# Patient Record
Sex: FEMALE | Race: WHITE | Employment: OTHER | URBAN - METROPOLITAN AREA
[De-identification: names, ages, dates, MRNs, and addresses within clinical notes are randomized per-mention and may not be internally consistent; named-entity substitution may affect disease eponyms.]

---

## 2017-07-01 PROBLEM — G93.40 ACUTE ENCEPHALOPATHY: Status: ACTIVE | Noted: 2017-07-01

## 2017-11-21 ENCOUNTER — TELEPHONE (OUTPATIENT)
Dept: FAMILY MEDICINE CLINIC | Age: 82
End: 2017-11-21

## 2018-05-01 ENCOUNTER — HOSPITAL ENCOUNTER (OUTPATIENT)
Dept: OTHER | Age: 83
Discharge: HOME | End: 2018-05-31
Attending: INTERNAL MEDICINE | Admitting: INTERNAL MEDICINE

## 2018-05-02 LAB
ALBUMIN SERPL-MCNC: 3.6 GM/DL (ref 3.4–5)
ALP BLD-CCNC: 81 IU/L (ref 40–128)
ALT SERPL-CCNC: 35 U/L (ref 10–40)
ANION GAP SERPL CALCULATED.3IONS-SCNC: 13 MMOL/L (ref 4–16)
AST SERPL-CCNC: 27 IU/L (ref 15–37)
BILIRUB SERPL-MCNC: 0.3 MG/DL (ref 0–1)
BUN BLDV-MCNC: 18 MG/DL (ref 6–23)
CALCIUM SERPL-MCNC: 9.6 MG/DL (ref 8.3–10.6)
CHLORIDE BLD-SCNC: 98 MMOL/L (ref 99–110)
CO2: 28 MMOL/L (ref 21–32)
CREAT SERPL-MCNC: 0.8 MG/DL (ref 0.6–1.1)
GFR AFRICAN AMERICAN: >60 ML/MIN/1.73M2
GFR NON-AFRICAN AMERICAN: >60 ML/MIN/1.73M2
GLUCOSE BLD-MCNC: 70 MG/DL (ref 70–99)
POTASSIUM SERPL-SCNC: 4.7 MMOL/L (ref 3.5–5.1)
SODIUM BLD-SCNC: 139 MMOL/L (ref 135–145)
TOTAL PROTEIN: 6.3 GM/DL (ref 6.4–8.2)

## 2018-05-16 LAB
ALBUMIN SERPL-MCNC: 3.5 GM/DL (ref 3.4–5)
ALP BLD-CCNC: 64 IU/L (ref 40–128)
ALT SERPL-CCNC: 8 U/L (ref 10–40)
ANION GAP SERPL CALCULATED.3IONS-SCNC: 10 MMOL/L (ref 4–16)
AST SERPL-CCNC: 13 IU/L (ref 15–37)
BILIRUB SERPL-MCNC: 0.2 MG/DL (ref 0–1)
BUN BLDV-MCNC: 18 MG/DL (ref 6–23)
CALCIUM SERPL-MCNC: 9.3 MG/DL (ref 8.3–10.6)
CHLORIDE BLD-SCNC: 102 MMOL/L (ref 99–110)
CO2: 28 MMOL/L (ref 21–32)
CREAT SERPL-MCNC: 0.9 MG/DL (ref 0.6–1.1)
GFR AFRICAN AMERICAN: >60 ML/MIN/1.73M2
GFR NON-AFRICAN AMERICAN: 59 ML/MIN/1.73M2
GLUCOSE BLD-MCNC: 78 MG/DL (ref 70–99)
POTASSIUM SERPL-SCNC: 4.2 MMOL/L (ref 3.5–5.1)
SODIUM BLD-SCNC: 140 MMOL/L (ref 135–145)
TOTAL PROTEIN: 5.9 GM/DL (ref 6.4–8.2)

## 2018-06-01 ENCOUNTER — HOSPITAL ENCOUNTER (OUTPATIENT)
Dept: OTHER | Age: 83
Discharge: HOME | End: 2018-06-30
Attending: INTERNAL MEDICINE | Admitting: INTERNAL MEDICINE

## 2018-08-12 PROBLEM — A41.9 SEPSIS (HCC): Status: ACTIVE | Noted: 2018-08-12

## 2018-08-24 PROBLEM — K92.2 GI BLEED: Status: ACTIVE | Noted: 2018-08-24

## 2018-10-17 ENCOUNTER — HOSPITAL ENCOUNTER (EMERGENCY)
Age: 83
Discharge: ANOTHER HOSPITAL | End: 2018-10-17
Attending: EMERGENCY MEDICINE
Payer: MEDICARE

## 2018-10-17 VITALS
WEIGHT: 153 LBS | HEIGHT: 60 IN | OXYGEN SATURATION: 97 % | HEART RATE: 92 BPM | SYSTOLIC BLOOD PRESSURE: 108 MMHG | TEMPERATURE: 98.2 F | DIASTOLIC BLOOD PRESSURE: 60 MMHG | BODY MASS INDEX: 30.04 KG/M2 | RESPIRATION RATE: 19 BRPM

## 2018-10-17 DIAGNOSIS — K92.2 LOWER GI BLEED: Primary | ICD-10-CM

## 2018-10-17 LAB
ALBUMIN SERPL-MCNC: 3.5 GM/DL (ref 3.4–5)
ALP BLD-CCNC: 82 IU/L (ref 40–129)
ALT SERPL-CCNC: 6 U/L (ref 10–40)
ANION GAP SERPL CALCULATED.3IONS-SCNC: 13 MMOL/L (ref 4–16)
APTT: 28.8 SECONDS (ref 21.2–33)
AST SERPL-CCNC: 9 IU/L (ref 15–37)
BASOPHILS ABSOLUTE: 0 K/CU MM
BASOPHILS RELATIVE PERCENT: 0.5 % (ref 0–1)
BILIRUB SERPL-MCNC: 0.3 MG/DL (ref 0–1)
BUN BLDV-MCNC: 34 MG/DL (ref 6–23)
CALCIUM SERPL-MCNC: 9.4 MG/DL (ref 8.3–10.6)
CHLORIDE BLD-SCNC: 106 MMOL/L (ref 99–110)
CO2: 19 MMOL/L (ref 21–32)
CREAT SERPL-MCNC: 1.1 MG/DL (ref 0.6–1.1)
DIFFERENTIAL TYPE: ABNORMAL
EOSINOPHILS ABSOLUTE: 0.1 K/CU MM
EOSINOPHILS RELATIVE PERCENT: 1.2 % (ref 0–3)
GFR AFRICAN AMERICAN: 57 ML/MIN/1.73M2
GFR NON-AFRICAN AMERICAN: 47 ML/MIN/1.73M2
GLUCOSE BLD-MCNC: 130 MG/DL (ref 70–99)
HCT VFR BLD CALC: 42.9 % (ref 37–47)
HEMOGLOBIN: 12.9 GM/DL (ref 12.5–16)
IMMATURE NEUTROPHIL %: 0.3 % (ref 0–0.43)
INR BLD: 0.98 INDEX
LACTATE: 2.4 MMOL/L (ref 0.4–2)
LYMPHOCYTES ABSOLUTE: 1.9 K/CU MM
LYMPHOCYTES RELATIVE PERCENT: 22.6 % (ref 24–44)
MCH RBC QN AUTO: 27.7 PG (ref 27–31)
MCHC RBC AUTO-ENTMCNC: 30.1 % (ref 32–36)
MCV RBC AUTO: 92.1 FL (ref 78–100)
MONOCYTES ABSOLUTE: 0.7 K/CU MM
MONOCYTES RELATIVE PERCENT: 8.5 % (ref 0–4)
NUCLEATED RBC %: 0 %
PDW BLD-RTO: 13.5 % (ref 11.7–14.9)
PLATELET # BLD: 367 K/CU MM (ref 140–440)
PMV BLD AUTO: 10 FL (ref 7.5–11.1)
POTASSIUM SERPL-SCNC: 3.7 MMOL/L (ref 3.5–5.1)
PROTHROMBIN TIME: 11.2 SECONDS (ref 9.12–12.5)
RBC # BLD: 4.66 M/CU MM (ref 4.2–5.4)
REASON FOR REJECTION: NORMAL
REJECTED TEST: NORMAL
SEGMENTED NEUTROPHILS ABSOLUTE COUNT: 5.8 K/CU MM
SEGMENTED NEUTROPHILS RELATIVE PERCENT: 66.9 % (ref 36–66)
SODIUM BLD-SCNC: 138 MMOL/L (ref 135–145)
SOURCE: NORMAL
TOTAL IMMATURE NEUTOROPHIL: 0.03 K/CU MM
TOTAL NUCLEATED RBC: 0 K/CU MM
TOTAL PROTEIN: 7.2 GM/DL (ref 6.4–8.2)
WBC # BLD: 8.6 K/CU MM (ref 4–10.5)

## 2018-10-17 PROCEDURE — 86850 RBC ANTIBODY SCREEN: CPT

## 2018-10-17 PROCEDURE — 99284 EMERGENCY DEPT VISIT MOD MDM: CPT

## 2018-10-17 PROCEDURE — 36415 COLL VENOUS BLD VENIPUNCTURE: CPT

## 2018-10-17 PROCEDURE — 2580000003 HC RX 258: Performed by: EMERGENCY MEDICINE

## 2018-10-17 PROCEDURE — 96361 HYDRATE IV INFUSION ADD-ON: CPT

## 2018-10-17 PROCEDURE — 96360 HYDRATION IV INFUSION INIT: CPT

## 2018-10-17 PROCEDURE — 86900 BLOOD TYPING SEROLOGIC ABO: CPT

## 2018-10-17 PROCEDURE — 86901 BLOOD TYPING SEROLOGIC RH(D): CPT

## 2018-10-17 PROCEDURE — 85730 THROMBOPLASTIN TIME PARTIAL: CPT

## 2018-10-17 PROCEDURE — 85610 PROTHROMBIN TIME: CPT

## 2018-10-17 PROCEDURE — 80053 COMPREHEN METABOLIC PANEL: CPT

## 2018-10-17 PROCEDURE — 83605 ASSAY OF LACTIC ACID: CPT

## 2018-10-17 PROCEDURE — 85025 COMPLETE CBC W/AUTO DIFF WBC: CPT

## 2018-10-17 RX ORDER — 0.9 % SODIUM CHLORIDE 0.9 %
500 INTRAVENOUS SOLUTION INTRAVENOUS ONCE
Status: COMPLETED | OUTPATIENT
Start: 2018-10-17 | End: 2018-10-17

## 2018-10-17 RX ADMIN — SODIUM CHLORIDE 500 ML: 9 INJECTION, SOLUTION INTRAVENOUS at 19:37

## 2018-10-17 ASSESSMENT — PAIN SCALES - GENERAL: PAINLEVEL_OUTOF10: 8

## 2018-10-17 ASSESSMENT — PAIN DESCRIPTION - LOCATION: LOCATION: ABDOMEN

## 2018-10-17 ASSESSMENT — PAIN DESCRIPTION - PAIN TYPE: TYPE: ACUTE PAIN

## 2018-10-17 NOTE — ED PROVIDER NOTES
Triage Chief Complaint:   Rectal Bleeding (x 1hour)    [place]:  Cheyanne Sanchez is a 80 y.o. female that presents with bright red blood per rectum. Per EMS report patient has had 1 hour of bright red blood per rectum. Patient with a history recently of the same. Patient is complaining of some abdominal pain that is described as an achiness, mid abdomen and nonradiating. Patient reports \"I am tired of this happening\". Patient recently with admissions for the same. Patient was found to have a transverse colon mass on last admission and scope at [facility] but at that time was declining any intervention. Patient is with some confusion limiting history. Per chart review, patient is DNR comfort care arrest.  Additionally, patient is on aspirin and Plavix per chart review.     ROS:  General:  No fevers, no chills, no weakness  Eyes:  No recent vison changes, no discharge  ENT:  No sore throat, no nasal congestion, no hearing changes  Cardiovascular:  No chest pain, no palpitations  Respiratory:  No shortness of breath, no cough, no wheezing  Gastrointestinal:  + pain, + GIB, no nausea, no vomiting, no diarrhea  Musculoskeletal:  No muscle pain, no joint pain  Skin:  No rash, no pruritis, no easy bruising  Neurologic:  No speech problems, no headache, no extremity numbness, no extremity tingling, no extremity weakness  Psychiatric:  No anxiety  Genitourinary:  No dysuria, no hematuria  Endocrine:  No unexpected weight gain, no unexpected weight loss  Extremities:  no edema, no pain    Past Medical History:   Diagnosis Date    Arthritis     Blind left eye     Cancer ([facility] [address] [facility] 1971    colon    COPD (chronic obstructive pulmonary disease) (HCC)     Depression     GERD (gastroesophageal reflux disease)     Hypertension     Pneumonia     Restless legs syndrome     Unspecified cerebral artery occlusion with cerebral infarction      Past Surgical History:   Procedure Laterality Date    ABDOMEN SURGERY do not believe this is unreasonable. [facility] and I did speak with Dr. Lacho Olivia who is accepting of patient for direct admission. Bed and definitive transfer are pending on signout to overnight physician. Vitals signs on signout 108/57, HR 88, 96% on RA. Patient updated. Questions sought and answered with the patient. They voice understanding and agree with plan. Clinical Impression:  1. Lower GI bleed      Disposition referral (if applicable):  No follow-up provider specified. Disposition medications (if applicable):  New Prescriptions    No medications on file       Comment: Please note this report has been produced using speech recognition software and may contain errors related to that system including errors in grammar, punctuation, and spelling, as well as words and phrases that may be inappropriate. If there are any questions or concerns please feel free to contact the dictating provider for clarification.        Viridiana Ortega MD  10/17/18 7754

## 2018-12-18 ENCOUNTER — HOSPITAL ENCOUNTER (EMERGENCY)
Age: 83
Discharge: HOME | End: 2018-12-18
Attending: EMERGENCY MEDICINE
Payer: MEDICARE

## 2018-12-18 VITALS
HEIGHT: 60 IN | RESPIRATION RATE: 18 BRPM | SYSTOLIC BLOOD PRESSURE: 118 MMHG | TEMPERATURE: 98.9 F | HEART RATE: 112 BPM | BODY MASS INDEX: 29.45 KG/M2 | WEIGHT: 150 LBS | DIASTOLIC BLOOD PRESSURE: 70 MMHG | OXYGEN SATURATION: 94 %

## 2018-12-18 DIAGNOSIS — R10.33 PERIUMBILICAL ABDOMINAL PAIN: Primary | ICD-10-CM

## 2018-12-18 LAB
ALBUMIN SERPL-MCNC: 3.7 GM/DL (ref 3.4–5)
ALP BLD-CCNC: 79 IU/L (ref 40–129)
ALT SERPL-CCNC: 10 U/L (ref 10–40)
ANION GAP SERPL CALCULATED.3IONS-SCNC: 11 MMOL/L (ref 4–16)
AST SERPL-CCNC: 18 IU/L (ref 15–37)
BASOPHILS ABSOLUTE: 0 K/CU MM
BASOPHILS RELATIVE PERCENT: 0.4 % (ref 0–1)
BILIRUB SERPL-MCNC: 0.1 MG/DL (ref 0–1)
BUN BLDV-MCNC: 15 MG/DL (ref 6–23)
CALCIUM SERPL-MCNC: 8.9 MG/DL (ref 8.3–10.6)
CHLORIDE BLD-SCNC: 107 MMOL/L (ref 99–110)
CO2: 23 MMOL/L (ref 21–32)
CREAT SERPL-MCNC: 0.8 MG/DL (ref 0.6–1.1)
DIFFERENTIAL TYPE: ABNORMAL
EOSINOPHILS ABSOLUTE: 0.4 K/CU MM
EOSINOPHILS RELATIVE PERCENT: 4.5 % (ref 0–3)
GFR AFRICAN AMERICAN: >60 ML/MIN/1.73M2
GFR NON-AFRICAN AMERICAN: >60 ML/MIN/1.73M2
GLUCOSE BLD-MCNC: 97 MG/DL (ref 70–99)
HCT VFR BLD CALC: 39.9 % (ref 37–47)
HEMOGLOBIN: 11.9 GM/DL (ref 12.5–16)
IMMATURE NEUTROPHIL %: 0.3 % (ref 0–0.43)
LACTATE: 1.8 MMOL/L (ref 0.4–2)
LIPASE: 53 IU/L (ref 13–60)
LYMPHOCYTES ABSOLUTE: 2 K/CU MM
LYMPHOCYTES RELATIVE PERCENT: 22.3 % (ref 24–44)
MCH RBC QN AUTO: 25.8 PG (ref 27–31)
MCHC RBC AUTO-ENTMCNC: 29.8 % (ref 32–36)
MCV RBC AUTO: 86.6 FL (ref 78–100)
MONOCYTES ABSOLUTE: 0.6 K/CU MM
MONOCYTES RELATIVE PERCENT: 6.7 % (ref 0–4)
NUCLEATED RBC %: 0 %
PDW BLD-RTO: 15.6 % (ref 11.7–14.9)
PLATELET # BLD: 307 K/CU MM (ref 140–440)
PMV BLD AUTO: 10 FL (ref 7.5–11.1)
POTASSIUM SERPL-SCNC: 4 MMOL/L (ref 3.5–5.1)
RBC # BLD: 4.61 M/CU MM (ref 4.2–5.4)
SEGMENTED NEUTROPHILS ABSOLUTE COUNT: 6 K/CU MM
SEGMENTED NEUTROPHILS RELATIVE PERCENT: 65.8 % (ref 36–66)
SODIUM BLD-SCNC: 141 MMOL/L (ref 135–145)
TOTAL IMMATURE NEUTOROPHIL: 0.03 K/CU MM
TOTAL NUCLEATED RBC: 0 K/CU MM
TOTAL PROTEIN: 6.9 GM/DL (ref 6.4–8.2)
TROPONIN T: <0.01 NG/ML
WBC # BLD: 9.1 K/CU MM (ref 4–10.5)

## 2018-12-18 PROCEDURE — 83690 ASSAY OF LIPASE: CPT

## 2018-12-18 PROCEDURE — 85025 COMPLETE CBC W/AUTO DIFF WBC: CPT

## 2018-12-18 PROCEDURE — 84484 ASSAY OF TROPONIN QUANT: CPT

## 2018-12-18 PROCEDURE — 83605 ASSAY OF LACTIC ACID: CPT

## 2018-12-18 PROCEDURE — 93005 ELECTROCARDIOGRAM TRACING: CPT | Performed by: EMERGENCY MEDICINE

## 2018-12-18 PROCEDURE — 36415 COLL VENOUS BLD VENIPUNCTURE: CPT

## 2018-12-18 PROCEDURE — 99284 EMERGENCY DEPT VISIT MOD MDM: CPT

## 2018-12-18 PROCEDURE — 80053 COMPREHEN METABOLIC PANEL: CPT

## 2018-12-18 ASSESSMENT — ENCOUNTER SYMPTOMS
SORE THROAT: 0
CONSTIPATION: 0
ABDOMINAL PAIN: 1
SHORTNESS OF BREATH: 0
COUGH: 0
RHINORRHEA: 0
DIARRHEA: 0
NAUSEA: 1
BACK PAIN: 0
EYE REDNESS: 0
VOMITING: 0

## 2018-12-18 NOTE — ED PROVIDER NOTES
COLONOSCOPY      ENDOSCOPY, COLON, DIAGNOSTIC      EYE SURGERY      FRACTURE SURGERY      HYSTERECTOMY  1972    SPINE SURGERY  1995    VASCULAR SURGERY       Family History   Problem Relation Age of Onset    Arthritis Mother     Cancer Mother     Diabetes Mother     Heart Disease Mother     High Blood Pressure Mother    Alissa Hanna / [place] Mother     Cancer Sister     Cancer Brother     Diabetes Daughter     High Blood Pressure Daughter      Social History     Social History    Marital status:      Spouse name: N/A    Number of children: 9    Years of education: N/A     Occupational History    Not on file. Social History Main Topics    Smoking status: Current Every Day Smoker     Packs/day: 0.25     Years: 50.00     Types: Cigarettes    Smokeless tobacco: Never Used      Comment: states smokes 1-2 cigs a day    Alcohol use No    Drug use: No    Sexual activity: No     Other Topics Concern    Not on file     Social History Narrative    No narrative on file     No current facility-administered medications for this encounter.       Current Outpatient Prescriptions   Medication Sig Dispense Refill    vitamin B-12 (CYANOCOBALAMIN) 1000 MCG tablet Take 1,000 mcg by mouth daily      magnesium hydroxide (MILK OF MAGNESIA) 400 MG/5ML suspension Take 30 mLs by mouth daily as needed for Constipation      albuterol sulfate  (90 Base) MCG/ACT inhaler Inhale 2 puffs into the lungs every 6 hours as needed for Shortness of Breath       rOPINIRole (REQUIP) 4 MG tablet Take 4 mg by mouth 2 times daily      bisacodyl (DULCOLAX) 10 MG suppository Place 10 mg rectally daily as needed for Constipation       vitamin D (CHOLECALCIFEROL) 1000 UNIT TABS tablet Take 1,000 Units by mouth daily      memantine (NAMENDA) 5 MG tablet Take 1 tablet by mouth 2 times daily 60 tablet 3    acetaminophen 650 MG TABS Take 650 mg by mouth every 4 hours as needed 120 tablet 3    dorzolamide available lab results from this visit (if applicable):  Results for orders placed or performed during the hospital encounter of 12/18/18   Comprehensive Metabolic Panel w/ Reflex to MG   Result Value Ref Range    Sodium 141 135 - 145 MMOL/L    Potassium 4.0 3.5 - 5.1 MMOL/L    Chloride 107 99 - 110 mMol/L    CO2 23 21 - 32 MMOL/L    BUN 15 6 - 23 MG/DL    CREATININE 0.8 0.6 - 1.1 MG/DL    Glucose 97 70 - 99 MG/DL    Calcium 8.9 8.3 - 10.6 MG/DL    Alb 3.7 3.4 - 5.0 GM/DL    Total Protein 6.9 6.4 - 8.2 GM/DL    Total Bilirubin 0.1 0.0 - 1.0 MG/DL    ALT 10 10 - 40 U/L    AST 18 15 - 37 IU/L    Alkaline Phosphatase 79 40 - 129 IU/L    GFR Non-African American >60 >60 mL/min/1.73m2    GFR African American >60 >60 mL/min/1.73m2    Anion Gap 11 4 - 16   CBC Auto Differential   Result Value Ref Range    WBC 9.1 4.0 - 10.5 K/CU MM    RBC 4.61 4.2 - 5.4 M/CU MM    Hemoglobin 11.9 (L) 12.5 - 16.0 GM/DL    Hematocrit 39.9 37 - 47 %    MCV 86.6 78 - 100 FL    MCH 25.8 (L) 27 - 31 PG    MCHC 29.8 (L) 32.0 - 36.0 %    RDW 15.6 (H) 11.7 - 14.9 %    Platelets 236 645 - 760 K/CU MM    MPV 10.0 7.5 - 11.1 FL    Differential Type AUTOMATED DIFFERENTIAL     Segs Relative 65.8 36 - 66 %    Lymphocytes % 22.3 (L) 24 - 44 %    Monocytes % 6.7 (H) 0 - 4 %    Eosinophils % 4.5 (H) 0 - 3 %    Basophils % 0.4 0 - 1 %    Segs Absolute 6.0 K/CU MM    Lymphocytes # 2.0 K/CU MM    Monocytes # 0.6 K/CU MM    Eosinophils # 0.4 K/CU MM    Basophils # 0.0 K/CU MM    Nucleated RBC % 0.0 %    Total Nucleated RBC 0.0 K/CU MM    Total Immature Neutrophil 0.03 K/CU MM    Immature Neutrophil % 0.3 0 - 0.43 %   Troponin   Result Value Ref Range    Troponin T <0.010 <0.01 NG/ML   Lipase   Result Value Ref Range    Lipase 53 13 - 60 IU/L   Lactic Acid, Plasma   Result Value Ref Range    Lactate 1.8 0.4 - 2.0 mMOL/L   EKG 12 Lead   Result Value Ref Range    Ventricular Rate 108 BPM    Atrial Rate 108 BPM    P-R Interval 152 ms    QRS Duration 72 ms    Q-T were addressed to the patient's satisfaction. Patient understood and agreed with plan. The likelihood of other entities in the differential is insufficient to justify any further testing for them. This was explained to the patient. The patient was advised that persistent or worsening symptoms would requirefurther evaluation. Clinical Impression:  1. Periumbilical abdominal pain          Cheryl Sena MD       Please note that portions of this note may have been complete with a voice recognition program.  Effortswere made to edit the dictations, but occasional words are mis-transcribed.           Cheyrl Sena MD  12/18/18 4018

## 2018-12-21 LAB
EKG ATRIAL RATE: 105 BPM
EKG DIAGNOSIS: NORMAL
EKG P AXIS: 33 DEGREES
EKG P-R INTERVAL: 154 MS
EKG Q-T INTERVAL: 368 MS
EKG QRS DURATION: 74 MS
EKG QTC CALCULATION (BAZETT): 486 MS
EKG R AXIS: -17 DEGREES
EKG T AXIS: 28 DEGREES
EKG VENTRICULAR RATE: 105 BPM

## 2019-02-09 ENCOUNTER — APPOINTMENT (OUTPATIENT)
Dept: CT IMAGING | Age: 84
End: 2019-02-09
Payer: MEDICARE

## 2019-02-09 ENCOUNTER — HOSPITAL ENCOUNTER (EMERGENCY)
Age: 84
Discharge: HOME | End: 2019-02-09
Attending: EMERGENCY MEDICINE
Payer: MEDICARE

## 2019-02-09 ENCOUNTER — APPOINTMENT (OUTPATIENT)
Dept: GENERAL RADIOLOGY | Age: 84
End: 2019-02-09
Payer: MEDICARE

## 2019-02-09 VITALS
RESPIRATION RATE: 15 BRPM | DIASTOLIC BLOOD PRESSURE: 114 MMHG | SYSTOLIC BLOOD PRESSURE: 165 MMHG | BODY MASS INDEX: 30.27 KG/M2 | OXYGEN SATURATION: 95 % | WEIGHT: 155 LBS | HEART RATE: 86 BPM | TEMPERATURE: 97.8 F

## 2019-02-09 DIAGNOSIS — R53.83 FATIGUE, UNSPECIFIED TYPE: Primary | ICD-10-CM

## 2019-02-09 LAB
ALBUMIN SERPL-MCNC: 3.5 GM/DL (ref 3.4–5)
ALP BLD-CCNC: 72 IU/L (ref 40–129)
ALT SERPL-CCNC: 9 U/L (ref 10–40)
ANION GAP SERPL CALCULATED.3IONS-SCNC: 10 MMOL/L (ref 4–16)
AST SERPL-CCNC: 15 IU/L (ref 15–37)
BACTERIA: ABNORMAL /HPF
BASOPHILS ABSOLUTE: 0 K/CU MM
BASOPHILS RELATIVE PERCENT: 0.8 % (ref 0–1)
BILIRUB SERPL-MCNC: 0.3 MG/DL (ref 0–1)
BILIRUBIN URINE: NEGATIVE MG/DL
BLOOD, URINE: ABNORMAL
BUN BLDV-MCNC: 15 MG/DL (ref 6–23)
CALCIUM SERPL-MCNC: 9.1 MG/DL (ref 8.3–10.6)
CHLORIDE BLD-SCNC: 108 MMOL/L (ref 99–110)
CLARITY: ABNORMAL
CO2: 24 MMOL/L (ref 21–32)
COLOR: YELLOW
CREAT SERPL-MCNC: 0.8 MG/DL (ref 0.6–1.1)
DIFFERENTIAL TYPE: ABNORMAL
EOSINOPHILS ABSOLUTE: 0.4 K/CU MM
EOSINOPHILS RELATIVE PERCENT: 8.3 % (ref 0–3)
GFR AFRICAN AMERICAN: >60 ML/MIN/1.73M2
GFR NON-AFRICAN AMERICAN: >60 ML/MIN/1.73M2
GLUCOSE BLD-MCNC: 89 MG/DL (ref 70–99)
GLUCOSE, URINE: NEGATIVE MG/DL
HCT VFR BLD CALC: 40.5 % (ref 37–47)
HEMOGLOBIN: 12.1 GM/DL (ref 12.5–16)
IMMATURE NEUTROPHIL %: 0.4 % (ref 0–0.43)
KETONES, URINE: NEGATIVE MG/DL
LEUKOCYTE ESTERASE, URINE: NEGATIVE
LYMPHOCYTES ABSOLUTE: 2 K/CU MM
LYMPHOCYTES RELATIVE PERCENT: 37.9 % (ref 24–44)
MCH RBC QN AUTO: 26.1 PG (ref 27–31)
MCHC RBC AUTO-ENTMCNC: 29.9 % (ref 32–36)
MCV RBC AUTO: 87.3 FL (ref 78–100)
MONOCYTES ABSOLUTE: 0.5 K/CU MM
MONOCYTES RELATIVE PERCENT: 9.7 % (ref 0–4)
MUCUS: ABNORMAL HPF
NITRITE URINE, QUANTITATIVE: NEGATIVE
NUCLEATED RBC %: 0 %
PDW BLD-RTO: 16.5 % (ref 11.7–14.9)
PH, URINE: 6 (ref 5–8)
PLATELET # BLD: 316 K/CU MM (ref 140–440)
PMV BLD AUTO: 9.8 FL (ref 7.5–11.1)
POTASSIUM SERPL-SCNC: 4.2 MMOL/L (ref 3.5–5.1)
PROTEIN UA: NEGATIVE MG/DL
RBC # BLD: 4.64 M/CU MM (ref 4.2–5.4)
RBC URINE: 3 /HPF (ref 0–6)
SEGMENTED NEUTROPHILS ABSOLUTE COUNT: 2.2 K/CU MM
SEGMENTED NEUTROPHILS RELATIVE PERCENT: 42.9 % (ref 36–66)
SODIUM BLD-SCNC: 142 MMOL/L (ref 135–145)
SPECIFIC GRAVITY UA: 1.01 (ref 1–1.03)
SQUAMOUS EPITHELIAL: 41 /HPF
TOTAL IMMATURE NEUTOROPHIL: 0.02 K/CU MM
TOTAL NUCLEATED RBC: 0 K/CU MM
TOTAL PROTEIN: 6.9 GM/DL (ref 6.4–8.2)
TRICHOMONAS: ABNORMAL /HPF
TROPONIN T: <0.01 NG/ML
UROBILINOGEN, URINE: NORMAL MG/DL (ref 0.2–1)
WBC # BLD: 5.2 K/CU MM (ref 4–10.5)
WBC UA: <1 /HPF (ref 0–5)

## 2019-02-09 PROCEDURE — 70450 CT HEAD/BRAIN W/O DYE: CPT

## 2019-02-09 PROCEDURE — 84484 ASSAY OF TROPONIN QUANT: CPT

## 2019-02-09 PROCEDURE — 4500000027

## 2019-02-09 PROCEDURE — 93005 ELECTROCARDIOGRAM TRACING: CPT | Performed by: PHYSICIAN ASSISTANT

## 2019-02-09 PROCEDURE — 85025 COMPLETE CBC W/AUTO DIFF WBC: CPT

## 2019-02-09 PROCEDURE — 99285 EMERGENCY DEPT VISIT HI MDM: CPT

## 2019-02-09 PROCEDURE — 71045 X-RAY EXAM CHEST 1 VIEW: CPT

## 2019-02-09 PROCEDURE — 81001 URINALYSIS AUTO W/SCOPE: CPT

## 2019-02-09 PROCEDURE — 80053 COMPREHEN METABOLIC PANEL: CPT

## 2019-02-11 PROCEDURE — 93010 ELECTROCARDIOGRAM REPORT: CPT | Performed by: INTERNAL MEDICINE

## 2019-02-12 LAB
EKG ATRIAL RATE: 78 BPM
EKG DIAGNOSIS: NORMAL
EKG P AXIS: 25 DEGREES
EKG P-R INTERVAL: 172 MS
EKG Q-T INTERVAL: 426 MS
EKG QRS DURATION: 82 MS
EKG QTC CALCULATION (BAZETT): 485 MS
EKG R AXIS: -12 DEGREES
EKG T AXIS: 43 DEGREES
EKG VENTRICULAR RATE: 78 BPM

## 2019-08-27 ENCOUNTER — HOSPITAL ENCOUNTER (INPATIENT)
Age: 84
LOS: 3 days | Discharge: HOME | DRG: 191 | End: 2019-08-30
Attending: EMERGENCY MEDICINE | Admitting: FAMILY MEDICINE
Payer: MEDICARE

## 2019-08-27 ENCOUNTER — APPOINTMENT (OUTPATIENT)
Dept: GENERAL RADIOLOGY | Age: 84
DRG: 191 | End: 2019-08-27
Payer: MEDICARE

## 2019-08-27 DIAGNOSIS — J44.1 COPD EXACERBATION (HCC): Primary | ICD-10-CM

## 2019-08-27 LAB
ADENOVIRUS DETECTION BY PCR: NOT DETECTED
ALBUMIN SERPL-MCNC: 3.7 GM/DL (ref 3.4–5)
ALP BLD-CCNC: 98 IU/L (ref 40–129)
ALT SERPL-CCNC: 10 U/L (ref 10–40)
ANION GAP SERPL CALCULATED.3IONS-SCNC: 9 MMOL/L (ref 4–16)
AST SERPL-CCNC: 16 IU/L (ref 15–37)
BASE EXCESS MIXED: ABNORMAL (ref 0–2.3)
BASOPHILS ABSOLUTE: 0 K/CU MM
BASOPHILS RELATIVE PERCENT: 0.5 % (ref 0–1)
BILIRUB SERPL-MCNC: 0.2 MG/DL (ref 0–1)
BORDETELLA PERTUSSIS PCR: NOT DETECTED
BUN BLDV-MCNC: 18 MG/DL (ref 6–23)
CALCIUM SERPL-MCNC: 9.5 MG/DL (ref 8.3–10.6)
CHLAMYDOPHILA PNEUMONIA PCR: NOT DETECTED
CHLORIDE BLD-SCNC: 105 MMOL/L (ref 99–110)
CO2: 25 MMOL/L (ref 21–32)
COMMENT: ABNORMAL
CORONAVIRUS 229E PCR: NOT DETECTED
CORONAVIRUS HKU1 PCR: NOT DETECTED
CORONAVIRUS NL63 PCR: NOT DETECTED
CORONAVIRUS OC43 PCR: NOT DETECTED
CREAT SERPL-MCNC: 1 MG/DL (ref 0.6–1.1)
DIFFERENTIAL TYPE: ABNORMAL
EOSINOPHILS ABSOLUTE: 0.3 K/CU MM
EOSINOPHILS RELATIVE PERCENT: 3.8 % (ref 0–3)
GFR AFRICAN AMERICAN: >60 ML/MIN/1.73M2
GFR NON-AFRICAN AMERICAN: 52 ML/MIN/1.73M2
GLUCOSE BLD-MCNC: 119 MG/DL (ref 70–99)
HCO3 VENOUS: 27.3 MMOL/L (ref 19–25)
HCT VFR BLD CALC: 40.8 % (ref 37–47)
HEMOGLOBIN: 12.4 GM/DL (ref 12.5–16)
HUMAN METAPNEUMOVIRUS PCR: NOT DETECTED
IMMATURE NEUTROPHIL %: 0.4 % (ref 0–0.43)
INFLUENZA A BY PCR: NOT DETECTED
INFLUENZA A H1 (2009) PCR: NOT DETECTED
INFLUENZA A H1 PANDEMIC PCR: NOT DETECTED
INFLUENZA A H3 PCR: NOT DETECTED
INFLUENZA B BY PCR: NOT DETECTED
LACTATE: 1.9 MMOL/L (ref 0.4–2)
LYMPHOCYTES ABSOLUTE: 2.5 K/CU MM
LYMPHOCYTES RELATIVE PERCENT: 31 % (ref 24–44)
MAGNESIUM: 2.2 MG/DL (ref 1.8–2.4)
MCH RBC QN AUTO: 26.6 PG (ref 27–31)
MCHC RBC AUTO-ENTMCNC: 30.4 % (ref 32–36)
MCV RBC AUTO: 87.4 FL (ref 78–100)
MONOCYTES ABSOLUTE: 0.6 K/CU MM
MONOCYTES RELATIVE PERCENT: 7.8 % (ref 0–4)
MYCOPLASMA PNEUMONIAE PCR: NOT DETECTED
NUCLEATED RBC %: 0 %
O2 SAT, VEN: 71.1 % (ref 50–70)
PARAINFLUENZA 1 PCR: NOT DETECTED
PARAINFLUENZA 2 PCR: NOT DETECTED
PARAINFLUENZA 3 PCR: NOT DETECTED
PARAINFLUENZA 4 PCR: NOT DETECTED
PCO2, VEN: 44 MMHG (ref 38–52)
PDW BLD-RTO: 15.5 % (ref 11.7–14.9)
PH VENOUS: 7.4 (ref 7.32–7.42)
PLATELET # BLD: 307 K/CU MM (ref 140–440)
PMV BLD AUTO: 10.2 FL (ref 7.5–11.1)
PO2, VEN: 35 MMHG (ref 28–48)
POTASSIUM SERPL-SCNC: 3.6 MMOL/L (ref 3.5–5.1)
PRO-BNP: 194.8 PG/ML
RBC # BLD: 4.67 M/CU MM (ref 4.2–5.4)
RHINOVIRUS ENTEROVIRUS PCR: ABNORMAL
RSV PCR: NOT DETECTED
SEGMENTED NEUTROPHILS ABSOLUTE COUNT: 4.5 K/CU MM
SEGMENTED NEUTROPHILS RELATIVE PERCENT: 56.5 % (ref 36–66)
SODIUM BLD-SCNC: 139 MMOL/L (ref 135–145)
TOTAL IMMATURE NEUTOROPHIL: 0.03 K/CU MM
TOTAL NUCLEATED RBC: 0 K/CU MM
TOTAL PROTEIN: 7 GM/DL (ref 6.4–8.2)
TROPONIN T: 0.02 NG/ML
WBC # BLD: 8 K/CU MM (ref 4–10.5)

## 2019-08-27 PROCEDURE — 6360000002 HC RX W HCPCS: Performed by: FAMILY MEDICINE

## 2019-08-27 PROCEDURE — 71045 X-RAY EXAM CHEST 1 VIEW: CPT

## 2019-08-27 PROCEDURE — 82805 BLOOD GASES W/O2 SATURATION: CPT

## 2019-08-27 PROCEDURE — 2580000003 HC RX 258: Performed by: INTERNAL MEDICINE

## 2019-08-27 PROCEDURE — 96375 TX/PRO/DX INJ NEW DRUG ADDON: CPT

## 2019-08-27 PROCEDURE — 2580000003 HC RX 258: Performed by: EMERGENCY MEDICINE

## 2019-08-27 PROCEDURE — 6370000000 HC RX 637 (ALT 250 FOR IP): Performed by: INTERNAL MEDICINE

## 2019-08-27 PROCEDURE — 96367 TX/PROPH/DG ADDL SEQ IV INF: CPT

## 2019-08-27 PROCEDURE — 83735 ASSAY OF MAGNESIUM: CPT

## 2019-08-27 PROCEDURE — 84484 ASSAY OF TROPONIN QUANT: CPT

## 2019-08-27 PROCEDURE — 87486 CHLMYD PNEUM DNA AMP PROBE: CPT

## 2019-08-27 PROCEDURE — 2580000003 HC RX 258: Performed by: FAMILY MEDICINE

## 2019-08-27 PROCEDURE — 94640 AIRWAY INHALATION TREATMENT: CPT

## 2019-08-27 PROCEDURE — 80053 COMPREHEN METABOLIC PANEL: CPT

## 2019-08-27 PROCEDURE — 85025 COMPLETE CBC W/AUTO DIFF WBC: CPT

## 2019-08-27 PROCEDURE — 6370000000 HC RX 637 (ALT 250 FOR IP): Performed by: FAMILY MEDICINE

## 2019-08-27 PROCEDURE — 96361 HYDRATE IV INFUSION ADD-ON: CPT

## 2019-08-27 PROCEDURE — 6360000002 HC RX W HCPCS: Performed by: INTERNAL MEDICINE

## 2019-08-27 PROCEDURE — 93010 ELECTROCARDIOGRAM REPORT: CPT | Performed by: INTERNAL MEDICINE

## 2019-08-27 PROCEDURE — 83605 ASSAY OF LACTIC ACID: CPT

## 2019-08-27 PROCEDURE — 87040 BLOOD CULTURE FOR BACTERIA: CPT

## 2019-08-27 PROCEDURE — 97163 PT EVAL HIGH COMPLEX 45 MIN: CPT

## 2019-08-27 PROCEDURE — 6370000000 HC RX 637 (ALT 250 FOR IP): Performed by: EMERGENCY MEDICINE

## 2019-08-27 PROCEDURE — 94761 N-INVAS EAR/PLS OXIMETRY MLT: CPT

## 2019-08-27 PROCEDURE — 36415 COLL VENOUS BLD VENIPUNCTURE: CPT

## 2019-08-27 PROCEDURE — 87581 M.PNEUMON DNA AMP PROBE: CPT

## 2019-08-27 PROCEDURE — 2060000000 HC ICU INTERMEDIATE R&B

## 2019-08-27 PROCEDURE — 96365 THER/PROPH/DIAG IV INF INIT: CPT

## 2019-08-27 PROCEDURE — 97530 THERAPEUTIC ACTIVITIES: CPT

## 2019-08-27 PROCEDURE — 87798 DETECT AGENT NOS DNA AMP: CPT

## 2019-08-27 PROCEDURE — 6360000002 HC RX W HCPCS: Performed by: EMERGENCY MEDICINE

## 2019-08-27 PROCEDURE — 87633 RESP VIRUS 12-25 TARGETS: CPT

## 2019-08-27 PROCEDURE — 83880 ASSAY OF NATRIURETIC PEPTIDE: CPT

## 2019-08-27 PROCEDURE — 97112 NEUROMUSCULAR REEDUCATION: CPT

## 2019-08-27 PROCEDURE — 93005 ELECTROCARDIOGRAM TRACING: CPT | Performed by: EMERGENCY MEDICINE

## 2019-08-27 PROCEDURE — 99285 EMERGENCY DEPT VISIT HI MDM: CPT

## 2019-08-27 RX ORDER — IPRATROPIUM BROMIDE AND ALBUTEROL SULFATE 2.5; .5 MG/3ML; MG/3ML
1 SOLUTION RESPIRATORY (INHALATION) ONCE
Status: COMPLETED | OUTPATIENT
Start: 2019-08-27 | End: 2019-08-27

## 2019-08-27 RX ORDER — SODIUM CHLORIDE 0.9 % (FLUSH) 0.9 %
10 SYRINGE (ML) INJECTION PRN
Status: DISCONTINUED | OUTPATIENT
Start: 2019-08-27 | End: 2019-08-30 | Stop reason: HOSPADM

## 2019-08-27 RX ORDER — ALBUTEROL SULFATE 90 UG/1
2 AEROSOL, METERED RESPIRATORY (INHALATION)
Status: DISCONTINUED | OUTPATIENT
Start: 2019-08-27 | End: 2019-08-30 | Stop reason: HOSPADM

## 2019-08-27 RX ORDER — 0.9 % SODIUM CHLORIDE 0.9 %
500 INTRAVENOUS SOLUTION INTRAVENOUS ONCE
Status: COMPLETED | OUTPATIENT
Start: 2019-08-27 | End: 2019-08-27

## 2019-08-27 RX ORDER — IPRATROPIUM BROMIDE AND ALBUTEROL SULFATE 2.5; .5 MG/3ML; MG/3ML
1 SOLUTION RESPIRATORY (INHALATION)
Status: DISCONTINUED | OUTPATIENT
Start: 2019-08-27 | End: 2019-08-30 | Stop reason: HOSPADM

## 2019-08-27 RX ORDER — IPRATROPIUM BROMIDE AND ALBUTEROL SULFATE 2.5; .5 MG/3ML; MG/3ML
1 SOLUTION RESPIRATORY (INHALATION) EVERY 4 HOURS PRN
Status: DISCONTINUED | OUTPATIENT
Start: 2019-08-27 | End: 2019-08-30 | Stop reason: HOSPADM

## 2019-08-27 RX ORDER — METHYLPREDNISOLONE SODIUM SUCCINATE 40 MG/ML
40 INJECTION, POWDER, LYOPHILIZED, FOR SOLUTION INTRAMUSCULAR; INTRAVENOUS EVERY 6 HOURS
Status: DISCONTINUED | OUTPATIENT
Start: 2019-08-27 | End: 2019-08-28

## 2019-08-27 RX ORDER — ACETAMINOPHEN 325 MG/1
650 TABLET ORAL EVERY 4 HOURS PRN
Status: DISCONTINUED | OUTPATIENT
Start: 2019-08-27 | End: 2019-08-30 | Stop reason: HOSPADM

## 2019-08-27 RX ORDER — METHYLPREDNISOLONE SODIUM SUCCINATE 125 MG/2ML
125 INJECTION, POWDER, LYOPHILIZED, FOR SOLUTION INTRAMUSCULAR; INTRAVENOUS ONCE
Status: COMPLETED | OUTPATIENT
Start: 2019-08-27 | End: 2019-08-27

## 2019-08-27 RX ORDER — SODIUM CHLORIDE 0.9 % (FLUSH) 0.9 %
10 SYRINGE (ML) INJECTION EVERY 12 HOURS SCHEDULED
Status: DISCONTINUED | OUTPATIENT
Start: 2019-08-27 | End: 2019-08-30 | Stop reason: HOSPADM

## 2019-08-27 RX ORDER — ROPINIROLE 1 MG/1
2 TABLET, FILM COATED ORAL 2 TIMES DAILY
Status: DISCONTINUED | OUTPATIENT
Start: 2019-08-27 | End: 2019-08-30 | Stop reason: HOSPADM

## 2019-08-27 RX ORDER — MAGNESIUM SULFATE IN WATER 40 MG/ML
2 INJECTION, SOLUTION INTRAVENOUS ONCE
Status: COMPLETED | OUTPATIENT
Start: 2019-08-27 | End: 2019-08-27

## 2019-08-27 RX ADMIN — IPRATROPIUM BROMIDE AND ALBUTEROL SULFATE 1 AMPULE: .5; 3 SOLUTION RESPIRATORY (INHALATION) at 07:21

## 2019-08-27 RX ADMIN — ROPINIROLE HYDROCHLORIDE 2 MG: 1 TABLET, FILM COATED ORAL at 11:33

## 2019-08-27 RX ADMIN — Medication 10 ML: at 20:16

## 2019-08-27 RX ADMIN — IPRATROPIUM BROMIDE AND ALBUTEROL SULFATE 1 AMPULE: .5; 3 SOLUTION RESPIRATORY (INHALATION) at 20:19

## 2019-08-27 RX ADMIN — ROPINIROLE HYDROCHLORIDE 2 MG: 1 TABLET, FILM COATED ORAL at 20:16

## 2019-08-27 RX ADMIN — AZITHROMYCIN MONOHYDRATE 500 MG: 500 INJECTION, POWDER, LYOPHILIZED, FOR SOLUTION INTRAVENOUS at 11:34

## 2019-08-27 RX ADMIN — SODIUM CHLORIDE 500 ML: 9 INJECTION, SOLUTION INTRAVENOUS at 01:14

## 2019-08-27 RX ADMIN — IPRATROPIUM BROMIDE AND ALBUTEROL SULFATE 1 AMPULE: .5; 3 SOLUTION RESPIRATORY (INHALATION) at 01:20

## 2019-08-27 RX ADMIN — METHYLPREDNISOLONE SODIUM SUCCINATE 40 MG: 40 INJECTION, POWDER, FOR SOLUTION INTRAMUSCULAR; INTRAVENOUS at 16:29

## 2019-08-27 RX ADMIN — IPRATROPIUM BROMIDE AND ALBUTEROL SULFATE 1 AMPULE: .5; 3 SOLUTION RESPIRATORY (INHALATION) at 02:10

## 2019-08-27 RX ADMIN — METHYLPREDNISOLONE SODIUM SUCCINATE 40 MG: 40 INJECTION, POWDER, FOR SOLUTION INTRAMUSCULAR; INTRAVENOUS at 11:34

## 2019-08-27 RX ADMIN — IPRATROPIUM BROMIDE AND ALBUTEROL SULFATE 1 AMPULE: .5; 3 SOLUTION RESPIRATORY (INHALATION) at 12:09

## 2019-08-27 RX ADMIN — Medication 10 ML: at 11:36

## 2019-08-27 RX ADMIN — ENOXAPARIN SODIUM 40 MG: 40 INJECTION SUBCUTANEOUS at 11:34

## 2019-08-27 RX ADMIN — AZITHROMYCIN MONOHYDRATE 500 MG: 500 INJECTION, POWDER, LYOPHILIZED, FOR SOLUTION INTRAVENOUS at 03:07

## 2019-08-27 RX ADMIN — METHYLPREDNISOLONE SODIUM SUCCINATE 125 MG: 125 INJECTION, POWDER, LYOPHILIZED, FOR SOLUTION INTRAMUSCULAR; INTRAVENOUS at 01:14

## 2019-08-27 RX ADMIN — METHYLPREDNISOLONE SODIUM SUCCINATE 40 MG: 40 INJECTION, POWDER, FOR SOLUTION INTRAMUSCULAR; INTRAVENOUS at 22:41

## 2019-08-27 RX ADMIN — MAGNESIUM SULFATE HEPTAHYDRATE 2 G: 40 INJECTION, SOLUTION INTRAVENOUS at 01:36

## 2019-08-27 ASSESSMENT — PAIN SCALES - GENERAL
PAINLEVEL_OUTOF10: 0
PAINLEVEL_OUTOF10: 0

## 2019-08-27 ASSESSMENT — ENCOUNTER SYMPTOMS
SORE THROAT: 0
COLOR CHANGE: 0
COUGH: 1
ABDOMINAL PAIN: 0
SHORTNESS OF BREATH: 1
WHEEZING: 1
BACK PAIN: 0

## 2019-08-27 ASSESSMENT — PULMONARY FUNCTION TESTS: PEFR_L/MIN: 92

## 2019-08-27 NOTE — ED NOTES
Pt medicated as per order, denies needs. Pt is blind so this RN instructed pt on which button to push on call light if she needs anything, she voiced understanding.          Flora Hoff RN  08/27/19 6464

## 2019-08-27 NOTE — ED PROVIDER NOTES
Emergency Department Encounter    Patient: Allison Mesa  MRN: [id number]  : 1931  Date of Evaluation: 2019  ED Provider:  Henrietta Kocher    Triage Chief Complaint:   Shortness of Breath    HPI:  Allison Mesa is a 80 y.o. female that presents for SOB for the past month worsening today. Hx of COPD. She reports dry cough. Current smoker. Denies chest pain. Worsens with exertion. No recent travels or hx of DVT or PE.     EMS reprotedly said she was mid [de-identified] on RA. No hx of O2 use. ROS:  Review of Systems   Constitutional: Positive for activity change. HENT: Negative for sore throat. Respiratory: Positive for cough, shortness of breath and wheezing. Cardiovascular: Negative for chest pain. Gastrointestinal: Negative for abdominal pain. Genitourinary: Negative for flank pain. Musculoskeletal: Negative for back pain. Skin: Negative for color change. Neurological: Negative for headaches. Psychiatric/Behavioral: Negative for confusion.          Past Medical History:   Diagnosis Date    Arthritis     Blind left eye     Cancer ([facility] [address] 1971    colon    COPD (chronic obstructive pulmonary disease) ([facility] [address]     Depression     GERD (gastroesophageal reflux disease)     Hypertension     Pneumonia     Restless legs syndrome     Unspecified cerebral artery occlusion with cerebral infarction      Past Surgical History:   Procedure Laterality Date    ABDOMEN SURGERY      APPENDECTOMY      BACK SURGERY      COLONOSCOPY      ENDOSCOPY, COLON, DIAGNOSTIC      EYE SURGERY      FRACTURE SURGERY      HYSTERECTOMY      SPINE SURGERY      VASCULAR SURGERY       Family History   Problem Relation Age of Onset    Arthritis Mother     Cancer Mother     Diabetes Mother     Heart Disease Mother     High Blood Pressure Mother    Elizabeth Tamayo / [place] Mother     Cancer Sister     Cancer Brother     Diabetes Daughter     High Blood Pressure Daughter      Social History Take 1,000 mcg by mouth daily      magnesium hydroxide (MILK OF MAGNESIA) 400 MG/5ML suspension Take 30 mLs by mouth daily as needed for Constipation      albuterol sulfate  (90 Base) MCG/ACT inhaler Inhale 2 puffs into the lungs every 6 hours as needed for Shortness of Breath       rOPINIRole (REQUIP) 4 MG tablet Take 4 mg by mouth 2 times daily      bisacodyl (DULCOLAX) 10 MG suppository Place 10 mg rectally daily as needed for Constipation       vitamin D (CHOLECALCIFEROL) 1000 UNIT TABS tablet Take 1,000 Units by mouth daily      memantine (NAMENDA) 5 MG tablet Take 1 tablet by mouth 2 times daily 60 tablet 3    acetaminophen 650 MG TABS Take 650 mg by mouth every 4 hours as needed 120 tablet 3    dorzolamide (TRUSOPT) 2 % ophthalmic solution Place 1 drop into both eyes 3 times daily 10 mL 2    latanoprost (XALATAN) 0.005 % ophthalmic solution Place 1 drop into both eyes nightly 1 Bottle 3    nitroGLYCERIN (NITROSTAT) 0.4 MG SL tablet Place 1 tablet under the tongue every 5 minutes as needed for Chest pain. 25 tablet 3     Allergies   Allergen Reactions    Other      Pt states allergy to unknown antibiotic that made her arm red        Nursing Notes Reviewed    Physical Exam:  Triage VS:    ED Triage Vitals   Enc Vitals Group      BP 08/27/19 0108 (!) 141/67      Pulse 08/27/19 0108 86      Resp 08/27/19 0108 21      Temp 08/27/19 0108 98.3 °F (36.8 °C)      Temp Source 08/27/19 0108 Oral      SpO2 08/27/19 0108 98 %      Weight 08/27/19 0105 155 lb (70.3 kg)      Height 08/27/19 0105 5' (1.524 m)      Head Circumference --       Peak Flow --       Pain Score --       Pain Loc --       Pain Edu? --       Excl. in [address]? --      Physical Exam   Constitutional: She appears distressed. HENT:   Head: Normocephalic and atraumatic. Mouth/Throat: No oropharyngeal exudate. Eyes: Right eye exhibits no discharge. Left eye exhibits no discharge.    Patient is tracking me as I am walking around the room applicable):  Emiliano Chaney MD  [address] [place] [address]  [phone]          Disposition medications (if applicable):  New Prescriptions    No medications on file       Comment: Please note this report has been produced using speech recognition software and may contain errors related to that system including errors in grammar, punctuation, and spelling, as well as words and phrases that may be inappropriate. Efforts were made to edit the dictations.         Daphene Mohs, MD  08/27/19 7519

## 2019-08-28 LAB
ALBUMIN SERPL-MCNC: 3.9 GM/DL (ref 3.4–5)
ALP BLD-CCNC: 92 IU/L (ref 40–128)
ALT SERPL-CCNC: 9 U/L (ref 10–40)
ANION GAP SERPL CALCULATED.3IONS-SCNC: 13 MMOL/L (ref 4–16)
AST SERPL-CCNC: 12 IU/L (ref 15–37)
BASE EXCESS: ABNORMAL (ref 0–2.4)
BASOPHILS ABSOLUTE: 0 K/CU MM
BASOPHILS RELATIVE PERCENT: 0.1 % (ref 0–1)
BILIRUB SERPL-MCNC: 0.3 MG/DL (ref 0–1)
BUN BLDV-MCNC: 19 MG/DL (ref 6–23)
CALCIUM SERPL-MCNC: 9.8 MG/DL (ref 8.3–10.6)
CARBON MONOXIDE, BLOOD: 1.9 % (ref 0–5)
CHLORIDE BLD-SCNC: 108 MMOL/L (ref 99–110)
CO2 CONTENT: 22.2 MMOL/L (ref 19–24)
CO2: 22 MMOL/L (ref 21–32)
COMMENT: ABNORMAL
CREAT SERPL-MCNC: 0.7 MG/DL (ref 0.6–1.1)
DIFFERENTIAL TYPE: ABNORMAL
EOSINOPHILS ABSOLUTE: 0 K/CU MM
EOSINOPHILS RELATIVE PERCENT: 0 % (ref 0–3)
GFR AFRICAN AMERICAN: >60 ML/MIN/1.73M2
GFR NON-AFRICAN AMERICAN: >60 ML/MIN/1.73M2
GLUCOSE BLD-MCNC: 142 MG/DL (ref 70–99)
HCO3 ARTERIAL: 21.3 MMOL/L (ref 18–23)
HCT VFR BLD CALC: 41.6 % (ref 37–47)
HEMOGLOBIN: 12.8 GM/DL (ref 12.5–16)
IMMATURE NEUTROPHIL %: 0.9 % (ref 0–0.43)
LV EF: 55 %
LVEF MODALITY: NORMAL
LYMPHOCYTES ABSOLUTE: 0.8 K/CU MM
LYMPHOCYTES RELATIVE PERCENT: 4.3 % (ref 24–44)
MAGNESIUM: 2.3 MG/DL (ref 1.8–2.4)
MCH RBC QN AUTO: 26.8 PG (ref 27–31)
MCHC RBC AUTO-ENTMCNC: 30.8 % (ref 32–36)
MCV RBC AUTO: 87.2 FL (ref 78–100)
METHEMOGLOBIN ARTERIAL: 1.5 %
MONOCYTES ABSOLUTE: 0.3 K/CU MM
MONOCYTES RELATIVE PERCENT: 1.7 % (ref 0–4)
NUCLEATED RBC %: 0 %
O2 SATURATION: 94.1 % (ref 96–97)
PCO2 ARTERIAL: 30 MMHG (ref 32–45)
PDW BLD-RTO: 15.5 % (ref 11.7–14.9)
PH BLOOD: 7.46 (ref 7.34–7.45)
PHOSPHORUS: 2.7 MG/DL (ref 2.5–4.9)
PLATELET # BLD: 317 K/CU MM (ref 140–440)
PMV BLD AUTO: 10.2 FL (ref 7.5–11.1)
PO2 ARTERIAL: 67 MMHG (ref 75–100)
POTASSIUM SERPL-SCNC: 4 MMOL/L (ref 3.5–5.1)
PRO-BNP: 656.5 PG/ML
PROCALCITONIN: 0.02
RBC # BLD: 4.77 M/CU MM (ref 4.2–5.4)
SEGMENTED NEUTROPHILS ABSOLUTE COUNT: 17.2 K/CU MM
SEGMENTED NEUTROPHILS RELATIVE PERCENT: 93 % (ref 36–66)
SODIUM BLD-SCNC: 143 MMOL/L (ref 135–145)
T4 FREE: 0.95 NG/DL (ref 0.9–1.8)
TOTAL IMMATURE NEUTOROPHIL: 0.17 K/CU MM
TOTAL NUCLEATED RBC: 0 K/CU MM
TOTAL PROTEIN: 6.9 GM/DL (ref 6.4–8.2)
TSH HIGH SENSITIVITY: 0.46 UIU/ML (ref 0.27–4.2)
WBC # BLD: 18.5 K/CU MM (ref 4–10.5)

## 2019-08-28 PROCEDURE — 84443 ASSAY THYROID STIM HORMONE: CPT

## 2019-08-28 PROCEDURE — 84100 ASSAY OF PHOSPHORUS: CPT

## 2019-08-28 PROCEDURE — 2060000000 HC ICU INTERMEDIATE R&B

## 2019-08-28 PROCEDURE — 94761 N-INVAS EAR/PLS OXIMETRY MLT: CPT

## 2019-08-28 PROCEDURE — 80053 COMPREHEN METABOLIC PANEL: CPT

## 2019-08-28 PROCEDURE — 84145 PROCALCITONIN (PCT): CPT

## 2019-08-28 PROCEDURE — 6360000002 HC RX W HCPCS: Performed by: FAMILY MEDICINE

## 2019-08-28 PROCEDURE — 94640 AIRWAY INHALATION TREATMENT: CPT

## 2019-08-28 PROCEDURE — 6370000000 HC RX 637 (ALT 250 FOR IP): Performed by: INTERNAL MEDICINE

## 2019-08-28 PROCEDURE — 93306 TTE W/DOPPLER COMPLETE: CPT

## 2019-08-28 PROCEDURE — 36415 COLL VENOUS BLD VENIPUNCTURE: CPT

## 2019-08-28 PROCEDURE — 6370000000 HC RX 637 (ALT 250 FOR IP): Performed by: FAMILY MEDICINE

## 2019-08-28 PROCEDURE — 84439 ASSAY OF FREE THYROXINE: CPT

## 2019-08-28 PROCEDURE — 2580000003 HC RX 258: Performed by: INTERNAL MEDICINE

## 2019-08-28 PROCEDURE — 6360000002 HC RX W HCPCS: Performed by: INTERNAL MEDICINE

## 2019-08-28 PROCEDURE — 82803 BLOOD GASES ANY COMBINATION: CPT

## 2019-08-28 PROCEDURE — 83880 ASSAY OF NATRIURETIC PEPTIDE: CPT

## 2019-08-28 PROCEDURE — 2580000003 HC RX 258: Performed by: FAMILY MEDICINE

## 2019-08-28 PROCEDURE — 36600 WITHDRAWAL OF ARTERIAL BLOOD: CPT

## 2019-08-28 PROCEDURE — 83735 ASSAY OF MAGNESIUM: CPT

## 2019-08-28 PROCEDURE — 85025 COMPLETE CBC W/AUTO DIFF WBC: CPT

## 2019-08-28 RX ORDER — METHYLPREDNISOLONE SODIUM SUCCINATE 40 MG/ML
40 INJECTION, POWDER, LYOPHILIZED, FOR SOLUTION INTRAMUSCULAR; INTRAVENOUS EVERY 8 HOURS
Status: DISCONTINUED | OUTPATIENT
Start: 2019-08-28 | End: 2019-08-28

## 2019-08-28 RX ORDER — LORAZEPAM 2 MG/ML
1 INJECTION INTRAMUSCULAR ONCE
Status: COMPLETED | OUTPATIENT
Start: 2019-08-28 | End: 2019-08-28

## 2019-08-28 RX ORDER — LORAZEPAM 1 MG/1
1 TABLET ORAL ONCE
Status: DISCONTINUED | OUTPATIENT
Start: 2019-08-28 | End: 2019-08-28

## 2019-08-28 RX ORDER — LORAZEPAM 0.5 MG/1
0.5 TABLET ORAL DAILY PRN
Status: DISCONTINUED | OUTPATIENT
Start: 2019-08-28 | End: 2019-08-30 | Stop reason: HOSPADM

## 2019-08-28 RX ORDER — BENZTROPINE MESYLATE 1 MG/ML
2 INJECTION INTRAMUSCULAR; INTRAVENOUS 2 TIMES DAILY PRN
Status: DISCONTINUED | OUTPATIENT
Start: 2019-08-28 | End: 2019-08-30 | Stop reason: HOSPADM

## 2019-08-28 RX ADMIN — LORAZEPAM 0.5 MG: 0.5 TABLET ORAL at 14:09

## 2019-08-28 RX ADMIN — ENOXAPARIN SODIUM 40 MG: 40 INJECTION SUBCUTANEOUS at 08:01

## 2019-08-28 RX ADMIN — ROPINIROLE HYDROCHLORIDE 2 MG: 1 TABLET, FILM COATED ORAL at 08:01

## 2019-08-28 RX ADMIN — METHYLPREDNISOLONE SODIUM SUCCINATE 40 MG: 40 INJECTION, POWDER, FOR SOLUTION INTRAMUSCULAR; INTRAVENOUS at 04:54

## 2019-08-28 RX ADMIN — BENZTROPINE MESYLATE 2 MG: 1 INJECTION INTRAMUSCULAR; INTRAVENOUS at 23:15

## 2019-08-28 RX ADMIN — LORAZEPAM 1 MG: 2 INJECTION INTRAMUSCULAR; INTRAVENOUS at 19:16

## 2019-08-28 RX ADMIN — Medication 10 ML: at 08:05

## 2019-08-28 RX ADMIN — IPRATROPIUM BROMIDE AND ALBUTEROL SULFATE 1 AMPULE: .5; 3 SOLUTION RESPIRATORY (INHALATION) at 07:47

## 2019-08-28 RX ADMIN — AZITHROMYCIN MONOHYDRATE 500 MG: 500 INJECTION, POWDER, LYOPHILIZED, FOR SOLUTION INTRAVENOUS at 10:04

## 2019-08-28 RX ADMIN — IPRATROPIUM BROMIDE AND ALBUTEROL SULFATE 1 AMPULE: .5; 3 SOLUTION RESPIRATORY (INHALATION) at 15:15

## 2019-08-28 ASSESSMENT — PAIN SCALES - GENERAL
PAINLEVEL_OUTOF10: 0

## 2019-08-28 NOTE — CARE COORDINATION
Received VM from [facility] . She stated that she is the  for patient from [employer] [phone]. Shira added that patient does receive HHA services a couple days a week and stated that family cannot provide 24/7 care. She stated to notify her if patient is going to need SNF on discharge. CM attempt to speak with patient . Patient stated that she can only see CM shadow. Patient alert to person , place ,  and current president. During conversation patient started talking about potato chips being in her pantry. Phoned [phone] [facility]\"" and had to leave a VM.

## 2019-08-29 PROBLEM — R45.1 AGITATION: Status: ACTIVE | Noted: 2019-08-29

## 2019-08-29 PROBLEM — F29 PSYCHOSIS (HCC): Status: ACTIVE | Noted: 2019-08-29

## 2019-08-29 PROBLEM — F03.90 DEMENTIA (HCC): Status: ACTIVE | Noted: 2019-08-29

## 2019-08-29 PROCEDURE — 6370000000 HC RX 637 (ALT 250 FOR IP): Performed by: INTERNAL MEDICINE

## 2019-08-29 PROCEDURE — 2060000000 HC ICU INTERMEDIATE R&B

## 2019-08-29 PROCEDURE — 6370000000 HC RX 637 (ALT 250 FOR IP): Performed by: FAMILY MEDICINE

## 2019-08-29 PROCEDURE — 2580000003 HC RX 258: Performed by: FAMILY MEDICINE

## 2019-08-29 PROCEDURE — 94761 N-INVAS EAR/PLS OXIMETRY MLT: CPT

## 2019-08-29 PROCEDURE — 94640 AIRWAY INHALATION TREATMENT: CPT

## 2019-08-29 PROCEDURE — 6360000002 HC RX W HCPCS: Performed by: FAMILY MEDICINE

## 2019-08-29 RX ADMIN — Medication 10 ML: at 09:40

## 2019-08-29 RX ADMIN — IPRATROPIUM BROMIDE AND ALBUTEROL SULFATE 1 AMPULE: .5; 3 SOLUTION RESPIRATORY (INHALATION) at 16:30

## 2019-08-29 RX ADMIN — ENOXAPARIN SODIUM 40 MG: 40 INJECTION SUBCUTANEOUS at 09:41

## 2019-08-29 RX ADMIN — IPRATROPIUM BROMIDE AND ALBUTEROL SULFATE 1 AMPULE: .5; 3 SOLUTION RESPIRATORY (INHALATION) at 20:56

## 2019-08-29 RX ADMIN — ROPINIROLE HYDROCHLORIDE 2 MG: 1 TABLET, FILM COATED ORAL at 09:41

## 2019-08-29 RX ADMIN — ROPINIROLE HYDROCHLORIDE 2 MG: 1 TABLET, FILM COATED ORAL at 20:49

## 2019-08-29 ASSESSMENT — PAIN SCALES - GENERAL: PAINLEVEL_OUTOF10: 0

## 2019-08-30 VITALS
OXYGEN SATURATION: 100 % | WEIGHT: 158.29 LBS | HEIGHT: 60 IN | DIASTOLIC BLOOD PRESSURE: 78 MMHG | SYSTOLIC BLOOD PRESSURE: 153 MMHG | RESPIRATION RATE: 17 BRPM | BODY MASS INDEX: 31.08 KG/M2 | TEMPERATURE: 97.7 F | HEART RATE: 87 BPM

## 2019-08-30 PROCEDURE — 94640 AIRWAY INHALATION TREATMENT: CPT

## 2019-08-30 PROCEDURE — 6370000000 HC RX 637 (ALT 250 FOR IP): Performed by: INTERNAL MEDICINE

## 2019-08-30 PROCEDURE — 6360000002 HC RX W HCPCS: Performed by: FAMILY MEDICINE

## 2019-08-30 PROCEDURE — 94761 N-INVAS EAR/PLS OXIMETRY MLT: CPT

## 2019-08-30 PROCEDURE — 6370000000 HC RX 637 (ALT 250 FOR IP): Performed by: FAMILY MEDICINE

## 2019-08-30 RX ADMIN — IPRATROPIUM BROMIDE AND ALBUTEROL SULFATE 1 AMPULE: .5; 3 SOLUTION RESPIRATORY (INHALATION) at 08:33

## 2019-08-30 RX ADMIN — IPRATROPIUM BROMIDE AND ALBUTEROL SULFATE 1 AMPULE: .5; 3 SOLUTION RESPIRATORY (INHALATION) at 11:54

## 2019-08-30 RX ADMIN — ROPINIROLE HYDROCHLORIDE 2 MG: 1 TABLET, FILM COATED ORAL at 08:51

## 2019-08-30 RX ADMIN — ENOXAPARIN SODIUM 40 MG: 40 INJECTION SUBCUTANEOUS at 08:51

## 2019-08-30 ASSESSMENT — PAIN SCALES - GENERAL
PAINLEVEL_OUTOF10: 0

## 2019-08-30 NOTE — PROGRESS NOTES
Called security to patients room. Patient jumped up out of the chair and started hitting and grabbing tech and nurses. Patient grabbed tech by throat and hitting. Patient was placed back in bed, patient cussing and still hitting. Called Dr. Jessie Lemon to let him be aware of patient status. New verbal orders from Dr. Jessie Lemon \" Give patient 1 mg Ativan IM\". Patient placed in bilateral wrist restraints. Called patient family again, patient daughter Emil Andrew states\" I'm on my way\". Dr. Jessie Lemon is in the hospital in on his way to patients room 2025. Will continue to monitor patient.
Patient daughter at bedside Jos eC Garza and states\" I want my mom to come home with me, so tell the doctor that\". Daughter Jose C Garza  phone number ( [phone] ).
Patient has eyes open and awake, patient moved from ED cart to bed with slide sheet. Patient skin check off with Keshawn SAN, patient has bilateral redness in groin, abrasion and back, scattered bruising, coccyx is red. Patient has speciality call light, patient is blind. Patient is oriented to room, call light in lap and bed in lowest position. Will continue to monitor patient.  Electronically signed by Sirisha Soriano RN on 8/27/2019 at 9:53 AM
Patient let me take vital signs, patient urinated in bed pan and marah care provided. Patient up to chair, feet elevated, chair alarm on and call light in patient lap.
Patient removed IV. Dr. Dereje Ordoñez is aware and at patients beside. Patient states\" I don't want another one of those\".
Patient resting with eyes closed, bed alarm on, bed in lowest position and call light in patient lap. Will continue to monitor patient.  Electronically signed by Jhon Puentes RN on 8/30/2019 at 5:58 PM
Physical Therapy    Facility/Department: [facility] ICU STEPDOWN  Initial Assessment    NAME: Randi Juan  : 1931  MRN: [id number]    Date of Service: 2019    Discharge Recommendations:  Subacute/Skilled Nursing Facility, 24 hour supervision or assist(patient would benefit from continued PT at discharge; has sufficient BLE strength to ambulate with assistance but has not been doing more than transferring to and from w/c at home due to visual impairment)        Assessment   Assessment: Pt is an 80 y.o. Female with medical history, surgical history, co-morbidities, and personal factors including  Arthritis, Blind left eye, Cancer, COPD (chronic obstructive pulmonary disease), Depression, GERD (gastroesophageal reflux disease), Hypertension, Pneumonia, Restless legs syndrome, Unspecified cerebral artery occlusion with cerebral infarction, Hysterectomy (); Spine surgery (); Abdomen surgery; fracture surgery; Appendectomy; Endoscopy, colon, diagnostic; vascular surgery; back surgery; Colonoscopy; and eye surgery with admission for COPD exacerbation. Prior to admission, pt was requiring assistance/supervision with functional mobility and ADLs. Examination of body systems reveals decreased strength, decreased balance, decreased aerobic capacity, impaired vision, impaired cognition, and decreased independence with functional mobility. Prognosis: Good  Decision Making: High Complexity  Clinical Presentation: unpredictable characteristics  PT Education: Goals;Transfer Training;Equipment;PT Role;Functional Mobility Training; Low Vision Education;Plan of Care;General Safety;Gait Training;Orientation  REQUIRES PT FOLLOW UP: Yes  Activity Tolerance  Activity Tolerance: Patient Tolerated treatment well         Restrictions  Restrictions/Precautions  Restrictions/Precautions: General Precautions, Fall Risk  Vision/Hearing  Vision: Impaired  Vision Exceptions: Legally blind  Hearing: Within functional
Pulmonary and Critical Care  Progress Note    Subjective: The patient is pleasantly confused. Shortness of breath has improved. Chest pain none  Addressing respiratory complaints Patient is negative for  hemoptysis and cyanosis  CONSTITUTIONAL:  negative for fevers and chills      Past Medical History:     has a past medical history of Arthritis, Blind left eye, Cancer ([facility] [address] COPD (chronic obstructive pulmonary disease) ([facility] [address] Depression, GERD (gastroesophageal reflux disease), Hypertension, Pneumonia, Restless legs syndrome, and Unspecified cerebral artery occlusion with cerebral infarction. has a past surgical history that includes Hysterectomy (1972); Spine surgery (1995); Abdomen surgery; fracture surgery; Appendectomy; Endoscopy, colon, diagnostic; vascular surgery; back surgery; Colonoscopy; and eye surgery. reports that she has been smoking cigarettes. She has a 12.50 pack-year smoking history. She has never used smokeless tobacco. She reports that she does not drink alcohol or use drugs. Family history:  family history includes Arthritis in her mother; Cancer in her brother, mother, and sister; Diabetes in her daughter and mother; Heart Disease in her mother; High Blood Pressure in her daughter and mother; Dillandafne Bowers / [place] in her mother. Allergies   Allergen Reactions    Other      Pt states allergy to unknown antibiotic that made her arm red      Social History:    Reviewed; no changes    Objective:   PHYSICAL EXAM:        VITALS:  /73   Pulse 99   Temp 98.3 °F (36.8 °C) (Axillary)   Resp 26   Ht 5' (1.524 m)   Wt 152 lb 1.9 oz (69 kg)   SpO2 94%   BMI 29.71 kg/m²     24HR INTAKE/OUTPUT:      Intake/Output Summary (Last 24 hours) at 8/29/2019 1001  Last data filed at 8/29/2019 0433  Gross per 24 hour   Intake 360 ml   Output 200 ml   Net 160 ml       CONSTITUTIONAL:  awake.   LUNGS:  decreased breath sounds  CARDIOVASCULAR:  normal S1 and S2 and negative JVD  ABD:Abdomen
Pulmonary and Critical Care  Progress Note    Subjective: The patient is sleepy this am.On RA. Shortness of breath has improved. Chest pain none  Addressing respiratory complaints Patient is negative for  hemoptysis and cyanosis  CONSTITUTIONAL:  negative for fevers and chills      Past Medical History:     has a past medical history of Arthritis, Blind left eye, Cancer ([facility] [address] COPD (chronic obstructive pulmonary disease) ([facility] [address] Depression, GERD (gastroesophageal reflux disease), Hypertension, Pneumonia, Restless legs syndrome, and Unspecified cerebral artery occlusion with cerebral infarction. has a past surgical history that includes Hysterectomy (1972); Spine surgery (1995); Abdomen surgery; fracture surgery; Appendectomy; Endoscopy, colon, diagnostic; vascular surgery; back surgery; Colonoscopy; and eye surgery. reports that she has been smoking cigarettes. She has a 12.50 pack-year smoking history. She has never used smokeless tobacco. She reports that she does not drink alcohol or use drugs. Family history:  family history includes Arthritis in her mother; Cancer in her brother, mother, and sister; Diabetes in her daughter and mother; Heart Disease in her mother; High Blood Pressure in her daughter and mother; Verlon Radha / [place] in her mother. Allergies   Allergen Reactions    Other      Pt states allergy to unknown antibiotic that made her arm red      Social History:    Reviewed; no changes    Objective:   PHYSICAL EXAM:        VITALS:  BP (!) 153/81   Pulse 87   Temp 98 °F (36.7 °C) (Oral)   Resp 16   Ht 5' (1.524 m)   Wt 158 lb 4.6 oz (71.8 kg)   SpO2 100%   BMI 30.91 kg/m²     24HR INTAKE/OUTPUT:      Intake/Output Summary (Last 24 hours) at 8/30/2019 1055  Last data filed at 8/30/2019 0833  Gross per 24 hour   Intake 360 ml   Output 1050 ml   Net -690 ml       CONSTITUTIONAL:  Somnolent. LUNGS:  decreased breath sounds.   CARDIOVASCULAR:  normal S1 and S2 and negative
12*   ALT 10 9*   BILITOT 0.2 0.3   ALKPHOS 98 92     No results for input(s): INR in the last 72 hours. No results for input(s): Shaina Barcenasler in the last 72 hours. Assessment/Plan:    Active Hospital Problems    Diagnosis Date Noted    COPD exacerbation ([facility] [address] [J44.1] 08/27/2019         D/c when bed available and sh is stable   resp inhalers  Solumedrol stopped due to severe agitation and worsening mentation . . D/w  with daughter`s paln for ECF . Marty Ramirez Cont cogentin and Ativan as needed to be restrain free . .  ..   Cont  COPD pathway . Marty Ramirez D/c Zithrmax IV . Marty Ramirez Res[ [michael positive for Rhinovirus . . resp therapy. .  D/c IVF , O2 as needed   Home meds   Cont  ativan PO   Check TFT , 2 D echo  : noted . Marty Ramirez No concern    DVT Prophylaxis: lovenox   Diet: DIET GENERAL;  Code Status: Full Code    Treatment progress and plan was d/w pt/family .         Silvano Raymond MD
19   CREATININE 1.0 0.7   CALCIUM 9.5 9.8   PHOS  --  2.7     Recent Labs     08/27/19  0111 08/28/19  0346   AST 16 12*   ALT 10 9*   BILITOT 0.2 0.3   ALKPHOS 98 92     No results for input(s): INR in the last 72 hours. No results for input(s): Katy Turkam in the last 72 hours. Assessment/Plan:    Active Hospital Problems    Diagnosis Date Noted    [facility] exacerb[facility] ([facility] [J44.1] 08/27/2019     Cont  COPD pathway . Naoma Broccoli D/c Zithrmax IV . Naoma Broccoli Res[ [michael positive for Rhinovirus . . resp therapy. . D/w pulmo Dr Esperanza Cerrato  IVF , O2   Home meds   Add ativan PO   Check TFT , 2 D echo  : noted . Naoma Broccoli No concern    DVT Prophylaxis: lovenox   Diet: DIET GENERAL;  Code Status: Full Code    Treatment progress and plan was d/w pt/family .         Nohemi Go MD
non-tender. BS normal. No masses,  No organomegaly  NEURO:Alert. DATA:    CBC:  Recent Labs     08/27/19  0111 08/28/19  0346   WBC 8.0 18.5*   RBC 4.67 4.77   HGB 12.4* 12.8   HCT 40.8 41.6    317   MCV 87.4 87.2   MCH 26.6* 26.8*   MCHC 30.4* 30.8*   RDW 15.5* 15.5*   SEGSPCT 56.5 93.0*      BMP:  Recent Labs     08/27/19  0111 08/28/19  0346    143   K 3.6 4.0    108   CO2 25 22   BUN 18 19   CREATININE 1.0 0.7   CALCIUM 9.5 9.8   GLUCOSE 119* 142*      ABG:  No results for input(s): PH, PO2ART, YCU6IUH, HCO3, BEART, O2SAT in the last 72 hours. Lab Results   Component Value Date    PROBNP 656.5 (H) 08/28/2019    PROBNP 194.8 08/27/2019    PROBNP 145.3 07/01/2017     No results found for: [facility]    Radiology Review:  Pertinent images / reports were reviewed as a part of this visit. Assessment:     Patient Active Problem List   Diagnosis    Hypertension    Restless legs syndrome    Blind left eye    Pneumonia due to infectious organism    COPD (chronic obstructive pulmonary disease) ([address]    Tobacco abuse    Blurry vision, right eye    Risk for falls    History of TIA (transient ischemic attack)    Acute metabolic encephalopathy    Cellulitis of right lower extremity    Bradycardia by electrocardiogram    Acute encephalopathy    Sepsis ([address]    GERD (gastroesophageal reflux disease)    GI bleed    COPD exacerbation ([address]       Plan:   1. Overall the patient has improved. 2. Taper steroids. 3. Inc. activity.       Elmer Rice MD  8/28/2019  9:56 AM

## 2019-08-30 NOTE — DISCHARGE INSTR - COC
[address] DARRON Safety Concerns:705005627}    Impairments/Disabilities:      [address] [facility] Impairments/Disabilities:700123975}    Nutrition Therapy:  Current Nutrition Therapy:   [address] [facility] Diet List:913398972}    Routes of Feeding: {CHP DME Other Feedings:237937986}  Liquids: {Slp liquid thickness:54625}  Daily Fluid Restriction: {CHP DME Yes amt example:049001614}  Last Modified Barium Swallow with Video (Video Swallowing Test): {Done Not Done EHYY:910809508}    Treatments at the Time of Hospital Discharge:   Respiratory Treatments: ***  Oxygen Therapy:  {Therapy; copd oxygen:10031}  Ventilator:    { CC Vent GZFY:695867087}    Rehab Therapies: {THERAPEUTIC INTERVENTION:4630648001}  Weight Bearing Status/Restrictions: [address]  Weight Bearin}  Other Medical Equipment (for information only, NOT a DME order):  {EQUIPMENT:383795282}  Other Treatments: ***    Patient's personal belongings (please select all that are sent with patient):  {P DME Belongings:436254991}    RN SIGNATURE:  {Esignature:474374150}    CASE MANAGEMENT/SOCIAL WORK SECTION    Inpatient Status Date: ***    Readmission Risk Assessment Score:  Readmission Risk              Risk of Unplanned Readmission:        13           Discharging to Facility/ Agency   · Name:   · Address:  · Phone:  · Fax:    Dialysis Facility (if applicable)   · Name:  · Address:  · Dialysis Schedule:  · Phone:  · Fax:    / signature: {Esignature:143699646}    PHYSICIAN SECTION    Prognosis: {Prognosis:6448169838}    Condition at Discharge: [address] Patient Condition:904796897}    Rehab Potential (if transferring to Rehab): {Prognosis:5093806795}    Recommended Labs or Other Treatments After Discharge: ***    Physician Certification: I certify the above information and transfer of Azael Rivera  is necessary for the continuing treatment of the diagnosis listed and that she requires {Admit to Appropriate Level of Care:66534} for {GREATER/LESS:526414957} 30

## 2019-09-01 LAB
CULTURE: NORMAL
CULTURE: NORMAL
Lab: NORMAL
Lab: NORMAL
SPECIMEN: NORMAL
SPECIMEN: NORMAL

## 2019-09-03 LAB
EKG ATRIAL RATE: 86 BPM
EKG DIAGNOSIS: NORMAL
EKG P AXIS: 37 DEGREES
EKG P-R INTERVAL: 158 MS
EKG Q-T INTERVAL: 406 MS
EKG QRS DURATION: 84 MS
EKG QTC CALCULATION (BAZETT): 485 MS
EKG R AXIS: -16 DEGREES
EKG T AXIS: 32 DEGREES
EKG VENTRICULAR RATE: 86 BPM

## 2020-03-03 ENCOUNTER — HOSPITAL ENCOUNTER (EMERGENCY)
Age: 85
Discharge: HOME | End: 2020-03-03
Attending: EMERGENCY MEDICINE
Payer: MEDICARE

## 2020-03-03 ENCOUNTER — APPOINTMENT (OUTPATIENT)
Dept: CT IMAGING | Age: 85
End: 2020-03-03
Payer: MEDICARE

## 2020-03-03 ENCOUNTER — APPOINTMENT (OUTPATIENT)
Dept: GENERAL RADIOLOGY | Age: 85
End: 2020-03-03
Payer: MEDICARE

## 2020-03-03 VITALS
WEIGHT: 150 LBS | TEMPERATURE: 97.6 F | BODY MASS INDEX: 29.45 KG/M2 | HEIGHT: 60 IN | DIASTOLIC BLOOD PRESSURE: 68 MMHG | HEART RATE: 77 BPM | SYSTOLIC BLOOD PRESSURE: 139 MMHG | OXYGEN SATURATION: 96 % | RESPIRATION RATE: 18 BRPM

## 2020-03-03 LAB
ACETAMINOPHEN LEVEL: <5 UG/ML (ref 10–30)
AMPHETAMINE SCREEN, URINE: NORMAL
ANION GAP SERPL CALCULATED.3IONS-SCNC: 12 MMOL/L (ref 3–16)
BARBITURATE SCREEN URINE: NORMAL
BASE EXCESS VENOUS: 0.6 MMOL/L (ref -2–3)
BASOPHILS ABSOLUTE: 0 K/UL (ref 0–0.2)
BASOPHILS RELATIVE PERCENT: 0.6 %
BENZODIAZEPINE SCREEN, URINE: NORMAL
BILIRUBIN URINE: NEGATIVE
BLOOD, URINE: ABNORMAL
BUN BLDV-MCNC: 19 MG/DL (ref 7–20)
CALCIUM SERPL-MCNC: 9.7 MG/DL (ref 8.3–10.6)
CANNABINOID SCREEN URINE: NORMAL
CARBOXYHEMOGLOBIN: 2.1 % (ref 0–1.5)
CHLORIDE BLD-SCNC: 106 MMOL/L (ref 99–110)
CLARITY: CLEAR
CO2: 22 MMOL/L (ref 21–32)
COCAINE METABOLITE SCREEN URINE: NORMAL
COLOR: YELLOW
CREAT SERPL-MCNC: 0.9 MG/DL (ref 0.6–1.2)
EOSINOPHILS ABSOLUTE: 0.4 K/UL (ref 0–0.6)
EOSINOPHILS RELATIVE PERCENT: 7.3 %
EPITHELIAL CELLS, UA: ABNORMAL /HPF (ref 0–5)
GFR AFRICAN AMERICAN: >60
GFR NON-AFRICAN AMERICAN: 59
GLUCOSE BLD-MCNC: 135 MG/DL (ref 70–99)
GLUCOSE URINE: NEGATIVE MG/DL
HCO3 VENOUS: 24.1 MMOL/L (ref 24–28)
HCT VFR BLD CALC: 41.9 % (ref 36–48)
HEMOGLOBIN, VEN, REDUCED: 7 %
HEMOGLOBIN: 13.6 G/DL (ref 12–16)
KETONES, URINE: NEGATIVE MG/DL
LACTIC ACID: 2 MMOL/L (ref 0.4–2)
LEUKOCYTE ESTERASE, URINE: NEGATIVE
LYMPHOCYTES ABSOLUTE: 1.4 K/UL (ref 1–5.1)
LYMPHOCYTES RELATIVE PERCENT: 27.5 %
Lab: NORMAL
MAGNESIUM: 2.1 MG/DL (ref 1.8–2.4)
MCH RBC QN AUTO: 28.1 PG (ref 26–34)
MCHC RBC AUTO-ENTMCNC: 32.4 G/DL (ref 31–36)
MCV RBC AUTO: 86.8 FL (ref 80–100)
METHADONE SCREEN, URINE: NORMAL
METHEMOGLOBIN VENOUS: 0.3 % (ref 0–1.5)
MICROSCOPIC EXAMINATION: YES
MONOCYTES ABSOLUTE: 0.4 K/UL (ref 0–1.3)
MONOCYTES RELATIVE PERCENT: 7.9 %
NEUTROPHILS ABSOLUTE: 3 K/UL (ref 1.7–7.7)
NEUTROPHILS RELATIVE PERCENT: 56.7 %
NITRITE, URINE: NEGATIVE
O2 SAT, VEN: 93 %
OPIATE SCREEN URINE: NORMAL
OXYCODONE URINE: NORMAL
PCO2, VEN: 36.7 MMHG (ref 41–51)
PDW BLD-RTO: 15.8 % (ref 12.4–15.4)
PH UA: 6
PH UA: 6 (ref 5–8)
PH VENOUS: 7.43 (ref 7.35–7.45)
PHENCYCLIDINE SCREEN URINE: NORMAL
PHOSPHORUS: 3 MG/DL (ref 2.5–4.9)
PLATELET # BLD: 279 K/UL (ref 135–450)
PMV BLD AUTO: 8.5 FL (ref 5–10.5)
PO2, VEN: 61.1 MMHG (ref 25–40)
POTASSIUM SERPL-SCNC: 4.5 MMOL/L (ref 3.5–5.1)
PRO-BNP: 158 PG/ML (ref 0–449)
PROPOXYPHENE SCREEN: NORMAL
PROTEIN UA: NEGATIVE MG/DL
RBC # BLD: 4.83 M/UL (ref 4–5.2)
RBC UA: ABNORMAL /HPF (ref 0–4)
SALICYLATE, SERUM: <0.3 MG/DL (ref 15–30)
SODIUM BLD-SCNC: 140 MMOL/L (ref 136–145)
SPECIFIC GRAVITY UA: 1.01 (ref 1–1.03)
TCO2 CALC VENOUS: 25 MMOL/L
TROPONIN: 0.02 NG/ML
TROPONIN: 0.02 NG/ML
URINE TYPE: ABNORMAL
UROBILINOGEN, URINE: 0.2 E.U./DL
WBC # BLD: 5.2 K/UL (ref 4–11)
WBC UA: ABNORMAL /HPF (ref 0–5)

## 2020-03-03 PROCEDURE — 36415 COLL VENOUS BLD VENIPUNCTURE: CPT

## 2020-03-03 PROCEDURE — 82803 BLOOD GASES ANY COMBINATION: CPT

## 2020-03-03 PROCEDURE — G0480 DRUG TEST DEF 1-7 CLASSES: HCPCS

## 2020-03-03 PROCEDURE — 80307 DRUG TEST PRSMV CHEM ANLYZR: CPT

## 2020-03-03 PROCEDURE — 96365 THER/PROPH/DIAG IV INF INIT: CPT

## 2020-03-03 PROCEDURE — 83880 ASSAY OF NATRIURETIC PEPTIDE: CPT

## 2020-03-03 PROCEDURE — 84100 ASSAY OF PHOSPHORUS: CPT

## 2020-03-03 PROCEDURE — 70450 CT HEAD/BRAIN W/O DYE: CPT

## 2020-03-03 PROCEDURE — 2580000003 HC RX 258: Performed by: EMERGENCY MEDICINE

## 2020-03-03 PROCEDURE — 84484 ASSAY OF TROPONIN QUANT: CPT

## 2020-03-03 PROCEDURE — 74177 CT ABD & PELVIS W/CONTRAST: CPT

## 2020-03-03 PROCEDURE — 71046 X-RAY EXAM CHEST 2 VIEWS: CPT

## 2020-03-03 PROCEDURE — 83605 ASSAY OF LACTIC ACID: CPT

## 2020-03-03 PROCEDURE — 80048 BASIC METABOLIC PNL TOTAL CA: CPT

## 2020-03-03 PROCEDURE — 6360000004 HC RX CONTRAST MEDICATION: Performed by: EMERGENCY MEDICINE

## 2020-03-03 PROCEDURE — 96366 THER/PROPH/DIAG IV INF ADDON: CPT

## 2020-03-03 PROCEDURE — 99284 EMERGENCY DEPT VISIT MOD MDM: CPT

## 2020-03-03 PROCEDURE — 85025 COMPLETE CBC W/AUTO DIFF WBC: CPT

## 2020-03-03 PROCEDURE — 83735 ASSAY OF MAGNESIUM: CPT

## 2020-03-03 PROCEDURE — 93005 ELECTROCARDIOGRAM TRACING: CPT | Performed by: EMERGENCY MEDICINE

## 2020-03-03 PROCEDURE — 81001 URINALYSIS AUTO W/SCOPE: CPT

## 2020-03-03 RX ORDER — SODIUM CHLORIDE, SODIUM LACTATE, POTASSIUM CHLORIDE, CALCIUM CHLORIDE 600; 310; 30; 20 MG/100ML; MG/100ML; MG/100ML; MG/100ML
1000 INJECTION, SOLUTION INTRAVENOUS ONCE
Status: COMPLETED | OUTPATIENT
Start: 2020-03-03 | End: 2020-03-03

## 2020-03-03 RX ADMIN — SODIUM CHLORIDE, SODIUM LACTATE, POTASSIUM CHLORIDE, AND CALCIUM CHLORIDE 1000 ML: .6; .31; .03; .02 INJECTION, SOLUTION INTRAVENOUS at 20:52

## 2020-03-03 RX ADMIN — IOPAMIDOL 80 ML: 755 INJECTION, SOLUTION INTRAVENOUS at 20:15
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
## 2020-03-04 LAB
EKG ATRIAL RATE: 88 BPM
EKG DIAGNOSIS: NORMAL
EKG P AXIS: 55 DEGREES
EKG P-R INTERVAL: 138 MS
EKG Q-T INTERVAL: 364 MS
EKG QRS DURATION: 76 MS
EKG QTC CALCULATION (BAZETT): 440 MS
EKG R AXIS: -19 DEGREES
EKG T AXIS: 54 DEGREES
EKG VENTRICULAR RATE: 88 BPM

## 2020-03-04 NOTE — ED NOTES
Bed: A02-02  Expected date:   Expected time:   Means of arrival:   Comments:  Christ Allen RN  03/03/20 1932

## 2020-03-04 NOTE — ED NOTES
Patient discharged to home with family. Patient verbalized understanding of discharge instructions. Advised of when to return to ED and when to call 911. Advised of follow up with PCP. No questions from patient to this RN. Patient left ED without incident.       Ilya Savage RN  03/03/20 2067

## 2020-05-06 ENCOUNTER — HOSPITAL ENCOUNTER (EMERGENCY)
Age: 85
Discharge: HOME | End: 2020-05-07
Attending: EMERGENCY MEDICINE
Payer: MEDICARE

## 2020-05-06 LAB
BASOPHILS ABSOLUTE: 0 K/CU MM
BASOPHILS RELATIVE PERCENT: 0.5 % (ref 0–1)
DIFFERENTIAL TYPE: ABNORMAL
EOSINOPHILS ABSOLUTE: 0.3 K/CU MM
EOSINOPHILS RELATIVE PERCENT: 4.1 % (ref 0–3)
HCT VFR BLD CALC: 39.2 % (ref 37–47)
HEMOGLOBIN: 12.1 GM/DL (ref 12.5–16)
IMMATURE NEUTROPHIL %: 0.5 % (ref 0–0.43)
LYMPHOCYTES ABSOLUTE: 1.7 K/CU MM
LYMPHOCYTES RELATIVE PERCENT: 27.7 % (ref 24–44)
MCH RBC QN AUTO: 27.6 PG (ref 27–31)
MCHC RBC AUTO-ENTMCNC: 30.9 % (ref 32–36)
MCV RBC AUTO: 89.5 FL (ref 78–100)
MONOCYTES ABSOLUTE: 0.6 K/CU MM
MONOCYTES RELATIVE PERCENT: 10.5 % (ref 0–4)
NUCLEATED RBC %: 0 %
PDW BLD-RTO: 14.8 % (ref 11.7–14.9)
PLATELET # BLD: 268 K/CU MM (ref 140–440)
PMV BLD AUTO: 10.4 FL (ref 7.5–11.1)
RBC # BLD: 4.38 M/CU MM (ref 4.2–5.4)
SEGMENTED NEUTROPHILS ABSOLUTE COUNT: 3.5 K/CU MM
SEGMENTED NEUTROPHILS RELATIVE PERCENT: 56.7 % (ref 36–66)
TOTAL IMMATURE NEUTOROPHIL: 0.03 K/CU MM
TOTAL NUCLEATED RBC: 0 K/CU MM
WBC # BLD: 6.1 K/CU MM (ref 4–10.5)

## 2020-05-06 PROCEDURE — 36415 COLL VENOUS BLD VENIPUNCTURE: CPT

## 2020-05-06 PROCEDURE — 80053 COMPREHEN METABOLIC PANEL: CPT

## 2020-05-06 PROCEDURE — 83735 ASSAY OF MAGNESIUM: CPT

## 2020-05-06 PROCEDURE — 99284 EMERGENCY DEPT VISIT MOD MDM: CPT

## 2020-05-06 PROCEDURE — 85025 COMPLETE CBC W/AUTO DIFF WBC: CPT

## 2020-05-06 PROCEDURE — 93005 ELECTROCARDIOGRAM TRACING: CPT | Performed by: EMERGENCY MEDICINE

## 2020-05-07 VITALS
WEIGHT: 155 LBS | OXYGEN SATURATION: 94 % | HEART RATE: 60 BPM | DIASTOLIC BLOOD PRESSURE: 61 MMHG | TEMPERATURE: 98.6 F | SYSTOLIC BLOOD PRESSURE: 143 MMHG | BODY MASS INDEX: 30.43 KG/M2 | HEIGHT: 60 IN | RESPIRATION RATE: 16 BRPM

## 2020-05-07 LAB
ALBUMIN SERPL-MCNC: 3.8 GM/DL (ref 3.4–5)
ALP BLD-CCNC: 83 IU/L (ref 40–129)
ALT SERPL-CCNC: 12 U/L (ref 10–40)
ANION GAP SERPL CALCULATED.3IONS-SCNC: 11 MMOL/L (ref 4–16)
AST SERPL-CCNC: 16 IU/L (ref 15–37)
BILIRUB SERPL-MCNC: 0.2 MG/DL (ref 0–1)
BUN BLDV-MCNC: 16 MG/DL (ref 6–23)
CALCIUM SERPL-MCNC: 9.4 MG/DL (ref 8.3–10.6)
CHLORIDE BLD-SCNC: 106 MMOL/L (ref 99–110)
CO2: 23 MMOL/L (ref 21–32)
CREAT SERPL-MCNC: 0.8 MG/DL (ref 0.6–1.1)
GFR AFRICAN AMERICAN: >60 ML/MIN/1.73M2
GFR NON-AFRICAN AMERICAN: >60 ML/MIN/1.73M2
GLUCOSE BLD-MCNC: 119 MG/DL (ref 70–99)
MAGNESIUM: 2.2 MG/DL (ref 1.8–2.4)
POTASSIUM SERPL-SCNC: 4.5 MMOL/L (ref 3.5–5.1)
SODIUM BLD-SCNC: 140 MMOL/L (ref 135–145)
TOTAL PROTEIN: 6.7 GM/DL (ref 6.4–8.2)

## 2020-05-07 NOTE — ED TRIAGE NOTES
To ED per coleen for c/o tremors,family contact Hakan De Leon,no tremors noted on arrival  [phone]

## 2020-05-07 NOTE — ED PROVIDER NOTES
normal. The uterus is absent. There is no free fluid in the pelvis. Bone windows demonstrate no suspicious lytic or blastic lesions. 1.  No acute abnormality in the abdomen and pelvis. 2.  Small hiatal hernia. 3.  Sequelae of chronic pancreatitis. 4.  Prior ventral hernia repair. EKG (if obtained): (All EKG's are interpreted by myself in the absence of a cardiologist)  Normal sinus rhythm with rate of 71 beats minute, normal intervals. No ST ovation. No previous to compare. MDM:  66-year-old female presents with complaint of tremors, has been increasing in frequency but has been having tremors for months. Has a history of restless leg, we were able to get more information from the granddaughter and sounds like it does start in her legs and then goes through the rest of the body. She does not appear to be having seizures, she is alert through all of it. She got Requip from her granddaughter and symptoms had resolved by the time she got here. I suspect it is likely her restless legs and then she becomes nervous and then has shaking. She does seem like a very nervous person. Appears that she was seen in March with similar complaints. She had a head CT at that time. Labs here are unremarkable and she has been sleeping, no tremors whatsoever over the last hour and a half. She is feeling better when I woke her. She is comfortable going home. We had called and spoken with the granddaughter, advise close follow-up with her PCP and neurology as they were previously getting this set up. She will be discharged in stable condition, given strict return precautions. Clinical Impression:  1.  Tremor      Disposition referral (if applicable):  Anum Nash MD  [address] [place] [address]  [phone]    Schedule an appointment as soon as possible for a visit       [facility] Emergency Department  [place] [id number]  [phone]    If symptoms worsen    Disposition medications (if applicable):  Discharge Medication List as of 5/7/2020 12:43 AM        ED Provider Disposition Time  DISPOSITION        Comment: Please note this report has been produced using speech recognition software and may contain errors related to that system including errors in grammar, punctuation, and spelling, as well as words and phrases that may be inappropriate. Efforts were made to edit the dictations.         Carrie Loco MD  05/07/20 9009

## 2020-05-08 ENCOUNTER — CARE COORDINATION (OUTPATIENT)
Dept: CARE COORDINATION | Age: 85
End: 2020-05-08

## 2020-05-08 NOTE — CARE COORDINATION
ACM called pt w/o succes for f/u on ED visit. ACM requested return call. ACM left messages. Contact info provided. Naheed Pepe RN  Ambulatory Care Manager  [phone]  [email or web address]

## 2020-05-12 LAB
EKG ATRIAL RATE: 71 BPM
EKG DIAGNOSIS: NORMAL
EKG P AXIS: 45 DEGREES
EKG P-R INTERVAL: 184 MS
EKG Q-T INTERVAL: 412 MS
EKG QRS DURATION: 72 MS
EKG QTC CALCULATION (BAZETT): 447 MS
EKG R AXIS: -15 DEGREES
EKG T AXIS: 49 DEGREES
EKG VENTRICULAR RATE: 71 BPM

## 2020-05-22 ENCOUNTER — HOSPITAL ENCOUNTER (EMERGENCY)
Age: 85
Discharge: HOME | End: 2020-05-23
Payer: MEDICARE

## 2020-05-22 PROCEDURE — 99284 EMERGENCY DEPT VISIT MOD MDM: CPT

## 2020-05-23 ENCOUNTER — APPOINTMENT (OUTPATIENT)
Dept: CT IMAGING | Age: 85
End: 2020-05-23
Payer: MEDICARE

## 2020-05-23 ENCOUNTER — HOSPITAL ENCOUNTER (EMERGENCY)
Age: 85
Discharge: HOME | End: 2020-05-23
Attending: EMERGENCY MEDICINE
Payer: MEDICARE

## 2020-05-23 VITALS
RESPIRATION RATE: 16 BRPM | TEMPERATURE: 98 F | BODY MASS INDEX: 30.43 KG/M2 | OXYGEN SATURATION: 95 % | HEIGHT: 60 IN | WEIGHT: 155 LBS | SYSTOLIC BLOOD PRESSURE: 120 MMHG | DIASTOLIC BLOOD PRESSURE: 62 MMHG | HEART RATE: 80 BPM

## 2020-05-23 VITALS
BODY MASS INDEX: 30.43 KG/M2 | HEIGHT: 60 IN | OXYGEN SATURATION: 95 % | SYSTOLIC BLOOD PRESSURE: 143 MMHG | HEART RATE: 66 BPM | TEMPERATURE: 98.1 F | DIASTOLIC BLOOD PRESSURE: 85 MMHG | RESPIRATION RATE: 17 BRPM | WEIGHT: 155 LBS

## 2020-05-23 LAB
ALBUMIN SERPL-MCNC: 3.7 GM/DL (ref 3.4–5)
ALP BLD-CCNC: 73 IU/L (ref 40–129)
ALT SERPL-CCNC: 13 U/L (ref 10–40)
ANION GAP SERPL CALCULATED.3IONS-SCNC: 11 MMOL/L (ref 4–16)
ANION GAP SERPL CALCULATED.3IONS-SCNC: 9 MMOL/L (ref 4–16)
AST SERPL-CCNC: 21 IU/L (ref 15–37)
BACTERIA: NEGATIVE /HPF
BASOPHILS ABSOLUTE: 0 K/CU MM
BASOPHILS ABSOLUTE: 0 K/CU MM
BASOPHILS RELATIVE PERCENT: 0.7 % (ref 0–1)
BASOPHILS RELATIVE PERCENT: 0.7 % (ref 0–1)
BILIRUB SERPL-MCNC: 0.3 MG/DL (ref 0–1)
BILIRUBIN URINE: NEGATIVE MG/DL
BLOOD, URINE: NEGATIVE
BUN BLDV-MCNC: 20 MG/DL (ref 6–23)
BUN BLDV-MCNC: 22 MG/DL (ref 6–23)
CALCIUM SERPL-MCNC: 9 MG/DL (ref 8.3–10.6)
CALCIUM SERPL-MCNC: 9.4 MG/DL (ref 8.3–10.6)
CHLORIDE BLD-SCNC: 106 MMOL/L (ref 99–110)
CHLORIDE BLD-SCNC: 106 MMOL/L (ref 99–110)
CLARITY: CLEAR
CO2: 24 MMOL/L (ref 21–32)
CO2: 24 MMOL/L (ref 21–32)
COLOR: ABNORMAL
CREAT SERPL-MCNC: 0.8 MG/DL (ref 0.6–1.1)
CREAT SERPL-MCNC: 0.9 MG/DL (ref 0.6–1.1)
DIFFERENTIAL TYPE: ABNORMAL
DIFFERENTIAL TYPE: ABNORMAL
EOSINOPHILS ABSOLUTE: 0.3 K/CU MM
EOSINOPHILS ABSOLUTE: 0.3 K/CU MM
EOSINOPHILS RELATIVE PERCENT: 4.9 % (ref 0–3)
EOSINOPHILS RELATIVE PERCENT: 5 % (ref 0–3)
GFR AFRICAN AMERICAN: >60 ML/MIN/1.73M2
GFR AFRICAN AMERICAN: >60 ML/MIN/1.73M2
GFR NON-AFRICAN AMERICAN: 59 ML/MIN/1.73M2
GFR NON-AFRICAN AMERICAN: >60 ML/MIN/1.73M2
GLUCOSE BLD-MCNC: 84 MG/DL (ref 70–99)
GLUCOSE BLD-MCNC: 94 MG/DL (ref 70–99)
GLUCOSE, URINE: NEGATIVE MG/DL
HCT VFR BLD CALC: 37.5 % (ref 37–47)
HCT VFR BLD CALC: 37.9 % (ref 37–47)
HEMOGLOBIN: 11.6 GM/DL (ref 12.5–16)
HEMOGLOBIN: 11.9 GM/DL (ref 12.5–16)
IMMATURE NEUTROPHIL %: 0.2 % (ref 0–0.43)
IMMATURE NEUTROPHIL %: 0.5 % (ref 0–0.43)
KETONES, URINE: NEGATIVE MG/DL
LEUKOCYTE ESTERASE, URINE: NEGATIVE
LYMPHOCYTES ABSOLUTE: 2 K/CU MM
LYMPHOCYTES ABSOLUTE: 2.1 K/CU MM
LYMPHOCYTES RELATIVE PERCENT: 35.7 % (ref 24–44)
LYMPHOCYTES RELATIVE PERCENT: 36.4 % (ref 24–44)
MAGNESIUM: 2 MG/DL (ref 1.8–2.4)
MCH RBC QN AUTO: 27.4 PG (ref 27–31)
MCH RBC QN AUTO: 27.5 PG (ref 27–31)
MCHC RBC AUTO-ENTMCNC: 30.9 % (ref 32–36)
MCHC RBC AUTO-ENTMCNC: 31.4 % (ref 32–36)
MCV RBC AUTO: 87.7 FL (ref 78–100)
MCV RBC AUTO: 88.4 FL (ref 78–100)
MONOCYTES ABSOLUTE: 0.6 K/CU MM
MONOCYTES ABSOLUTE: 0.6 K/CU MM
MONOCYTES RELATIVE PERCENT: 10.2 % (ref 0–4)
MONOCYTES RELATIVE PERCENT: 11 % (ref 0–4)
NITRITE URINE, QUANTITATIVE: NEGATIVE
NUCLEATED RBC %: 0 %
NUCLEATED RBC %: 0 %
PDW BLD-RTO: 14.7 % (ref 11.7–14.9)
PDW BLD-RTO: 14.8 % (ref 11.7–14.9)
PH, URINE: 7 (ref 5–8)
PLATELET # BLD: 245 K/CU MM (ref 140–440)
PLATELET # BLD: 255 K/CU MM (ref 140–440)
PMV BLD AUTO: 10.3 FL (ref 7.5–11.1)
PMV BLD AUTO: 10.6 FL (ref 7.5–11.1)
POTASSIUM SERPL-SCNC: 4.3 MMOL/L (ref 3.5–5.1)
POTASSIUM SERPL-SCNC: 4.3 MMOL/L (ref 3.5–5.1)
PROTEIN UA: NEGATIVE MG/DL
RBC # BLD: 4.24 M/CU MM (ref 4.2–5.4)
RBC # BLD: 4.32 M/CU MM (ref 4.2–5.4)
RBC URINE: 2 /HPF (ref 0–6)
SEGMENTED NEUTROPHILS ABSOLUTE COUNT: 2.6 K/CU MM
SEGMENTED NEUTROPHILS ABSOLUTE COUNT: 2.7 K/CU MM
SEGMENTED NEUTROPHILS RELATIVE PERCENT: 47.2 % (ref 36–66)
SEGMENTED NEUTROPHILS RELATIVE PERCENT: 47.5 % (ref 36–66)
SODIUM BLD-SCNC: 139 MMOL/L (ref 135–145)
SODIUM BLD-SCNC: 141 MMOL/L (ref 135–145)
SPECIFIC GRAVITY UA: 1.01 (ref 1–1.03)
SQUAMOUS EPITHELIAL: <1 /HPF
TOTAL CK: 54 IU/L (ref 26–140)
TOTAL IMMATURE NEUTOROPHIL: 0.01 K/CU MM
TOTAL IMMATURE NEUTOROPHIL: 0.03 K/CU MM
TOTAL NUCLEATED RBC: 0 K/CU MM
TOTAL NUCLEATED RBC: 0 K/CU MM
TOTAL PROTEIN: 6.5 GM/DL (ref 6.4–8.2)
TRICHOMONAS: ABNORMAL /HPF
TSH HIGH SENSITIVITY: 2.78 UIU/ML (ref 0.27–4.2)
UROBILINOGEN, URINE: NORMAL MG/DL (ref 0.2–1)
WBC # BLD: 5.5 K/CU MM (ref 4–10.5)
WBC # BLD: 5.8 K/CU MM (ref 4–10.5)
WBC UA: ABNORMAL /HPF (ref 0–5)

## 2020-05-23 PROCEDURE — 99284 EMERGENCY DEPT VISIT MOD MDM: CPT

## 2020-05-23 PROCEDURE — 85025 COMPLETE CBC W/AUTO DIFF WBC: CPT

## 2020-05-23 PROCEDURE — 96374 THER/PROPH/DIAG INJ IV PUSH: CPT

## 2020-05-23 PROCEDURE — 6370000000 HC RX 637 (ALT 250 FOR IP): Performed by: PHYSICIAN ASSISTANT

## 2020-05-23 PROCEDURE — 81001 URINALYSIS AUTO W/SCOPE: CPT

## 2020-05-23 PROCEDURE — 93010 ELECTROCARDIOGRAM REPORT: CPT | Performed by: INTERNAL MEDICINE

## 2020-05-23 PROCEDURE — 6360000002 HC RX W HCPCS: Performed by: EMERGENCY MEDICINE

## 2020-05-23 PROCEDURE — 70450 CT HEAD/BRAIN W/O DYE: CPT

## 2020-05-23 PROCEDURE — 6370000000 HC RX 637 (ALT 250 FOR IP): Performed by: EMERGENCY MEDICINE

## 2020-05-23 PROCEDURE — 84443 ASSAY THYROID STIM HORMONE: CPT

## 2020-05-23 PROCEDURE — 80048 BASIC METABOLIC PNL TOTAL CA: CPT

## 2020-05-23 PROCEDURE — 80053 COMPREHEN METABOLIC PANEL: CPT

## 2020-05-23 PROCEDURE — 82550 ASSAY OF CK (CPK): CPT

## 2020-05-23 PROCEDURE — 83735 ASSAY OF MAGNESIUM: CPT

## 2020-05-23 PROCEDURE — 93005 ELECTROCARDIOGRAM TRACING: CPT | Performed by: EMERGENCY MEDICINE

## 2020-05-23 RX ORDER — HYDROXYZINE PAMOATE 25 MG/1
25 CAPSULE ORAL ONCE
Status: COMPLETED | OUTPATIENT
Start: 2020-05-23 | End: 2020-05-23

## 2020-05-23 RX ORDER — ACETAMINOPHEN 500 MG
1000 TABLET ORAL ONCE
Status: COMPLETED | OUTPATIENT
Start: 2020-05-23 | End: 2020-05-23

## 2020-05-23 RX ORDER — DIPHENHYDRAMINE HYDROCHLORIDE 50 MG/ML
25 INJECTION INTRAMUSCULAR; INTRAVENOUS ONCE
Status: COMPLETED | OUTPATIENT
Start: 2020-05-23 | End: 2020-05-23

## 2020-05-23 RX ADMIN — HYDROXYZINE PAMOATE 25 MG: 25 CAPSULE ORAL at 03:02

## 2020-05-23 RX ADMIN — ACETAMINOPHEN 1000 MG: 500 TABLET ORAL at 06:51

## 2020-05-23 RX ADMIN — DIPHENHYDRAMINE HYDROCHLORIDE 25 MG: 50 INJECTION INTRAMUSCULAR; INTRAVENOUS at 06:50

## 2020-05-23 ASSESSMENT — ENCOUNTER SYMPTOMS
ALLERGIC/IMMUNOLOGIC NEGATIVE: 1
GASTROINTESTINAL NEGATIVE: 1
RESPIRATORY NEGATIVE: 1
EYES NEGATIVE: 1

## 2020-05-23 ASSESSMENT — PAIN SCALES - GENERAL
PAINLEVEL_OUTOF10: 3
PAINLEVEL_OUTOF10: 9

## 2020-05-23 NOTE — ED NOTES
\"I have testing Tuesday with Dr. Ez Simms to figure out why these tremors keep happening\". Pt has unlabored chest ride and fall, no obvious visual difficulty breathing. SPO2% stable.  HX of COPD. uses albuterol inhaler, did not use before arrival.        Spencer Holden RN  05/22/20 9689

## 2020-05-23 NOTE — ED TRIAGE NOTES
Pt arrived to ED from home where she lives with family. Pt has been dealing with tremors for \"4-5 months\" and \"its hard to breathe whenever it happens\" Pt's  recently , pt. In current grief. Pt denies tremors r/t anxiety. Pt blind and is often scared to be alone.

## 2020-05-23 NOTE — ED PROVIDER NOTES
medications for this encounter. Current Outpatient Medications   Medication Sig Dispense Refill    vitamin B-12 (CYANOCOBALAMIN) 1000 MCG tablet Take 1,000 mcg by mouth daily      magnesium hydroxide (MILK OF MAGNESIA) 400 MG/5ML suspension Take 30 mLs by mouth daily as needed for Constipation      albuterol sulfate  (90 Base) MCG/ACT inhaler Inhale 2 puffs into the lungs every 6 hours as needed for Shortness of Breath       rOPINIRole (REQUIP) 4 MG tablet Take 4 mg by mouth 2 times daily      bisacodyl (DULCOLAX) 10 MG suppository Place 10 mg rectally daily as needed for Constipation       vitamin D (CHOLECALCIFEROL) 1000 UNIT TABS tablet Take 1,000 Units by mouth daily      memantine (NAMENDA) 5 MG tablet Take 1 tablet by mouth 2 times daily 60 tablet 3    acetaminophen 650 MG TABS Take 650 mg by mouth every 4 hours as needed 120 tablet 3    dorzolamide (TRUSOPT) 2 % ophthalmic solution Place 1 drop into both eyes 3 times daily 10 mL 2    latanoprost (XALATAN) 0.005 % ophthalmic solution Place 1 drop into both eyes nightly 1 Bottle 3    nitroGLYCERIN (NITROSTAT) 0.4 MG SL tablet Place 1 tablet under the tongue every 5 minutes as needed for Chest pain. 25 tablet 3      Allergies   Allergen Reactions    Other      Pt states allergy to unknown antibiotic that made her arm red      No current facility-administered medications for this encounter.       Current Outpatient Medications   Medication Sig Dispense Refill    vitamin B-12 (CYANOCOBALAMIN) 1000 MCG tablet Take 1,000 mcg by mouth daily      magnesium hydroxide (MILK OF MAGNESIA) 400 MG/5ML suspension Take 30 mLs by mouth daily as needed for Constipation      albuterol sulfate  (90 Base) MCG/ACT inhaler Inhale 2 puffs into the lungs every 6 hours as needed for Shortness of Breath       rOPINIRole (REQUIP) 4 MG tablet Take 4 mg by mouth 2 times daily      bisacodyl (DULCOLAX) 10 MG suppository Place 10 mg rectally daily as needed Sensitivity 2.780 0.270 - 4.20 uIu/ml   Magnesium   Result Value Ref Range    Magnesium 2.0 1.8 - 2.4 mg/dl   Urinalysis   Result Value Ref Range    Color, UA STRAW (A) YELLOW    Clarity, UA CLEAR CLEAR    Glucose, Urine NEGATIVE NEGATIVE MG/DL    Bilirubin Urine NEGATIVE NEGATIVE MG/DL    Ketones, Urine NEGATIVE NEGATIVE MG/DL    Specific Gravity, UA 1.010 1.001 - 1.035    Blood, Urine NEGATIVE NEGATIVE    pH, Urine 7.0 5.0 - 8.0    Protein, UA NEGATIVE NEGATIVE MG/DL    Urobilinogen, Urine NORMAL 0.2 - 1.0 MG/DL    Nitrite Urine, Quantitative NEGATIVE NEGATIVE    Leukocyte Esterase, Urine NEGATIVE NEGATIVE    RBC, UA 2 0 - 6 /HPF    WBC, UA NONE SEEN 0 - 5 /HPF    Bacteria, UA NEGATIVE NEGATIVE /HPF    Squam Epithel, UA <1 /HPF    Trichomonas, UA NONE SEEN NONE SEEN /HPF   EKG 12 Lead   Result Value Ref Range    Ventricular Rate 77 BPM    Atrial Rate 77 BPM    P-R Interval 168 ms    QRS Duration 76 ms    Q-T Interval 404 ms    QTc Calculation (Bazett) 457 ms    P Axis 51 degrees    R Axis -10 degrees    T Axis 69 degrees    Diagnosis       Normal sinus rhythm  Minimal voltage criteria for LVH, may be normal variant  Borderline ECG  When compared with ECG of 06-MAY-2020 23:45,  No significant change was found          Radiographs (if obtained):  [] The following radiograph was interpreted by myself in the absence of a radiologist:  [x] Radiologist's Report Reviewed:    CT Brain    EKG (if obtained): (All EKG's are interpreted by myself in the absence of a cardiologist)    12 lead EKG per my interpretation:  Normal Sinus Rhythm 77  Axis is   Normal  QTc is  457  There is no specific T wave changes appreciated. There is no specific ST wave changes appreciated. Prior EKG to compare with was not available       MDM:    Patient with complaint of tremor. Again she was here couple hours ago but discharged after a normal work-up. She has an MRI of her head scheduled for this week she states on Tuesday.   Denies any fever nausea vomiting chest pain shortness of breath weakness numbness tingling unilaterally. No trauma. She is blind does see shadows. On arrival she appears well she is no distress vital signs are stable. She has been having some headache she states. I will get imaging of her head perform basic lab work, EKG. EKG is negative. I do not see any signs of stroke she has no tremor my exam she has a negative Minsky no clonus good reflexes she has no weakness her cranial nerves are intact this is a chronic issue this is not new she does follow-up with neurology she states in [place] and family doctor about this. Patient was her she did well for over 3 hours I do not see any tremor I rechecked her she feels better she feels okay going home so far work-up here is negative occluding labs, imaging did give patient a new neurologist to see again I do not think she has a stroke or brain bleed or meningitis or seizure or emergent neurologic disorder. Patient discharged stable. No signs of infection.     CLINICAL IMPRESSION:  Final diagnoses:   Tremor       (Please note that portions of this note may have been completed with a voice recognition program. Efforts were made to edit the dictations but occasionally words aremis-transcribed.)    DISPOSITION REFERRAL (if applicable):  Maximo Segura MD  [address] [place] [address]  [phone]    In 1 day      [facility] Emergency Department  [facility] [id number] [phone]    If symptoms worsen    Artemus Carrel, MD [address] [place] [address] [phone]    Schedule an appointment as soon as possible for a visit in 1 day  Neurology      DISPOSITION MEDICATIONS (if applicable):  New Prescriptions    No medications on file          Cayla Barrientos, [facility], [address] [place] [address]  05/23/20 3351

## 2020-05-23 NOTE — ED NOTES
Patient laying in bed, bouncing legs up and down. Patient states that she has restless leg syndrome and takes requip at home. Patient recently given visteral. Will see if this helps.       Raymond Meneses RN  05/23/20 5442

## 2020-05-23 NOTE — ED PROVIDER NOTES
currently available lab results from this visit (if applicable):  Results for orders placed or performed during the hospital encounter of 05/22/20   CBC auto diff   Result Value Ref Range    WBC 5.8 4.0 - 10.5 K/CU MM    RBC 4.24 4.2 - 5.4 M/CU MM    Hemoglobin 11.6 (L) 12.5 - 16.0 GM/DL    Hematocrit 37.5 37 - 47 %    MCV 88.4 78 - 100 FL    MCH 27.4 27 - 31 PG    MCHC 30.9 (L) 32.0 - 36.0 %    RDW 14.8 11.7 - 14.9 %    Platelets 564 585 - 603 K/CU MM    MPV 10.3 7.5 - 11.1 FL    Differential Type AUTOMATED DIFFERENTIAL     Segs Relative 47.2 36 - 66 %    Lymphocytes % 36.4 24 - 44 %    Monocytes % 10.2 (H) 0 - 4 %    Eosinophils % 5.0 (H) 0 - 3 %    Basophils % 0.7 0 - 1 %    Segs Absolute 2.7 K/CU MM    Lymphocytes Absolute 2.1 K/CU MM    Monocytes Absolute 0.6 K/CU MM    Eosinophils Absolute 0.3 K/CU MM    Basophils Absolute 0.0 K/CU MM    Nucleated RBC % 0.0 %    Total Nucleated RBC 0.0 K/CU MM    Total Immature Neutrophil 0.03 K/CU MM    Immature Neutrophil % 0.5 (H) 0 - 0.43 %   BMP   Result Value Ref Range    Sodium 141 135 - 145 MMOL/L    Potassium 4.3 3.5 - 5.1 MMOL/L    Chloride 106 99 - 110 mMol/L    CO2 24 21 - 32 MMOL/L    Anion Gap 11 4 - 16    BUN 22 6 - 23 MG/DL    CREATININE 0.9 0.6 - 1.1 MG/DL    Glucose 94 70 - 99 MG/DL    Calcium 9.4 8.3 - 10.6 MG/DL    GFR Non- 59 (L) >60 mL/min/1.73m2    GFR African American >60 >60 mL/min/1.73m2      Radiographs (if obtained):  [] The following radiograph was interpreted by myself in the absence of a radiologist:   [] Radiologist's Report Reviewed:  No orders to display       EKG (if obtained):   Please See Note of attending physician for EKG interpretation. Chart review shows recent radiograph(s):  No results found. MDM:     Neurovascularly intact patient presents today to the emergency department complaining of tremor. With associated shortness of breath. This tremor is been ongoing over the last several months.   She has no

## 2020-05-24 ENCOUNTER — CARE COORDINATION (OUTPATIENT)
Dept: CARE COORDINATION | Age: 85
End: 2020-05-24

## 2020-05-26 ENCOUNTER — HOSPITAL ENCOUNTER (OUTPATIENT)
Dept: MRI IMAGING | Age: 85
Discharge: HOME | End: 2020-05-26
Payer: MEDICARE

## 2020-05-26 PROCEDURE — 70544 MR ANGIOGRAPHY HEAD W/O DYE: CPT

## 2020-05-26 PROCEDURE — 70551 MRI BRAIN STEM W/O DYE: CPT

## 2020-05-27 ENCOUNTER — CARE COORDINATION (OUTPATIENT)
Dept: CARE COORDINATION | Age: 85
End: 2020-05-27

## 2020-05-29 LAB
EKG ATRIAL RATE: 77 BPM
EKG DIAGNOSIS: NORMAL
EKG P AXIS: 51 DEGREES
EKG P-R INTERVAL: 168 MS
EKG Q-T INTERVAL: 404 MS
EKG QRS DURATION: 76 MS
EKG QTC CALCULATION (BAZETT): 457 MS
EKG R AXIS: -10 DEGREES
EKG T AXIS: 69 DEGREES
EKG VENTRICULAR RATE: 77 BPM

## 2020-05-29 NOTE — CARE COORDINATION
on severity of symptoms and risk factors. Spoke with pt caregiver, states continues to have tremors. No new symptoms or concerns. Unable to get into neuro until August.  Encouraged follow up with PCP, ACM attempted to call to arrange an appt but had to LM & no return call received at this time. Provided caregiver with neurology options in [facility]. Will plan for final outreach in approximately 1 week. César Acosta RN  Ambulatory Care Manager  [phone] office/cell  [phone] fax  [email or web address]

## 2020-06-01 ENCOUNTER — HOSPITAL ENCOUNTER (OUTPATIENT)
Age: 85
Setting detail: OBSERVATION
Discharge: HOME | End: 2020-06-04
Attending: FAMILY MEDICINE | Admitting: FAMILY MEDICINE
Payer: MEDICARE

## 2020-06-01 PROBLEM — R56.9 SEIZURE-LIKE ACTIVITY (HCC): Status: ACTIVE | Noted: 2020-06-01

## 2020-06-01 PROCEDURE — G0378 HOSPITAL OBSERVATION PER HR: HCPCS

## 2020-06-01 PROCEDURE — 1200000000 HC SEMI PRIVATE

## 2020-06-01 PROCEDURE — 6370000000 HC RX 637 (ALT 250 FOR IP): Performed by: FAMILY MEDICINE

## 2020-06-01 PROCEDURE — G0379 DIRECT REFER HOSPITAL OBSERV: HCPCS

## 2020-06-01 RX ORDER — ACETAMINOPHEN 325 MG/1
650 TABLET ORAL EVERY 6 HOURS PRN
Status: DISCONTINUED | OUTPATIENT
Start: 2020-06-01 | End: 2020-06-04 | Stop reason: HOSPADM

## 2020-06-01 RX ORDER — FAMOTIDINE 20 MG/1
20 TABLET, FILM COATED ORAL 2 TIMES DAILY
Status: DISCONTINUED | OUTPATIENT
Start: 2020-06-01 | End: 2020-06-04 | Stop reason: HOSPADM

## 2020-06-01 RX ORDER — ACETAMINOPHEN 650 MG/1
650 SUPPOSITORY RECTAL EVERY 6 HOURS PRN
Status: DISCONTINUED | OUTPATIENT
Start: 2020-06-01 | End: 2020-06-04 | Stop reason: HOSPADM

## 2020-06-01 RX ORDER — HYDRALAZINE HYDROCHLORIDE 50 MG/1
50 TABLET, FILM COATED ORAL EVERY 8 HOURS SCHEDULED
Status: DISCONTINUED | OUTPATIENT
Start: 2020-06-01 | End: 2020-06-04 | Stop reason: HOSPADM

## 2020-06-01 RX ORDER — SODIUM CHLORIDE 0.9 % (FLUSH) 0.9 %
10 SYRINGE (ML) INJECTION PRN
Status: DISCONTINUED | OUTPATIENT
Start: 2020-06-01 | End: 2020-06-04 | Stop reason: HOSPADM

## 2020-06-01 RX ORDER — POTASSIUM CHLORIDE 7.45 MG/ML
10 INJECTION INTRAVENOUS PRN
Status: DISCONTINUED | OUTPATIENT
Start: 2020-06-01 | End: 2020-06-04 | Stop reason: HOSPADM

## 2020-06-01 RX ORDER — ROPINIROLE 1 MG/1
4 TABLET, FILM COATED ORAL 2 TIMES DAILY
Status: DISCONTINUED | OUTPATIENT
Start: 2020-06-01 | End: 2020-06-03

## 2020-06-01 RX ORDER — ONDANSETRON 2 MG/ML
4 INJECTION INTRAMUSCULAR; INTRAVENOUS EVERY 6 HOURS PRN
Status: DISCONTINUED | OUTPATIENT
Start: 2020-06-01 | End: 2020-06-04 | Stop reason: HOSPADM

## 2020-06-01 RX ORDER — SODIUM PHOSPHATE, DIBASIC AND SODIUM PHOSPHATE, MONOBASIC 7; 19 G/133ML; G/133ML
1 ENEMA RECTAL DAILY PRN
Status: DISCONTINUED | OUTPATIENT
Start: 2020-06-01 | End: 2020-06-04 | Stop reason: HOSPADM

## 2020-06-01 RX ORDER — POLYETHYLENE GLYCOL 3350 17 G/17G
17 POWDER, FOR SOLUTION ORAL DAILY PRN
Status: DISCONTINUED | OUTPATIENT
Start: 2020-06-01 | End: 2020-06-04 | Stop reason: HOSPADM

## 2020-06-01 RX ORDER — MAGNESIUM SULFATE IN WATER 40 MG/ML
2 INJECTION, SOLUTION INTRAVENOUS PRN
Status: DISCONTINUED | OUTPATIENT
Start: 2020-06-01 | End: 2020-06-04 | Stop reason: HOSPADM

## 2020-06-01 RX ORDER — SODIUM CHLORIDE 0.9 % (FLUSH) 0.9 %
10 SYRINGE (ML) INJECTION EVERY 12 HOURS SCHEDULED
Status: DISCONTINUED | OUTPATIENT
Start: 2020-06-01 | End: 2020-06-04 | Stop reason: HOSPADM

## 2020-06-01 RX ORDER — SODIUM CHLORIDE 9 MG/ML
INJECTION, SOLUTION INTRAVENOUS CONTINUOUS
Status: DISCONTINUED | OUTPATIENT
Start: 2020-06-01 | End: 2020-06-04

## 2020-06-01 RX ORDER — POTASSIUM CHLORIDE 20 MEQ/1
40 TABLET, EXTENDED RELEASE ORAL PRN
Status: DISCONTINUED | OUTPATIENT
Start: 2020-06-01 | End: 2020-06-04 | Stop reason: HOSPADM

## 2020-06-01 RX ORDER — NICOTINE 21 MG/24HR
1 PATCH, TRANSDERMAL 24 HOURS TRANSDERMAL DAILY
Status: DISCONTINUED | OUTPATIENT
Start: 2020-06-01 | End: 2020-06-04 | Stop reason: HOSPADM

## 2020-06-01 RX ORDER — MEMANTINE HYDROCHLORIDE 10 MG/1
5 TABLET ORAL 2 TIMES DAILY
Status: DISCONTINUED | OUTPATIENT
Start: 2020-06-01 | End: 2020-06-04 | Stop reason: HOSPADM

## 2020-06-01 RX ORDER — CALCIUM CARBONATE 200(500)MG
750 TABLET,CHEWABLE ORAL 3 TIMES DAILY PRN
Status: DISCONTINUED | OUTPATIENT
Start: 2020-06-01 | End: 2020-06-04 | Stop reason: HOSPADM

## 2020-06-01 RX ORDER — CLONAZEPAM 0.5 MG/1
0.5 TABLET ORAL 2 TIMES DAILY
Status: DISCONTINUED | OUTPATIENT
Start: 2020-06-01 | End: 2020-06-03

## 2020-06-01 RX ORDER — PROMETHAZINE HYDROCHLORIDE 25 MG/1
12.5 TABLET ORAL EVERY 6 HOURS PRN
Status: DISCONTINUED | OUTPATIENT
Start: 2020-06-01 | End: 2020-06-04 | Stop reason: HOSPADM

## 2020-06-01 RX ORDER — LATANOPROST 50 UG/ML
1 SOLUTION/ DROPS OPHTHALMIC NIGHTLY
Status: DISCONTINUED | OUTPATIENT
Start: 2020-06-01 | End: 2020-06-04 | Stop reason: HOSPADM

## 2020-06-01 RX ORDER — DORZOLAMIDE HCL 20 MG/ML
1 SOLUTION/ DROPS OPHTHALMIC 3 TIMES DAILY
Status: DISCONTINUED | OUTPATIENT
Start: 2020-06-01 | End: 2020-06-04 | Stop reason: HOSPADM

## 2020-06-01 RX ADMIN — ROPINIROLE HYDROCHLORIDE 4 MG: 1 TABLET, FILM COATED ORAL at 23:40

## 2020-06-01 RX ADMIN — HYDRALAZINE HYDROCHLORIDE 50 MG: 50 TABLET, FILM COATED ORAL at 23:40

## 2020-06-01 RX ADMIN — CLONAZEPAM 0.5 MG: 0.5 TABLET ORAL at 23:40

## 2020-06-01 RX ADMIN — FAMOTIDINE 20 MG: 20 TABLET ORAL at 23:40

## 2020-06-01 RX ADMIN — MEMANTINE 5 MG: 10 TABLET ORAL at 23:40

## 2020-06-01 ASSESSMENT — PAIN SCALES - GENERAL
PAINLEVEL_OUTOF10: 0
PAINLEVEL_OUTOF10: 0

## 2020-06-01 NOTE — PLAN OF CARE
Problem: SAFETY  Goal: Free from accidental physical injury  Outcome: Ongoing     Problem: DAILY CARE  Goal: Daily care needs are met  Outcome: Ongoing     Problem: PAIN  Goal: Patient's pain/discomfort is manageable  Outcome: Ongoing     Problem: SKIN INTEGRITY  Goal: Skin integrity is maintained or improved  Outcome: Ongoing     Problem: KNOWLEDGE DEFICIT  Goal: Patient/S.O. demonstrates understanding of disease process, treatment plan, medications, and discharge instructions.   Outcome: Ongoing     Problem: DISCHARGE BARRIERS  Goal: Patient's continuum of care needs are met  Outcome: Ongoing

## 2020-06-02 ENCOUNTER — APPOINTMENT (OUTPATIENT)
Dept: MRI IMAGING | Age: 85
End: 2020-06-02
Attending: FAMILY MEDICINE
Payer: MEDICARE

## 2020-06-02 LAB
ALBUMIN SERPL-MCNC: 3.7 GM/DL (ref 3.4–5)
ALP BLD-CCNC: 79 IU/L (ref 40–129)
ALT SERPL-CCNC: 11 U/L (ref 10–40)
ANION GAP SERPL CALCULATED.3IONS-SCNC: 11 MMOL/L (ref 4–16)
AST SERPL-CCNC: 18 IU/L (ref 15–37)
BASOPHILS ABSOLUTE: 0 K/CU MM
BASOPHILS RELATIVE PERCENT: 0.7 % (ref 0–1)
BILIRUB SERPL-MCNC: 0.5 MG/DL (ref 0–1)
BILIRUBIN DIRECT: 0.2 MG/DL (ref 0–0.3)
BILIRUBIN, INDIRECT: 0.3 MG/DL (ref 0–0.7)
BUN BLDV-MCNC: 20 MG/DL (ref 6–23)
CALCIUM SERPL-MCNC: 9.4 MG/DL (ref 8.3–10.6)
CHLORIDE BLD-SCNC: 109 MMOL/L (ref 99–110)
CO2: 25 MMOL/L (ref 21–32)
CREAT SERPL-MCNC: 0.9 MG/DL (ref 0.6–1.1)
DIFFERENTIAL TYPE: ABNORMAL
EOSINOPHILS ABSOLUTE: 0.2 K/CU MM
EOSINOPHILS RELATIVE PERCENT: 3.4 % (ref 0–3)
ERYTHROCYTE SEDIMENTATION RATE: 17 MM/HR (ref 0–30)
GFR AFRICAN AMERICAN: >60 ML/MIN/1.73M2
GFR NON-AFRICAN AMERICAN: 59 ML/MIN/1.73M2
GLUCOSE BLD-MCNC: 85 MG/DL (ref 70–99)
HCT VFR BLD CALC: 37.9 % (ref 37–47)
HEMOGLOBIN: 11.9 GM/DL (ref 12.5–16)
IMMATURE NEUTROPHIL %: 0.2 % (ref 0–0.43)
LYMPHOCYTES ABSOLUTE: 1.6 K/CU MM
LYMPHOCYTES RELATIVE PERCENT: 27 % (ref 24–44)
MCH RBC QN AUTO: 27.5 PG (ref 27–31)
MCHC RBC AUTO-ENTMCNC: 31.4 % (ref 32–36)
MCV RBC AUTO: 87.7 FL (ref 78–100)
MONOCYTES ABSOLUTE: 0.5 K/CU MM
MONOCYTES RELATIVE PERCENT: 8.7 % (ref 0–4)
NUCLEATED RBC %: 0 %
PDW BLD-RTO: 14.6 % (ref 11.7–14.9)
PLATELET # BLD: 268 K/CU MM (ref 140–440)
PMV BLD AUTO: 10.6 FL (ref 7.5–11.1)
POTASSIUM SERPL-SCNC: 4 MMOL/L (ref 3.5–5.1)
RBC # BLD: 4.32 M/CU MM (ref 4.2–5.4)
SEGMENTED NEUTROPHILS ABSOLUTE COUNT: 3.6 K/CU MM
SEGMENTED NEUTROPHILS RELATIVE PERCENT: 60 % (ref 36–66)
SODIUM BLD-SCNC: 145 MMOL/L (ref 135–145)
TOTAL IMMATURE NEUTOROPHIL: 0.01 K/CU MM
TOTAL NUCLEATED RBC: 0 K/CU MM
TOTAL PROTEIN: 6.5 GM/DL (ref 6.4–8.2)
WBC # BLD: 6 K/CU MM (ref 4–10.5)

## 2020-06-02 PROCEDURE — 80076 HEPATIC FUNCTION PANEL: CPT

## 2020-06-02 PROCEDURE — G0378 HOSPITAL OBSERVATION PER HR: HCPCS

## 2020-06-02 PROCEDURE — A9577 INJ MULTIHANCE: HCPCS | Performed by: FAMILY MEDICINE

## 2020-06-02 PROCEDURE — 2580000003 HC RX 258: Performed by: FAMILY MEDICINE

## 2020-06-02 PROCEDURE — 36415 COLL VENOUS BLD VENIPUNCTURE: CPT

## 2020-06-02 PROCEDURE — 70552 MRI BRAIN STEM W/DYE: CPT

## 2020-06-02 PROCEDURE — 1200000000 HC SEMI PRIVATE

## 2020-06-02 PROCEDURE — 99205 OFFICE O/P NEW HI 60 MIN: CPT | Performed by: PSYCHIATRY & NEUROLOGY

## 2020-06-02 PROCEDURE — 6370000000 HC RX 637 (ALT 250 FOR IP): Performed by: FAMILY MEDICINE

## 2020-06-02 PROCEDURE — 95819 EEG AWAKE AND ASLEEP: CPT | Performed by: PSYCHIATRY & NEUROLOGY

## 2020-06-02 PROCEDURE — 95819 EEG AWAKE AND ASLEEP: CPT

## 2020-06-02 PROCEDURE — 97166 OT EVAL MOD COMPLEX 45 MIN: CPT

## 2020-06-02 PROCEDURE — 85025 COMPLETE CBC W/AUTO DIFF WBC: CPT

## 2020-06-02 PROCEDURE — 85652 RBC SED RATE AUTOMATED: CPT

## 2020-06-02 PROCEDURE — 6360000004 HC RX CONTRAST MEDICATION: Performed by: FAMILY MEDICINE

## 2020-06-02 PROCEDURE — 6360000002 HC RX W HCPCS: Performed by: FAMILY MEDICINE

## 2020-06-02 PROCEDURE — 97162 PT EVAL MOD COMPLEX 30 MIN: CPT

## 2020-06-02 PROCEDURE — 80048 BASIC METABOLIC PNL TOTAL CA: CPT

## 2020-06-02 PROCEDURE — 96372 THER/PROPH/DIAG INJ SC/IM: CPT

## 2020-06-02 PROCEDURE — 97535 SELF CARE MNGMENT TRAINING: CPT

## 2020-06-02 PROCEDURE — 94761 N-INVAS EAR/PLS OXIMETRY MLT: CPT

## 2020-06-02 PROCEDURE — 97112 NEUROMUSCULAR REEDUCATION: CPT

## 2020-06-02 RX ADMIN — GADOBENATE DIMEGLUMINE 7 ML: 529 INJECTION, SOLUTION INTRAVENOUS at 12:54

## 2020-06-02 RX ADMIN — HYDRALAZINE HYDROCHLORIDE 50 MG: 50 TABLET, FILM COATED ORAL at 23:18

## 2020-06-02 RX ADMIN — SODIUM CHLORIDE, PRESERVATIVE FREE 10 ML: 5 INJECTION INTRAVENOUS at 09:41

## 2020-06-02 RX ADMIN — ROPINIROLE HYDROCHLORIDE 4 MG: 1 TABLET, FILM COATED ORAL at 09:40

## 2020-06-02 RX ADMIN — FAMOTIDINE 20 MG: 20 TABLET ORAL at 23:18

## 2020-06-02 RX ADMIN — ENOXAPARIN SODIUM 30 MG: 30 INJECTION SUBCUTANEOUS at 09:41

## 2020-06-02 RX ADMIN — MEMANTINE 5 MG: 10 TABLET ORAL at 09:40

## 2020-06-02 RX ADMIN — LATANOPROST 1 DROP: 50 SOLUTION OPHTHALMIC at 00:24

## 2020-06-02 RX ADMIN — ACETAMINOPHEN 650 MG: 325 TABLET ORAL at 09:40

## 2020-06-02 RX ADMIN — SODIUM CHLORIDE, PRESERVATIVE FREE 10 ML: 5 INJECTION INTRAVENOUS at 00:28

## 2020-06-02 RX ADMIN — CLONAZEPAM 0.5 MG: 0.5 TABLET ORAL at 23:17

## 2020-06-02 RX ADMIN — FAMOTIDINE 20 MG: 20 TABLET ORAL at 09:40

## 2020-06-02 RX ADMIN — MEMANTINE 5 MG: 10 TABLET ORAL at 23:18

## 2020-06-02 RX ADMIN — HYDRALAZINE HYDROCHLORIDE 50 MG: 50 TABLET, FILM COATED ORAL at 06:12

## 2020-06-02 RX ADMIN — DORZOLAMIDE HYDROCHLORIDE 1 DROP: 20 SOLUTION/ DROPS OPHTHALMIC at 09:44

## 2020-06-02 RX ADMIN — CLONAZEPAM 0.5 MG: 0.5 TABLET ORAL at 09:40

## 2020-06-02 RX ADMIN — Medication 1000 UNITS: at 09:40

## 2020-06-02 RX ADMIN — SODIUM CHLORIDE: 9 INJECTION, SOLUTION INTRAVENOUS at 00:24

## 2020-06-02 RX ADMIN — ROPINIROLE HYDROCHLORIDE 4 MG: 1 TABLET, FILM COATED ORAL at 23:18

## 2020-06-02 RX ADMIN — DORZOLAMIDE HYDROCHLORIDE 1 DROP: 20 SOLUTION/ DROPS OPHTHALMIC at 00:24

## 2020-06-02 RX ADMIN — LATANOPROST 1 DROP: 50 SOLUTION OPHTHALMIC at 23:31

## 2020-06-02 RX ADMIN — DORZOLAMIDE HYDROCHLORIDE 1 DROP: 20 SOLUTION/ DROPS OPHTHALMIC at 23:31

## 2020-06-02 RX ADMIN — DORZOLAMIDE HYDROCHLORIDE 1 DROP: 20 SOLUTION/ DROPS OPHTHALMIC at 15:15

## 2020-06-02 ASSESSMENT — PAIN SCALES - GENERAL
PAINLEVEL_OUTOF10: 0
PAINLEVEL_OUTOF10: 10
PAINLEVEL_OUTOF10: 0

## 2020-06-02 ASSESSMENT — PAIN SCALES - WONG BAKER
WONGBAKER_NUMERICALRESPONSE: 0

## 2020-06-02 NOTE — CONSULTS
Neurology Service Consult Note  [facility]  Patient Name: Scooter Herrera  : 1931        Subjective:   Reason for consult: tremor   80 y.o. right-handed female with past medical history of arthritis, she is blind in the left eye, colon cancer, COPD, dementia, depression, GERD, hypertension, pneumonia, RLS, tremor and CVA presenting to [facility] with complaints from patient's family that she is having uncontrollable movements of all 4 extremities. Going through the notes you see the patient's been seen several times not only in the primary care office but in the emergency room for these tremors that it is noted seizures been ruled out and she has outpatient appointment with neurology. The appointment is scheduled for August of this year in our office in [facility] with Dr. Whit Pollack. Patient is able to give me a little bit of insight on the tremor she states that her whole body shakes she cannot replicate or demonstrate what it looks like but she does specifically tell me that starts in her right leg it begins to jerk and becomes uncontrollable where she cannot calm it down then she states it progresses to the whole body. Previous notes state the episodes last for 10 minutes previous notes also state that patient is alert and talking through the episodes she has no eye deviation or altered mental status and there is no paralysis afterward. I do not know where patient had previous seizure work-up there is no seizure work-up in care everywhere and I checked my outpatient notes again patient scheduled to see us in August but has not seen us on outpatient basis due to the [place] pandemic. When I go into the room patient states that she needs her Requip and her legs hurt thus her legs are moving side to side in pain but they are not uncontrollable and patient actually can self limit the movement of those legs and she has full strength.   No witnessed tremor in the bilateral upper extremities during my exam.    Patient does tell me that she has a bilateral tension type headache is 8 out of 10 in intensity, she states that her headaches are not new she suffers from chronic daily headaches, blood pressure within normal limits, she has a baseline blindness she tells me in both eyes as documented in the left eye. Patient just had MRI May 26, 2020 it was noncontrasted she had an associated MRA there is no evidence of stroke, mass or aneurysm she does have a history of colon cancer and has not had a contrasted study. She is not on seizure medication at home. She is on Requip 4 mg twice daily. She is also on Namenda 5 mg twice daily. Patient at the bedside denies any temporal region tenderness, jaw claudication, denies any facial droop aphasia or dysarthria, she denies any unilateral arm or leg weakness or sensation changes, no nausea vomiting diarrhea, no chest pain or shortness of breath.       Past Medical History:   Diagnosis Date    Arthritis     Blind left eye     Cancer ([facility] [address] [facility]    colon    [facility]     Dementia (HCC)     Depression     GERD (gastroesophageal reflux disease)     Hypertension     Pneumonia     Restless legs syndrome     Tremors of nervous system     Unspecified cerebral artery occlusion with cerebral infarction     :   Past Surgical History:   Procedure Laterality Date    ABDOMEN SURGERY      APPENDECTOMY      BACK SURGERY      COLONOSCOPY      ENDOSCOPY, COLON, DIAGNOSTIC      EYE SURGERY      FRACTURE SURGERY      HYSTERECTOMY  1972    SPINE SURGERY  1995    VASCULAR SURGERY       Medications:  Scheduled Meds:   dorzolamide  1 drop Both Eyes TID    latanoprost  1 drop Both Eyes Nightly    memantine  5 mg Oral BID    rOPINIRole  4 mg Oral BID    vitamin D  1,000 Units Oral Daily    sodium chloride flush  10 mL Intravenous 2 times per day    famotidine  20 mg Oral BID    nicotine  1 patch patient with Travon Triplett CNP. I have reviewed the chart and we have discussed this case in detail. The patient was seen and examined by myself. Pertinent labs and imaging have been personally reviewed. Our findings and impressions were discussed with the patient. I concur with the Nurse Practioner's assessment and plan. I suspect she has augmentation with her RLS due to higher dose requip. Will need further evaluation when more awake.      Keven Schneider DO 6/2/2020 7:43 PM

## 2020-06-02 NOTE — H&P
disease), Hypertension, Pneumonia, Restless legs syndrome, Tremors of nervous system, and Unspecified cerebral artery occlusion with cerebral infarction. Past Surgical History:   has a past surgical history that includes Hysterectomy (1972); Spine surgery (1995); Abdomen surgery; fracture surgery; Appendectomy; Endoscopy, colon, diagnostic; vascular surgery; back surgery; Colonoscopy; and eye surgery. Medications:  No current facility-administered medications on file prior to encounter. Current Outpatient Medications on File Prior to Encounter   Medication Sig Dispense Refill    magnesium hydroxide (MILK OF MAGNESIA) 400 MG/5ML suspension Take 30 mLs by mouth daily as needed for Constipation      albuterol sulfate  (90 Base) MCG/ACT inhaler Inhale 2 puffs into the lungs every 6 hours as needed for Shortness of Breath       rOPINIRole (REQUIP) 4 MG tablet Take 4 mg by mouth 2 times daily      bisacodyl (DULCOLAX) 10 MG suppository Place 10 mg rectally daily as needed for Constipation       memantine (NAMENDA) 5 MG tablet Take 1 tablet by mouth 2 times daily 60 tablet 3    acetaminophen 650 MG TABS Take 650 mg by mouth every 4 hours as needed 120 tablet 3    vitamin B-12 (CYANOCOBALAMIN) 1000 MCG tablet Take 1,000 mcg by mouth daily      vitamin D (CHOLECALCIFEROL) 1000 UNIT TABS tablet Take 1,000 Units by mouth daily      dorzolamide (TRUSOPT) 2 % ophthalmic solution Place 1 drop into both eyes 3 times daily 10 mL 2    latanoprost (XALATAN) 0.005 % ophthalmic solution Place 1 drop into both eyes nightly 1 Bottle 3       Allergies: Allergies   Allergen Reactions    Other      Pt states allergy to unknown antibiotic that made her arm red         Social History:   reports that she has been smoking cigarettes. She has a 12.50 pack-year smoking history. She has never used smokeless tobacco. She reports that she does not drink alcohol or use drugs.      Family History:  family history

## 2020-06-02 NOTE — CARE COORDINATION
APRILW acknowledges social work consult. APRILW reviewed chart and called the Pt room. Pt did not answer. LSW called Pt emergency contact Hakan. Hakan is the Pt granddgt. Pt lives with her granddgt as of last month. Pt did lose her home 3 years ago when her dgt and son sold it from up under her while she was in the hospital. Susana Adhikari said that the home she is in now is stable. They live in a condo. Pt granddgt provides transportation. Pt was just approved for medicaid today. Pt marylou is trying to get Pt set up with an aide as the Pt is unable to see. Pt has PCP. Pt has med/Rx insurance and is able to afford Rx. Schedoug would like for the pt to return home with her at discharge. She is unsure of PT/OT as she believes the pt is at or near her baseline. Cm to continue to follow.      Electronically signed by Valla Nageotte, LSW on 6/2/2020 at 4:06 PM

## 2020-06-02 NOTE — CONSULTS
[address], 1/30/1931, 1107/1107-A, 6/2/2020  History  [place]:  There were no encounter diagnoses. Patient  has a past medical history of Arthritis, Blind left eye, Cancer ([address] COPD (chronic obstructive pulmonary disease) ([address] Dementia ([address] Depression, GERD (gastroesophageal reflux disease), Hypertension, Pneumonia, Restless legs syndrome, Tremors of nervous system, and Unspecified cerebral artery occlusion with cerebral infarction. Patient  has a past surgical history that includes Hysterectomy (1972); Spine surgery (1995); Abdomen surgery; fracture surgery; Appendectomy; Endoscopy, colon, diagnostic; vascular surgery; back surgery; Colonoscopy; and eye surgery. Therapy Hx and additional comorbidities:  n/a    Restrictions:  fall risk  Communication with other providers:  cleared for tx  Subjective:  Patient states:  Agreeable to therapy. Does not know if she has been mobile today. Does not recall OT eval earlier today. Pain:  0/10.   Patient goal:  None stated  Occupational profile (relevant social history and personal factors):    Social/Functional History  Lives With: Family(daughter and granddaughter)  Type of Home: House  Home Layout: Two level, Performs ADL's on one level, Able to Live on Main level with bedroom/bathroom  Home Access: Stairs to enter without rails  Entrance Stairs - Number of Steps: 1  Bathroom Shower/Tub: Tub/Shower unit  Bathroom Toilet: Standard  Bathroom Accessibility: Accessible  Home Equipment: Rolling walker  Receives Help From: Family  ADL Assistance: Needs assistance  Homemaking Assistance: Needs assistance  Homemaking Responsibilities: No  Ambulation Assistance: Independent  Transfer Assistance: Independent  Active : No  Patient's  Info: family drives  Occupation: Retired  Additional Comments: Pt reports no recent falls  Examination of body systems (includes body structures/functions, activity/participation limitations): · Vision and Hearing:  visual impairments and WFL hearing  · Cognition and Participation:  alert and oriented, pleasant and participatory, engaged in service and follows commands appropriately. · Participation and Tolerance:  tolerant of activity and recovers quickly with rest period  · Neuro:  grossly [facility] for functional mobility  and impaired control grossly, with multiple significant episodes of tremoring throughout entire body. the shaking is significant and inés remains alert and participaotry during shaking episodes with most gross posturing intact and no LOBs. she appeared to calm from seated tremoring with standing for two of three episodes. standing may help.  patient with less tremoring in standing with steps and mobility, grossly mild to moderate amount. · Musculoskeletal:  Functional AROM:  WFL  PROM:  WFL. Functional strength:  WFL for xfers, standing, repetitive movement, and ambulation in room for short periods of time. · Mobility/Transfer Skills:  Pattern/Sequence:  WFL. Efficiency:  WFL aside from tremors. Mobility skills performance:  Moves in bed with supervision or independently, supervision sup-sit, CGA sit-stand, CGA SPT with tremoring, supervision stand-sit. · Balance:  CGAstanding with ambulation in room, two trials of 20 feet.   · Impairments:  efficiency, standing static balance, standing dynamic balance, gait patterning/sequence and ambulatory balance  [facility] 6 Clicks Inpatient Mobility:  AM-PAC Mobility Inpatient   How much difficulty turning over in bed?: None  How much difficulty sitting down on / standing up from a chair with arms?: A Little  How much difficulty moving from lying on back to sitting on side of bed?: None  How much help from another person moving to and from a bed to a chair?: A Little  How much help from another person needed to walk in hospital room?: A Little  How much help from another person for climbing 3-5

## 2020-06-03 LAB
GLUCOSE BLD-MCNC: 79 MG/DL (ref 70–99)
HCT VFR BLD CALC: 38.9 % (ref 37–47)
HEMOGLOBIN: 11.9 GM/DL (ref 12.5–16)
MCH RBC QN AUTO: 27.1 PG (ref 27–31)
MCHC RBC AUTO-ENTMCNC: 30.6 % (ref 32–36)
MCV RBC AUTO: 88.6 FL (ref 78–100)
PDW BLD-RTO: 14.8 % (ref 11.7–14.9)
PLATELET # BLD: 269 K/CU MM (ref 140–440)
PMV BLD AUTO: 10.3 FL (ref 7.5–11.1)
RBC # BLD: 4.39 M/CU MM (ref 4.2–5.4)
WBC # BLD: 5.7 K/CU MM (ref 4–10.5)

## 2020-06-03 PROCEDURE — 85027 COMPLETE CBC AUTOMATED: CPT

## 2020-06-03 PROCEDURE — 6360000002 HC RX W HCPCS

## 2020-06-03 PROCEDURE — 2580000003 HC RX 258: Performed by: FAMILY MEDICINE

## 2020-06-03 PROCEDURE — G0378 HOSPITAL OBSERVATION PER HR: HCPCS

## 2020-06-03 PROCEDURE — 94761 N-INVAS EAR/PLS OXIMETRY MLT: CPT

## 2020-06-03 PROCEDURE — 99215 OFFICE O/P EST HI 40 MIN: CPT | Performed by: NURSE PRACTITIONER

## 2020-06-03 PROCEDURE — 6360000002 HC RX W HCPCS: Performed by: FAMILY MEDICINE

## 2020-06-03 PROCEDURE — 96372 THER/PROPH/DIAG INJ SC/IM: CPT

## 2020-06-03 PROCEDURE — 36415 COLL VENOUS BLD VENIPUNCTURE: CPT

## 2020-06-03 PROCEDURE — 6370000000 HC RX 637 (ALT 250 FOR IP): Performed by: FAMILY MEDICINE

## 2020-06-03 PROCEDURE — 97530 THERAPEUTIC ACTIVITIES: CPT

## 2020-06-03 PROCEDURE — 97116 GAIT TRAINING THERAPY: CPT

## 2020-06-03 PROCEDURE — 82962 GLUCOSE BLOOD TEST: CPT

## 2020-06-03 PROCEDURE — 96374 THER/PROPH/DIAG INJ IV PUSH: CPT

## 2020-06-03 RX ORDER — CLONAZEPAM 0.5 MG/1
0.25 TABLET ORAL 2 TIMES DAILY PRN
Qty: 45 TABLET | Refills: 0 | Status: SHIPPED | OUTPATIENT
Start: 2020-06-03 | End: 2020-06-04 | Stop reason: HOSPADM

## 2020-06-03 RX ORDER — LORAZEPAM 2 MG/ML
INJECTION INTRAMUSCULAR
Status: DISCONTINUED
Start: 2020-06-03 | End: 2020-06-03 | Stop reason: WASHOUT

## 2020-06-03 RX ORDER — ROPINIROLE 1 MG/1
2 TABLET, FILM COATED ORAL 3 TIMES DAILY
Status: DISCONTINUED | OUTPATIENT
Start: 2020-06-03 | End: 2020-06-03

## 2020-06-03 RX ORDER — LORAZEPAM 2 MG/ML
0.5 INJECTION INTRAMUSCULAR ONCE
Status: COMPLETED | OUTPATIENT
Start: 2020-06-03 | End: 2020-06-03

## 2020-06-03 RX ORDER — LORAZEPAM 2 MG/ML
INJECTION INTRAMUSCULAR
Status: COMPLETED
Start: 2020-06-03 | End: 2020-06-03

## 2020-06-03 RX ORDER — HYDRALAZINE HYDROCHLORIDE 50 MG/1
50 TABLET, FILM COATED ORAL EVERY 8 HOURS SCHEDULED
Qty: 90 TABLET | Refills: 3 | Status: SHIPPED | OUTPATIENT
Start: 2020-06-03 | End: 2022-04-14

## 2020-06-03 RX ORDER — DIAZEPAM 2 MG/1
2 TABLET ORAL EVERY 8 HOURS PRN
Status: DISCONTINUED | OUTPATIENT
Start: 2020-06-03 | End: 2020-06-04 | Stop reason: HOSPADM

## 2020-06-03 RX ORDER — LORAZEPAM 2 MG/ML
1 INJECTION INTRAMUSCULAR ONCE
Status: COMPLETED | OUTPATIENT
Start: 2020-06-03 | End: 2020-06-03

## 2020-06-03 RX ADMIN — LORAZEPAM 0.5 MG: 2 INJECTION INTRAMUSCULAR; INTRAVENOUS at 15:36

## 2020-06-03 RX ADMIN — CLONAZEPAM 0.5 MG: 0.5 TABLET ORAL at 09:36

## 2020-06-03 RX ADMIN — DORZOLAMIDE HYDROCHLORIDE 1 DROP: 20 SOLUTION/ DROPS OPHTHALMIC at 22:32

## 2020-06-03 RX ADMIN — SODIUM CHLORIDE: 9 INJECTION, SOLUTION INTRAVENOUS at 01:59

## 2020-06-03 RX ADMIN — Medication 1000 UNITS: at 09:36

## 2020-06-03 RX ADMIN — HYDRALAZINE HYDROCHLORIDE 50 MG: 50 TABLET, FILM COATED ORAL at 06:27

## 2020-06-03 RX ADMIN — SODIUM CHLORIDE, PRESERVATIVE FREE 10 ML: 5 INJECTION INTRAVENOUS at 20:35

## 2020-06-03 RX ADMIN — LORAZEPAM 1 MG: 2 INJECTION INTRAMUSCULAR at 20:50

## 2020-06-03 RX ADMIN — DORZOLAMIDE HYDROCHLORIDE 1 DROP: 20 SOLUTION/ DROPS OPHTHALMIC at 09:37

## 2020-06-03 RX ADMIN — FAMOTIDINE 20 MG: 20 TABLET ORAL at 20:51

## 2020-06-03 RX ADMIN — MEMANTINE 5 MG: 10 TABLET ORAL at 09:36

## 2020-06-03 RX ADMIN — LORAZEPAM 1 MG: 2 INJECTION INTRAMUSCULAR; INTRAVENOUS at 20:50

## 2020-06-03 RX ADMIN — SODIUM CHLORIDE: 9 INJECTION, SOLUTION INTRAVENOUS at 14:48

## 2020-06-03 RX ADMIN — LATANOPROST 1 DROP: 50 SOLUTION OPHTHALMIC at 22:32

## 2020-06-03 RX ADMIN — LORAZEPAM 0.5 MG: 2 INJECTION INTRAMUSCULAR at 15:36

## 2020-06-03 RX ADMIN — ROPINIROLE HYDROCHLORIDE 2.5 MG: 1 TABLET, FILM COATED ORAL at 20:51

## 2020-06-03 RX ADMIN — ENOXAPARIN SODIUM 30 MG: 30 INJECTION SUBCUTANEOUS at 09:36

## 2020-06-03 RX ADMIN — ROPINIROLE HYDROCHLORIDE 4 MG: 1 TABLET, FILM COATED ORAL at 09:35

## 2020-06-03 RX ADMIN — ACETAMINOPHEN 650 MG: 325 TABLET ORAL at 02:19

## 2020-06-03 RX ADMIN — DORZOLAMIDE HYDROCHLORIDE 1 DROP: 20 SOLUTION/ DROPS OPHTHALMIC at 16:05

## 2020-06-03 RX ADMIN — DIAZEPAM 2 MG: 2 TABLET ORAL at 20:35

## 2020-06-03 RX ADMIN — HYDRALAZINE HYDROCHLORIDE 50 MG: 50 TABLET, FILM COATED ORAL at 15:55

## 2020-06-03 RX ADMIN — MEMANTINE 5 MG: 10 TABLET ORAL at 20:51

## 2020-06-03 RX ADMIN — FAMOTIDINE 20 MG: 20 TABLET ORAL at 09:36

## 2020-06-03 RX ADMIN — HYDRALAZINE HYDROCHLORIDE 50 MG: 50 TABLET, FILM COATED ORAL at 20:51

## 2020-06-03 ASSESSMENT — PAIN DESCRIPTION - LOCATION: LOCATION: HEAD

## 2020-06-03 ASSESSMENT — PAIN SCALES - GENERAL
PAINLEVEL_OUTOF10: 3
PAINLEVEL_OUTOF10: 0
PAINLEVEL_OUTOF10: 6

## 2020-06-03 ASSESSMENT — PAIN DESCRIPTION - DESCRIPTORS: DESCRIPTORS: ACHING

## 2020-06-03 ASSESSMENT — PAIN DESCRIPTION - FREQUENCY: FREQUENCY: INTERMITTENT

## 2020-06-03 NOTE — PROGRESS NOTES
I called granddaughter and updated her about uncontrolled jerky movement with plan to hold discharge at this point ( after rapid response ) . . she agreed and appreciated that .

## 2020-06-03 NOTE — PROGRESS NOTES
Physical Therapy    Physical Therapy Treatment Note  Name: Radha Shepard MRN: [id number] :   1931   Date:  6/3/2020   Admission Date: 2020 Room:  [address] [place] [address]   Restrictions/Precautions:        limited eye sight on L and no sight on the R  Communication with other providers:  Guillermo seaman RN states pt is ok to see for therapy  Subjective:  Patient states:  She needs to use the rest room  Pain:   Location, Type, Intensity (0/10 to 10/10):  0/10  Objective:    Observation:  Pt was reclined in her chair  Treatment, including education/measures:  Transfers  Scooting :VC's and SBA  Sit to stand :CGA and VC's for safety  Stand to sit :CGA and VC's for safety  Gait:  Pt amb with RW for 10 ft x 2 and 40 ft with CGA  Pt needed VC's for L and R turns, distance and safety  Safety  Patient left safely in the chair, with call light/phone in reach with alarm applied. Gait belt was used for transfers and gait.   Assessment / Impression:     Patient's tolerance of treatment:  Good, pt wants to be as Ind as she can bed   Adverse Reaction: none  Significant change in status and impact:  none  Barriers to improvement:  Limited sight, weakness of LE's  Plan for Next Session:    Will cont to work towards pt's goals per her tolerance  Time in:  1100  Time out:  1130  Timed treatment minutes:  30  Total treatment time:  30  Previously filed items:  Social/Functional History  Lives With: Family(daughter and granddaughter)  Type of Home: House  Home Layout: Two level, Performs ADL's on one level, Able to Live on Main level with bedroom/bathroom  Home Access: Stairs to enter without rails  Entrance Stairs - Number of Steps: 1  Bathroom Shower/Tub: Tub/Shower unit  Bathroom Toilet: Standard  Bathroom Accessibility: Accessible  Home Equipment: Rolling walker  Receives Help From: Family  ADL Assistance: Needs assistance  Homemaking Assistance: Needs assistance  Homemaking Responsibilities: No  Ambulation Assistance: Independent  Transfer Assistance: Independent  Active : No  Patient's  Info: family drives  Occupation: Retired  Additional Comments: Pt reports no recent falls      Electronically signed by:     David Galvez PTA  6/3/2020, 11:21 AM

## 2020-06-03 NOTE — PROGRESS NOTES
Neurology Service Progress Note   [facility]  Patient Name: Gerhard Crisostomo  : 1931        Subjective:   Patient seen and examined today  Prior to my exam patient had no episodes of full body shaking. She told me during my exam her headache had resolved and her leg felt good, no pain. She denied any new symptoms, denied CP and SOB  Discussed with nursing staff regarding follow up in our office and discharge plans. At around 1530 rapid response was called and I was notified via paging system that she was having full body shaking but was talking alert, stated her legs hurt and she had no eye deviation or altered mental status. Spoke to the IM physician Dr. Lilian Villeda as she was in the room, recommended low dose Ativan or Valium to ease her leg shaking from a anxiety stand point and explained she is augmented on her requip thus, little to do except for ween her down and this will be done outpatient as it will be a long, slow process. This will be verbalized to the family during the august appt and  wants to keep her another day and discuss plan of care with the family as well regarding expectation of care.        Past Medical History:   Diagnosis Date    Arthritis     Blind left eye     Cancer ([facility] [address]     colon    COPD (chronic obstructive pulmonary disease) ([facility] [address]     Dementia (HCC)     Depression     GERD (gastroesophageal reflux disease)     Hypertension     Pneumonia     Restless legs syndrome     Tremors of nervous system     Unspecified cerebral artery occlusion with cerebral infarction     :   Past Surgical History:   Procedure Laterality Date    ABDOMEN SURGERY      APPENDECTOMY      BACK SURGERY      COLONOSCOPY      ENDOSCOPY, COLON, DIAGNOSTIC      EYE SURGERY      FRACTURE SURGERY      HYSTERECTOMY      SPINE SURGERY      VASCULAR SURGERY       Medications:  Scheduled Meds:   dorzolamide  1 drop Both Eyes TID    latanoprost  1 drop Both Eyes Nightly    memantine  5 mg Oral BID    rOPINIRole  4 mg Oral BID    vitamin D  1,000 Units Oral Daily    sodium chloride flush  10 mL Intravenous 2 times per day    famotidine  20 mg Oral BID    nicotine  1 patch Transdermal Daily    enoxaparin  30 mg Subcutaneous Daily    hydrALAZINE  50 mg Oral 3 times per day    clonazePAM  0.5 mg Oral BID     Continuous Infusions:   sodium chloride 75 mL/hr at 06/03/20 1448     PRN Meds:.magnesium hydroxide, sodium chloride flush, potassium chloride **OR** potassium alternative oral replacement **OR** potassium chloride, magnesium sulfate, acetaminophen **OR** acetaminophen, polyethylene glycol, fleet, promethazine **OR** ondansetron, calcium carbonate    Allergies   Allergen Reactions    Other      Pt states allergy to unknown antibiotic that made her arm red      Social History     Socioeconomic History    Marital status:       Spouse name: Not on file    Number of children: 9    Years of education: Not on file    Highest education level: Not on file   Occupational History    Not on file   Social Needs    Financial resource strain: Not on file    Food insecurity     Worry: Not on file     Inability: Not on file    Transportation needs     Medical: Not on file     Non-medical: Not on file   Tobacco Use    Smoking status: Current Every Day Smoker     Packs/day: 0.25     Years: 50.00     Pack years: 12.50     Types: Cigarettes    Smokeless tobacco: Never Used    Tobacco comment: states smokes 1-2 cigs a day   Substance and Sexual Activity    Alcohol use: No     Alcohol/week: 0.0 standard drinks    Drug use: No    Sexual activity: Never   Lifestyle    Physical activity     Days per week: Not on file     Minutes per session: Not on file    Stress: Not on file   Relationships    Social connections     Talks on phone: Not on file     Gets together: Not on file     Attends Nondenominational service: Not on file     Active member of club or organization: Not on file

## 2020-06-04 VITALS
OXYGEN SATURATION: 91 % | WEIGHT: 164.1 LBS | TEMPERATURE: 98.3 F | BODY MASS INDEX: 32.22 KG/M2 | RESPIRATION RATE: 17 BRPM | HEART RATE: 81 BPM | SYSTOLIC BLOOD PRESSURE: 110 MMHG | DIASTOLIC BLOOD PRESSURE: 55 MMHG | HEIGHT: 60 IN

## 2020-06-04 LAB
HCT VFR BLD CALC: 39.1 % (ref 37–47)
HEMOGLOBIN: 12.5 GM/DL (ref 12.5–16)
MCH RBC QN AUTO: 27.6 PG (ref 27–31)
MCHC RBC AUTO-ENTMCNC: 32 % (ref 32–36)
MCV RBC AUTO: 86.3 FL (ref 78–100)
PDW BLD-RTO: 14.8 % (ref 11.7–14.9)
PLATELET # BLD: 292 K/CU MM (ref 140–440)
PMV BLD AUTO: 10.5 FL (ref 7.5–11.1)
RBC # BLD: 4.53 M/CU MM (ref 4.2–5.4)
WBC # BLD: 5.2 K/CU MM (ref 4–10.5)

## 2020-06-04 PROCEDURE — 6370000000 HC RX 637 (ALT 250 FOR IP): Performed by: FAMILY MEDICINE

## 2020-06-04 PROCEDURE — 2580000003 HC RX 258: Performed by: FAMILY MEDICINE

## 2020-06-04 PROCEDURE — 97110 THERAPEUTIC EXERCISES: CPT

## 2020-06-04 PROCEDURE — 6360000002 HC RX W HCPCS: Performed by: FAMILY MEDICINE

## 2020-06-04 PROCEDURE — 97116 GAIT TRAINING THERAPY: CPT

## 2020-06-04 PROCEDURE — 97530 THERAPEUTIC ACTIVITIES: CPT

## 2020-06-04 PROCEDURE — 94761 N-INVAS EAR/PLS OXIMETRY MLT: CPT

## 2020-06-04 PROCEDURE — G0378 HOSPITAL OBSERVATION PER HR: HCPCS

## 2020-06-04 PROCEDURE — 85027 COMPLETE CBC AUTOMATED: CPT

## 2020-06-04 PROCEDURE — 96372 THER/PROPH/DIAG INJ SC/IM: CPT

## 2020-06-04 RX ORDER — ROPINIROLE 0.5 MG/1
2.5 TABLET, FILM COATED ORAL 3 TIMES DAILY
Qty: 30 TABLET | Refills: 1 | Status: SHIPPED | OUTPATIENT
Start: 2020-06-04

## 2020-06-04 RX ORDER — DIAZEPAM 5 MG/1
5 TABLET ORAL EVERY 12 HOURS PRN
Qty: 60 TABLET | Refills: 0 | Status: SHIPPED | OUTPATIENT
Start: 2020-06-04 | End: 2020-07-04

## 2020-06-04 RX ADMIN — HYDRALAZINE HYDROCHLORIDE 50 MG: 50 TABLET, FILM COATED ORAL at 14:44

## 2020-06-04 RX ADMIN — ROPINIROLE HYDROCHLORIDE 2.5 MG: 1 TABLET, FILM COATED ORAL at 14:44

## 2020-06-04 RX ADMIN — HYDRALAZINE HYDROCHLORIDE 50 MG: 50 TABLET, FILM COATED ORAL at 05:32

## 2020-06-04 RX ADMIN — FAMOTIDINE 20 MG: 20 TABLET ORAL at 09:47

## 2020-06-04 RX ADMIN — SODIUM CHLORIDE: 9 INJECTION, SOLUTION INTRAVENOUS at 00:44

## 2020-06-04 RX ADMIN — SODIUM CHLORIDE, PRESERVATIVE FREE 10 ML: 5 INJECTION INTRAVENOUS at 09:49

## 2020-06-04 RX ADMIN — DORZOLAMIDE HYDROCHLORIDE 1 DROP: 20 SOLUTION/ DROPS OPHTHALMIC at 14:44

## 2020-06-04 RX ADMIN — Medication 1000 UNITS: at 09:47

## 2020-06-04 RX ADMIN — DIAZEPAM 2 MG: 2 TABLET ORAL at 05:32

## 2020-06-04 RX ADMIN — MEMANTINE 5 MG: 10 TABLET ORAL at 09:48

## 2020-06-04 RX ADMIN — ENOXAPARIN SODIUM 30 MG: 30 INJECTION SUBCUTANEOUS at 09:47

## 2020-06-04 RX ADMIN — ROPINIROLE HYDROCHLORIDE 2.5 MG: 1 TABLET, FILM COATED ORAL at 09:47

## 2020-06-04 RX ADMIN — DORZOLAMIDE HYDROCHLORIDE 1 DROP: 20 SOLUTION/ DROPS OPHTHALMIC at 09:53

## 2020-06-04 ASSESSMENT — PAIN SCALES - GENERAL
PAINLEVEL_OUTOF10: 0

## 2020-06-04 NOTE — DISCHARGE SUMMARY
without Rales/Wheezes/Rhonchi. Abdomen: Soft, non-tender, non-distended, bowel sounds present  Musculoskeletal:   no cyanosis, no edema  Neurologic:  Cranial nerves: II-XII intact, grossly non-focal.  Psychiatric:  A & O x3      Labs: For convenience and continuity at follow-up the following most recent labs are provided:    Lab Results   Component Value Date    WBC 5.2 06/04/2020    HGB 12.5 06/04/2020    HCT 39.1 06/04/2020    MCV 86.3 06/04/2020     06/04/2020     06/02/2020    K 4.0 06/02/2020     06/02/2020    CO2 25 06/02/2020    BUN 20 06/02/2020    CREATININE 0.9 06/02/2020    CALCIUM 9.4 06/02/2020    PHOS 3.0 03/03/2020    BNP 22 06/05/2013    ALKPHOS 79 06/02/2020    ALT 11 06/02/2020    AST 18 06/02/2020    BILITOT 0.5 06/02/2020    BILIDIR 0.2 06/02/2020    LABALBU 3.7 06/02/2020    TRIG 91 08/03/2016     Lab Results   Component Value Date    INR 0.98 10/17/2018    INR 1.03 08/24/2018    INR 1.03 06/23/2016           Chart review shows recent radiographs:  Ct Head Wo Contrast    Result Date: 5/24/2020  EXAMINATION: CT OF THE HEAD WITHOUT CONTRAST  5/23/2020 7:48 am TECHNIQUE: CT of the head was performed without the administration of intravenous contrast. Dose modulation, iterative reconstruction, and/or weight based adjustment of the mA/kV was utilized to reduce the radiation dose to as low as reasonably achievable. COMPARISON: February 9, 2019 HISTORY: ORDERING SYSTEM PROVIDED HISTORY: tremor/headache TECHNOLOGIST PROVIDED HISTORY: Reason for exam:->tremor/headache Has a \"code stroke\" or \"stroke alert\" been called? ->No Reason for Exam: tremor/headache Acuity: Acute Type of Exam: Initial Relevant Medical/Surgical History: patient having tremors and unable to hold still Acute headache. Initial encounter. FINDINGS: BRAIN/VENTRICLES: There is no acute intracranial hemorrhage, mass effect or midline shift. No abnormal extra-axial fluid collection.   The gray-white differentiation is maintained without evidence of an acute infarct. There is no evidence of hydrocephalus. There are calcified plaques in the cavernous portion of the internal carotid arteries. Periventricular areas of low attenuation likely represent chronic microvascular white matter ischemic changes. ORBITS: The visualized portion of the orbits demonstrate no acute abnormality. SINUSES: The visualized paranasal sinuses and mastoid air cells demonstrate no acute abnormality. SOFT TISSUES/SKULL:  No acute abnormality of the visualized skull or soft tissues. 1. No acute intracranial abnormality. 2. Global atrophy with chronic periventricular white matter ischemic changes. Mra Head Wo Contrast    Result Date: 5/26/2020  EXAMINATION: MRA OF THE HEAD WITHOUT CONTRAST 5/26/2020 8:23 am TECHNIQUE: MRA of the head was performed utilizing time-of-flight imaging with MIP images. No intravenous contrast was administered. COMPARISON: None HISTORY: ORDERING SYSTEM PROVIDED HISTORY: Tremor TECHNOLOGIST PROVIDED HISTORY: Reason for Exam: uncontrolled tremors and dizziness / hx of dementia FINDINGS: Examination is motion degraded. ANTERIOR CIRCULATION: No significant stenosis of the intracranial internal carotid, anterior cerebral, or middle cerebral arteries. No sizable aneurysm within limitation of motion. POSTERIOR CIRCULATION: No significant stenosis of the vertebral, basilar, or posterior cerebral arteries. No sizable aneurysm within limitation of motion. No high-grade stenosis or focal occlusion involving intracranial vasculature.      Mri Brain W Contrast    Result Date: 6/2/2020  EXAMINATION: MRI OF THE BRAIN WITH CONTRAST  6/2/2020 12:26 pm TECHNIQUE: Multiplanar multisequence MRI of the head/brain was performed with the administration of intravenous contrast. COMPARISON: 05/26/2020 HISTORY: ORDERING SYSTEM PROVIDED HISTORY: rule out any enhancing lesions TECHNOLOGIST PROVIDED HISTORY: Reason for exam:->rule out any no corresponding CT abnormality. Clinical correlation, and consultation with ophthalmology is recommended. 3. Chronic small vessel ischemic white matter disease and diffuse cerebral volume loss. 4. Small area of encephalomalacia within the medial inferior right cerebellum, most likely from previous infarct. EKG     Rhythm: normal sinus   Rate: normal  Clinical Impression: no acute changes        The patient was seen and examined on day of discharge and this discharge summary is in conjunction with any daily progress note from day of discharge. Time Spent on discharge is   >35  min  in the examination, evaluation, counseling and review of medications and discharge plan.             Ada Osorio MD   6/4/2020

## 2020-06-05 ENCOUNTER — CARE COORDINATION (OUTPATIENT)
Dept: CARE COORDINATION | Age: 85
End: 2020-06-05

## 2020-06-05 NOTE — PROGRESS NOTES
Went over all discharge instructions with patient and answered all questions. Patient went home with family in good spirits.

## 2020-06-05 NOTE — CARE COORDINATION
Outreach for 14 day ER follow up. LM with ACM contact information for additional needs/concerns. No further outreach is planned, ACM signing off. Zeynep Floyd RN  Ambulatory Care Manager  [phone] office/cell  [phone] fax  [email or web address]

## 2020-09-22 ENCOUNTER — HOSPITAL ENCOUNTER (OUTPATIENT)
Age: 85
Discharge: HOME | End: 2020-09-22
Payer: MEDICARE

## 2020-09-22 ENCOUNTER — HOSPITAL ENCOUNTER (OUTPATIENT)
Dept: GENERAL RADIOLOGY | Age: 85
Discharge: HOME | End: 2020-09-22
Payer: MEDICARE

## 2020-09-22 LAB
ESTIMATED AVERAGE GLUCOSE: 108 MG/DL
FERRITIN: 39 NG/ML (ref 15–150)
FOLATE: 17.9 NG/ML (ref 3.1–17.5)
HBA1C MFR BLD: 5.4 % (ref 4.2–6.3)
IRON: 58 UG/DL (ref 37–145)
PCT TRANSFERRIN: 17 % (ref 10–44)
TOTAL IRON BINDING CAPACITY: 347 UG/DL (ref 250–450)
TSH HIGH SENSITIVITY: 2.01 UIU/ML (ref 0.27–4.2)
UNSATURATED IRON BINDING CAPACITY: 289 UG/DL (ref 110–370)
VITAMIN B-12: 519.3 PG/ML (ref 211–911)

## 2020-09-22 PROCEDURE — 86320 SERUM IMMUNOELECTROPHORESIS: CPT

## 2020-09-22 PROCEDURE — 73030 X-RAY EXAM OF SHOULDER: CPT

## 2020-09-22 PROCEDURE — 83550 IRON BINDING TEST: CPT

## 2020-09-22 PROCEDURE — 83036 HEMOGLOBIN GLYCOSYLATED A1C: CPT

## 2020-09-22 PROCEDURE — 84165 PROTEIN E-PHORESIS SERUM: CPT

## 2020-09-22 PROCEDURE — 84443 ASSAY THYROID STIM HORMONE: CPT

## 2020-09-22 PROCEDURE — 82746 ASSAY OF FOLIC ACID SERUM: CPT

## 2020-09-22 PROCEDURE — 82607 VITAMIN B-12: CPT

## 2020-09-22 PROCEDURE — 83540 ASSAY OF IRON: CPT

## 2020-09-22 PROCEDURE — 36415 COLL VENOUS BLD VENIPUNCTURE: CPT

## 2020-09-22 PROCEDURE — 73502 X-RAY EXAM HIP UNI 2-3 VIEWS: CPT

## 2020-09-22 PROCEDURE — 82728 ASSAY OF FERRITIN: CPT

## 2020-09-24 LAB
ALBUMIN ELP: 3.3 GM/DL (ref 3.2–5.6)
ALPHA-1-GLOBULIN: 0.3 GM/DL (ref 0.1–0.4)
ALPHA-2-GLOBULIN: 0.7 GM/DL (ref 0.4–1.2)
BETA GLOBULIN: 1 GM/DL (ref 0.5–1.3)
GAMMA GLOBULIN: 1.2 GM/DL (ref 0.5–1.6)
SPEP INTERPRETATION: NORMAL
SPEP INTERPRETATION: NORMAL
TOTAL PROTEIN: 6.5 GM/DL (ref 6.4–8.2)

## 2020-10-17 ENCOUNTER — APPOINTMENT (OUTPATIENT)
Dept: GENERAL RADIOLOGY | Age: 85
End: 2020-10-17
Payer: MEDICARE

## 2020-10-17 ENCOUNTER — HOSPITAL ENCOUNTER (EMERGENCY)
Age: 85
Discharge: HOME | End: 2020-10-17
Attending: EMERGENCY MEDICINE
Payer: MEDICARE

## 2020-10-17 ENCOUNTER — APPOINTMENT (OUTPATIENT)
Dept: CT IMAGING | Age: 85
End: 2020-10-17
Payer: MEDICARE

## 2020-10-17 VITALS
HEART RATE: 81 BPM | RESPIRATION RATE: 19 BRPM | DIASTOLIC BLOOD PRESSURE: 58 MMHG | BODY MASS INDEX: 31.25 KG/M2 | OXYGEN SATURATION: 93 % | SYSTOLIC BLOOD PRESSURE: 135 MMHG | TEMPERATURE: 98.2 F | WEIGHT: 160 LBS

## 2020-10-17 LAB
ALBUMIN SERPL-MCNC: 4 GM/DL (ref 3.4–5)
ALP BLD-CCNC: 113 IU/L (ref 40–129)
ALT SERPL-CCNC: 15 U/L (ref 10–40)
AMMONIA: 37 UMOL/L (ref 11–51)
ANION GAP SERPL CALCULATED.3IONS-SCNC: 11 MMOL/L (ref 4–16)
AST SERPL-CCNC: 21 IU/L (ref 15–37)
BACTERIA: ABNORMAL /HPF
BASOPHILS ABSOLUTE: 0 K/CU MM
BASOPHILS RELATIVE PERCENT: 0.5 % (ref 0–1)
BILIRUB SERPL-MCNC: 0.4 MG/DL (ref 0–1)
BILIRUBIN URINE: NEGATIVE MG/DL
BLOOD, URINE: NEGATIVE
BUN BLDV-MCNC: 19 MG/DL (ref 6–23)
CALCIUM SERPL-MCNC: 9.8 MG/DL (ref 8.3–10.6)
CHLORIDE BLD-SCNC: 105 MMOL/L (ref 99–110)
CLARITY: CLEAR
CO2: 25 MMOL/L (ref 21–32)
COLOR: YELLOW
CREAT SERPL-MCNC: 0.9 MG/DL (ref 0.6–1.1)
DIFFERENTIAL TYPE: ABNORMAL
EOSINOPHILS ABSOLUTE: 0.3 K/CU MM
EOSINOPHILS RELATIVE PERCENT: 5.4 % (ref 0–3)
GFR AFRICAN AMERICAN: >60 ML/MIN/1.73M2
GFR NON-AFRICAN AMERICAN: 59 ML/MIN/1.73M2
GLUCOSE BLD-MCNC: 83 MG/DL (ref 70–99)
GLUCOSE, URINE: NEGATIVE MG/DL
HCT VFR BLD CALC: 44.4 % (ref 37–47)
HEMOGLOBIN: 13.7 GM/DL (ref 12.5–16)
IMMATURE NEUTROPHIL %: 0.4 % (ref 0–0.43)
KETONES, URINE: NEGATIVE MG/DL
LEUKOCYTE ESTERASE, URINE: ABNORMAL
LYMPHOCYTES ABSOLUTE: 1.8 K/CU MM
LYMPHOCYTES RELATIVE PERCENT: 32.1 % (ref 24–44)
MAGNESIUM: 2.2 MG/DL (ref 1.8–2.4)
MCH RBC QN AUTO: 27.8 PG (ref 27–31)
MCHC RBC AUTO-ENTMCNC: 30.9 % (ref 32–36)
MCV RBC AUTO: 90.2 FL (ref 78–100)
MONOCYTES ABSOLUTE: 0.5 K/CU MM
MONOCYTES RELATIVE PERCENT: 9.3 % (ref 0–4)
MUCUS: ABNORMAL HPF
NITRITE URINE, QUANTITATIVE: NEGATIVE
NUCLEATED RBC %: 0 %
PDW BLD-RTO: 14.6 % (ref 11.7–14.9)
PH, URINE: 6 (ref 5–8)
PLATELET # BLD: 262 K/CU MM (ref 140–440)
PMV BLD AUTO: 10 FL (ref 7.5–11.1)
POTASSIUM SERPL-SCNC: 4.2 MMOL/L (ref 3.5–5.1)
PRO-BNP: 114 PG/ML
PROTEIN UA: NEGATIVE MG/DL
RBC # BLD: 4.92 M/CU MM (ref 4.2–5.4)
RBC URINE: 2 /HPF (ref 0–6)
SEGMENTED NEUTROPHILS ABSOLUTE COUNT: 2.9 K/CU MM
SEGMENTED NEUTROPHILS RELATIVE PERCENT: 52.3 % (ref 36–66)
SODIUM BLD-SCNC: 141 MMOL/L (ref 135–145)
SPECIFIC GRAVITY UA: 1.01 (ref 1–1.03)
SQUAMOUS EPITHELIAL: 1 /HPF
TOTAL IMMATURE NEUTOROPHIL: 0.02 K/CU MM
TOTAL NUCLEATED RBC: 0 K/CU MM
TOTAL PROTEIN: 6.8 GM/DL (ref 6.4–8.2)
TRANSITIONAL EPITHELIAL: <1 /HPF
TRICHOMONAS: ABNORMAL /HPF
TSH HIGH SENSITIVITY: 2.22 UIU/ML (ref 0.27–4.2)
UROBILINOGEN, URINE: NORMAL MG/DL (ref 0.2–1)
WBC # BLD: 5.6 K/CU MM (ref 4–10.5)
WBC UA: 2 /HPF (ref 0–5)

## 2020-10-17 PROCEDURE — 96374 THER/PROPH/DIAG INJ IV PUSH: CPT

## 2020-10-17 PROCEDURE — 70450 CT HEAD/BRAIN W/O DYE: CPT

## 2020-10-17 PROCEDURE — 71045 X-RAY EXAM CHEST 1 VIEW: CPT

## 2020-10-17 PROCEDURE — 85025 COMPLETE CBC W/AUTO DIFF WBC: CPT

## 2020-10-17 PROCEDURE — 82140 ASSAY OF AMMONIA: CPT

## 2020-10-17 PROCEDURE — 83880 ASSAY OF NATRIURETIC PEPTIDE: CPT

## 2020-10-17 PROCEDURE — 99284 EMERGENCY DEPT VISIT MOD MDM: CPT

## 2020-10-17 PROCEDURE — 73610 X-RAY EXAM OF ANKLE: CPT

## 2020-10-17 PROCEDURE — 83735 ASSAY OF MAGNESIUM: CPT

## 2020-10-17 PROCEDURE — 93005 ELECTROCARDIOGRAM TRACING: CPT | Performed by: EMERGENCY MEDICINE

## 2020-10-17 PROCEDURE — 80053 COMPREHEN METABOLIC PANEL: CPT

## 2020-10-17 PROCEDURE — 6360000002 HC RX W HCPCS: Performed by: EMERGENCY MEDICINE

## 2020-10-17 PROCEDURE — 84443 ASSAY THYROID STIM HORMONE: CPT

## 2020-10-17 PROCEDURE — 6370000000 HC RX 637 (ALT 250 FOR IP): Performed by: EMERGENCY MEDICINE

## 2020-10-17 PROCEDURE — 81001 URINALYSIS AUTO W/SCOPE: CPT

## 2020-10-17 PROCEDURE — 93010 ELECTROCARDIOGRAM REPORT: CPT | Performed by: INTERNAL MEDICINE

## 2020-10-17 PROCEDURE — 36415 COLL VENOUS BLD VENIPUNCTURE: CPT

## 2020-10-17 RX ORDER — LORAZEPAM 2 MG/ML
1 INJECTION INTRAMUSCULAR ONCE
Status: DISCONTINUED | OUTPATIENT
Start: 2020-10-17 | End: 2020-10-17

## 2020-10-17 RX ORDER — HYDROXYZINE PAMOATE 25 MG/1
25 CAPSULE ORAL 3 TIMES DAILY PRN
Qty: 30 CAPSULE | Refills: 0 | Status: ON HOLD | OUTPATIENT
Start: 2020-10-17 | End: 2020-11-19 | Stop reason: HOSPADM

## 2020-10-17 RX ORDER — LORAZEPAM 2 MG/ML
0.5 INJECTION INTRAMUSCULAR ONCE
Status: COMPLETED | OUTPATIENT
Start: 2020-10-17 | End: 2020-10-17

## 2020-10-17 RX ORDER — DIPHENHYDRAMINE HCL 25 MG
25 TABLET ORAL ONCE
Status: COMPLETED | OUTPATIENT
Start: 2020-10-17 | End: 2020-10-17

## 2020-10-17 RX ADMIN — LORAZEPAM 0.5 MG: 2 INJECTION, SOLUTION INTRAMUSCULAR; INTRAVENOUS at 11:09

## 2020-10-17 RX ADMIN — DIPHENHYDRAMINE HYDROCHLORIDE 25 MG: 25 TABLET ORAL at 13:20

## 2020-10-17 NOTE — ED NOTES
Medicated per [facility] and call light within reach. Repositioned to comfortable position. Daughter at the bedside.      Eva Rizzo RN  10/17/20 [id number]

## 2020-10-17 NOTE — ED TRIAGE NOTES
Pt to ed with ems c/o of tremors getting worse and confusion more than normal has hx of dementia. More swelling to legs and has hx of copd.  Pt AOx4

## 2020-10-17 NOTE — ED NOTES
Pt on the bedpan for urine sample. Tolerating well. Pt is more calm at this time. Also pt stating she see 3 angels and daughter at the bedside says \" it is normal, she will see babies, angels at times \" No tremors noticed at this time.      Anne Jansen RN  10/17/20 [id number]

## 2020-10-17 NOTE — ED PROVIDER NOTES
Triage Chief Complaint:   Tremors (for about 1yr and worse since this morning)      [place]:  Eugene Garcia is a 80 y.o. female that presents to the emergency department with tremors. Patient has been brought in by EMS. She lives at home with granddaughter. Granddaughter states she has lived with her for about a year and has had tremors worse in her legs. She has a history of anxiety and restless leg syndrome. She has been seen many times in the ED for this. Daughter is bedside who states that she does not live here and is from [place] and just saw her this morning and this shaking did alarm her. States the granddaughter states she has had longer episodes of tremors. States they normally last about 15 to 20 minutes but they have been lasting more like 30 to 40 minutes. She is awake and alert during these episodes and does not have any loss of bowel or bladder incontinence no grand mal seizure-like activity. Patient was admitted to the hospital in June of this year and had a negative MRI and a normal EEG. She is followed by Dr. Francisco Javier Vera, neurology, who put her on gabapentin 600 mg extended release 2 months ago. He had had her on Klonopin 0.5 mg at bedtime which was switched to Valium. Family member bedside states she gave her 2.5 mg of Valium this morning which did not seem to help. Patient is awake alert and oriented to self, situation and place. She denies any pain. There has been no reports of nausea, vomiting, diarrhea, fevers, chills, coughing. Nothing else out of the ordinary. She does have a history of dementia per granddaughter who lives with her. Of note she is on Requip as well 0.5 mg 3 times daily.     Past Medical History:   Diagnosis Date    Arthritis     Blind left eye     Cancer ([facility] [address] 1971    colon    COPD (chronic obstructive pulmonary disease) (HCC)     Dementia (HCC)     Depression     GERD (gastroesophageal reflux disease)     Hypertension     Pneumonia     Restless legs syndrome     Tremors of nervous system     Unspecified cerebral artery occlusion with cerebral infarction      Past Surgical History:   Procedure Laterality Date    ABDOMEN SURGERY      APPENDECTOMY      BACK SURGERY      COLONOSCOPY      ENDOSCOPY, COLON, DIAGNOSTIC      EYE SURGERY      FRACTURE SURGERY      HYSTERECTOMY  1972    SPINE SURGERY  1995    VASCULAR SURGERY       Family History   Problem Relation Age of Onset    Arthritis Mother     Cancer Mother     Diabetes Mother     Heart Disease Mother     High Blood Pressure Mother    David Bell / Davei Mother     Cancer Sister     Cancer Brother     Diabetes Daughter     High Blood Pressure Daughter      Social History     Socioeconomic History    Marital status:       Spouse name: Not on file    Number of children: 9    Years of education: Not on file    Highest education level: Not on file   Occupational History    Not on file   Social Needs    Financial resource strain: Not on file    Food insecurity     Worry: Not on file     Inability: Not on file    Transportation needs     Medical: Not on file     Non-medical: Not on file   Tobacco Use    Smoking status: Current Every Day Smoker     Packs/day: 0.25     Years: 50.00     Pack years: 12.50     Types: Cigarettes    Smokeless tobacco: Never Used    Tobacco comment: states smokes 1-2 cigs a day   Substance and Sexual Activity    Alcohol use: No     Alcohol/week: 0.0 standard drinks    Drug use: No    Sexual activity: Never   Lifestyle    Physical activity     Days per week: Not on file     Minutes per session: Not on file    Stress: Not on file   Relationships    Social connections     Talks on phone: Not on file     Gets together: Not on file     Attends Holiness service: Not on file     Active member of club or organization: Not on file     Attends meetings of clubs or organizations: Not on file     Relationship status: Not on file    Intimate partner °C)      Temp Source Oral      SpO2 93 %      Weight 160 lb (72.6 kg)      Height       Head Circumference       Peak Flow       Pain Score       Pain Loc       Pain Edu? Excl. in [address]? My pulse oximetry interpretation is which is within the normal range    GENERAL APPEARANCE: Awake and alert. Cooperative. No acute distress. HEAD: Normocephalic. Atraumatic. EYES: EOM's grossly intact. Sclera anicteric. ENT: Mucous membranes are moist. Tolerates saliva. No trismus. NECK: Supple. No meningismus. Trachea midline. HEART: Sinus tachycardia at 103 however patient is shaking and has tremors. . Radial pulses 2+. LUNGS: Respirations unlabored. CTAB  ABDOMEN: Soft. Non-tender. No guarding or rebound. Normal bowel sounds. EXTREMITIES: No acute deformities. No pitting edema. Does have some slight shaking in both of her legs and her extremities. SKIN: Warm and dry. NEUROLOGICAL: No gross facial drooping. Moves all 4 extremities spontaneously. Patient is awake, alert to self, situation. PSYCHIATRIC: Normal mood.     I have reviewed and interpreted all of the currently available lab results from this visit (if applicable):  Results for orders placed or performed during the hospital encounter of 10/17/20   CBC with Auto Diff   Result Value Ref Range    WBC 5.6 4.0 - 10.5 K/CU MM    RBC 4.92 4.2 - 5.4 M/CU MM    Hemoglobin 13.7 12.5 - 16.0 GM/DL    Hematocrit 44.4 37 - 47 %    MCV 90.2 78 - 100 FL    MCH 27.8 27 - 31 PG    MCHC 30.9 (L) 32.0 - 36.0 %    RDW 14.6 11.7 - 14.9 %    Platelets 140 819 - 448 K/CU MM    MPV 10.0 7.5 - 11.1 FL    Differential Type AUTOMATED DIFFERENTIAL     Segs Relative 52.3 36 - 66 %    Lymphocytes % 32.1 24 - 44 %    Monocytes % 9.3 (H) 0 - 4 %    Eosinophils % 5.4 (H) 0 - 3 %    Basophils % 0.5 0 - 1 %    Segs Absolute 2.9 K/CU MM    Lymphocytes Absolute 1.8 K/CU MM    Monocytes Absolute 0.5 K/CU MM    Eosinophils Absolute 0.3 K/CU MM    Basophils Absolute 0.0 K/CU MM    Nucleated RBC % 0.0 %    Total Nucleated RBC 0.0 K/CU MM    Total Immature Neutrophil 0.02 K/CU MM    Immature Neutrophil % 0.4 0 - 0.43 %   CMP   Result Value Ref Range    Sodium 141 135 - 145 MMOL/L    Potassium 4.2 3.5 - 5.1 MMOL/L    Chloride 105 99 - 110 mMol/L    CO2 25 21 - 32 MMOL/L    BUN 19 6 - 23 MG/DL    CREATININE 0.9 0.6 - 1.1 MG/DL    Glucose 83 70 - 99 MG/DL    Calcium 9.8 8.3 - 10.6 MG/DL    Alb 4.0 3.4 - 5.0 GM/DL    Total Protein 6.8 6.4 - 8.2 GM/DL    Total Bilirubin 0.4 0.0 - 1.0 MG/DL    ALT 15 10 - 40 U/L    AST 21 15 - 37 IU/L    Alkaline Phosphatase 113 40 - 129 IU/L    GFR Non- 59 (L) >60 mL/min/1.73m2    GFR African American >60 >60 mL/min/1.73m2    Anion Gap 11 4 - 16   Magnesium   Result Value Ref Range    Magnesium 2.2 1.8 - 2.4 mg/dl   TSH without Reflex   Result Value Ref Range    TSH, High Sensitivity 2.220 0.270 - 4.20 uIu/ml   Ammonia Level   Result Value Ref Range    Ammonia 37 11 - 51 UMOL/L   Urinalysis with microscopic   Result Value Ref Range    Color, UA YELLOW YELLOW    Clarity, UA CLEAR CLEAR    Glucose, Urine NEGATIVE NEGATIVE MG/DL    Bilirubin Urine NEGATIVE NEGATIVE MG/DL    Ketones, Urine NEGATIVE NEGATIVE MG/DL    Specific Gravity, UA 1.010 1.001 - 1.035    Blood, Urine NEGATIVE NEGATIVE    pH, Urine 6.0 5.0 - 8.0    Protein, UA NEGATIVE NEGATIVE MG/DL    Urobilinogen, Urine NORMAL 0.2 - 1.0 MG/DL    Nitrite Urine, Quantitative NEGATIVE NEGATIVE    Leukocyte Esterase, Urine TRACE (A) NEGATIVE    RBC, UA 2 0 - 6 /HPF    WBC, UA 2 0 - 5 /HPF    Bacteria, UA RARE (A) NEGATIVE /HPF    Squam Epithel, UA 1 /HPF    Trans Epithel, UA <1 /HPF    Mucus, UA RARE (A) NEGATIVE HPF    Trichomonas, UA NONE SEEN NONE SEEN /HPF   Brain Natriuretic Peptide   Result Value Ref Range    Pro-.0 <300 PG/ML   EKG 12 Lead   Result Value Ref Range    Ventricular Rate 76 BPM    Atrial Rate 76 BPM    P-R Interval 182 ms    QRS Duration 86 ms    Q-T Interval 414 ms    QTc Calculation (Bazett) 465 ms    P Axis 54 degrees    R Axis -7 degrees    T Axis 52 degrees    Diagnosis       Normal sinus rhythm  Normal ECG  When compared with ECG of 23-MAY-2020 06:43,  No significant change was found          Radiographs:  [] Radiologist's Wet Read Report Reviewed:      CT HEAD WO CONTRAST (Final result)   Result time 10/17/20 11:39:57   Final result by Tequila Carrasco MD (10/17/20 11:39:57)                 Impression:     No acute intracranial abnormality. Narrative:     EXAMINATION:   CT OF THE HEAD WITHOUT CONTRAST     10/17/2020 11:08 am     TECHNIQUE:   CT of the head was performed without the administration of intravenous   contrast. Dose modulation, iterative reconstruction, and/or weight based   adjustment of the mA/kV was utilized to reduce the radiation dose to as low   as reasonably achievable. COMPARISON:   Head CT 05/23/2020     HISTORY:   ORDERING SYSTEM PROVIDED HISTORY: tremors   TECHNOLOGIST PROVIDED HISTORY:   Reason for exam:->tremors   Has a \"code stroke\" or \"stroke alert\" been called? ->No   Reason for Exam: tremors   Acuity: Acute   Type of Exam: Initial   Relevant Medical/Surgical History: hx TIA, dementia     FINDINGS:   BRAIN/VENTRICLES:  No masses nor acute intracranial hemorrhage.  Unchanged   encephalomalacia in the inferomedial right cerebellar hemisphere.  Gray/white   matter differentiation without findings of acute ischemia.  No mass effect   nor midline shift.  Patent basilar cisterns and foramen magnum.  No   hydrocephalus.  Age-appropriate mild diffuse atrophy.  Mild deep and   periventricular white matter hypodensities likely due to chronic small vessel   ischemia.  Old lacunar infarcts in the bilateral basal ganglia. ORBITS:  Right lens implant.  No acute abnormality. SINUSES:  Visualized portions appear normally pneumatized and aerated.      SOFT TISSUES/SKULL:  No acute soft tissue abnormality.  Mild atherosclerotic   calcifications.  No acute fracture.                       XR CHEST PORTABLE (Final result)   Result time 10/17/20 11:20:40   Final result by Sara Hart MD (10/17/20 11:20:40)                 Impression:     1. Left basilar airspace opacity is most likely due to atelectasis, although   pneumonia and aspiration could appear similar. 2. Questionable trace left pleural effusion. 3. Interstitial prominence most likely due to chronic changes or pulmonary   vascular congestion.  Superimposed interstitial edema could be present. Narrative:     EXAMINATION:   ONE XRAY VIEW OF THE CHEST     10/17/2020 10:34 am     COMPARISON:   Chest radiograph 08/27/2019     HISTORY:   ORDERING SYSTEM PROVIDED HISTORY: confusion   TECHNOLOGIST PROVIDED HISTORY:   Reason for exam:->confusion   Reason for Exam: confusion   Acuity: Acute   Type of Exam: Initial     FINDINGS:   Left basilar airspace opacity, predominantly linear in appearance.  Diffuse   interstitial prominence with indistinct pulmonary vasculature and possible   interlobular septal thickening.  Questionable trace left pleural effusion. No definite findings of pneumothorax or right pleural effusion.  Normal   mediastinal and cardiac contours.  Mildly prominent hilar contours. Atherosclerotic calcification in the aorta.  No acute fracture. [] Discussed with Radiologist:     [] The following radiograph was interpreted by myself in the absence of a radiologist:     EKG: (All EKG's are interpreted by myself in the absence of a cardiologist)  The Ekg interpreted by me shows  normal sinus rhythm with a rate of 76  Axis is   Normal  QTc is  normal  Intervals and Durations are unremarkable. ST Segments: no acute change  No significant change from prior EKG dated 5-      MDM:  Vitals normal.  Patient is awake and alert while having her tremors. Did order 1 mg of IV Ativan. CBC is within normal limits.   CMP normal.  Magnesium normal.  Ammonia normal. TSH normal. BNP normal. Urinalysis normal. CXR left basilar airspace that is likely atelectasis. Patient does not have a cough. No elevation in white count. No fever. No respiratory distress. . CT head shows no acute findings. . EKG shows no acute findings. Went into room to discuss results with patient and daughter who is bedside. She is asking me to order an x-ray of her left ankle states she fell sometime ago and seems to be complaining that that area hurts her. No new known trauma. Xray left ankle shows no acute findings. .  Discussed with family member that all of her results are within normal limits. She has already been evaluated by neuro and had a negative MRI and a negative EEG recently. This does appear to be likely anxiety and restless leg related. I did discuss with her to call the neurologist on Monday to discuss the next plan of action as he wanted to start her on gabapentin and see how she responded. Did discuss with him if she is having increasing episodes then it is likely time to adjust her meds outpatient. Clinical Impression:  1. Tremor    2. Restless leg syndrome    3. Anxiety state    4. Dementia without behavioral disturbance, unspecified dementia type (HCC)        Disposition Vitals:  [unfilled], [unfilled], [unfilled], [unfilled]    Disposition referral (if applicable):  Bob Rivera DO  [address] [place] [id number]    Call   Call neurology on monday to follow up.       Disposition medications (if applicable):  New Prescriptions    HYDROXYZINE (VISTARIL) 25 MG CAPSULE    Take 1 capsule by mouth 3 times daily as needed for Anxiety         (Please note that portions of this note may have been completed with a voice recognition program. Efforts were made to edit the dictations but occasionally words are mis-transcribed.)    MD Zachary Mason MD  10/17/20 [id number]

## 2020-10-17 NOTE — ED NOTES
This nurse noticed pinkish to red coloring to BLE, daughter stateing that this is normal and also pt is itching bottom couple superficial abrasions noticed. No sore observed.  Dr. Daniela Lindsay aware     Casey Monsivais, RN  10/17/20 [address] [place] [address] Livia  10/17/20 [id number]

## 2020-10-20 LAB
EKG ATRIAL RATE: 76 BPM
EKG DIAGNOSIS: NORMAL
EKG P AXIS: 54 DEGREES
EKG P-R INTERVAL: 182 MS
EKG Q-T INTERVAL: 414 MS
EKG QRS DURATION: 86 MS
EKG QTC CALCULATION (BAZETT): 465 MS
EKG R AXIS: -7 DEGREES
EKG T AXIS: 52 DEGREES
EKG VENTRICULAR RATE: 76 BPM

## 2020-11-11 ENCOUNTER — APPOINTMENT (OUTPATIENT)
Dept: CT IMAGING | Age: 85
DRG: 071 | End: 2020-11-11
Payer: MEDICARE

## 2020-11-11 ENCOUNTER — HOSPITAL ENCOUNTER (OUTPATIENT)
Age: 85
Setting detail: OBSERVATION
Discharge: HOME | DRG: 071 | End: 2020-11-13
Attending: FAMILY MEDICINE | Admitting: FAMILY MEDICINE
Payer: MEDICARE

## 2020-11-11 ENCOUNTER — APPOINTMENT (OUTPATIENT)
Dept: GENERAL RADIOLOGY | Age: 85
DRG: 071 | End: 2020-11-11
Payer: MEDICARE

## 2020-11-11 DIAGNOSIS — R07.9 CHEST PAIN, UNSPECIFIED TYPE: Primary | ICD-10-CM

## 2020-11-11 DIAGNOSIS — R41.0 CONFUSION: ICD-10-CM

## 2020-11-11 DIAGNOSIS — M79.89 SWELLING OF LOWER LEG: ICD-10-CM

## 2020-11-11 LAB
ALBUMIN SERPL-MCNC: 3.5 GM/DL (ref 3.4–5)
ALP BLD-CCNC: 86 IU/L (ref 40–129)
ALT SERPL-CCNC: 17 U/L (ref 10–40)
ANION GAP SERPL CALCULATED.3IONS-SCNC: 10 MMOL/L (ref 4–16)
AST SERPL-CCNC: 26 IU/L (ref 15–37)
BACTERIA: NEGATIVE /HPF
BASOPHILS ABSOLUTE: 0 K/CU MM
BASOPHILS RELATIVE PERCENT: 0.7 % (ref 0–1)
BILIRUB SERPL-MCNC: 0.5 MG/DL (ref 0–1)
BILIRUBIN URINE: NEGATIVE MG/DL
BLOOD, URINE: NEGATIVE
BUN BLDV-MCNC: 18 MG/DL (ref 6–23)
CALCIUM SERPL-MCNC: 9.2 MG/DL (ref 8.3–10.6)
CHLORIDE BLD-SCNC: 106 MMOL/L (ref 99–110)
CLARITY: CLEAR
CO2: 25 MMOL/L (ref 21–32)
COLOR: ABNORMAL
CREAT SERPL-MCNC: 0.9 MG/DL (ref 0.6–1.1)
DIFFERENTIAL TYPE: ABNORMAL
EOSINOPHILS ABSOLUTE: 0.3 K/CU MM
EOSINOPHILS RELATIVE PERCENT: 5.4 % (ref 0–3)
GFR AFRICAN AMERICAN: >60 ML/MIN/1.73M2
GFR NON-AFRICAN AMERICAN: 59 ML/MIN/1.73M2
GLUCOSE BLD-MCNC: 84 MG/DL (ref 70–99)
GLUCOSE, URINE: NEGATIVE MG/DL
HCT VFR BLD CALC: 39.7 % (ref 37–47)
HEMOGLOBIN: 12.8 GM/DL (ref 12.5–16)
IMMATURE NEUTROPHIL %: 0.2 % (ref 0–0.43)
KETONES, URINE: NEGATIVE MG/DL
LACTATE: 1.2 MMOL/L (ref 0.4–2)
LEUKOCYTE ESTERASE, URINE: NEGATIVE
LYMPHOCYTES ABSOLUTE: 1.7 K/CU MM
LYMPHOCYTES RELATIVE PERCENT: 31.4 % (ref 24–44)
MCH RBC QN AUTO: 28.6 PG (ref 27–31)
MCHC RBC AUTO-ENTMCNC: 32.2 % (ref 32–36)
MCV RBC AUTO: 88.6 FL (ref 78–100)
MONOCYTES ABSOLUTE: 0.5 K/CU MM
MONOCYTES RELATIVE PERCENT: 9.3 % (ref 0–4)
MUCUS: ABNORMAL HPF
NITRITE URINE, QUANTITATIVE: NEGATIVE
NUCLEATED RBC %: 0 %
PDW BLD-RTO: 14.6 % (ref 11.7–14.9)
PH, URINE: 5 (ref 5–8)
PLATELET # BLD: 236 K/CU MM (ref 140–440)
PMV BLD AUTO: 9.9 FL (ref 7.5–11.1)
POTASSIUM SERPL-SCNC: 4.4 MMOL/L (ref 3.5–5.1)
PRO-BNP: 189.9 PG/ML
PROTEIN UA: NEGATIVE MG/DL
RAPID INFLUENZA  B AGN: NEGATIVE
RAPID INFLUENZA A AGN: NEGATIVE
RBC # BLD: 4.48 M/CU MM (ref 4.2–5.4)
RBC URINE: ABNORMAL /HPF (ref 0–6)
REASON FOR REJECTION: NORMAL
REJECTED TEST: NORMAL
SARS-COV-2, NAAT: NOT DETECTED
SEGMENTED NEUTROPHILS ABSOLUTE COUNT: 2.9 K/CU MM
SEGMENTED NEUTROPHILS RELATIVE PERCENT: 53 % (ref 36–66)
SODIUM BLD-SCNC: 141 MMOL/L (ref 135–145)
SOURCE: NORMAL
SPECIFIC GRAVITY UA: 1.01 (ref 1–1.03)
SQUAMOUS EPITHELIAL: 3 /HPF
TOTAL IMMATURE NEUTOROPHIL: 0.01 K/CU MM
TOTAL NUCLEATED RBC: 0 K/CU MM
TOTAL PROTEIN: 6.7 GM/DL (ref 6.4–8.2)
TRICHOMONAS: ABNORMAL /HPF
TROPONIN T: <0.01 NG/ML
UROBILINOGEN, URINE: NORMAL MG/DL (ref 0.2–1)
WBC # BLD: 5.5 K/CU MM (ref 4–10.5)
WBC UA: ABNORMAL /HPF (ref 0–5)

## 2020-11-11 PROCEDURE — 81001 URINALYSIS AUTO W/SCOPE: CPT

## 2020-11-11 PROCEDURE — 70450 CT HEAD/BRAIN W/O DYE: CPT

## 2020-11-11 PROCEDURE — 87804 INFLUENZA ASSAY W/OPTIC: CPT

## 2020-11-11 PROCEDURE — 93005 ELECTROCARDIOGRAM TRACING: CPT | Performed by: PHYSICIAN ASSISTANT

## 2020-11-11 PROCEDURE — 1200000000 HC SEMI PRIVATE

## 2020-11-11 PROCEDURE — 87086 URINE CULTURE/COLONY COUNT: CPT

## 2020-11-11 PROCEDURE — 84484 ASSAY OF TROPONIN QUANT: CPT

## 2020-11-11 PROCEDURE — 71045 X-RAY EXAM CHEST 1 VIEW: CPT

## 2020-11-11 PROCEDURE — 99285 EMERGENCY DEPT VISIT HI MDM: CPT

## 2020-11-11 PROCEDURE — 85025 COMPLETE CBC W/AUTO DIFF WBC: CPT

## 2020-11-11 PROCEDURE — 80053 COMPREHEN METABOLIC PANEL: CPT

## 2020-11-11 PROCEDURE — 83880 ASSAY OF NATRIURETIC PEPTIDE: CPT

## 2020-11-11 PROCEDURE — 36415 COLL VENOUS BLD VENIPUNCTURE: CPT

## 2020-11-11 PROCEDURE — 83605 ASSAY OF LACTIC ACID: CPT

## 2020-11-11 PROCEDURE — U0002 COVID-19 LAB TEST NON-CDC: HCPCS

## 2020-11-11 ASSESSMENT — PAIN SCALES - GENERAL: PAINLEVEL_OUTOF10: 8

## 2020-11-11 ASSESSMENT — PAIN DESCRIPTION - DESCRIPTORS: DESCRIPTORS: PRESSURE

## 2020-11-11 ASSESSMENT — PAIN DESCRIPTION - LOCATION: LOCATION: CHEST

## 2020-11-11 ASSESSMENT — PAIN DESCRIPTION - PROGRESSION: CLINICAL_PROGRESSION: GRADUALLY WORSENING

## 2020-11-11 ASSESSMENT — PAIN DESCRIPTION - PAIN TYPE: TYPE: ACUTE PAIN

## 2020-11-11 ASSESSMENT — PAIN DESCRIPTION - ORIENTATION: ORIENTATION: LEFT

## 2020-11-11 ASSESSMENT — PAIN DESCRIPTION - FREQUENCY: FREQUENCY: CONTINUOUS

## 2020-11-11 ASSESSMENT — PAIN DESCRIPTION - ONSET: ONSET: GRADUAL

## 2020-11-11 NOTE — ED NOTES
Bed: ED-36  Expected date:   Expected time:   Means of arrival:   Comments:  ems     Filemon Ordoñez RN  11/11/20 [id number]

## 2020-11-11 NOTE — ED NOTES
Patient is c/o mid-left sided chest pressure for last 1-2 days. Per patient and granddaughter whom lives with her patient was seen on Saturday at urgent care dx with bronchitis and placed on amoxicillin 500 mg bid. Patient was also started on a new medication for anxiety/ trimmers a few weeks ago from ED. Also reporting swelling to bilateral lower legs. Gisele Rosenberg.  MENA Romo  11/11/20 [id number]

## 2020-11-11 NOTE — ED PROVIDER NOTES
EMERGENCY DEPARTMENT ENCOUNTER    [facility] EMERGENCY DEPARTMENT        TRIAGE CHIEF COMPLAINT:   Chest Pain; Cough; Nasal Congestion; Leg Swelling; and Altered Mental Status (HX OF DEMENTIA)      LON Watkins is a 80 y.o. female that presents chest pain, cough and concern for altered mental status. On exam, patient is alert and oriented on my evaluation to self and location but not to time, does have a history of dementia. ED nurse did call and speak with patient's granddaughter who patient lives with, was reportedly seen at urgent care on Saturday, diagnosed with pneumonia and started on oral amoxicillin. Patient was given 1 nitro and 324 mg aspirin on route by EMS but continues to report 8/10 burning pain to the left side of the chest.  She does endorse a cough but is not sure if it is productive. No known fever. Patient is a poor historian      I did call and speak with granddaughter Scot Kelley who reports that that patient was started on amoxicillin for bronchitis but since Saturday, patient has appeared generally more fatigued. Family has not noted any obvious slurred speech or focal weakness but patient has had previous history of stroke. Family is not sure if her increased confusion is secondary to starting new oral antibiotic as well as Vistaril prescribed by the ED several weeks ago for her baseline tremors. No documented falls, nausea or vomiting, fevers or positive COVID-19 contacts per family. Patient is currently full code. Patient is legally blind. Family also endorsed some nasal congestion and an intermittent cough. No changes in appetite or urinary changes. Family has noted some increased swelling in the bilateral lower legs.   ROS: Per patient and family  General:  No fevers. +generalized fatigue  ENT:  No sore throat, +nasal congestion, no hearing changes  Cardiovascular:  See HPI    Respiratory: See HPI  Gastrointestinal:  No pain, no nausea, no vomiting, no diarrhea  Musculoskeletal:  No muscle pain, no joint pain  Skin:  No rash, no pruritis, no easy bruising  Neurologic:  See HPI  Psychiatric:  No anxiety  Genitourinary:  No dysuria, no hematuria  Extremities:  No edema    Past Medical History:   Diagnosis Date    Arthritis     Blind left eye     Cancer ([facility] [address] 1971    colon    COPD (chronic obstructive pulmonary disease) (HCC)     Dementia (HCC)     Depression     GERD (gastroesophageal reflux disease)     Hypertension     Pneumonia     Restless legs syndrome     Tremors of nervous system     Unspecified cerebral artery occlusion with cerebral infarction      Past Surgical History:   Procedure Laterality Date    ABDOMEN SURGERY      APPENDECTOMY      BACK SURGERY      COLONOSCOPY      ENDOSCOPY, COLON, DIAGNOSTIC      EYE SURGERY      FRACTURE SURGERY      HYSTERECTOMY  1972    SPINE SURGERY  1995    VASCULAR SURGERY       Family History   Problem Relation Age of Onset    Arthritis Mother     Cancer Mother     Diabetes Mother     Heart Disease Mother     High Blood Pressure Mother    Elke Graevs / Ed Abt Mother     Cancer Sister     Cancer Brother     Diabetes Daughter     High Blood Pressure Daughter      Social History     Socioeconomic History    Marital status:       Spouse name: Not on file    Number of children: 9    Years of education: Not on file    Highest education level: Not on file   Occupational History    Not on file   Social Needs    Financial resource strain: Not on file    Food insecurity     Worry: Not on file     Inability: Not on file    Transportation needs     Medical: Not on file     Non-medical: Not on file   Tobacco Use    Smoking status: Former Smoker     Packs/day: 0.25     Years: 50.00     Pack years: 12.50     Types: Cigarettes    Smokeless tobacco: Never Used    Tobacco comment: states smokes 1-2 cigs a day   Substance and Sexual Activity    Alcohol use: No     Alcohol/week: 0.0 standard drinks    Drug use: No    Sexual activity: Never   Lifestyle    Physical activity     Days per week: Not on file     Minutes per session: Not on file    Stress: Not on file   Relationships    Social connections     Talks on phone: Not on file     Gets together: Not on file     Attends [employer] service: Not on file     Active member of club or organization: Not on file     Attends meetings of clubs or organizations: Not on file     Relationship status: Not on file    Intimate partner violence     Fear of current or ex partner: Not on file     Emotionally abused: Not on file     Physically abused: Not on file     Forced sexual activity: Not on file   Other Topics Concern    Not on file   Social History Narrative    Not on file     No current facility-administered medications for this encounter.       Current Outpatient Medications   Medication Sig Dispense Refill    hydrOXYzine (VISTARIL) 25 MG capsule Take 1 capsule by mouth 3 times daily as needed for Anxiety 30 capsule 0    rOPINIRole (REQUIP) 0.5 MG tablet Take 5 tablets by mouth 3 times daily 30 tablet 1    hydrALAZINE (APRESOLINE) 50 MG tablet Take 1 tablet by mouth every 8 hours 90 tablet 3    vitamin B-12 (CYANOCOBALAMIN) 1000 MCG tablet Take 1,000 mcg by mouth daily      magnesium hydroxide (MILK OF MAGNESIA) 400 MG/5ML suspension Take 30 mLs by mouth daily as needed for Constipation      albuterol sulfate  (90 Base) MCG/ACT inhaler Inhale 2 puffs into the lungs every 6 hours as needed for Shortness of Breath       bisacodyl (DULCOLAX) 10 MG suppository Place 10 mg rectally daily as needed for Constipation       vitamin D (CHOLECALCIFEROL) 1000 UNIT TABS tablet Take 1,000 Units by mouth daily      memantine (NAMENDA) 5 MG tablet Take 1 tablet by mouth 2 times daily 60 tablet 3    acetaminophen 650 MG TABS Take 650 mg by mouth every 4 hours as needed 120 tablet 3    dorzolamide (TRUSOPT) 2 % ophthalmic solution Place 1 drop into both eyes 3 times daily 10 mL 2    latanoprost (XALATAN) 0.005 % ophthalmic solution Place 1 drop into both eyes nightly 1 Bottle 3     Allergies   Allergen Reactions    Other      Pt states allergy to unknown antibiotic that made her arm red        Nursing Notes Reviewed  PHYSICAL EXAM    VITAL SIGNS: /73   Pulse 58   Temp 98.4 °F (36.9 °C) (Oral)   Resp 14   Ht 5' (1.524 m)   Wt 170 lb (77.1 kg)   SpO2 96%   BMI 33.20 kg/m²    Constitutional:  Well developed, Well nourished, In no acute distress  Head:  Normocephalic, Atraumatic  Eyes:   EOMI. Sclera clear. Conjunctiva normal, No discharge. Neck/Lymphatics: Supple, no JVD, No lymphadenopathy  Cardiovascular:  RRR, Normal S1 & S2    Peripheral Vascular: Distal pulses 2+, Capillary refill <2seconds  Respiratory:  Respirations nonnlabored, 95% on room air. Speaking full sentences no difficulty. Diminished air exchange bilaterally, no rales or rhonchi. No retractions. Abdomen: Bowel sounds normal in all quadrants, Soft, Non tender/Nondistended, No palpable abdominal masses. Musculoskeletal: BUE/BLE symmetrical without atrophy or deformities. Calves are supple. 2+ equal pre to pitting edema with generalized erythema and chronic venous stasis skin changes. No weeping discharge. Andersen's are soft throughout the bilateral lower legs. Integument:  Warm, Dry, Intact  Neurologic:  Alert & oriented to person and location, not to time, No focal deficits noted. Cranial nerves II through XII grossly intact. No slurred speech. No facial droop. Patient is a poor historian but following all commands. Finger to nose intact, rapid alternating movements intact. Normal gross motor coordination & motor strength bilateral upper and lower extremities. Upper and Lower extremities DTRs intact. Sensation intact. No pronator drift.    Psychiatric:  Affect appropriate      I have reviewed and interpreted all of the currently available lab results from this visit (if applicable):  Results for orders placed or performed during the hospital encounter of 11/11/20   Rapid influenza A/B antigens    Specimen: Nasopharyngeal   Result Value Ref Range    Rapid Influenza A Ag NEGATIVE NEGATIVE    Rapid Influenza B Ag NEGATIVE NEGATIVE   Comprehensive Metabolic Panel   Result Value Ref Range    Sodium 141 135 - 145 MMOL/L    Potassium 4.4 3.5 - 5.1 MMOL/L    Chloride 106 99 - 110 mMol/L    CO2 25 21 - 32 MMOL/L    BUN 18 6 - 23 MG/DL    CREATININE 0.9 0.6 - 1.1 MG/DL    Glucose 84 70 - 99 MG/DL    Calcium 9.2 8.3 - 10.6 MG/DL    Alb 3.5 3.4 - 5.0 GM/DL    Total Protein 6.7 6.4 - 8.2 GM/DL    Total Bilirubin 0.5 0.0 - 1.0 MG/DL    ALT 17 10 - 40 U/L    AST 26 15 - 37 IU/L    Alkaline Phosphatase 86 40 - 129 IU/L    GFR Non- 59 (L) >60 mL/min/1.73m2    GFR African American >60 >60 mL/min/1.73m2    Anion Gap 10 4 - 16   Troponin   Result Value Ref Range    Troponin T <0.010 <0.01 NG/ML   Brain Natriuretic Peptide   Result Value Ref Range    Pro-.9 <300 PG/ML   Lactic Acid, Plasma   Result Value Ref Range    Lactate 1.2 0.4 - 2.0 mMOL/L   Urinalysis   Result Value Ref Range    Color, UA STRAW (A) YELLOW    Clarity, UA CLEAR CLEAR    Glucose, Urine NEGATIVE NEGATIVE MG/DL    Bilirubin Urine NEGATIVE NEGATIVE MG/DL    Ketones, Urine NEGATIVE NEGATIVE MG/DL    Specific Gravity, UA 1.009 1.001 - 1.035    Blood, Urine NEGATIVE NEGATIVE    pH, Urine 5.0 5.0 - 8.0    Protein, UA NEGATIVE NEGATIVE MG/DL    Urobilinogen, Urine NORMAL 0.2 - 1.0 MG/DL    Nitrite Urine, Quantitative NEGATIVE NEGATIVE    Leukocyte Esterase, Urine NEGATIVE NEGATIVE    RBC, UA NONE SEEN 0 - 6 /HPF    WBC, UA NONE SEEN 0 - 5 /HPF    Bacteria, UA NEGATIVE NEGATIVE /HPF    Squam Epithel, UA 3 /HPF    Mucus, UA RARE (A) NEGATIVE HPF    Trichomonas, UA NONE SEEN NONE SEEN /HPF   COVID-19    Specimen: Nasopharyngeal Swab   Result Value Ref Range    Source THROAT     SARS-CoV-2, NAAT NOT DETECTED    SPECIMEN REJECTION   Result Value Ref Range    Rejected Test CD     Reason for Rejection UNABLE TO PERFORM TESTING:    CBC Auto Differential   Result Value Ref Range    WBC 5.5 4.0 - 10.5 K/CU MM    RBC 4.48 4.2 - 5.4 M/CU MM    Hemoglobin 12.8 12.5 - 16.0 GM/DL    Hematocrit 39.7 37 - 47 %    MCV 88.6 78 - 100 FL    MCH 28.6 27 - 31 PG    MCHC 32.2 32.0 - 36.0 %    RDW 14.6 11.7 - 14.9 %    Platelets 872 760 - 003 K/CU MM    MPV 9.9 7.5 - 11.1 FL    Differential Type AUTOMATED DIFFERENTIAL     Segs Relative 53.0 36 - 66 %    Lymphocytes % 31.4 24 - 44 %    Monocytes % 9.3 (H) 0 - 4 %    Eosinophils % 5.4 (H) 0 - 3 %    Basophils % 0.7 0 - 1 %    Segs Absolute 2.9 K/CU MM    Lymphocytes Absolute 1.7 K/CU MM    Monocytes Absolute 0.5 K/CU MM    Eosinophils Absolute 0.3 K/CU MM    Basophils Absolute 0.0 K/CU MM    Nucleated RBC % 0.0 %    Total Nucleated RBC 0.0 K/CU MM    Total Immature Neutrophil 0.01 K/CU MM    Immature Neutrophil % 0.2 0 - 0.43 %        Radiographs (if obtained):  [] The following radiograph was interpreted by myself in the absence of a radiologist:   [] Radiologist's Report Reviewed:  CT HEAD WO CONTRAST   Final Result   No acute intracranial abnormality. XR CHEST PORTABLE   Final Result   No acute process. Bibasilar hypoaeration              CT HEAD WO CONTRAST (Final result)   Result time 11/11/20 18:58:34   Final result by Dorota Charlton MD (11/11/20 18:58:34)                 Impression:     No acute intracranial abnormality. Narrative:     EXAMINATION:   CT OF THE HEAD WITHOUT CONTRAST  11/11/2020 6:47 pm     TECHNIQUE:   CT of the head was performed without the administration of intravenous   contrast. Dose modulation, iterative reconstruction, and/or weight based   adjustment of the mA/kV was utilized to reduce the radiation dose to as low   as reasonably achievable.      COMPARISON:   10/17/2020     HISTORY:   ORDERING SYSTEM PROVIDED HISTORY: AMS   TECHNOLOGIST PROVIDED HISTORY:   Has a \"code stroke\" or \"stroke alert\" been called? ->No   Reason for exam:->AMS   Reason for Exam: AMS   Acuity: Acute   Type of Exam: Initial     FINDINGS:   BRAIN/VENTRICLES: The ventricles and sulci are diffusely enlarged.  Low   attenuation is seen in the periventricular and subcortical white matter.  No   acute intracranial hemorrhage or acute infarct is identified. ORBITS: The visualized portion of the orbits demonstrate no acute abnormality. SINUSES: The visualized paranasal sinuses and mastoid air cells demonstrate   no acute abnormality. SOFT TISSUES/SKULL:  No acute abnormality of the visualized skull or soft   tissues.                       XR CHEST PORTABLE (Final result)   Result time 11/11/20 16:35:50   Final result by Oscar Easton MD (11/11/20 16:35:50)                 Impression:     No acute process. Bibasilar hypoaeration             Narrative:     EXAMINATION:   ONE XRAY VIEW OF THE CHEST     11/11/2020 4:18 pm     COMPARISON:   10/17/2020     HISTORY:   ORDERING SYSTEM PROVIDED HISTORY: chest pain   TECHNOLOGIST PROVIDED HISTORY:   Reason for exam:->chest pain   Reason for Exam: chest pain   Acuity: Acute   Type of Exam: Initial     FINDINGS:   The lungs are without acute focal process.  Bibasilar hypoaeration.  There is   no effusion or pneumothorax. The cardiomediastinal silhouette is stable. The   osseous structures are stable. EKG Interpretation  Please see ED physician's note - Dr Renee Shankar -  for EKG interpretation      Chart review shows recent radiographs:  Xr Ankle Left (min 3 Views)    Result Date: 10/20/2020  EXAMINATION: THREE XRAY VIEWS OF THE LEFT ANKLE 10/17/2020 12:54 pm COMPARISON: None. HISTORY: ORDERING SYSTEM PROVIDED HISTORY: states pain and previous fall, complaining of pain in this ankle to family.  hx dementia TECHNOLOGIST PROVIDED HISTORY: Reason for exam:->states pain and previous fall, complaining of pain in this ankle to family. hx dementia FINDINGS: Generalized osteopenia limits evaluation for nondisplaced fractures. Within this limitation, no acute displaced fracture is identified. The ankle mortise is congruent. Plantar and retrocalcaneal enthesophytes are seen. Linear sclerosis in the distal tibia and fibula are favored to represent physeal scars. No acute bony abnormality identified, within the limitations of diffuse osteopenia. Ct Head Wo Contrast    Result Date: 10/17/2020  EXAMINATION: CT OF THE HEAD WITHOUT CONTRAST 10/17/2020 11:08 am TECHNIQUE: CT of the head was performed without the administration of intravenous contrast. Dose modulation, iterative reconstruction, and/or weight based adjustment of the mA/kV was utilized to reduce the radiation dose to as low as reasonably achievable. COMPARISON: Head CT 05/23/2020 HISTORY: ORDERING SYSTEM PROVIDED HISTORY: tremors TECHNOLOGIST PROVIDED HISTORY: Reason for exam:->tremors Has a \"code stroke\" or \"stroke alert\" been called? ->No Reason for Exam: tremors Acuity: Acute Type of Exam: Initial Relevant Medical/Surgical History: hx TIA, dementia FINDINGS: BRAIN/VENTRICLES:  No masses nor acute intracranial hemorrhage. Unchanged encephalomalacia in the inferomedial right cerebellar hemisphere. Gray/white matter differentiation without findings of acute ischemia. No mass effect nor midline shift. Patent basilar cisterns and foramen magnum. No hydrocephalus. Age-appropriate mild diffuse atrophy. Mild deep and periventricular white matter hypodensities likely due to chronic small vessel ischemia. Old lacunar infarcts in the bilateral basal ganglia. ORBITS:  Right lens implant. No acute abnormality. SINUSES:  Visualized portions appear normally pneumatized and aerated. SOFT TISSUES/SKULL:  No acute soft tissue abnormality. Mild atherosclerotic calcifications. No acute fracture.      No acute intracranial abnormality. Xr Chest Portable    Result Date: 10/17/2020  EXAMINATION: ONE XRAY VIEW OF THE CHEST 10/17/2020 10:34 am COMPARISON: Chest radiograph 08/27/2019 HISTORY: ORDERING SYSTEM PROVIDED HISTORY: confusion TECHNOLOGIST PROVIDED HISTORY: Reason for exam:->confusion Reason for Exam: confusion Acuity: Acute Type of Exam: Initial FINDINGS: Left basilar airspace opacity, predominantly linear in appearance. Diffuse interstitial prominence with indistinct pulmonary vasculature and possible interlobular septal thickening. Questionable trace left pleural effusion. No definite findings of pneumothorax or right pleural effusion. Normal mediastinal and cardiac contours. Mildly prominent hilar contours. Atherosclerotic calcification in the aorta. No acute fracture. 1. Left basilar airspace opacity is most likely due to atelectasis, although pneumonia and aspiration could appear similar. 2. Questionable trace left pleural effusion. 3. Interstitial prominence most likely due to chronic changes or pulmonary vascular congestion. Superimposed interstitial edema could be present. ED COURSE & MEDICAL DECISION MAKING       Vital signs and nursing notes reviewed during ED course. I have independently evaluated this patient . Supervising physician - Dr. Everett Szymanski - was present in ED and available for consultation throughout entirety of patient's care. All pertinent Lab data and radiographic results reviewed with patient at bedside. The patient and/or the family were informed of the results of any tests/labs/imaging, the treatment plan, and time was allotted to answer questions. Clinical Impression:  1. Chest pain, unspecified type    2. Confusion    3. Swelling of lower leg        Patient presents via EMS with concern for altered mental status, recent bronchitis diagnosis. On exam, pleasant 35-year-old female, sitting comfortably upright in bed, in no acute respiratory distress.   Vitals are stable on room air, 95% on room air without increased work of breathing. Patient is a poor historian, alert and oriented to self and location only, not to time but otherwise following all commands. No localized extremity weakness, no pronator drift. Lungs with diminished air exchange without rales or rhonchi. 2+ equal pre some pitting edema to the lower legs with otherwise supple calves. Patient is placed on to telemetry and continuous pulse ox monitoring. As patient's confusion has been progressively worsening over the last several days, stroke alert was not initiated. Labs are reassuring, CBC, CMP without significant derangement. Normal troponin and BNP. UA is unremarkable. Normal lactic acid. Rapid Covid and influenza are both negative. No evidence of acute intracranial process on CT head and chest x-ray shows bibasilar hypoaeration without other acute cardiopulmonary process. Given patient's age, chest pain and worsening confusion, I do feel admission is warranted for further evaluation care. Plan for admission was discussed with family who verbalizes understanding agreement. I did consult with Dr. Raul Madsen - hospitalist - and discussed patient's history, ED presentation/course including any pertinent laboratory findings and imaging study findings. He/she agrees to hospital admission. Patient is admitted to the hospital in stable condition. In light of current events, I did utilize appropriate PPE (including N95 face mask, safety glasses, gloves as recommended by the health facility/national standard best practice, during my bedside interactions with the patient. Patient was masked throughout ED course. Full droplet precaution as well as full PPE was followed throughout patient's ED course and evaluation. In consideration of current COVID19 pandemic, with effort to minimize unnecessary provider exposure, this patient was seen at bedside by me independently.  However, in compliance with current hospital BR[facility]/ED protocol, prior to admission I did discuss this patient case with emergency department physician, Dr. Evelia Lima, who did agree with ED workup/evaluation and plan for admission. Of note, this Pt was NOT admitted to the ICU. Diagnosis and plan discussed in detail with patient who understands and agrees. I was updated by [facility] that patient's PCP is actually Dr. Marshal Telles who admits his own patients so he was consulted and agrees to admit patient. Disposition referral (if applicable):  No follow-up provider specified.     Disposition medications (if applicable):  New Prescriptions    No medications on file         (Please note that portions of this note may have been completed with a voice recognition program. Efforts were made to edit the dictations but occasionally words are mis-transcribed.)          Jean-Pierre Strickland PA-C  11/11/20 Lynette Joshi PA-C  11/11/20 [id number]

## 2020-11-12 ENCOUNTER — APPOINTMENT (OUTPATIENT)
Dept: NUCLEAR MEDICINE | Age: 85
DRG: 071 | End: 2020-11-12
Payer: MEDICARE

## 2020-11-12 LAB
CHOLESTEROL: 182 MG/DL
D DIMER: 814 NG/ML(DDU)
HDLC SERPL-MCNC: 59 MG/DL
LDL CHOLESTEROL DIRECT: 117 MG/DL
LV EF: 53 %
LV EF: 71 %
LVEF MODALITY: NORMAL
LVEF MODALITY: NORMAL
PRO-BNP: 244.4 PG/ML
TRIGL SERPL-MCNC: 105 MG/DL
TROPONIN T: <0.01 NG/ML
TROPONIN T: <0.01 NG/ML

## 2020-11-12 PROCEDURE — 99223 1ST HOSP IP/OBS HIGH 75: CPT | Performed by: INTERNAL MEDICINE

## 2020-11-12 PROCEDURE — G0378 HOSPITAL OBSERVATION PER HR: HCPCS

## 2020-11-12 PROCEDURE — G0008 ADMIN INFLUENZA VIRUS VAC: HCPCS | Performed by: FAMILY MEDICINE

## 2020-11-12 PROCEDURE — 96375 TX/PRO/DX INJ NEW DRUG ADDON: CPT

## 2020-11-12 PROCEDURE — 93017 CV STRESS TEST TRACING ONLY: CPT

## 2020-11-12 PROCEDURE — 93010 ELECTROCARDIOGRAM REPORT: CPT | Performed by: INTERNAL MEDICINE

## 2020-11-12 PROCEDURE — 6370000000 HC RX 637 (ALT 250 FOR IP): Performed by: FAMILY MEDICINE

## 2020-11-12 PROCEDURE — 78452 HT MUSCLE IMAGE SPECT MULT: CPT

## 2020-11-12 PROCEDURE — 85379 FIBRIN DEGRADATION QUANT: CPT

## 2020-11-12 PROCEDURE — 96372 THER/PROPH/DIAG INJ SC/IM: CPT

## 2020-11-12 PROCEDURE — A9500 TC99M SESTAMIBI: HCPCS | Performed by: INTERNAL MEDICINE

## 2020-11-12 PROCEDURE — 83880 ASSAY OF NATRIURETIC PEPTIDE: CPT

## 2020-11-12 PROCEDURE — 90686 IIV4 VACC NO PRSV 0.5 ML IM: CPT | Performed by: FAMILY MEDICINE

## 2020-11-12 PROCEDURE — 80061 LIPID PANEL: CPT

## 2020-11-12 PROCEDURE — 6360000002 HC RX W HCPCS: Performed by: INTERNAL MEDICINE

## 2020-11-12 PROCEDURE — 93306 TTE W/DOPPLER COMPLETE: CPT

## 2020-11-12 PROCEDURE — 84484 ASSAY OF TROPONIN QUANT: CPT

## 2020-11-12 PROCEDURE — 36415 COLL VENOUS BLD VENIPUNCTURE: CPT

## 2020-11-12 PROCEDURE — 94761 N-INVAS EAR/PLS OXIMETRY MLT: CPT

## 2020-11-12 PROCEDURE — 1200000000 HC SEMI PRIVATE

## 2020-11-12 PROCEDURE — 3430000000 HC RX DIAGNOSTIC RADIOPHARMACEUTICAL: Performed by: INTERNAL MEDICINE

## 2020-11-12 PROCEDURE — 6360000002 HC RX W HCPCS: Performed by: FAMILY MEDICINE

## 2020-11-12 PROCEDURE — 2580000003 HC RX 258: Performed by: FAMILY MEDICINE

## 2020-11-12 PROCEDURE — 83721 ASSAY OF BLOOD LIPOPROTEIN: CPT

## 2020-11-12 PROCEDURE — 96365 THER/PROPH/DIAG IV INF INIT: CPT

## 2020-11-12 RX ORDER — BISACODYL 10 MG
10 SUPPOSITORY, RECTAL RECTAL DAILY PRN
Status: DISCONTINUED | OUTPATIENT
Start: 2020-11-12 | End: 2020-11-13 | Stop reason: HOSPADM

## 2020-11-12 RX ORDER — PROMETHAZINE HYDROCHLORIDE 25 MG/1
12.5 TABLET ORAL EVERY 6 HOURS PRN
Status: DISCONTINUED | OUTPATIENT
Start: 2020-11-12 | End: 2020-11-13 | Stop reason: HOSPADM

## 2020-11-12 RX ORDER — MEMANTINE HYDROCHLORIDE 5 MG/1
5 TABLET ORAL 2 TIMES DAILY
Status: DISCONTINUED | OUTPATIENT
Start: 2020-11-12 | End: 2020-11-13 | Stop reason: HOSPADM

## 2020-11-12 RX ORDER — ROPINIROLE 0.25 MG/1
0.5 TABLET, FILM COATED ORAL 3 TIMES DAILY
Status: DISCONTINUED | OUTPATIENT
Start: 2020-11-12 | End: 2020-11-13 | Stop reason: HOSPADM

## 2020-11-12 RX ORDER — LORAZEPAM 2 MG/ML
1 INJECTION INTRAMUSCULAR
Status: DISCONTINUED | OUTPATIENT
Start: 2020-11-12 | End: 2020-11-13 | Stop reason: HOSPADM

## 2020-11-12 RX ORDER — LATANOPROST 50 UG/ML
1 SOLUTION/ DROPS OPHTHALMIC NIGHTLY
Status: DISCONTINUED | OUTPATIENT
Start: 2020-11-12 | End: 2020-11-13 | Stop reason: HOSPADM

## 2020-11-12 RX ORDER — HYDROXYZINE PAMOATE 25 MG/1
25 CAPSULE ORAL 3 TIMES DAILY PRN
Status: DISCONTINUED | OUTPATIENT
Start: 2020-11-12 | End: 2020-11-13 | Stop reason: HOSPADM

## 2020-11-12 RX ORDER — MAGNESIUM SULFATE IN WATER 40 MG/ML
2 INJECTION, SOLUTION INTRAVENOUS PRN
Status: DISCONTINUED | OUTPATIENT
Start: 2020-11-12 | End: 2020-11-13 | Stop reason: HOSPADM

## 2020-11-12 RX ORDER — PROMETHAZINE HYDROCHLORIDE 25 MG/1
12.5 TABLET ORAL EVERY 6 HOURS PRN
Status: DISCONTINUED | OUTPATIENT
Start: 2020-11-12 | End: 2020-11-12

## 2020-11-12 RX ORDER — IPRATROPIUM BROMIDE AND ALBUTEROL SULFATE 2.5; .5 MG/3ML; MG/3ML
1 SOLUTION RESPIRATORY (INHALATION)
Status: DISCONTINUED | OUTPATIENT
Start: 2020-11-12 | End: 2020-11-12

## 2020-11-12 RX ORDER — DORZOLAMIDE HCL 20 MG/ML
1 SOLUTION/ DROPS OPHTHALMIC 3 TIMES DAILY
Status: DISCONTINUED | OUTPATIENT
Start: 2020-11-12 | End: 2020-11-13 | Stop reason: HOSPADM

## 2020-11-12 RX ORDER — ONDANSETRON 2 MG/ML
4 INJECTION INTRAMUSCULAR; INTRAVENOUS EVERY 6 HOURS PRN
Status: DISCONTINUED | OUTPATIENT
Start: 2020-11-12 | End: 2020-11-12

## 2020-11-12 RX ORDER — ATORVASTATIN CALCIUM 40 MG/1
40 TABLET, FILM COATED ORAL NIGHTLY
Status: DISCONTINUED | OUTPATIENT
Start: 2020-11-12 | End: 2020-11-13 | Stop reason: HOSPADM

## 2020-11-12 RX ORDER — NITROGLYCERIN 0.4 MG/1
0.4 TABLET SUBLINGUAL EVERY 5 MIN PRN
Status: DISCONTINUED | OUTPATIENT
Start: 2020-11-12 | End: 2020-11-13 | Stop reason: HOSPADM

## 2020-11-12 RX ORDER — POTASSIUM CHLORIDE 7.45 MG/ML
10 INJECTION INTRAVENOUS PRN
Status: DISCONTINUED | OUTPATIENT
Start: 2020-11-12 | End: 2020-11-13 | Stop reason: HOSPADM

## 2020-11-12 RX ORDER — HYDRALAZINE HYDROCHLORIDE 50 MG/1
50 TABLET, FILM COATED ORAL EVERY 8 HOURS SCHEDULED
Status: DISCONTINUED | OUTPATIENT
Start: 2020-11-12 | End: 2020-11-13 | Stop reason: HOSPADM

## 2020-11-12 RX ORDER — ASPIRIN 81 MG/1
81 TABLET, CHEWABLE ORAL DAILY
Status: DISCONTINUED | OUTPATIENT
Start: 2020-11-12 | End: 2020-11-13 | Stop reason: HOSPADM

## 2020-11-12 RX ORDER — IPRATROPIUM BROMIDE AND ALBUTEROL SULFATE 2.5; .5 MG/3ML; MG/3ML
1 SOLUTION RESPIRATORY (INHALATION) EVERY 4 HOURS PRN
Status: DISCONTINUED | OUTPATIENT
Start: 2020-11-12 | End: 2020-11-13 | Stop reason: HOSPADM

## 2020-11-12 RX ORDER — LANOLIN ALCOHOL/MO/W.PET/CERES
1000 CREAM (GRAM) TOPICAL DAILY
Status: DISCONTINUED | OUTPATIENT
Start: 2020-11-12 | End: 2020-11-13 | Stop reason: HOSPADM

## 2020-11-12 RX ORDER — ONDANSETRON 2 MG/ML
4 INJECTION INTRAMUSCULAR; INTRAVENOUS EVERY 6 HOURS PRN
Status: DISCONTINUED | OUTPATIENT
Start: 2020-11-12 | End: 2020-11-13 | Stop reason: HOSPADM

## 2020-11-12 RX ADMIN — Medication 30 MILLICURIE: at 10:25

## 2020-11-12 RX ADMIN — DORZOLAMIDE HYDROCHLORIDE 1 DROP: 20 SOLUTION/ DROPS OPHTHALMIC at 22:11

## 2020-11-12 RX ADMIN — Medication 1000 UNITS: at 12:10

## 2020-11-12 RX ADMIN — ENOXAPARIN SODIUM 30 MG: 30 INJECTION SUBCUTANEOUS at 12:07

## 2020-11-12 RX ADMIN — LATANOPROST 1 DROP: 50 SOLUTION/ DROPS OPHTHALMIC at 22:11

## 2020-11-12 RX ADMIN — MEMANTINE HYDROCHLORIDE 5 MG: 5 TABLET ORAL at 12:09

## 2020-11-12 RX ADMIN — Medication 10 MILLICURIE: at 09:00

## 2020-11-12 RX ADMIN — INFLUENZA A VIRUS A/VICTORIA/2454/2019 IVR-207 (H1N1) ANTIGEN (PROPIOLACTONE INACTIVATED), INFLUENZA A VIRUS A/HONG KONG/2671/2019 IVR-208 (H3N2) ANTIGEN (PROPIOLACTONE INACTIVATED), INFLUENZA B VIRUS B/VICTORIA/705/2018 BVR-11 ANTIGEN (PROPIOLACTONE INACTIVATED), INFLUENZA B VIRUS B/PHUKET/3073/2013 BVR-1B ANTIGEN (PROPIOLACTONE INACTIVATED) 0.5 ML: 15; 15; 15; 15 INJECTION, SUSPENSION INTRAMUSCULAR at 12:08

## 2020-11-12 RX ADMIN — REGADENOSON 0.4 MG: 0.08 INJECTION, SOLUTION INTRAVENOUS at 10:26

## 2020-11-12 RX ADMIN — CYANOCOBALAMIN TAB 1000 MCG 1000 MCG: 1000 TAB at 12:10

## 2020-11-12 RX ADMIN — LORAZEPAM 1 MG: 2 INJECTION INTRAMUSCULAR; INTRAVENOUS at 23:05

## 2020-11-12 RX ADMIN — ROPINIROLE HYDROCHLORIDE 0.5 MG: 0.25 TABLET, FILM COATED ORAL at 12:10

## 2020-11-12 RX ADMIN — HYDRALAZINE HYDROCHLORIDE 50 MG: 50 TABLET, FILM COATED ORAL at 14:28

## 2020-11-12 RX ADMIN — DORZOLAMIDE HYDROCHLORIDE 1 DROP: 20 SOLUTION/ DROPS OPHTHALMIC at 12:06

## 2020-11-12 RX ADMIN — AZITHROMYCIN MONOHYDRATE 500 MG: 500 INJECTION, POWDER, LYOPHILIZED, FOR SOLUTION INTRAVENOUS at 02:00

## 2020-11-12 ASSESSMENT — PAIN SCALES - GENERAL
PAINLEVEL_OUTOF10: 0

## 2020-11-12 NOTE — ED PROVIDER NOTES
12 lead EKG per my interpretation:  Normal Sinus Rhythm at 70  Axis is   Leftward axis  QTc is  within an acceptable range  There is no specific T wave changes appreciated. There is no specific ST wave changes appreciated. No STEMI    Prior EKG to compare with was available and no clinically significant change in overall morphology.     Mitzy De Jesus MD  11/11/20 [id number]

## 2020-11-12 NOTE — CARE COORDINATION
CM into see pt to initiate a safe discharge plan. Cm  introduced self and explained role of CM. Pt is kind, alert, confused to year, president. Pt is sitting on side of bed. Pt lives with her grand dtr,  Hakan and gave permission for CM to call her. Cm informed per pt that she has 8 children and 6 live local.   CM called [facility] and left a VM for her to call to discuss discharge planning. CM provided card and encouraged to call for any needs or concern. CM is available if any needs arise. 1125 CM received call back from pts dtr Hakan. Pt is blind. Zairan is primary caregiver. It is true with pt has many children but they are not local. They are all trying to help with care of pt but due to Covid that has not been consistent. Donaldo dtr is home when Sohail Head is working. CM informed that DME includes a walker and w/c. [facility] CM informed that pt was supposed to be receiving 40 hrs per week but she is only receiving aide service T TH from 12-3. CM received permission to call her CM to discuss. [id number] called [facility], CM left a VM requesting call back.  [address]

## 2020-11-12 NOTE — CONSULTS
CARDIOLOGY CONSULT NOTE   Reason for consultation:  Chest pain     Referring physician:  Bandar Gunter MD     Primary care physician: Shola Mayer      Dear  Dr. Bandar Gunter MD   Thanks for the consult. Chief Complaints :  Chief Complaint   Patient presents with    Chest Pain    Cough    Nasal Congestion    Leg Swelling    Altered Mental Status     HX OF DEMENTIA        History of present illness:Karime is a 80 y. o.year old who presents with primarily complains of altered mental status and some chest pressure patient is a poor historian she has dementia but is also legally blind  Audiology was asked to see her for chest pressure and pain apparently she was having ongoing burning pain in the middle of the chest or more to the left side? Past medical history:    has a past medical history of Arthritis, Blind left eye, Cancer ([address] COPD (chronic obstructive pulmonary disease) ([address] Dementia ([address] Depression, GERD (gastroesophageal reflux disease), Hypertension, Pneumonia, Restless legs syndrome, Tremors of nervous system, and Unspecified cerebral artery occlusion with cerebral infarction. Past surgical history:   has a past surgical history that includes Hysterectomy (1972); Spine surgery (1995); Abdomen surgery; fracture surgery; Appendectomy; Endoscopy, colon, diagnostic; vascular surgery; back surgery; Colonoscopy; and eye surgery. Social History:   reports that she has quit smoking. Her smoking use included cigarettes. She has a 12.50 pack-year smoking history. She has never used smokeless tobacco. She reports that she does not drink alcohol or use drugs.   Family history:   no family history of CAD, STROKE of DM at early age    Allergies   Allergen Reactions    Other      Pt states allergy to unknown antibiotic that made her arm red        dorzolamide (TRUSOPT) 2 % ophthalmic solution 1 drop, TID  latanoprost (XALATAN) 0.005 % ophthalmic solution 1 drop, Nightly  memantine (NAMENDA) tablet 5 mg, BID  bisacodyl (DULCOLAX) suppository 10 mg, Daily PRN  vitamin D CAPS 1,000 Units, Daily  magnesium hydroxide (MILK OF MAGNESIA) 400 MG/5ML suspension 30 mL, Daily PRN  ipratropium-albuterol (DUONEB) nebulizer solution 1 ampule, Q4H PRN  vitamin B-12 (CYANOCOBALAMIN) tablet 1,000 mcg, Daily  hydrALAZINE (APRESOLINE) tablet 50 mg, 3 times per day  rOPINIRole (REQUIP) tablet 0.5 mg, TID  hydrOXYzine (VISTARIL) capsule 25 mg, TID PRN  promethazine (PHENERGAN) tablet 12.5 mg, Q6H PRN    Or  ondansetron (ZOFRAN) injection 4 mg, Q6H PRN  atorvastatin (LIPITOR) tablet 40 mg, Nightly  enoxaparin (LOVENOX) injection 30 mg, Daily  aspirin chewable tablet 81 mg, Daily  nitroGLYCERIN (NITROSTAT) SL tablet 0.4 mg, Q5 Min PRN  azithromycin (ZITHROMAX) 500 mg in dextrose 5 % 250 mL IVPB, Q24H  potassium chloride 10 mEq/100 mL IVPB (Peripheral Line), PRN  magnesium sulfate 2 g in 50 mL IVPB premix, PRN  technetium sestamibi (CARDIOLITE) injection 10 millicurie, ONCE PRN  technetium sestamibi (CARDIOLITE) injection 30 millicurie, ONCE PRN      Current Facility-Administered Medications   Medication Dose Route Frequency Provider Last Rate Last Dose    dorzolamide (TRUSOPT) 2 % ophthalmic solution 1 drop  1 drop Both Eyes TID Jimi Hamm MD   1 drop at 11/12/20 1206    latanoprost (XALATAN) 0.005 % ophthalmic solution 1 drop  1 drop Both Eyes Nightly Jimi Hamm MD        memantine (NAMENDA) tablet 5 mg  5 mg Oral BID Jimi Hamm MD   5 mg at 11/12/20 1209    bisacodyl (DULCOLAX) suppository 10 mg  10 mg Rectal Daily PRN Jimi Hamm MD        vitamin D CAPS 1,000 Units  1,000 Units Oral Daily Jimi Hamm MD   1,000 Units at 11/12/20 1210    magnesium hydroxide (MILK OF MAGNESIA) 400 MG/5ML suspension 30 mL  30 mL Oral Daily PRN Sj Lauren MD        ipratropium-albuterol (DUONEB) nebulizer solution 1 ampule  1 ampule Inhalation Q4H PRN Sj Lauren MD       Arlin Alu vitamin B-12 (CYANOCOBALAMIN) tablet 1,000 mcg  1,000 mcg Oral Daily Jimi Hamm MD   1,000 mcg at 11/12/20 1210    hydrALAZINE (APRESOLINE) tablet 50 mg  50 mg Oral 3 times per day Zac Fuentes MD   Stopped at 11/12/20 0655    rOPINIRole (REQUIP) tablet 0.5 mg  0.5 mg Oral TID Zac Fuentes MD   0.5 mg at 11/12/20 1210    hydrOXYzine (VISTARIL) capsule 25 mg  25 mg Oral TID PRN Jimi Hamm MD        promethazine (PHENERGAN) tablet 12.5 mg  12.5 mg Oral Q6H PRN Jimi Hamm MD        Or    ondansetron (ZOFRAN) injection 4 mg  4 mg Intravenous Q6H PRN Jimi Hamm MD        atorvastatin (LIPITOR) tablet 40 mg  40 mg Oral Nightly Jimi Hamm MD        enoxaparin (LOVENOX) injection 30 mg  30 mg Subcutaneous Daily Jimi Hamm MD   30 mg at 11/12/20 1207    aspirin chewable tablet 81 mg  81 mg Oral Daily Jimi Hamm MD        nitroGLYCERIN (NITROSTAT) SL tablet 0.4 mg  0.4 mg Sublingual Q5 Min PRN Zac Fuentes MD        azithromycin (ZITHROMAX) 500 mg in dextrose 5 % 250 mL IVPB  500 mg Intravenous Q24H Zac Fuentes MD   Stopped at 11/12/20 0300    potassium chloride 10 mEq/100 mL IVPB (Peripheral Line)  10 mEq Intravenous PRN Zac Fuentes MD        magnesium sulfate 2 g in 50 mL IVPB premix  2 g Intravenous PRN Zac Fuentes MD        technetium sestamibi (CARDIOLITE) injection 10 millicurie  10 millicurie Intravenous ONCE PRN Gunnar Daniel MD        technetium sestamibi (CARDIOLITE) injection 30 millicurie  30 millicurie Intravenous ONCE PRN Gunnar Daniel MD         Review of Systems: Unable to obtain patient is poor historian  C       Physical Examination:    Vitals:    11/12/20 0025 11/12/20 0653 11/12/20 0738 11/12/20 1200   BP: 129/61 134/63  (!) 155/66   Pulse:    62   Resp: 14   14   Temp: 97.6 °F (36.4 °C)   97.8 °F (36.6 °C)   TempSrc: Oral   Oral   SpO2:   95% 95%   Weight: 169 lb 14.4 oz (77.1 kg)      Height: General Appearance: She is legally blind no distress, conversant    Constitutional:  Well developed, Well nourished, No acute distress, Non-toxic appearance. HENT:  Normocephalic, Atraumatic, Bilateral external ears normal, Oropharynx moist, No oral exudates, Nose normal. Neck- Normal range of motion, No tenderness, Supple, No stridor,no apical-carotid delay  Lymphatics : no palpable lymph nodes  Eyes:  PERRL, EOMI, Conjunctiva normal, No discharge. Respiratory:  Normal breath sounds, No respiratory distress, No wheezing, No chest tenderness. ,no use of accessory muscles, crackles Absent   Cardiovascular: (PMI) apex non displaced,no lifts no thrills, ankle swelling Absent  , 1+, s1 and s2 audible,Murmur. Present, JVD not noted    Abdomen /GI:  Bowel sounds normal, Soft, No tenderness, No masses, No gross visceromegaly   :  No costovertebral angle tenderness   Musculoskeletal:  No edema, no tenderness, no deformities.  Back- no tenderness  Integument:  Well hydrated, no rash   Lymphatic:  No lymphadenopathy noted   Neurologic:  Alert & oriented x 3, CN 2-12 normal, normal motor function, normal sensory function, no focal deficits noted           Medical decision making and Data review:    Lab Review   Recent Labs     11/11/20  1726   WBC 5.5   HGB 12.8   HCT 39.7         Recent Labs     11/11/20  1640      K 4.4      CO2 25   BUN 18   CREATININE 0.9     Recent Labs     11/11/20  1640   AST 26   ALT 17   BILITOT 0.5   ALKPHOS 86     Recent Labs     11/11/20  1640 11/12/20  0423 11/12/20  0726   TROPONINT <0.010 <0.010 <0.010       Recent Labs     11/11/20  1640 11/12/20  0423   PROBNP 189.9 244.4     Lab Results   Component Value Date    INR 0.98 10/17/2018    PROTIME 11.2 10/17/2018       EKG: (reviewed by myself)    ECHO:(reviewed by myself)    Chest Xray:(reviewed by myself)  Xr Ankle Left (min 3 Views)    Result Date: 10/20/2020  EXAMINATION: THREE XRAY VIEWS OF THE LEFT ANKLE 10/17/2020 11:08 am TECHNIQUE: CT of the head was performed without the administration of intravenous contrast. Dose modulation, iterative reconstruction, and/or weight based adjustment of the mA/kV was utilized to reduce the radiation dose to as low as reasonably achievable. COMPARISON: Head CT 05/23/2020 HISTORY: ORDERING SYSTEM PROVIDED HISTORY: tremors TECHNOLOGIST PROVIDED HISTORY: Reason for exam:->tremors Has a \"code stroke\" or \"stroke alert\" been called? ->No Reason for Exam: tremors Acuity: Acute Type of Exam: Initial Relevant Medical/Surgical History: hx TIA, dementia FINDINGS: BRAIN/VENTRICLES:  No masses nor acute intracranial hemorrhage. Unchanged encephalomalacia in the inferomedial right cerebellar hemisphere. Gray/white matter differentiation without findings of acute ischemia. No mass effect nor midline shift. Patent basilar cisterns and foramen magnum. No hydrocephalus. Age-appropriate mild diffuse atrophy. Mild deep and periventricular white matter hypodensities likely due to chronic small vessel ischemia. Old lacunar infarcts in the bilateral basal ganglia. ORBITS:  Right lens implant. No acute abnormality. SINUSES:  Visualized portions appear normally pneumatized and aerated. SOFT TISSUES/SKULL:  No acute soft tissue abnormality. Mild atherosclerotic calcifications. No acute fracture. No acute intracranial abnormality. Xr Chest Portable    Result Date: 11/11/2020  EXAMINATION: ONE XRAY VIEW OF THE CHEST 11/11/2020 4:18 pm COMPARISON: 10/17/2020 HISTORY: ORDERING SYSTEM PROVIDED HISTORY: chest pain TECHNOLOGIST PROVIDED HISTORY: Reason for exam:->chest pain Reason for Exam: chest pain Acuity: Acute Type of Exam: Initial FINDINGS: The lungs are without acute focal process. Bibasilar hypoaeration. There is no effusion or pneumothorax. The cardiomediastinal silhouette is stable. The osseous structures are stable. No acute process.  Bibasilar hypoaeration     Xr Chest Portable    Result Date: 10/17/2020  EXAMINATION: ONE XRAY VIEW OF THE CHEST 10/17/2020 10:34 am COMPARISON: Chest radiograph 08/27/2019 HISTORY: ORDERING SYSTEM PROVIDED HISTORY: confusion TECHNOLOGIST PROVIDED HISTORY: Reason for exam:->confusion Reason for Exam: confusion Acuity: Acute Type of Exam: Initial FINDINGS: Left basilar airspace opacity, predominantly linear in appearance. Diffuse interstitial prominence with indistinct pulmonary vasculature and possible interlobular septal thickening. Questionable trace left pleural effusion. No definite findings of pneumothorax or right pleural effusion. Normal mediastinal and cardiac contours. Mildly prominent hilar contours. Atherosclerotic calcification in the aorta. No acute fracture. 1. Left basilar airspace opacity is most likely due to atelectasis, although pneumonia and aspiration could appear similar. 2. Questionable trace left pleural effusion. 3. Interstitial prominence most likely due to chronic changes or pulmonary vascular congestion. Superimposed interstitial edema could be present. All labs, medications and tests reviewed by myself including data  from outside source , patient and available family . Continue all other medications of all above medical condition listed as is. Impression:  Active Problems:    Hypertension    Chest pain  Resolved Problems:    * No resolved hospital problems. *      Assessment: 80 y. o.year old with PMH of  has a past medical history of Arthritis, Blind left eye, Cancer ([address] COPD (chronic obstructive pulmonary disease) ([address] Dementia ([address] Depression, GERD (gastroesophageal reflux disease), Hypertension, Pneumonia, Restless legs syndrome, Tremors of nervous system, and Unspecified cerebral artery occlusion with cerebral infarction. Plan and Recommendations:     We will get stress test and echo  Chest Pain: serial cardiac enzymes, EKG reviewed, further plan as per enzymes and clinical course  DVT prophylaxis if no contraindication  6. Dyslipidemia: continue statins           Thank you  much for consult and giving us the opportunity in contributing in the care of this patient. Please feel free to call me for any questions.        Kayla Salinas MD, 11/12/2020 12:41 PM

## 2020-11-12 NOTE — H&P
HISTORY AND PHYSICAL    2020     Patient Information:    Patient: Chrystal Tao     Gender: female  : 1931   Age: 80 y.o. MRN: [id number]        PCP:  DUANE Mota CNP (Tel: [phone] )    Chief complaint:    Chief Complaint   Patient presents with    Chest Pain    Cough    Nasal Congestion    Leg Swelling    Altered Mental Status     HX OF DEMENTIA           History of Present Illness:  Chrystal Tao is a 80 y.o. blinde  female with dementia , H/O CVA, TIA , RLS brought to  ER for worsening cough , abdominal pain, chest pain . pt was seen in  and ABX initiated for acute bronchitis . Pt is poor historian and she does not has idea why she is in hospital and asking to go home . Blood work non specific and CXR non acute and Head CT non acute . Troponin negative with no acute change in EKG   History obtained from chart/staff /family        REVIEW OF SYSTEMS:   Unobtainable . Past Medical History:   has a past medical history of Arthritis, Blind left eye, Cancer ([facility] COPD (chronic obstructive pulmonary disease) ([facility] Dementia ([facility] Depression, GERD (gastroesophageal reflux disease), Hypertension, Pneumonia, Restless legs syndrome, Tremors of nervous system, and Unspecified cerebral artery occlusion with cerebral infarction. Past Surgical History:   has a past surgical history that includes Hysterectomy (); Spine surgery (); Abdomen surgery; fracture surgery; Appendectomy; Endoscopy, colon, diagnostic; vascular surgery; back surgery; Colonoscopy; and eye surgery. Medications:  No current facility-administered medications on file prior to encounter.       Current Outpatient Medications on File Prior to Encounter   Medication Sig Dispense Refill    hydrOXYzine (VISTARIL) 25 MG capsule Take 1 capsule by mouth 3 times daily as needed for Anxiety 30 capsule 0    rOPINIRole (REQUIP) 0.5 MG tablet Take 5 tablets by mouth 3 times daily 30 tablet 1    hydrALAZINE (APRESOLINE) 50 MG tablet Take 1 tablet by mouth every 8 hours 90 tablet 3    vitamin B-12 (CYANOCOBALAMIN) 1000 MCG tablet Take 1,000 mcg by mouth daily      magnesium hydroxide (MILK OF MAGNESIA) 400 MG/5ML suspension Take 30 mLs by mouth daily as needed for Constipation      albuterol sulfate  (90 Base) MCG/ACT inhaler Inhale 2 puffs into the lungs every 6 hours as needed for Shortness of Breath       bisacodyl (DULCOLAX) 10 MG suppository Place 10 mg rectally daily as needed for Constipation       vitamin D (CHOLECALCIFEROL) 1000 UNIT TABS tablet Take 1,000 Units by mouth daily      memantine (NAMENDA) 5 MG tablet Take 1 tablet by mouth 2 times daily 60 tablet 3    acetaminophen 650 MG TABS Take 650 mg by mouth every 4 hours as needed 120 tablet 3    dorzolamide (TRUSOPT) 2 % ophthalmic solution Place 1 drop into both eyes 3 times daily 10 mL 2    latanoprost (XALATAN) 0.005 % ophthalmic solution Place 1 drop into both eyes nightly 1 Bottle 3       Allergies: Allergies   Allergen Reactions    Other      Pt states allergy to unknown antibiotic that made her arm red         Social History:   reports that she has quit smoking. Her smoking use included cigarettes. She has a 12.50 pack-year smoking history. She has never used smokeless tobacco. She reports that she does not drink alcohol or use drugs. Family History:  family history includes Arthritis in her mother; Cancer in her brother, mother, and sister; Diabetes in her daughter and mother; Heart Disease in her mother; High Blood Pressure in her daughter and mother; Devota Evens / [place] in her mother. ,     Physical Exam:  BP (!) 155/66   Pulse 62   Temp 97.8 °F (36.6 °C) (Oral)   Resp 14   Ht 5' (1.524 m)   Wt 169 lb 14.4 oz (77.1 kg)   SpO2 95%   BMI 33.18 kg/m²     General appearance:  no distress .    Eyes: Sclera clear, pupils equal  Cardiovascular: Regular rhythm, normal S1, S2. No edema in lower extremities  Respiratory: Clear to auscultation bilaterally, scattered mild  wheeze,  good inspiratory effort  Gastrointestinal: Abdomen soft, non-tender, not distended, normal bowel sounds  Musculoskeletal: No cyanosis in digits, neck supple  Neurology: not cooperating with exam .  Psychiatry: .  Not agitated  Skin: Warm, dry, normal turgor, no rash    Labs:  CBC:   Lab Results   Component Value Date    WBC 5.5 11/11/2020    RBC 4.48 11/11/2020    HGB 12.8 11/11/2020    HCT 39.7 11/11/2020    MCV 88.6 11/11/2020    MCH 28.6 11/11/2020    MCHC 32.2 11/11/2020    RDW 14.6 11/11/2020     11/11/2020    MPV 9.9 11/11/2020     BMP:    Lab Results   Component Value Date     11/11/2020    K 4.4 11/11/2020     11/11/2020    CO2 25 11/11/2020    BUN 18 11/11/2020    CREATININE 0.9 11/11/2020    CALCIUM 9.2 11/11/2020    GFRAA >60 11/11/2020    LABGLOM 59 11/11/2020    GLUCOSE 84 11/11/2020       Chest Xray:   EKG:    I visualized CXR images and EKG strips      Patient Active Problem List   Diagnosis Code    Hypertension I10    Restless leg syndrome, uncontrolled G25.81    Blind left eye H54.40    Pneumonia due to infectious organism J18.9    COPD (chronic obstructive pulmonary disease) ([facility]) J44.9    Tobacco abuse Z72.0    Blurry vision, right eye H53.8    Risk for falls Z91.81    History of TIA (transient ischemic attack) Z86.73    Acute metabolic encephalopathy V91.54    Cellulitis of right lower extremity L03.115    Bradycardia by electrocardiogram R00.1    Acute encephalopathy G93.40    Sepsis ([facility]) A41.9    GERD (gastroesophageal reflux disease) K21.9    GI bleed K92.2    COPD exacerbation ([facility]) J44.1    Agitation R45.1    Psychosis ([facility]) F29    Dementia ([facility]) F03.90    Seizure-like activity ([facility]) R56.9    Chest pain R07.9         Active Hospital Problems    Diagnosis    Chest pain [R07.9]    Hypertension [I10]   Dementia   Recent acute Bronchitis   H/o CVA   H/o Tremor           Assessment/Plan:   Tele   cardiac markers, cardiac consult   Neuro consult , PT/OT consult   IVF  Home meds , reviewed and resumed as appropriate   Symptoms releif/Pain control  DVT proph           Morro Albert MD    11/12/2020 12:47 PM

## 2020-11-13 ENCOUNTER — HOSPITAL ENCOUNTER (INPATIENT)
Age: 85
LOS: 5 days | Discharge: HOME HEALTH | DRG: 071 | End: 2020-11-19
Attending: EMERGENCY MEDICINE | Admitting: INTERNAL MEDICINE
Payer: MEDICARE

## 2020-11-13 ENCOUNTER — APPOINTMENT (OUTPATIENT)
Dept: GENERAL RADIOLOGY | Age: 85
DRG: 071 | End: 2020-11-13
Payer: MEDICARE

## 2020-11-13 VITALS
TEMPERATURE: 98.6 F | WEIGHT: 169.9 LBS | RESPIRATION RATE: 16 BRPM | BODY MASS INDEX: 33.36 KG/M2 | SYSTOLIC BLOOD PRESSURE: 113 MMHG | OXYGEN SATURATION: 97 % | HEART RATE: 98 BPM | HEIGHT: 60 IN | DIASTOLIC BLOOD PRESSURE: 54 MMHG

## 2020-11-13 DIAGNOSIS — R41.82 ALTERED MENTAL STATUS, UNSPECIFIED ALTERED MENTAL STATUS TYPE: Primary | ICD-10-CM

## 2020-11-13 DIAGNOSIS — R55 NEAR SYNCOPE: ICD-10-CM

## 2020-11-13 DIAGNOSIS — I95.9 HYPOTENSION, UNSPECIFIED HYPOTENSION TYPE: ICD-10-CM

## 2020-11-13 LAB
APTT: 31.5 SECONDS (ref 25.1–37.1)
BASE EXCESS: 0 (ref 0–2.4)
BASOPHILS ABSOLUTE: 0 K/CU MM
BASOPHILS RELATIVE PERCENT: 0.5 % (ref 0–1)
COMMENT: ABNORMAL
CULTURE: NORMAL
DIFFERENTIAL TYPE: ABNORMAL
EOSINOPHILS ABSOLUTE: 0.1 K/CU MM
EOSINOPHILS RELATIVE PERCENT: 1.6 % (ref 0–3)
HCO3 VENOUS: 24.8 MMOL/L (ref 19–25)
HCT VFR BLD CALC: 46.4 % (ref 37–47)
HEMOGLOBIN: 15.1 GM/DL (ref 12.5–16)
IMMATURE NEUTROPHIL %: 0.5 % (ref 0–0.43)
INR BLD: 1 INDEX
LACTATE: 1.6 MMOL/L (ref 0.4–2)
LYMPHOCYTES ABSOLUTE: 1.5 K/CU MM
LYMPHOCYTES RELATIVE PERCENT: 20.3 % (ref 24–44)
Lab: NORMAL
MCH RBC QN AUTO: 28.4 PG (ref 27–31)
MCHC RBC AUTO-ENTMCNC: 32.5 % (ref 32–36)
MCV RBC AUTO: 87.2 FL (ref 78–100)
MONOCYTES ABSOLUTE: 0.6 K/CU MM
MONOCYTES RELATIVE PERCENT: 8.5 % (ref 0–4)
NUCLEATED RBC %: 0 %
O2 SAT, VEN: 76.6 % (ref 50–70)
PCO2, VEN: 41 MMHG (ref 38–52)
PDW BLD-RTO: 14.7 % (ref 11.7–14.9)
PH VENOUS: 7.39 (ref 7.32–7.42)
PLATELET # BLD: 294 K/CU MM (ref 140–440)
PMV BLD AUTO: 10.2 FL (ref 7.5–11.1)
PO2, VEN: 45 MMHG (ref 28–48)
PRO-BNP: 689.4 PG/ML
PROCALCITONIN: 0.09
PROTHROMBIN TIME: 12.1 SECONDS (ref 11.7–14.5)
RBC # BLD: 5.32 M/CU MM (ref 4.2–5.4)
SEGMENTED NEUTROPHILS ABSOLUTE COUNT: 5.2 K/CU MM
SEGMENTED NEUTROPHILS RELATIVE PERCENT: 68.6 % (ref 36–66)
SPECIMEN: NORMAL
T4 FREE: 1.67 NG/DL (ref 0.9–1.8)
TOTAL IMMATURE NEUTOROPHIL: 0.04 K/CU MM
TOTAL NUCLEATED RBC: 0 K/CU MM
TROPONIN T: <0.01 NG/ML
TSH HIGH SENSITIVITY: 3.79 UIU/ML (ref 0.27–4.2)
WBC # BLD: 7.6 K/CU MM (ref 4–10.5)

## 2020-11-13 PROCEDURE — G0378 HOSPITAL OBSERVATION PER HR: HCPCS

## 2020-11-13 PROCEDURE — 92610 EVALUATE SWALLOWING FUNCTION: CPT

## 2020-11-13 PROCEDURE — 83605 ASSAY OF LACTIC ACID: CPT

## 2020-11-13 PROCEDURE — 96366 THER/PROPH/DIAG IV INF ADDON: CPT

## 2020-11-13 PROCEDURE — 84439 ASSAY OF FREE THYROXINE: CPT

## 2020-11-13 PROCEDURE — 85025 COMPLETE CBC W/AUTO DIFF WBC: CPT

## 2020-11-13 PROCEDURE — 83690 ASSAY OF LIPASE: CPT

## 2020-11-13 PROCEDURE — 99285 EMERGENCY DEPT VISIT HI MDM: CPT

## 2020-11-13 PROCEDURE — 80053 COMPREHEN METABOLIC PANEL: CPT

## 2020-11-13 PROCEDURE — 6360000002 HC RX W HCPCS: Performed by: FAMILY MEDICINE

## 2020-11-13 PROCEDURE — 82140 ASSAY OF AMMONIA: CPT

## 2020-11-13 PROCEDURE — 36415 COLL VENOUS BLD VENIPUNCTURE: CPT

## 2020-11-13 PROCEDURE — 84443 ASSAY THYROID STIM HORMONE: CPT

## 2020-11-13 PROCEDURE — 6370000000 HC RX 637 (ALT 250 FOR IP): Performed by: FAMILY MEDICINE

## 2020-11-13 PROCEDURE — 6370000000 HC RX 637 (ALT 250 FOR IP): Performed by: INTERNAL MEDICINE

## 2020-11-13 PROCEDURE — 2580000003 HC RX 258: Performed by: EMERGENCY MEDICINE

## 2020-11-13 PROCEDURE — 2580000003 HC RX 258: Performed by: FAMILY MEDICINE

## 2020-11-13 PROCEDURE — 87040 BLOOD CULTURE FOR BACTERIA: CPT

## 2020-11-13 PROCEDURE — 84484 ASSAY OF TROPONIN QUANT: CPT

## 2020-11-13 PROCEDURE — 94761 N-INVAS EAR/PLS OXIMETRY MLT: CPT

## 2020-11-13 PROCEDURE — APPSS45 APP SPLIT SHARED TIME 31-45 MINUTES: Performed by: NURSE PRACTITIONER

## 2020-11-13 PROCEDURE — 85610 PROTHROMBIN TIME: CPT

## 2020-11-13 PROCEDURE — 96372 THER/PROPH/DIAG INJ SC/IM: CPT

## 2020-11-13 PROCEDURE — 71045 X-RAY EXAM CHEST 1 VIEW: CPT

## 2020-11-13 PROCEDURE — 99223 1ST HOSP IP/OBS HIGH 75: CPT | Performed by: PHYSICIAN ASSISTANT

## 2020-11-13 PROCEDURE — 99212 OFFICE O/P EST SF 10 MIN: CPT | Performed by: INTERNAL MEDICINE

## 2020-11-13 PROCEDURE — 85730 THROMBOPLASTIN TIME PARTIAL: CPT

## 2020-11-13 PROCEDURE — 84145 PROCALCITONIN (PCT): CPT

## 2020-11-13 PROCEDURE — 83880 ASSAY OF NATRIURETIC PEPTIDE: CPT

## 2020-11-13 PROCEDURE — 93005 ELECTROCARDIOGRAM TRACING: CPT | Performed by: EMERGENCY MEDICINE

## 2020-11-13 PROCEDURE — 82805 BLOOD GASES W/O2 SATURATION: CPT

## 2020-11-13 RX ORDER — DEXTROSE AND SODIUM CHLORIDE 5; .45 G/100ML; G/100ML
INJECTION, SOLUTION INTRAVENOUS CONTINUOUS
Status: DISCONTINUED | OUTPATIENT
Start: 2020-11-13 | End: 2020-11-13 | Stop reason: HOSPADM

## 2020-11-13 RX ORDER — AZITHROMYCIN 250 MG/1
250 TABLET, FILM COATED ORAL DAILY
Status: DISCONTINUED | OUTPATIENT
Start: 2020-11-14 | End: 2020-11-13 | Stop reason: HOSPADM

## 2020-11-13 RX ORDER — NITROGLYCERIN 0.4 MG/1
TABLET SUBLINGUAL
Qty: 25 TABLET | Refills: 3 | Status: ON HOLD | OUTPATIENT
Start: 2020-11-13 | End: 2020-11-19 | Stop reason: HOSPADM

## 2020-11-13 RX ORDER — ROPINIROLE 0.25 MG/1
0.5 TABLET, FILM COATED ORAL 3 TIMES DAILY
Status: DISCONTINUED | OUTPATIENT
Start: 2020-11-14 | End: 2020-11-14

## 2020-11-13 RX ORDER — 0.9 % SODIUM CHLORIDE 0.9 %
30 INTRAVENOUS SOLUTION INTRAVENOUS ONCE
Status: COMPLETED | OUTPATIENT
Start: 2020-11-13 | End: 2020-11-14

## 2020-11-13 RX ORDER — ATORVASTATIN CALCIUM 40 MG/1
40 TABLET, FILM COATED ORAL NIGHTLY
Qty: 30 TABLET | Refills: 3 | Status: SHIPPED | OUTPATIENT
Start: 2020-11-13

## 2020-11-13 RX ORDER — ACETAMINOPHEN 325 MG/1
650 TABLET ORAL EVERY 4 HOURS PRN
Status: DISCONTINUED | OUTPATIENT
Start: 2020-11-13 | End: 2020-11-13 | Stop reason: HOSPADM

## 2020-11-13 RX ORDER — AZITHROMYCIN 250 MG/1
500 TABLET, FILM COATED ORAL DAILY
Status: COMPLETED | OUTPATIENT
Start: 2020-11-13 | End: 2020-11-13

## 2020-11-13 RX ORDER — ASPIRIN 81 MG/1
81 TABLET, CHEWABLE ORAL DAILY
Qty: 30 TABLET | Refills: 3 | Status: SHIPPED | OUTPATIENT
Start: 2020-11-14

## 2020-11-13 RX ORDER — CODEINE PHOSPHATE AND GUAIFENESIN 10; 100 MG/5ML; MG/5ML
5 SOLUTION ORAL EVERY 6 HOURS PRN
Status: DISCONTINUED | OUTPATIENT
Start: 2020-11-13 | End: 2020-11-13 | Stop reason: HOSPADM

## 2020-11-13 RX ADMIN — MEMANTINE HYDROCHLORIDE 5 MG: 5 TABLET ORAL at 20:45

## 2020-11-13 RX ADMIN — MEMANTINE HYDROCHLORIDE 5 MG: 5 TABLET ORAL at 10:57

## 2020-11-13 RX ADMIN — ROPINIROLE HYDROCHLORIDE 0.5 MG: 0.25 TABLET, FILM COATED ORAL at 15:52

## 2020-11-13 RX ADMIN — Medication 1000 UNITS: at 10:58

## 2020-11-13 RX ADMIN — ENOXAPARIN SODIUM 30 MG: 30 INJECTION SUBCUTANEOUS at 10:57

## 2020-11-13 RX ADMIN — CYANOCOBALAMIN TAB 1000 MCG 1000 MCG: 1000 TAB at 10:58

## 2020-11-13 RX ADMIN — ATORVASTATIN CALCIUM 40 MG: 40 TABLET, FILM COATED ORAL at 20:43

## 2020-11-13 RX ADMIN — AZITHROMYCIN MONOHYDRATE 500 MG: 500 INJECTION, POWDER, LYOPHILIZED, FOR SOLUTION INTRAVENOUS at 02:10

## 2020-11-13 RX ADMIN — DORZOLAMIDE HYDROCHLORIDE 1 DROP: 20 SOLUTION/ DROPS OPHTHALMIC at 20:50

## 2020-11-13 RX ADMIN — AZITHROMYCIN MONOHYDRATE 500 MG: 250 TABLET ORAL at 20:42

## 2020-11-13 RX ADMIN — LATANOPROST 1 DROP: 50 SOLUTION/ DROPS OPHTHALMIC at 20:44

## 2020-11-13 RX ADMIN — DORZOLAMIDE HYDROCHLORIDE 1 DROP: 20 SOLUTION/ DROPS OPHTHALMIC at 11:24

## 2020-11-13 RX ADMIN — HYDRALAZINE HYDROCHLORIDE 50 MG: 50 TABLET, FILM COATED ORAL at 15:52

## 2020-11-13 RX ADMIN — ROPINIROLE HYDROCHLORIDE 0.5 MG: 0.25 TABLET, FILM COATED ORAL at 10:58

## 2020-11-13 RX ADMIN — DEXTROSE AND SODIUM CHLORIDE: 5; 450 INJECTION, SOLUTION INTRAVENOUS at 10:59

## 2020-11-13 RX ADMIN — ROPINIROLE HYDROCHLORIDE 0.5 MG: 0.25 TABLET, FILM COATED ORAL at 20:43

## 2020-11-13 RX ADMIN — ASPIRIN 81 MG: 81 TABLET, CHEWABLE ORAL at 10:58

## 2020-11-13 RX ADMIN — SODIUM CHLORIDE 1000 ML: 0.9 INJECTION, SOLUTION INTRAVENOUS at 23:42

## 2020-11-13 RX ADMIN — DORZOLAMIDE HYDROCHLORIDE 1 DROP: 20 SOLUTION/ DROPS OPHTHALMIC at 15:53

## 2020-11-13 ASSESSMENT — PAIN SCALES - PAIN ASSESSMENT IN ADVANCED DEMENTIA (PAINAD)
FACIALEXPRESSION: 0
BREATHING: 0
BREATHING: 0
TOTALSCORE: 0
FACIALEXPRESSION: 0
BODYLANGUAGE: 0
FACIALEXPRESSION: 0
FACIALEXPRESSION: 0
NEGVOCALIZATION: 0
BREATHING: 0
NEGVOCALIZATION: 0
FACIALEXPRESSION: 0
TOTALSCORE: 0
CONSOLABILITY: 0
CONSOLABILITY: 0
TOTALSCORE: 0
FACIALEXPRESSION: 0
TOTALSCORE: 0
FACIALEXPRESSION: 0
NEGVOCALIZATION: 0
CONSOLABILITY: 0
NEGVOCALIZATION: 0
NEGVOCALIZATION: 0
BREATHING: 0
FACIALEXPRESSION: 0
CONSOLABILITY: 0
NEGVOCALIZATION: 0
TOTALSCORE: 0
CONSOLABILITY: 0
NEGVOCALIZATION: 0
TOTALSCORE: 0
CONSOLABILITY: 0
BODYLANGUAGE: 0
BREATHING: 0
BREATHING: 0
TOTALSCORE: 0
BREATHING: 0
FACIALEXPRESSION: 0
CONSOLABILITY: 0
BODYLANGUAGE: 0
CONSOLABILITY: 0
NEGVOCALIZATION: 0
CONSOLABILITY: 0
BREATHING: 0
FACIALEXPRESSION: 0
BODYLANGUAGE: 0
NEGVOCALIZATION: 0
BODYLANGUAGE: 0
CONSOLABILITY: 0
FACIALEXPRESSION: 0
FACIALEXPRESSION: 0
BODYLANGUAGE: 0
FACIALEXPRESSION: 0
NEGVOCALIZATION: 0
FACIALEXPRESSION: 0
BREATHING: 0
TOTALSCORE: 0
TOTALSCORE: 0
CONSOLABILITY: 0
TOTALSCORE: 0
BODYLANGUAGE: 0
CONSOLABILITY: 0
CONSOLABILITY: 0
NEGVOCALIZATION: 0
BREATHING: 0
CONSOLABILITY: 0
BODYLANGUAGE: 0
BODYLANGUAGE: 0
BREATHING: 0
BODYLANGUAGE: 0
NEGVOCALIZATION: 0
BREATHING: 0
TOTALSCORE: 0
NEGVOCALIZATION: 0
BODYLANGUAGE: 0
NEGVOCALIZATION: 0
TOTALSCORE: 0
CONSOLABILITY: 0
TOTALSCORE: 0
BODYLANGUAGE: 0
BREATHING: 0
BODYLANGUAGE: 0
NEGVOCALIZATION: 0
FACIALEXPRESSION: 0

## 2020-11-13 ASSESSMENT — PAIN SCALES - GENERAL
PAINLEVEL_OUTOF10: 0

## 2020-11-13 ASSESSMENT — PAIN SCALES - WONG BAKER

## 2020-11-13 NOTE — PROGRESS NOTES
Speech Language Pathology  Facility/Department: [address] [place] [address]   CLINICAL BEDSIDE SWALLOW EVALUATION    NAME: Carrie Knott  : 1931  MRN: [id number]    ADMISSION DATE: 2020  ADMITTING DIAGNOSIS: has Hypertension; Restless leg syndrome, uncontrolled; Blind left eye; Pneumonia due to infectious organism; COPD (chronic obstructive pulmonary disease) ([address] Tobacco abuse; Blurry vision, right eye; Risk for falls; History of TIA (transient ischemic attack); Acute metabolic encephalopathy; Cellulitis of right lower extremity; Bradycardia by electrocardiogram; Acute encephalopathy; Sepsis ([address] GERD (gastroesophageal reflux disease); GI bleed; COPD exacerbation ([address] Agitation; Psychosis ([address] Dementia ([address] Seizure-like activity ([address] and Chest pain on their problem list.    Impressions:  Carrie Knott was seen for a bedside swallowing evaluation after being admitted to [facility] with chest pain and AMS. Pt was alert and pleasantly confused throughout assessment. Relevant medical hx includes COPD, GERD, and dementia. Pt denies hx of dysphagia PTA. Pt was positioned upright in bed and presented with a clear vocal quality and strong volitional cough. She was unable to follow commands to complete oral mechanism examination at this time. Mild oral dysphagia was observed characterized by prolonged mastication and minimal lingual residue with trials of soft/regular solids. Pharyngeal swallow appeared intact characterized by timely swallow initiation and adequate laryngeal elevation. Clear vocal quality and 0 overt s/s of aspiration were observed with all PO trials given. Recommend dysphagia 2 diet/thin liquids with strict aspiration precautions. Pt requires assistance with meals. ST will continue to follow Carrie Knott for diet tolerance monitoring.       ONSET DATE: this admission     Date of Eval: 2020  Evaluating Therapist: Tildon Angelucci    Current Diet level:  Current Diet : NPO  Current Liquid Diet : NPO      Primary Complaint  Patient Complaint: weakness/ confusion    Pain:  Pain Assessment  Pain Assessment: 0-10  Pain Level: 0  Patient's Stated Pain Goal: No pain  Pain Type: Acute pain  Pain Location: Chest  Pain Orientation: Left  Pain Descriptors: Pressure  Pain Frequency: Continuous  Pain Onset: Gradual  Clinical Progression: Gradually worsening    Reason for Referral  Chrystal Tao was referred for a bedside swallow evaluation to assess the efficiency of her swallow function, identify signs and symptoms of aspiration and make recommendations regarding safe dietary consistencies, effective compensatory strategies, and safe eating environment. Impression  Dysphagia Diagnosis: Mild to moderate oral stage dysphagia  Dysphagia Outcome Severity Scale: Level 5: Mild dysphagia- Distant supervision. May need one diet consistency restricted     Treatment Plan  Requires SLP Intervention: Yes  Duration/Frequency of Treatment: 1-2xs weekly/ LOS  or until goals are met  D/C Recommendations: To be determined       Recommended Diet and Intervention  Diet Solids Recommendation: Dysphagia Minced and Moist (Dysphagia II)  Liquid Consistency Recommendation: Thin  Recommended Form of Meds: PO  Recommendations: Dysphagia treatment  Therapeutic Interventions: Diet tolerance monitoring    Compensatory Swallowing Strategies  Compensatory Swallowing Strategies: Small bites/sips;Assist feed;Eat/Feed slowly;Upright as possible for all oral intake    Treatment/Goals  Short-term Goals  Timeframe for Short-term Goals: LOS or until goals are met  Goal 1: Pt will tolerate dysphagia 2 diet/thin liquids without clinical evidence of aspiration 100%  Goal 2: Pt/caregivers will demonstrate comprehension of recommendations/POC    General  Chart Reviewed: Yes  Behavior/Cognition: Alert; Cooperative;Pleasant mood  Respiratory Status: O2 via nasual cannula  O2 Device: Nasal cannula  Communication Observation: Functional  Follows Directions: Complex  Dentition: Adequate; Some missing teeth  Patient Positioning: Upright in bed  Baseline Vocal Quality: Normal  Volitional Cough: Absent  Prior Dysphagia History: Pt denies hx of dysphagia  Consistencies Administered: Reg solid; Dysphagia Minced and Moist (Dysphagia II); Dysphagia Pureed (Dysphagia I); Thin - teaspoon; Thin - straw           Vision/Hearing  Vision  Vision: Impaired  Hearing  Hearing: Within functional limits    Oral Motor Deficits  Oral/Motor  Oral Motor: (ANGI)    Oral Phase Dysfunction  Oral Phase  Oral Phase: Exceptions  Oral Phase Dysfunction  Impaired Mastication: Soft Solid  Lingual/Palatal Residue: Soft solid     Indicators of Pharyngeal Phase Dysfunction   Pharyngeal Phase  Pharyngeal Phase: WFL    Prognosis  Prognosis  Prognosis for safe diet advancement: good  Individuals consulted  Consulted and agree with results and recommendations: Patient;RN    Education  Patient Education: recommendations/POC  Patient Education Response: Verbalizes understanding  Safety Devices in place: Yes  Type of devices:  All fall risk precautions in place       Therapy Time  SLP Individual Minutes  Time In: 0920  Time Out: 9490  Minutes: Indigo Escobar 87, CCC-SLP, 11/13/2020

## 2020-11-13 NOTE — DISCHARGE SUMMARY
Patient: Elsie Daniel, APRN - CNP      Gender: female  : 1931   Age: 80 y.o. MRN: [id number]    Admitting Physician: Tamara Olmedo MD  Discharge Physician: Tamara Olmedo MD     Code Status: Full Code     Admit Date: 2020   Discharge Date: 20      Disposition:  Home       Condition at Discharge:  stable . Follow-up appointments:  f/u one week with PCP , and with consultants as recommended . Outpatient to do list: f/u       Discharge Diagnoses: Active Hospital Problems    Diagnosis    AMS (altered mental status) [R41.82]    Chest pain [R07.9]    Hypertension [I10]     Dementia   Recent acute Bronchitis   H/o CVA   H/o Tremor     History of Present Illness:  Karime Batista is a 80 y.o. blinde  female with dementia , H/O CVA, TIA , RLS brought to  ER for worsening cough , abdominal pain, chest pain . pt was seen in  and ABX initiated for acute bronchitis . Pt is poor historian and she does not has idea why she is in hospital and asking to go home . Blood work non specific and CXR non acute and Head CT non acute . Troponin negative with no acute change in EKG   History obtained from chart/staff /family      Assessment/Plan:   Tele   cardiac markers : neg , cardiac consult . Franco Hernandez no further work up   Neuro consult , PT/OT consult   IVF. Franco Hernandez Home meds , reviewed and resumed as appropriate   Symptoms releif/Pain control  DVT proph             Hospital Course:         Consults. IP CONSULT TO HOSPITALIST  IP CONSULT TO CARDIOLOGY  IP CONSULT TO NEUROLOGY        Discharge Medications:   Current Discharge Medication List      START taking these medications    Details   aspirin 81 MG chewable tablet Take 1 tablet by mouth daily  Qty: 30 tablet, Refills: 3      nitroGLYCERIN (NITROSTAT) 0.4 MG SL tablet up to max of 3 total doses. If no relief after 1 dose, call 911.   Qty: 25 tablet, Refills: 3      atorvastatin (LIPITOR) 40 MG tablet Take 1 tablet by mouth nightly  Qty: 30 tablet, Refills: 3           Current Discharge Medication List        Current Discharge Medication List      CONTINUE these medications which have NOT CHANGED    Details   hydrOXYzine (VISTARIL) 25 MG capsule Take 1 capsule by mouth 3 times daily as needed for Anxiety  Qty: 30 capsule, Refills: 0      rOPINIRole (REQUIP) 0.5 MG tablet Take 5 tablets by mouth 3 times daily  Qty: 30 tablet, Refills: 1      hydrALAZINE (APRESOLINE) 50 MG tablet Take 1 tablet by mouth every 8 hours  Qty: 90 tablet, Refills: 3      vitamin B-12 (CYANOCOBALAMIN) 1000 MCG tablet Take 1,000 mcg by mouth daily      magnesium hydroxide (MILK OF MAGNESIA) 400 MG/5ML suspension Take 30 mLs by mouth daily as needed for Constipation      albuterol sulfate  (90 Base) MCG/ACT inhaler Inhale 2 puffs into the lungs every 6 hours as needed for Shortness of Breath       bisacodyl (DULCOLAX) 10 MG suppository Place 10 mg rectally daily as needed for Constipation       vitamin D (CHOLECALCIFEROL) 1000 UNIT TABS tablet Take 1,000 Units by mouth daily      memantine (NAMENDA) 5 MG tablet Take 1 tablet by mouth 2 times daily  Qty: 60 tablet, Refills: 3      acetaminophen 650 MG TABS Take 650 mg by mouth every 4 hours as needed  Qty: 120 tablet, Refills: 3      dorzolamide (TRUSOPT) 2 % ophthalmic solution Place 1 drop into both eyes 3 times daily  Qty: 10 mL, Refills: 2      latanoprost (XALATAN) 0.005 % ophthalmic solution Place 1 drop into both eyes nightly  Qty: 1 Bottle, Refills: 3           Current Discharge Medication List          Discharge ROS:   review of systems is limited due to dementia . Discharge Exam:    BP (!) 141/60   Pulse 76   Temp 98.2 °F (36.8 °C) (Oral)   Resp 18   Ht 5' (1.524 m)   Wt 169 lb 14.4 oz (77.1 kg)   SpO2 95%   BMI 33.18 kg/m²   General appearance:  NAD  Heart[de-identified] Normal s1/s2, RRR, no murmurs, gallops, or rubs.  No leg edema  Lungs:  Clear to auscultation, bilaterally without Rales/Wheezes/Rhonchi. Abdomen: Soft, non-tender, non-distended, bowel sounds present  Musculoskeletal:   no cyanosis, no edema  Neurologic:  Cranial nerves: II-XII intact, grossly non-focal.  Psychiatric:  A & O x3      Labs: For convenience and continuity at follow-up the following most recent labs are provided:    Lab Results   Component Value Date    WBC 5.5 11/11/2020    HGB 12.8 11/11/2020    HCT 39.7 11/11/2020    MCV 88.6 11/11/2020     11/11/2020     11/11/2020    K 4.4 11/11/2020     11/11/2020    CO2 25 11/11/2020    BUN 18 11/11/2020    CREATININE 0.9 11/11/2020    CALCIUM 9.2 11/11/2020    PHOS 3.0 03/03/2020    BNP 22 06/05/2013    ALKPHOS 86 11/11/2020    ALT 17 11/11/2020    AST 26 11/11/2020    BILITOT 0.5 11/11/2020    BILIDIR 0.2 06/02/2020    LABALBU 3.5 11/11/2020    TRIG 105 11/12/2020     Lab Results   Component Value Date    INR 0.98 10/17/2018    INR 1.03 08/24/2018    INR 1.03 06/23/2016           Chart review shows recent radiographs:  Xr Ankle Left (min 3 Views)    Result Date: 10/20/2020  EXAMINATION: THREE XRAY VIEWS OF THE LEFT ANKLE 10/17/2020 12:54 pm COMPARISON: None. HISTORY: ORDERING SYSTEM PROVIDED HISTORY: states pain and previous fall, complaining of pain in this ankle to family. hx dementia TECHNOLOGIST PROVIDED HISTORY: Reason for exam:->states pain and previous fall, complaining of pain in this ankle to family. hx dementia FINDINGS: Generalized osteopenia limits evaluation for nondisplaced fractures. Within this limitation, no acute displaced fracture is identified. The ankle mortise is congruent. Plantar and retrocalcaneal enthesophytes are seen. Linear sclerosis in the distal tibia and fibula are favored to represent physeal scars. No acute bony abnormality identified, within the limitations of diffuse osteopenia.      Ct Head Wo Contrast    Result Date: 11/11/2020  EXAMINATION: CT OF THE HEAD WITHOUT CONTRAST  11/11/2020 6:47 pm TECHNIQUE: CT of cisterns and foramen magnum. No hydrocephalus. Age-appropriate mild diffuse atrophy. Mild deep and periventricular white matter hypodensities likely due to chronic small vessel ischemia. Old lacunar infarcts in the bilateral basal ganglia. ORBITS:  Right lens implant. No acute abnormality. SINUSES:  Visualized portions appear normally pneumatized and aerated. SOFT TISSUES/SKULL:  No acute soft tissue abnormality. Mild atherosclerotic calcifications. No acute fracture. No acute intracranial abnormality. Xr Chest Portable    Result Date: 11/11/2020  EXAMINATION: ONE XRAY VIEW OF THE CHEST 11/11/2020 4:18 pm COMPARISON: 10/17/2020 HISTORY: ORDERING SYSTEM PROVIDED HISTORY: chest pain TECHNOLOGIST PROVIDED HISTORY: Reason for exam:->chest pain Reason for Exam: chest pain Acuity: Acute Type of Exam: Initial FINDINGS: The lungs are without acute focal process. Bibasilar hypoaeration. There is no effusion or pneumothorax. The cardiomediastinal silhouette is stable. The osseous structures are stable. No acute process. Bibasilar hypoaeration     Xr Chest Portable    Result Date: 10/17/2020  EXAMINATION: ONE XRAY VIEW OF THE CHEST 10/17/2020 10:34 am COMPARISON: Chest radiograph 08/27/2019 HISTORY: ORDERING SYSTEM PROVIDED HISTORY: confusion TECHNOLOGIST PROVIDED HISTORY: Reason for exam:->confusion Reason for Exam: confusion Acuity: Acute Type of Exam: Initial FINDINGS: Left basilar airspace opacity, predominantly linear in appearance. Diffuse interstitial prominence with indistinct pulmonary vasculature and possible interlobular septal thickening. Questionable trace left pleural effusion. No definite findings of pneumothorax or right pleural effusion. Normal mediastinal and cardiac contours. Mildly prominent hilar contours. Atherosclerotic calcification in the aorta. No acute fracture.      1. Left basilar airspace opacity is most likely due to atelectasis, although pneumonia and aspiration could appear similar. 2. Questionable trace left pleural effusion. 3. Interstitial prominence most likely due to chronic changes or pulmonary vascular congestion. Superimposed interstitial edema could be present. Nm Myocardial Spect Rest Exercise Or Rx    Result Date: 11/12/2020  Cardiac Perfusion Imaging   Demographics   Patient Name      Teresa Boudreaux I  Date of study        11/12/2020   Date of Birth     01/30/1931         Gender               Female   Age               80 year(s)         Race                    Patient Number    [id number]         Room Number          7876   Visit Number      [id number]          Height               60 inches   Corporate ID      [id number]           Weight               169 pounds   Accession Number  [id number]                                        NM Technologist      Henran Cardenas, [address] [place] [address], Aden Fleming  Physician         MD                 Cardiologist         MD   Conclusions   Summary  ECG portion of stress test is negative for ischemia by diagnostic criteria. Normal EF 71 % with normal ventricular contractility. No infarct or ischemia  noted. Normal stress myocardial perfusion. This is a normal study. Signatures   ------------------------------------------------------------------  Electronically signed by Justice White MD (Interpreting  cardiologist) on 11/12/2020 at 14:39  ------------------------------------------------------------------  Procedure Procedure Type:   Nuclear Stress Test:Pharmacological, Myocardial Perfusion Imaging with  Pharm, NM MYOCARDIAL SPECT REST EXERCISE OR RX  Indications: Chest pain. Risk Factors   The patient risk factors include:obesity, Current - Frequency unknown  tobacco use, hypercholesterolemia, hypertension and chronic lung disease. Stress Protocols   Resting ECG  Normal sinus rhythm.    Resting HR:63 bpm  Resting BP:144/62 mmHg  Stress Protocol:Pharmacologic - Lexiscan  Peak HR:82 bpm                               HR/BP product:51204  Peak BP:144/62 mmHg  Predicted HR: 131 bpm  % of predicted HR: 63   Exercise duration: 01:01 min  Reason for termination:Completed   ECG Findings  Normal sinus rhythm. Symptoms  No symptoms with Lexiscan infusion. Stress Interpretation  ECG portion of stress test is negative for ischemia by diagnostic criteria. Procedure Medications   - Lexiscan I.V. bolus (over 15sec.) 0.4 mg admininstered @ 11/12/2020 10:25. Imaging Protocols   Rest                             Stress   Isotope:Sestamibi 99mTc          Isotope: Sestamibi 99mTc  Isotope dose:9.1 mCi             Isotope dose:31.2 mCi  Administration route: I.V. Administration route: I.V. Injection Date:11/12/2020 09:00  Injection Date:11/12/2020 10:25  Scan Date:11/12/2020 09:45       Scan Date:11/12/2020 11:10   Technique:        SPECT          Technique:        SPECT   Procedure Description   Upon patient arrival, the patient is identified using two identifiers and  the physician order is verified. An IV is established and 8-11mCi of 99mTc  Sestamibi is intravenously injected and followed with 10mL 0.9% Normal  Saline flush. A circulation period of 45 minutes occurs prior to resting  SPECT imaging. After imaging is complete the patient is escorted to the  stress lab. The patient is connected to the ECG and blood pressure is  measured. The RN starts the stress portion of the exam and rapidly  intravenously injects Lexiscan (regadenosine) 0.4mg over a period of 10 to15  seconds and follows with 5mL 0.9% Normal Saline flush. Immediately following  the Nuclear Technologist intravenously injects 22-33mCi of 99mTc Sestamibi  and 5mL 0.9% Normal Saline flush. After completion, recovery, and removal of  the IV, the patient rests during the second circulation period of 45  minutes. Final stress SPECT gated imaging is performed.  The patient may  return home or to their room after stress imaging. The images are processed  and final charting is completed and sent to the appropriate cardiologist for  interpretation and reporting. Perfusion Interpretation   Normal EF 71 % with normal ventricular contractility. No infarct or ischemia  noted. Imaging Results    Summed scores     - Summed stress score: 3     - Summed rest score: 0     - Summed difference score:    3   Rest ejection  Ejection fraction:71 %  EDV :63 ml  ESV :18 ml  Stroke volume :45 ml  Medical History   Accession#:  [id number]  Admission Data Admission date: 11/11/2020 Admission Time: 15:37 Hospital Status: Inpatient. EKG     Rhythm: normal sinus   Rate: normal  Clinical Impression: no acute changes        The patient was seen and examined on day of discharge and this discharge summary is in conjunction with any daily progress note from day of discharge. Time Spent on discharge is   >35  min  in the examination, evaluation, counseling and review of medications and discharge plan.             Luis Ordoñez MD   11/13/2020

## 2020-11-13 NOTE — PROGRESS NOTES
Pt very agitated, restless and combative this evening. Pt was abusive towards this nurse and nursing assistant. She was attempting to get out of bed, removing telemetry wires, hitting and kicking staff. She was very disoriented, unaware of location or situation. This nurse PerfectServed Dr. Tobi Tamez for orders. Dr. Tobi Tamez ordered Ativan 1mg q3h PRN and 2 point restraints. Patient still confused. Restraints have helped with the combativeness. Intermittent sleeping. Will continue to monitor.

## 2020-11-13 NOTE — CARE COORDINATION
CM called Keshav Wilfred with [employer] and left message for her to call CM regarding discharge planning.  CM will follow [address]

## 2020-11-13 NOTE — CONSULTS
Neurology Service Consult Note  [facility]  Patient Name: Sarita Lopez  : 1931        Subjective:   Reason for consult: Altered mental status  80 y.o.  female presenting to [facility], two days prior to exam, for chest pain and cough with further concern of altered mental status. Per chart review, patient's granddaughter said she has been on amoxicillin since Saturday for bronchitis infection (ED notes indicate pneumonia) and has been more fatigued. Per nursing reports patient was restless and combative, hitting/kicking staff. Dr. Moon Welch ordered Ativan 1 mg q 3 hr PRN and 2 point restraints. They reported patient with continued confusion but has helped with combativeness. On my exam, patient is talking out loud with no one around prior to my entering the room. She is completely disoriented, telling me it's , she's at her [address] She is not able to provide any history. Cardiology consulted for chest pain, obtained echo and stress test (normal)  in addition to serial cardiac enzymes which are negative. Cardiology stand point, patient is stable for discharge. Patient has history of CVA, HTN, is blind in left eye, COPD, and dementia. She is on namenda for cognitive issues prior to admission, she does not appear to be on any antiplatelet/anticoagulation. Patient follows with Dr. Deidre Ott outpatient for tremors, last seen in August. She has scheduled follow up in December with NP. No family at bedside. Per care management note patient's granddaughter is her primary caregiver. When she is working her mother is home to observe patient.        Past Medical History:   Diagnosis Date    Arthritis     Blind left eye     Cancer ([facility]     colon    COPD (chronic obstructive pulmonary disease) (HCC)     Dementia (HCC)     Depression     GERD (gastroesophageal reflux disease)     Hypertension     Pneumonia     Restless legs syndrome     Tremors of nervous system     Unspecified cerebral artery occlusion with cerebral infarction     :   Past Surgical History:   Procedure Laterality Date    ABDOMEN SURGERY      APPENDECTOMY      BACK SURGERY      COLONOSCOPY      ENDOSCOPY, COLON, DIAGNOSTIC      EYE SURGERY      FRACTURE SURGERY      HYSTERECTOMY  1972    SPINE SURGERY  1995    VASCULAR SURGERY       Medications:  Scheduled Meds:   dorzolamide  1 drop Both Eyes TID    latanoprost  1 drop Both Eyes Nightly    memantine  5 mg Oral BID    vitamin D  1,000 Units Oral Daily    vitamin B-12  1,000 mcg Oral Daily    hydrALAZINE  50 mg Oral 3 times per day    rOPINIRole  0.5 mg Oral TID    atorvastatin  40 mg Oral Nightly    enoxaparin  30 mg Subcutaneous Daily    aspirin  81 mg Oral Daily    azithromycin  500 mg Intravenous Q24H     Continuous Infusions:  PRN Meds:.bisacodyl, magnesium hydroxide, ipratropium-albuterol, hydrOXYzine, promethazine **OR** ondansetron, nitroGLYCERIN, potassium chloride, magnesium sulfate, LORazepam    Allergies   Allergen Reactions    Other      Pt states allergy to unknown antibiotic that made her arm red      Social History     Socioeconomic History    Marital status:       Spouse name: Not on file    Number of children: 9    Years of education: Not on file    Highest education level: Not on file   Occupational History    Not on file   Social Needs    Financial resource strain: Not on file    Food insecurity     Worry: Not on file     Inability: Not on file    Transportation needs     Medical: Not on file     Non-medical: Not on file   Tobacco Use    Smoking status: Former Smoker     Packs/day: 0.25     Years: 50.00     Pack years: 12.50     Types: Cigarettes    Smokeless tobacco: Never Used    Tobacco comment: states smokes 1-2 cigs a day   Substance and Sexual Activity    Alcohol use: No     Alcohol/week: 0.0 standard drinks    Drug use: No    Sexual activity: Never   Lifestyle    Physical activity Days per week: Not on file     Minutes per session: Not on file    Stress: Not on file   Relationships    Social connections     Talks on phone: Not on file     Gets together: Not on file     Attends Voodoo service: Not on file     Active member of club or organization: Not on file     Attends meetings of clubs or organizations: Not on file     Relationship status: Not on file    Intimate partner violence     Fear of current or ex partner: Not on file     Emotionally abused: Not on file     Physically abused: Not on file     Forced sexual activity: Not on file   Other Topics Concern    Not on file   Social History Narrative    Not on file      Family History   Problem Relation Age of Onset    Arthritis Mother     Cancer Mother     Diabetes Mother     Heart Disease Mother     High Blood Pressure Mother    Kris Ee / Tomer Varghesey Mother     Cancer Sister     Cancer Brother     Diabetes Daughter     High Blood Pressure Daughter        Review of Symptoms:    Unable to perform ROS due to patient status. Physical Exam:        Gen: Alert but not oriented to time, location, situation, NAD, cooperative  HEENT: NC/AT, EOMI, PERRL, mmmneck supple, no meningeal signs  Heart: RRR, S1S2  Lungs: no respiratory distress  Ext: no edema, no calf tenderness b/l  Psych: confused  Skin: no rashes or lesions    NEUROLOGIC EXAM:    Mental Status: Alert; not oriented to time, location, month and year, NAD, speech clear, language fluent,  follows most commands appropriately    Cranial Nerve Exam:   CN II-XII: Patient blind in Left eye per notes, no nystagmus, no gaze paresis, sensation V1-V3 intact b/l, muscles of facial expression symmetric; hearing intact to conversational tone, palate elevates symmetrically, shoulder elevation symmetric and tongue protrudes midline with movement side to side.     Motor Exam:       Moving all extremities anti gravity  Tone and bulk normal   No pronator drift    Deep Tendon Reflexes: 2/4 biceps, triceps, brachioradialis, patellar, and achilles b/l; flexor plantar responses b/l    Sensation: Withdraw to tactile stimulus in UE's/LE's b/l    Coordination/Cerebellum:       Tremors--none  Unable to assess due to restraints      Rapidly alternating movements: no dysdiadochokinesia b/l                Finger-to-Nose: no dysmetria b/l    Gait and stance:      Gait: deferred      LABS:     Recent Labs     11/11/20  1640 11/11/20  1726   WBC  --  5.5     --    K 4.4  --      --    CO2 25  --    BUN 18  --    CREATININE 0.9  --    GLUCOSE 84  --      LDL: 117  IMAGING:    CT Head  No acute intracranial abnormality. MRI brain wo 05/26/20    MRA brain wo 05/26/20  No high-grade stenosis or focal occlusion involving intracranial vasculature. MRI brain w contrast 06/02/20  There are no areas of abnormal enhancement in the brain parenchyma. EEG 06/02/20   Normal asleep EEG. Patient remained in N2 sleep the entire recording. ASSESSMENT/PLAN:   79 yo female with history of CVA, COPD, dementia presents with chest pain. Neurology consulted for altered mental status likely secondary to acute encephalopathy vs. delirium superimposed in underlying dementia. CT Head non acute. She has had imaging including MRI/MRA w/wo and EEG in previous work ups, do not feel any further imaging warranted at this time. Physical exam is non focal/lateralizing. Plan of care as follows:     1. Altered mental status likely secondary to acute encephalopathy vs. Delirium superimposed on underlying dementia and sedation. 1. CT Head: see above  2. Prior imaging completed on prior admissions   1. MRI w contrast 06/02/20  2. MRI/MRA wo 05/26/20  3. EEG 06/02/20  3. Continue secondary stroke prevention: ASA and Lipitor 40 mg daily  4. PT/OT per their recommendations   5. If patient remains admitted tomorrow, will see her but no further inpatient work up from neurology perspective  6.  She has scheduled outpatient follow up with our group 12/16/20       Patient care discussed with attending physician, Dr. Ana Liang. Thank you for allowing us to participate in the care of your patient. If there are any questions regarding evaluation please feel free to contact us.      Elizabeth Ramirez, [address], 11/13/2020

## 2020-11-13 NOTE — PLAN OF CARE
Problem: Falls - Risk of:  Goal: Will remain free from falls  Description: Will remain free from falls  Outcome: Ongoing  Goal: Absence of physical injury  Description: Absence of physical injury  Outcome: Ongoing     Problem: Skin Integrity:  Goal: Will show no infection signs and symptoms  Description: Will show no infection signs and symptoms  Outcome: Ongoing  Goal: Absence of new skin breakdown  Description: Absence of new skin breakdown  Outcome: Ongoing     Problem: Restraint Use - Nonviolent/Non-Self-Destructive Behavior:  Goal: Absence of restraint indications  Description: Absence of restraint indications  Outcome: Ongoing  Goal: Absence of restraint-related injury  Description: Absence of restraint-related injury  Outcome: Ongoing

## 2020-11-13 NOTE — PROGRESS NOTES
[place] [facility] [place] [facility] [place]  Gabriel Oshea[address]  Phone: [phone]    Fax [phone]                  Cecy Dotson MD, Marcia Pool MD, [address], MD, MD Anson Berg MD Fransisca Churn, MD Thomas Quiñonez, MD Gloris Mcardle, DUANE Jackson, DUANE Urrutia, DUANE Glovre, DUANE    Cardiology Progress Note     Today's Plan: sign off    Admit Date:  11/11/2020    Consult reason/ Seen today for: chest pain    Subjective and  Overnight Events:  Patient is confused, restrained, and not able to answer questions. Assessment / Plan / Recommendation:     1. Chest Pain: serial cardiac enzymes are negative, EKG reviewed, stress test is normal and echo showed EF 50-55% and mild to moderate VHD and Grade I DD  2. DVT prophylaxis if no contraindication  3. Dyslipidemia: continue statins   4. Patient is okay for discharge from cardiology perspective. Please re consult if additional cardiology recommendations are needed. History of Presenting Illness:    Chief complain on admission : 80 y. o.year old who is admitted for  Chief Complaint   Patient presents with    Chest Pain    Cough    Nasal Congestion    Leg Swelling    Altered Mental Status     HX OF DEMENTIA        Past medical history:    has a past medical history of Arthritis, Blind left eye, Cancer ([address] COPD (chronic obstructive pulmonary disease) ([address] Dementia ([address] Depression, GERD (gastroesophageal reflux disease), Hypertension, Pneumonia, Restless legs syndrome, Tremors of nervous system, and Unspecified cerebral artery occlusion with cerebral infarction. Past surgical history:   has a past surgical history that includes Hysterectomy (1972); Spine surgery (1995); Abdomen surgery; fracture surgery; Appendectomy; Endoscopy, colon, diagnostic; vascular surgery; back surgery; Colonoscopy; and eye surgery.   Social History:   reports that she has quit smoking. Her smoking use included cigarettes. She has a 12.50 pack-year smoking history. She has never used smokeless tobacco. She reports that she does not drink alcohol or use drugs. Family history:  family history includes Arthritis in her mother; Cancer in her brother, mother, and sister; Diabetes in her daughter and mother; Heart Disease in her mother; High Blood Pressure in her daughter and mother; [place] / [place] in her mother. Allergies   Allergen Reactions    Other      Pt states allergy to unknown antibiotic that made her arm red          Review of Systems:   Unable to access as she is confused    /62   Pulse 150   Temp 98.8 °F (37.1 °C) (Oral)   Resp 16   Ht 5' (1.524 m)   Wt 169 lb 14.4 oz (77.1 kg)   SpO2 98%   BMI 33.18 kg/m²       Intake/Output Summary (Last 24 hours) at 11/13/2020 1141  Last data filed at 11/13/2020 0549  Gross per 24 hour   Intake 250 ml   Output --   Net 250 ml       Physical Exam  Vitals signs reviewed. Constitutional:       General: She is not in acute distress. Appearance: She is obese. She is not ill-appearing. Eyes:      Conjunctiva/sclera: Conjunctivae normal.      Pupils: Pupils are equal, round, and reactive to light. Neck:      Musculoskeletal: Neck supple. No muscular tenderness. Vascular: No carotid bruit. Cardiovascular:      Rate and Rhythm: Normal rate and regular rhythm. Pulses: Normal pulses. Heart sounds: Normal heart sounds. No murmur. Pulmonary:      Effort: Pulmonary effort is normal. No respiratory distress. Breath sounds: Normal breath sounds. Musculoskeletal:         General: No swelling or deformity. Skin:     General: Skin is warm and dry. Capillary Refill: Capillary refill takes less than 2 seconds. Neurological:      Mental Status: She is alert. She is confused. Psychiatric:         Behavior: Behavior is uncooperative. Cognition and Memory: Cognition is impaired. Judgment: Judgment is impulsive. Telemetry Reviewed:   Sinus rhythm rate 87    Medications:    dorzolamide  1 drop Both Eyes TID    latanoprost  1 drop Both Eyes Nightly    memantine  5 mg Oral BID    vitamin D  1,000 Units Oral Daily    vitamin B-12  1,000 mcg Oral Daily    hydrALAZINE  50 mg Oral 3 times per day    rOPINIRole  0.5 mg Oral TID    atorvastatin  40 mg Oral Nightly    enoxaparin  30 mg Subcutaneous Daily    aspirin  81 mg Oral Daily    azithromycin  500 mg Intravenous Q24H      dextrose 5 % and 0.45 % NaCl 75 mL/hr at 11/13/20 1059     bisacodyl, magnesium hydroxide, ipratropium-albuterol, hydrOXYzine, promethazine **OR** ondansetron, nitroGLYCERIN, potassium chloride, magnesium sulfate, LORazepam    Lab Data:  CBC:   Recent Labs     11/11/20  1726   WBC 5.5   HGB 12.8   HCT 39.7   MCV 88.6        BMP:   Recent Labs     11/11/20  1640      K 4.4      CO2 25   BUN 18   CREATININE 0.9     PT/INR: No results for input(s): PROTIME, INR in the last 72 hours. BNP:    Recent Labs     11/11/20  1640 11/12/20  0423   PROBNP 189.9 244.4     TROPONIN:   Recent Labs     11/11/20  1640 11/12/20  0423 11/12/20  0726   TROPONINT <0.010 <0.010 <0.010        ECHO :   Echocardiogram 11/12/2020 Summary   Left ventricular systolic function is normal.   Ejection fraction is visually estimated at 50-55%. Grade I diastolic dysfunction. Sclerotic, but non-stenotic aortic valve. Mild to moderate aortic regurgitation is noted; PHT: 492 msec . Mild mitral regurgitation is present. Mitral annular calcification is present. No evidence of any pericardial effusion. All labs, medications and tests reviewed by myself , continue all other medications of all above medical condition listed as is except for changes mentioned above. Thank you very much for consult , please call with questions.     Electronically signed by DUANE Bhat CNP on 11/13/2020 at 11:41 AM      CARDIOLOGY ATTENDING ADDENDUM    I have seen ,spoken to  and examined this patient personally, independently of the nurse practitioner. I have reviewed the hospital care given to date and reviewed all pertinent labs and imaging. The plan was developed mutually at the time of the visit with the patient,  NP   and myself. I have spoken with patient, nursing staff and provided written and verbal instructions . The above note has been reviewed and I agree with the assessment, diagnosis, and treatment plan with changes made by me as follows     HPI:  I have reviewed the above HPI  And agree with above   Anny Grace is a 80 y. o.year old who and presents with had concerns including Chest Pain; Cough; Nasal Congestion; Leg Swelling; and Altered Mental Status (HX OF DEMENTIA). Chief Complaint   Patient presents with    Chest Pain    Cough    Nasal Congestion    Leg Swelling    Altered Mental Status     HX OF DEMENTIA     Please review addendum/changes made to note above   Interval history:  Dementia limits info    Physical Exam:  General:  Awake,  NAD  Head:normal  Eye:normal  Neck:  No JVD   Chest:  Clear to auscultation, respiration easy  Cardiovascular:  S1 and S2 audible, No added heart sounds, No signs of ankle edema, or volume overload, No evidence of JVD, No crackles  Abdomen:   nontender  Extremities:  *no  edema  Pulses; palpable  Neuro: grossly normal      MEDICAL DECISION MAKING;    I agree with the above plan, which was planned by myself and discussed with NP.     Stress normal     Caroline Perez MD [facility] 11/13/20

## 2020-11-14 ENCOUNTER — APPOINTMENT (OUTPATIENT)
Dept: CT IMAGING | Age: 85
DRG: 071 | End: 2020-11-14
Payer: MEDICARE

## 2020-11-14 LAB
ADENOVIRUS DETECTION BY PCR: NOT DETECTED
ALBUMIN SERPL-MCNC: 4 GM/DL (ref 3.4–5)
ALP BLD-CCNC: 96 IU/L (ref 40–129)
ALT SERPL-CCNC: 15 U/L (ref 10–40)
AMMONIA: 42 UMOL/L (ref 11–51)
ANION GAP SERPL CALCULATED.3IONS-SCNC: 17 MMOL/L (ref 4–16)
AST SERPL-CCNC: 26 IU/L (ref 15–37)
BILIRUB SERPL-MCNC: 1 MG/DL (ref 0–1)
BORDETELLA PARAPERTUSSIS BY PCR: NOT DETECTED
BORDETELLA PERTUSSIS PCR: NOT DETECTED
BUN BLDV-MCNC: 15 MG/DL (ref 6–23)
CALCIUM SERPL-MCNC: 9.6 MG/DL (ref 8.3–10.6)
CHLAMYDOPHILA PNEUMONIA PCR: NOT DETECTED
CHLORIDE BLD-SCNC: 101 MMOL/L (ref 99–110)
CO2: 20 MMOL/L (ref 21–32)
CORONAVIRUS 229E PCR: NOT DETECTED
CORONAVIRUS HKU1 PCR: NOT DETECTED
CORONAVIRUS NL63 PCR: NOT DETECTED
CORONAVIRUS OC43 PCR: NOT DETECTED
CREAT SERPL-MCNC: 1.2 MG/DL (ref 0.6–1.1)
GFR AFRICAN AMERICAN: 51 ML/MIN/1.73M2
GFR NON-AFRICAN AMERICAN: 42 ML/MIN/1.73M2
GLUCOSE BLD-MCNC: 119 MG/DL (ref 70–99)
HUMAN METAPNEUMOVIRUS PCR: NOT DETECTED
INFLUENZA A BY PCR: NOT DETECTED
INFLUENZA A H1 (2009) PCR: NOT DETECTED
INFLUENZA A H1 PANDEMIC PCR: NOT DETECTED
INFLUENZA A H3 PCR: NOT DETECTED
INFLUENZA B BY PCR: NOT DETECTED
LIPASE: 36 IU/L (ref 13–60)
MYCOPLASMA PNEUMONIAE PCR: NOT DETECTED
PARAINFLUENZA 1 PCR: NOT DETECTED
PARAINFLUENZA 2 PCR: NOT DETECTED
PARAINFLUENZA 3 PCR: NOT DETECTED
PARAINFLUENZA 4 PCR: NOT DETECTED
POTASSIUM SERPL-SCNC: 3.6 MMOL/L (ref 3.5–5.1)
PROCALCITONIN: 0.1
RHINOVIRUS ENTEROVIRUS PCR: NOT DETECTED
RSV PCR: NOT DETECTED
SARS-COV-2: NOT DETECTED
SODIUM BLD-SCNC: 138 MMOL/L (ref 135–145)
TOTAL PROTEIN: 7.5 GM/DL (ref 6.4–8.2)

## 2020-11-14 PROCEDURE — 6360000002 HC RX W HCPCS: Performed by: INTERNAL MEDICINE

## 2020-11-14 PROCEDURE — 6360000002 HC RX W HCPCS: Performed by: EMERGENCY MEDICINE

## 2020-11-14 PROCEDURE — 6370000000 HC RX 637 (ALT 250 FOR IP): Performed by: INTERNAL MEDICINE

## 2020-11-14 PROCEDURE — 71250 CT THORAX DX C-: CPT

## 2020-11-14 PROCEDURE — 6370000000 HC RX 637 (ALT 250 FOR IP): Performed by: EMERGENCY MEDICINE

## 2020-11-14 PROCEDURE — 6370000000 HC RX 637 (ALT 250 FOR IP): Performed by: PHYSICIAN ASSISTANT

## 2020-11-14 PROCEDURE — 70450 CT HEAD/BRAIN W/O DYE: CPT

## 2020-11-14 PROCEDURE — 2580000003 HC RX 258: Performed by: INTERNAL MEDICINE

## 2020-11-14 PROCEDURE — 94640 AIRWAY INHALATION TREATMENT: CPT

## 2020-11-14 PROCEDURE — 94762 N-INVAS EAR/PLS OXIMTRY CONT: CPT

## 2020-11-14 PROCEDURE — 1200000000 HC SEMI PRIVATE

## 2020-11-14 PROCEDURE — 96374 THER/PROPH/DIAG INJ IV PUSH: CPT

## 2020-11-14 PROCEDURE — 74176 CT ABD & PELVIS W/O CONTRAST: CPT

## 2020-11-14 PROCEDURE — 0202U NFCT DS 22 TRGT SARS-COV-2: CPT

## 2020-11-14 RX ORDER — SODIUM CHLORIDE 0.9 % (FLUSH) 0.9 %
10 SYRINGE (ML) INJECTION PRN
Status: DISCONTINUED | OUTPATIENT
Start: 2020-11-14 | End: 2020-11-19 | Stop reason: HOSPADM

## 2020-11-14 RX ORDER — BISACODYL 10 MG
10 SUPPOSITORY, RECTAL RECTAL DAILY PRN
Status: DISCONTINUED | OUTPATIENT
Start: 2020-11-14 | End: 2020-11-19 | Stop reason: HOSPADM

## 2020-11-14 RX ORDER — ASPIRIN 81 MG/1
81 TABLET, CHEWABLE ORAL DAILY
Status: DISCONTINUED | OUTPATIENT
Start: 2020-11-14 | End: 2020-11-19 | Stop reason: HOSPADM

## 2020-11-14 RX ORDER — HYDROXYZINE PAMOATE 25 MG/1
25 CAPSULE ORAL 3 TIMES DAILY PRN
Status: DISCONTINUED | OUTPATIENT
Start: 2020-11-14 | End: 2020-11-19 | Stop reason: HOSPADM

## 2020-11-14 RX ORDER — ROPINIROLE 0.25 MG/1
0.5 TABLET, FILM COATED ORAL 3 TIMES DAILY
Status: DISCONTINUED | OUTPATIENT
Start: 2020-11-14 | End: 2020-11-19 | Stop reason: HOSPADM

## 2020-11-14 RX ORDER — DORZOLAMIDE HCL 20 MG/ML
1 SOLUTION/ DROPS OPHTHALMIC 3 TIMES DAILY
Status: DISCONTINUED | OUTPATIENT
Start: 2020-11-14 | End: 2020-11-19 | Stop reason: HOSPADM

## 2020-11-14 RX ORDER — ATORVASTATIN CALCIUM 40 MG/1
40 TABLET, FILM COATED ORAL NIGHTLY
Status: DISCONTINUED | OUTPATIENT
Start: 2020-11-14 | End: 2020-11-19 | Stop reason: HOSPADM

## 2020-11-14 RX ORDER — LANOLIN ALCOHOL/MO/W.PET/CERES
1000 CREAM (GRAM) TOPICAL DAILY
Status: DISCONTINUED | OUTPATIENT
Start: 2020-11-14 | End: 2020-11-19 | Stop reason: HOSPADM

## 2020-11-14 RX ORDER — ALBUTEROL SULFATE 90 UG/1
2 AEROSOL, METERED RESPIRATORY (INHALATION) EVERY 6 HOURS PRN
Status: DISCONTINUED | OUTPATIENT
Start: 2020-11-14 | End: 2020-11-17

## 2020-11-14 RX ORDER — LATANOPROST 50 UG/ML
1 SOLUTION/ DROPS OPHTHALMIC NIGHTLY
Status: DISCONTINUED | OUTPATIENT
Start: 2020-11-14 | End: 2020-11-19 | Stop reason: HOSPADM

## 2020-11-14 RX ORDER — ACETAMINOPHEN 325 MG/1
650 TABLET ORAL EVERY 6 HOURS PRN
Status: DISCONTINUED | OUTPATIENT
Start: 2020-11-14 | End: 2020-11-19 | Stop reason: HOSPADM

## 2020-11-14 RX ORDER — ALBUTEROL SULFATE 90 UG/1
2 AEROSOL, METERED RESPIRATORY (INHALATION) 4 TIMES DAILY
Status: DISCONTINUED | OUTPATIENT
Start: 2020-11-14 | End: 2020-11-17

## 2020-11-14 RX ORDER — METHYLPREDNISOLONE SODIUM SUCCINATE 40 MG/ML
40 INJECTION, POWDER, LYOPHILIZED, FOR SOLUTION INTRAMUSCULAR; INTRAVENOUS DAILY
Status: DISCONTINUED | OUTPATIENT
Start: 2020-11-14 | End: 2020-11-19

## 2020-11-14 RX ORDER — SODIUM CHLORIDE 9 MG/ML
INJECTION, SOLUTION INTRAVENOUS CONTINUOUS
Status: DISCONTINUED | OUTPATIENT
Start: 2020-11-14 | End: 2020-11-15

## 2020-11-14 RX ORDER — HEPARIN SODIUM 5000 [USP'U]/ML
5000 INJECTION, SOLUTION INTRAVENOUS; SUBCUTANEOUS EVERY 8 HOURS SCHEDULED
Status: DISCONTINUED | OUTPATIENT
Start: 2020-11-14 | End: 2020-11-19 | Stop reason: HOSPADM

## 2020-11-14 RX ORDER — SODIUM CHLORIDE 0.9 % (FLUSH) 0.9 %
10 SYRINGE (ML) INJECTION EVERY 12 HOURS SCHEDULED
Status: DISCONTINUED | OUTPATIENT
Start: 2020-11-14 | End: 2020-11-19 | Stop reason: HOSPADM

## 2020-11-14 RX ORDER — METHYLPREDNISOLONE SODIUM SUCCINATE 125 MG/2ML
40 INJECTION, POWDER, LYOPHILIZED, FOR SOLUTION INTRAMUSCULAR; INTRAVENOUS DAILY
Status: DISCONTINUED | OUTPATIENT
Start: 2020-11-14 | End: 2020-11-14 | Stop reason: SDUPTHER

## 2020-11-14 RX ORDER — LORAZEPAM 2 MG/ML
1 INJECTION INTRAMUSCULAR ONCE
Status: COMPLETED | OUTPATIENT
Start: 2020-11-14 | End: 2020-11-14

## 2020-11-14 RX ORDER — ACETAMINOPHEN 325 MG/1
650 TABLET ORAL EVERY 4 HOURS PRN
Status: DISCONTINUED | OUTPATIENT
Start: 2020-11-14 | End: 2020-11-19

## 2020-11-14 RX ORDER — ACETAMINOPHEN 650 MG/1
650 SUPPOSITORY RECTAL EVERY 6 HOURS PRN
Status: DISCONTINUED | OUTPATIENT
Start: 2020-11-14 | End: 2020-11-19

## 2020-11-14 RX ORDER — MEMANTINE HYDROCHLORIDE 5 MG/1
5 TABLET ORAL 2 TIMES DAILY
Status: DISCONTINUED | OUTPATIENT
Start: 2020-11-14 | End: 2020-11-18

## 2020-11-14 RX ADMIN — METHYLPREDNISOLONE SODIUM SUCCINATE 40 MG: 40 INJECTION, POWDER, FOR SOLUTION INTRAMUSCULAR; INTRAVENOUS at 09:04

## 2020-11-14 RX ADMIN — ALBUTEROL SULFATE 2 PUFF: 90 AEROSOL, METERED RESPIRATORY (INHALATION) at 16:03

## 2020-11-14 RX ADMIN — ASPIRIN 81 MG CHEWABLE TABLET 81 MG: 81 TABLET CHEWABLE at 09:04

## 2020-11-14 RX ADMIN — IPRATROPIUM BROMIDE 2 PUFF: 17 AEROSOL, METERED RESPIRATORY (INHALATION) at 16:04

## 2020-11-14 RX ADMIN — ROPINIROLE HYDROCHLORIDE 0.5 MG: 0.25 TABLET, FILM COATED ORAL at 13:19

## 2020-11-14 RX ADMIN — SODIUM CHLORIDE, PRESERVATIVE FREE 10 ML: 5 INJECTION INTRAVENOUS at 09:05

## 2020-11-14 RX ADMIN — MEMANTINE HYDROCHLORIDE 5 MG: 5 TABLET ORAL at 09:04

## 2020-11-14 RX ADMIN — LORAZEPAM 1 MG: 2 INJECTION, SOLUTION INTRAMUSCULAR; INTRAVENOUS at 02:26

## 2020-11-14 RX ADMIN — ALBUTEROL SULFATE 2 PUFF: 90 AEROSOL, METERED RESPIRATORY (INHALATION) at 12:21

## 2020-11-14 RX ADMIN — IPRATROPIUM BROMIDE 2 PUFF: 17 AEROSOL, METERED RESPIRATORY (INHALATION) at 08:37

## 2020-11-14 RX ADMIN — ROPINIROLE HYDROCHLORIDE 0.5 MG: 0.25 TABLET, FILM COATED ORAL at 20:44

## 2020-11-14 RX ADMIN — LATANOPROST 1 DROP: 50 SOLUTION/ DROPS OPHTHALMIC at 20:45

## 2020-11-14 RX ADMIN — HEPARIN SODIUM 5000 UNITS: 5000 INJECTION INTRAVENOUS; SUBCUTANEOUS at 13:19

## 2020-11-14 RX ADMIN — DORZOLAMIDE HYDROCHLORIDE 1 DROP: 20 SOLUTION/ DROPS OPHTHALMIC at 10:04

## 2020-11-14 RX ADMIN — HEPARIN SODIUM 5000 UNITS: 5000 INJECTION INTRAVENOUS; SUBCUTANEOUS at 20:48

## 2020-11-14 RX ADMIN — IPRATROPIUM BROMIDE 2 PUFF: 17 AEROSOL, METERED RESPIRATORY (INHALATION) at 20:57

## 2020-11-14 RX ADMIN — CYANOCOBALAMIN TAB 1000 MCG 1000 MCG: 1000 TAB at 09:04

## 2020-11-14 RX ADMIN — ROPINIROLE HYDROCHLORIDE 0.5 MG: 0.25 TABLET, FILM COATED ORAL at 09:04

## 2020-11-14 RX ADMIN — SODIUM CHLORIDE: 9 INJECTION, SOLUTION INTRAVENOUS at 06:05

## 2020-11-14 RX ADMIN — ATORVASTATIN CALCIUM 40 MG: 40 TABLET, FILM COATED ORAL at 20:44

## 2020-11-14 RX ADMIN — ROPINIROLE HYDROCHLORIDE 0.5 MG: 0.25 TABLET, FILM COATED ORAL at 00:01

## 2020-11-14 RX ADMIN — DORZOLAMIDE HYDROCHLORIDE 1 DROP: 20 SOLUTION/ DROPS OPHTHALMIC at 13:19

## 2020-11-14 RX ADMIN — DORZOLAMIDE HYDROCHLORIDE 1 DROP: 20 SOLUTION/ DROPS OPHTHALMIC at 20:45

## 2020-11-14 RX ADMIN — ALBUTEROL SULFATE 2 PUFF: 90 AEROSOL, METERED RESPIRATORY (INHALATION) at 20:56

## 2020-11-14 RX ADMIN — ALBUTEROL SULFATE 2 PUFF: 90 AEROSOL, METERED RESPIRATORY (INHALATION) at 08:37

## 2020-11-14 RX ADMIN — Medication 1000 UNITS: at 09:04

## 2020-11-14 RX ADMIN — MEMANTINE HYDROCHLORIDE 5 MG: 5 TABLET ORAL at 20:44

## 2020-11-14 RX ADMIN — SODIUM CHLORIDE: 9 INJECTION, SOLUTION INTRAVENOUS at 17:43

## 2020-11-14 RX ADMIN — IPRATROPIUM BROMIDE 2 PUFF: 17 AEROSOL, METERED RESPIRATORY (INHALATION) at 12:21

## 2020-11-14 ASSESSMENT — PAIN SCALES - PAIN ASSESSMENT IN ADVANCED DEMENTIA (PAINAD)
BODYLANGUAGE: 0
NEGVOCALIZATION: 0
NEGVOCALIZATION: 0
TOTALSCORE: 0
FACIALEXPRESSION: 0
BODYLANGUAGE: 0
FACIALEXPRESSION: 0
CONSOLABILITY: 0
TOTALSCORE: 0
CONSOLABILITY: 0
BREATHING: 0
BREATHING: 0

## 2020-11-14 ASSESSMENT — PAIN SCALES - GENERAL
PAINLEVEL_OUTOF10: 0
PAINLEVEL_OUTOF10: 0

## 2020-11-14 ASSESSMENT — PAIN SCALES - WONG BAKER
WONGBAKER_NUMERICALRESPONSE: 0
WONGBAKER_NUMERICALRESPONSE: 0

## 2020-11-14 NOTE — PROGRESS NOTES
Hospitalist Progress Note      Name:  Haleigh Mejía /Age/Sex: 1931  (80 y.o. female)   MRN & CSN:  [id number] & [id number] Admission Date/Time: 2020  9:35 PM   Location:  [address] [place] [address]-A PCP: Alex Singer 112 Day: 2    Assessment and Plan:   Haleigh Mejía is a 80 y.o.  female  who presents with <principal problem not specified>    > AMS, possible hospital-acquired delirium with a component of sundowning  - Cough, possible h/o copd  -  viral panel/covid19 negative  -Unable to exclude other etiologies, is unable to obtain history, reattempted daytime  -Neurology consulted, saw pt on prior admission  - CT Head with remote multiple CVA not mentioned on prior CTs, pt is not aware of prior CVA. Floyd Dolores, Statin     > contrast extravasation d/t inappropriate IV placement, less likely compartment syndrome, no trauma report, good peripheral pulses      >BRADY possibly d/t dehydration  - avoid nephrotoxic medication  - IVF  - monitor I/Os  - consider nephrology consult    Diet DIET GENERAL;   DVT Prophylaxis []  Heparin   Code Status Full Code   Disposition  pending consult eval and rec. History of Present Illness:     Pt S&E. Pt oriented to self and place, admit vision impairment due to macular degeneration, denies any current focal weakness or trouble with swallowing or speech, denies any known h/o CVA, no abd pain, no chest pain, no dyspnea. 10-14 point ROS reviewed negative, unless as noted above    Objective:   No intake or output data in the 24 hours ending 20 0924   Vitals:   Vitals:    20 0830   BP: (!) 123/59   Pulse: 98   Resp: 18   Temp: 98.1 °F (36.7 °C)   SpO2:      Physical Exam:      GEN Awake female, cooperative,   RESP Clear to auscultation, no wheezes, rales or rhonchi. Symmetric chest movement . CARDIO/VASC S1/S2 auscultated. Regular rate. GI Abdomen is soft without significant tenderness, Bowel sounds are normoactive.    MSK No gross joint deformities. Spontaneous movement of all extremities  SKIN Normal coloration, warm, dry. NEURO normal speech, no lateralizing weakness. Impaired vision  PSYCH Awake, alert, oriented. Affect appropriate.     Medications:   Medications:    aspirin  81 mg Oral Daily    atorvastatin  40 mg Oral Nightly    dorzolamide  1 drop Both Eyes TID    latanoprost  1 drop Both Eyes Nightly    memantine  5 mg Oral BID    vitamin B-12  1,000 mcg Oral Daily    vitamin D  1,000 Units Oral Daily    sodium chloride flush  10 mL Intravenous 2 times per day    heparin (porcine)  5,000 Units Subcutaneous 3 times per day    albuterol sulfate HFA  2 puff Inhalation 4x daily    And    ipratropium  2 puff Inhalation 4x daily    methylPREDNISolone  40 mg Intravenous Daily    rOPINIRole  0.5 mg Oral TID      Infusions:    sodium chloride 100 mL/hr at 11/14/20 0605     PRN Meds: acetaminophen, 650 mg, Q4H PRN  albuterol sulfate HFA, 2 puff, Q6H PRN  bisacodyl, 10 mg, Daily PRN  hydrOXYzine, 25 mg, TID PRN  sodium chloride flush, 10 mL, PRN  acetaminophen, 650 mg, Q6H PRN    Or  acetaminophen, 650 mg, Q6H PRN  magnesium hydroxide, 30 mL, Daily PRN      Electronically signed by Kevin Vee MD on 11/14/2020 at 9:24 AM

## 2020-11-14 NOTE — PROGRESS NOTES
Night Shift RN Notes:  2053 Discharged in improved condition to home. [address] daughter came to get her on private vehicle at 8:53 PM assisted by two Julissa Eubanks via wheelchair. All night time medicines were given. Patient was dry and bundled up. Vital signs on discharge were as follows:   Vitals:    11/13/20 2039   BP: (!) 113/54   Pulse: 98   Resp: 16   Temp: 98.6 °F (37 °C)   SpO2: 97%     IV access was removed per protocol w/ no complication noted, sterile dressing applied. ID band removed. Cardiac monitor removed. No s/s of distress and discomforts. Discharge papers was sent with the patient and was explained to the granddaughter over the phone by Nurse Clair Blackchmiguel. 2100 Grand daughter is refusing to take the patient home apparently because the patient is confuse and demanding that she does not want Dr. Fernanda Schumacher to be the patient's doctor anymore. 2101 This nurse informed Charge Nurse Aminata Ureañ and CN Aminata Ureña and Supervisor Timmy and Supervisor Timmy said he'll see what he can do. 2132 Per Supervisor Timmy, she will be going to be checked in through ER and be discharged from .

## 2020-11-14 NOTE — H&P
HISTORY AND PHYSICAL  (Hospitalist, Internal Medicine)  IDENTIFYING INFORMATION   PATIENT:  Ela Morejon  MRN:  [id number]  ADMIT DATE: 11/13/2020  TIME OF EVALUATION: 11/14/2020 4:30 AM    CHIEF COMPLAINT     Altered mental status  HISTORY OF PRESENT ILLNESS   Ela Morejon is a 80 y.o. female admitted for altered mental status, patient was recently admitted to the hospital.  Under PCP, family refused readmission with the same provider. Initially unable to reach granddaughter via telephone, discussed with daughter admission. Again we attempted to contact granddaughter, was able to reach her. She stated that she does not want patient to be admitted to Dr. Pauline Lovett, and stated she did not know why she was discharged. As she was more confused when she was going home. She refused to get into the car, and \"she hardly could get up. \"     Otherwise granddaughter who takes care of her states that she does have dementia, however she has not never been this confused. She has noticed that she has been coughing for about 10 days, and complaining of chest pain with deep inspiration. On my examination patient was extremely sleepy, and refused to answer any questions, when asked her name she said \"I do not have a name,\"and subsequently go back to sleep. Patient is easily arousable, and maintaining airway. Otherwise no history is obtainable. Of note patient also received Ativan, and likely contributing to drowsiness.     PMH listed below:    PAST MEDICAL, SURGICAL, FAMILY, and SOCIAL HISTORY     Past Medical History:   Diagnosis Date    Arthritis     Blind left eye     [facility] ([facility] 1971    colon    COPD (chronic obstructive pulmonary disease) ([facility]     Dementia ([facility]     Depression     GERD (gastroesophageal reflux disease)     Hypertension     Pneumonia     Restless legs syndrome     Tremors of nervous system     Unspecified cerebral artery occlusion with cerebral infarction      Past Surgical History:   Procedure Laterality Date    ABDOMEN SURGERY      APPENDECTOMY      BACK SURGERY      COLONOSCOPY      ENDOSCOPY, COLON, DIAGNOSTIC      EYE SURGERY      FRACTURE SURGERY      HYSTERECTOMY  1972    SPINE SURGERY  1995    VASCULAR SURGERY       Family History   Problem Relation Age of Onset    Arthritis Mother     Cancer Mother     Diabetes Mother     Heart Disease Mother     High Blood Pressure Mother     Miscarriages / [place] Mother     Cancer Sister     Cancer Brother     Diabetes Daughter     High Blood Pressure Daughter      Family Hx of HTN  Family Hx as reviewed above, otherwise non-contributory  Social History     Socioeconomic History    Marital status:       Spouse name: None    Number of children: 7    Years of education: None    Highest education level: None   Occupational History    None   Social Needs    Financial resource strain: None    Food insecurity     Worry: None     Inability: None    Transportation needs     Medical: None     Non-medical: None   Tobacco Use    Smoking status: Former Smoker     Packs/day: 0.25     Years: 50.00     Pack years: 12.50     Types: Cigarettes    Smokeless tobacco: Never Used    Tobacco comment: states smokes 1-2 cigs a day   Substance and Sexual Activity    Alcohol use: No     Alcohol/week: 0.0 standard drinks    Drug use: No    Sexual activity: Never   Lifestyle    Physical activity     Days per week: None     Minutes per session: None    Stress: None   Relationships    Social connections     Talks on phone: None     Gets together: None     Attends Zoroastrian service: None     Active member of club or organization: None     Attends meetings of clubs or organizations: None     Relationship status: None    Intimate partner violence     Fear of current or ex partner: None     Emotionally abused: None     Physically abused: None     Forced sexual activity: None   Other Topics Concern    None   Social History Narrative    None       MEDICATIONS   Medications Prior to Admission  Not in a hospital admission. Current Medications  Current Facility-Administered Medications   Medication Dose Route Frequency Provider Last Rate Last Dose    rOPINIRole (REQUIP) tablet 0.5 mg  0.5 mg Oral TID Kaylie Bill    0.5 mg at 11/14/20 0001     Current Outpatient Medications   Medication Sig Dispense Refill    aspirin 81 MG chewable tablet Take 1 tablet by mouth daily 30 tablet 3    nitroGLYCERIN (NITROSTAT) 0.4 MG SL tablet up to max of 3 total doses.  If no relief after 1 dose, call 911. 25 tablet 3    atorvastatin (LIPITOR) 40 MG tablet Take 1 tablet by mouth nightly 30 tablet 3    hydrOXYzine (VISTARIL) 25 MG capsule Take 1 capsule by mouth 3 times daily as needed for Anxiety 30 capsule 0    rOPINIRole (REQUIP) 0.5 MG tablet Take 5 tablets by mouth 3 times daily 30 tablet 1    hydrALAZINE (APRESOLINE) 50 MG tablet Take 1 tablet by mouth every 8 hours 90 tablet 3    vitamin B-12 (CYANOCOBALAMIN) 1000 MCG tablet Take 1,000 mcg by mouth daily      magnesium hydroxide (MILK OF MAGNESIA) 400 MG/5ML suspension Take 30 mLs by mouth daily as needed for Constipation      albuterol sulfate  (90 Base) MCG/ACT inhaler Inhale 2 puffs into the lungs every 6 hours as needed for Shortness of Breath       bisacodyl (DULCOLAX) 10 MG suppository Place 10 mg rectally daily as needed for Constipation       vitamin D (CHOLECALCIFEROL) 1000 UNIT TABS tablet Take 1,000 Units by mouth daily      memantine (NAMENDA) 5 MG tablet Take 1 tablet by mouth 2 times daily 60 tablet 3    acetaminophen 650 MG TABS Take 650 mg by mouth every 4 hours as needed 120 tablet 3    dorzolamide (TRUSOPT) 2 % ophthalmic solution Place 1 drop into both eyes 3 times daily 10 mL 2    latanoprost (XALATAN) 0.005 % ophthalmic solution Place 1 drop into both eyes nightly 1 Bottle 3         Allergies  Allergies   Allergen Reactions    Other      Pt states allergy to unknown antibiotic that made her arm red        REVIEW OF SYSTEMS   Within above limitations. 14 point review of systems reviewed. Pertinent positive or negative as per HPI or otherwise negative per 14 point systems review. PHYSICAL EXAM     Wt Readings from Last 3 Encounters:   11/13/20 170 lb (77.1 kg)   11/12/20 169 lb 14.4 oz (77.1 kg)   10/17/20 160 lb (72.6 kg)       Blood pressure (!) 122/94, pulse 107, temperature 98.9 °F (37.2 °C), resp. rate 20, height 5' (1.524 m), weight 170 lb (77.1 kg), SpO2 98 %, not currently breastfeeding. General -sleepy  Psych -unable to assess  Eyes - Eye lids intact. No scleral icterus  ENT - Lips wnl. External ear clear/dry/intact. No thyromegaly on inspection  Neuro -unable to assess  Heart - Sinus. RRR. S1 and S2 present. Lung - Adequate air entry b/l, mild wheezes  GI - Soft. No guarding/rigidity. No hepatosplenomegaly/ascites. BS+   - No CVA/suprapubic tenderness or palpable bladder distension  Skin - Intact. No rash/petechiae/ecchymosis. Warm extremities  MSK - Joints with normal ROM.  No joint swellings    Lines/Drains/Airways/Wounds:  [unfilled]    LABS AND IMAGING   CBC  [unfilled]    Last 3 Hemoglobin  Lab Results   Component Value Date    HGB 15.1 11/13/2020    HGB 12.8 11/11/2020    HGB 13.7 10/17/2020     Last 3 WBC/ANC  Lab Results   Component Value Date    WBC 7.6 11/13/2020    WBC 5.5 11/11/2020    WBC 5.6 10/17/2020     No components found for: GRNLOCTYABS  Last 3 Platelets  No results found for: PLATELET  Chemistry  [unfilled]  [unfilled]  No results found for: LDH  Coagulation Studies  Lab Results   Component Value Date    INR 1.00 11/13/2020     Liver Function Studies  Lab Results   Component Value Date    ALT 15 11/13/2020    AST 26 11/13/2020    ALKPHOS 96 11/13/2020       Recent Imaging        Relevant labs and imaging reviewed    ASSESSMENT AND PLAN   Intervolve is a 80 y.o. female H/O ?dementia,CVA, TIA , RLS p/w    AMS, possible hospital-acquired delirium with a component of sundowning  - Cough, possible h/o copd  - TPSNR21 r/o with viral panel  -Unable to exclude other etiologies, is unable to obtain history, reattempted daytime  -Neurology follow-up, was consulted on prior admission  -[place]  -Follow-up UA  -Neurochecks  -Frequent verbal re- orientation    * contrast extravasation d/t inappropriate IV placement, less likely compartment syndrome, no trauma report, good peripheral pulses   - neurovascular checks       CVA, TIA , RLS     BRADY possibly d/t dehydration  - avoid nephrotoxic medication  - IVF  - monitor I/Os  - consider nephrology consult      Case d/w ED provider    DVT ppx: lovenox  Code status:  full        [address], Internal Medicine  11/14/2020 at 4:30 AM

## 2020-11-14 NOTE — ED NOTES
CT said pt would not hold still. Dr aware and will put in an order for medication to help keep her still.       Isaías Martinez, MENA  11/14/20 2412

## 2020-11-14 NOTE — ED PROVIDER NOTES
Emergency Department Encounter    Patient: Waleska Kelly  MRN: [id number]  : 1931  Date of Evaluation: 2020  ED Provider:  Andrew Fletcher    Briefly, Waleska Kelly is a 80 y.o. female presented to the emergency department for reported altered mental status, syncope and hypotension. Upon arrival to the emergency department, patient's systolic blood pressure was reported to be 78. Family reports that the patient did not recognize them at the time of discharge. This is not the patient's baseline per family. CT of the head, chest, abdomen and pelvis were negative for acute changes. Lab work was unremarkable except for acute kidney injury. Previous UA was negative. Patient was given IV fluids and blood pressure improved to 122/94. Discussed the case with nurse practitioner covering for Dr. Rachel Martinez. She reported that he was not admitting tonight. Family desires admission to hospitalist service. They request that Dr. Rachel Martinez not care for the patient. Discussed the case with Dr. Yohana Chang who will admit the patient. We will continue to monitor while in the emergency department.     I have reviewed and interpreted all of the currently available lab results from this visit (if applicable)  Results for orders placed or performed during the hospital encounter of 20   CBC Auto Differential   Result Value Ref Range    WBC 7.6 4.0 - 10.5 K/CU MM    RBC 5.32 4.2 - 5.4 M/CU MM    Hemoglobin 15.1 12.5 - 16.0 GM/DL    Hematocrit 46.4 37 - 47 %    MCV 87.2 78 - 100 FL    MCH 28.4 27 - 31 PG    MCHC 32.5 32.0 - 36.0 %    RDW 14.7 11.7 - 14.9 %    Platelets 281 221 - 404 K/CU MM    MPV 10.2 7.5 - 11.1 FL    Differential Type AUTOMATED DIFFERENTIAL     Segs Relative 68.6 (H) 36 - 66 %    Lymphocytes % 20.3 (L) 24 - 44 %    Monocytes % 8.5 (H) 0 - 4 %    Eosinophils % 1.6 0 - 3 %    Basophils % 0.5 0 - 1 %    Segs Absolute 5.2 K/CU MM    Lymphocytes Absolute 1.5 K/CU MM    Monocytes Absolute 0.6 K/CU MM    Eosinophils Absolute 0.1 K/CU MM    Basophils Absolute 0.0 K/CU MM    Nucleated RBC % 0.0 %    Total Nucleated RBC 0.0 K/CU MM    Total Immature Neutrophil 0.04 K/CU MM    Immature Neutrophil % 0.5 (H) 0 - 0.43 %   Comprehensive Metabolic Panel w/ Reflex to MG   Result Value Ref Range    Sodium 138 135 - 145 MMOL/L    Potassium 3.6 3.5 - 5.1 MMOL/L    Chloride 101 99 - 110 mMol/L    CO2 20 (L) 21 - 32 MMOL/L    BUN 15 6 - 23 MG/DL    CREATININE 1.2 (H) 0.6 - 1.1 MG/DL    Glucose 119 (H) 70 - 99 MG/DL    Calcium 9.6 8.3 - 10.6 MG/DL    Alb 4.0 3.4 - 5.0 GM/DL    Total Protein 7.5 6.4 - 8.2 GM/DL    Total Bilirubin 1.0 0.0 - 1.0 MG/DL    ALT 15 10 - 40 U/L    AST 26 15 - 37 IU/L    Alkaline Phosphatase 96 40 - 129 IU/L    GFR Non- 42 (L) >60 mL/min/1.73m2    GFR  51 (L) >60 mL/min/1.73m2    Anion Gap 17 (H) 4 - 16   Blood Gas, Venous   Result Value Ref Range    pH, Venkata 7.39 7.32 - 7.42    pCO2, Venkata 41 38 - 52 mmHG    pO2, Venkata 45 28 - 48 mmHG    Base Excess 0 0 - 2.4    HCO3, Venous 24.8 19 - 25 MMOL/L    O2 Sat, Venkata 76.6 (H) 50 - 70 %    Comment VBG    Lactic Acid, Plasma   Result Value Ref Range    Lactate 1.6 0.4 - 2.0 mMOL/L   Protime-INR   Result Value Ref Range    Protime 12.1 11.7 - 14.5 SECONDS    INR 1.00 INDEX   APTT   Result Value Ref Range    aPTT 31.5 25.1 - 37.1 SECONDS   Troponin   Result Value Ref Range    Troponin T <0.010 <0.01 NG/ML   Brain Natriuretic Peptide   Result Value Ref Range    Pro-.4 (H) <300 PG/ML   Lipase   Result Value Ref Range    Lipase 36 13 - 60 IU/L   Ammonia Level   Result Value Ref Range    Ammonia 42 11 - 51 UMOL/L   TSH without Reflex   Result Value Ref Range    TSH, High Sensitivity 3.790 0.270 - 4.20 uIu/ml   T4, free   Result Value Ref Range    T4 Free 1.67 0.9 - 1.8 NG/DL   Procalcitonin   Result Value Ref Range    Procalcitonin 0.093    EKG 12 Lead   Result Value Ref Range    Ventricular Rate 119 BPM    Atrial Rate 104 BPM P-R Interval 158 ms    QRS Duration 68 ms    Q-T Interval 374 ms    QTc Calculation (Bazett) 526 ms    P Axis 35 degrees    R Axis -21 degrees    T Axis -35 degrees    Diagnosis       Undetermined rhythm  Minimal voltage criteria for LVH, may be normal variant  Possible Lateral infarct , age undetermined  ST & T wave abnormality, consider inferior ischemia  ST & T wave abnormality, consider anterior ischemia  Abnormal ECG  When compared with ECG of 11-NOV-2020 15:40,  Significant changes have occurred        Radiographs (if obtained):    [] Radiologist's Report Reviewed:  CT ABDOMEN PELVIS WO CONTRAST Additional Contrast? None   Final Result   1. Left upper extremity contrast extravasation. Clinical evaluation for   possible compartment syndrome is recommended. 2. No acute findings in the chest, abdomen or pelvis. CT CHEST WO CONTRAST   Final Result   1. Left upper extremity contrast extravasation. Clinical evaluation for   possible compartment syndrome is recommended. 2. No acute findings in the chest, abdomen or pelvis. CT Head WO Contrast   Final Result   1. Mild cerebral white matter chronic microvascular ischemic disease. 2. Inferior right cerebellar remote infarction. 3. Multifocal remote lacunar infarcts involving the bilateral basal ganglia   and cerebellar hemispheres. 4. No significant interval change. MDM:      Clinical Impression:  1. Altered mental status, unspecified altered mental status type    2. Hypotension, unspecified hypotension type    3. Near syncope      Disposition referral (if applicable):  No follow-up provider specified.   Disposition medications (if applicable):  New Prescriptions    No medications on file       Comment: Please note this report has been produced using speech recognition software and may contain errors related to that system including errors in grammar, punctuation, and spelling, as well as words and phrases that may be inappropriate. Efforts were made to edit the dictations.        Veronica Comer,   11/14/20 0623

## 2020-11-14 NOTE — PLAN OF CARE
Problem: Falls - Risk of:  Goal: Will remain free from falls  Description: Will remain free from falls  11/14/2020 0626 by Carrie Felder RN  Outcome: Ongoing  11/14/2020 0625 by Carrie Felder RN  Outcome: Ongoing  Goal: Absence of physical injury  Description: Absence of physical injury  11/14/2020 0626 by Carrie Felder RN  Outcome: Ongoing  11/14/2020 0625 by Carrie Felder RN  Outcome: Ongoing     Problem: Skin Integrity:  Goal: Will show no infection signs and symptoms  Description: Will show no infection signs and symptoms  11/14/2020 0626 by Carrie Felder RN  Outcome: Ongoing  11/14/2020 0625 by Carrie Felder RN  Outcome: Ongoing  Goal: Absence of new skin breakdown  Description: Absence of new skin breakdown  11/14/2020 0626 by Carrie Felder RN  Outcome: Ongoing  11/14/2020 0625 by Carrie Felder RN  Outcome: Ongoing     Problem: Coping:  Goal: Ability to remain calm will improve  Description: Ability to remain calm will improve  Outcome: Ongoing     Problem: Safety:  Goal: Ability to remain free from injury will improve  Description: Ability to remain free from injury will improve  Outcome: Ongoing     Problem: Self-Care:  Goal: Ability to participate in self-care as condition permits will improve  Description: Ability to participate in self-care as condition permits will improve  Outcome: Ongoing

## 2020-11-14 NOTE — ED PROVIDER NOTES
Emergency Department Encounter    Patient: Bianca Galan  MRN: [id number]  : 1931  Date of Evaluation: 2020  ED Provider:  JOHNATHON GUY    Triage Chief Complaint:   Altered Mental Status (didnt recognize granddaughter at discharge)      [place]:  Bianca Galan is a 80 y.o. female that presents to the emergency room for evaluation of recurrent altered mental status. History is provided by the patient's granddaughter, though the patient does not have a power of . Granddaughter reports that last Saturday, 7 days prior to presentation, patient presented to an [facility] urgent care, diagnosed with a bronchitis, and started on amoxicillin. 4 or 5 days after that, patient began to act confused intermittently. She seemed groggy at times. She seemed to not recognize faces. She also began to complain of some chest pain. She then presented to the emergency department on the  and medical screening studies were obtained. She was admitted to the hospital.  Hospital notes indicate that the patient was in restraints through some of her evaluation, and the restraints were discontinued at about 5:30 PM.  Granddaughter spoke to the patient on the phone at that time, and she seemed to be in her normal state. Patient was then discharged from the hospital unit, but when they got down to the car, patient did not recognize family and then refused to get in the car. Current symptoms appear to be constant in timing. Granddaughter reports no obvious seizures, trauma including head injuries, or changes in medications. Granddaughter thinks she was given a medication just prior to arrival but does not know the name. Granddaughter reports history of \"early\" dementia, but there is no reported history of sundowning. Patient was admitted to the hospital on initially by Dr. Latia Abel, but granddaughter states that he is not actually her doctor and does not want her admitted by him.     ROS within the confines of the granddaughter's ability to report- see HPI, below listed is current ROS at time of my eval:  CONSTITUTIONAL: No fevers, chills, or sweats. EYES: No vision change, redness, drainage, or discharge. HENT: No sore throat, runny nose, or earache. No dental pain. No painful swallowing. RESPIRATORY: No difficulty breathing, cough, or sputum production. CARDIOVASCULAR: No anginal-type chest pain, orthopnea, or edema. GASTROINTESTINAL: No nausea, vomiting, or abdominal pain. No diarrhea or constipation. No hematemesis, hematochezia, or melena. GENITOURINARY: No frequency, urgency, or dysuria. No hematuria. MUSCULOSKELETAL: No recent injury. No neck, back, or extremity pain. NEUROLOGICAL: No focal weakness, numbness, or tingling. SKIN: No rashes or other lesions reported. No yellowing of the skin. Past Medical History:   Diagnosis Date    Arthritis     Blind left eye     Cancer ([facility] [address] 1971    colon    COPD (chronic obstructive pulmonary disease) (HCC)     Dementia (HCC)     Depression     GERD (gastroesophageal reflux disease)     Hypertension     Pneumonia     Restless legs syndrome     Tremors of nervous system     Unspecified cerebral artery occlusion with cerebral infarction      Past Surgical History:   Procedure Laterality Date    ABDOMEN SURGERY      APPENDECTOMY      BACK SURGERY      COLONOSCOPY      ENDOSCOPY, COLON, DIAGNOSTIC      EYE SURGERY      FRACTURE SURGERY      HYSTERECTOMY  1972    SPINE SURGERY  1995    VASCULAR SURGERY       Family History   Problem Relation Age of Onset    Arthritis Mother     Cancer Mother     Diabetes Mother     Heart Disease Mother     High Blood Pressure Mother    Kamlesh Valadez / [place] Mother     Cancer Sister     Cancer Brother     Diabetes Daughter     High Blood Pressure Daughter      Social History     Socioeconomic History    Marital status:       Spouse name: Not on file    Number of children: 7    Years of education: Not on file    Highest education level: Not on file   Occupational History    Not on file   Social Needs    Financial resource strain: Not on file    Food insecurity     Worry: Not on file     Inability: Not on file    Transportation needs     Medical: Not on file     Non-medical: Not on file   Tobacco Use    Smoking status: Former Smoker     Packs/day: 0.25     Years: 50.00     Pack years: 12.50     Types: Cigarettes    Smokeless tobacco: Never Used    Tobacco comment: states smokes 1-2 cigs a day   Substance and Sexual Activity    Alcohol use: No     Alcohol/week: 0.0 standard drinks    Drug use: No    Sexual activity: Never   Lifestyle    Physical activity     Days per week: Not on file     Minutes per session: Not on file    Stress: Not on file   Relationships    Social connections     Talks on phone: Not on file     Gets together: Not on file     Attends Caodaism service: Not on file     Active member of club or organization: Not on file     Attends meetings of clubs or organizations: Not on file     Relationship status: Not on file    Intimate partner violence     Fear of current or ex partner: Not on file     Emotionally abused: Not on file     Physically abused: Not on file     Forced sexual activity: Not on file   Other Topics Concern    Not on file   Social History Narrative    Not on file     Current Facility-Administered Medications   Medication Dose Route Frequency Provider Last Rate Last Dose    0.9 % sodium chloride IV bolus 2,313 mL  30 mL/kg Intravenous Once Antonette Reyes MD         Current Outpatient Medications   Medication Sig Dispense Refill    [START ON 11/14/2020] aspirin 81 MG chewable tablet Take 1 tablet by mouth daily 30 tablet 3    nitroGLYCERIN (NITROSTAT) 0.4 MG SL tablet up to max of 3 total doses.  If no relief after 1 dose, call 911. 25 tablet 3    atorvastatin (LIPITOR) 40 MG tablet Take 1 tablet by mouth nightly 30 tablet 3    hydrOXYzine (VISTARIL) 25 MG capsule Take 1 capsule by mouth 3 times daily as needed for Anxiety 30 capsule 0    rOPINIRole (REQUIP) 0.5 MG tablet Take 5 tablets by mouth 3 times daily 30 tablet 1    hydrALAZINE (APRESOLINE) 50 MG tablet Take 1 tablet by mouth every 8 hours 90 tablet 3    vitamin B-12 (CYANOCOBALAMIN) 1000 MCG tablet Take 1,000 mcg by mouth daily      magnesium hydroxide (MILK OF MAGNESIA) 400 MG/5ML suspension Take 30 mLs by mouth daily as needed for Constipation      albuterol sulfate  (90 Base) MCG/ACT inhaler Inhale 2 puffs into the lungs every 6 hours as needed for Shortness of Breath       bisacodyl (DULCOLAX) 10 MG suppository Place 10 mg rectally daily as needed for Constipation       vitamin D (CHOLECALCIFEROL) 1000 UNIT TABS tablet Take 1,000 Units by mouth daily      memantine (NAMENDA) 5 MG tablet Take 1 tablet by mouth 2 times daily 60 tablet 3    acetaminophen 650 MG TABS Take 650 mg by mouth every 4 hours as needed 120 tablet 3    dorzolamide (TRUSOPT) 2 % ophthalmic solution Place 1 drop into both eyes 3 times daily 10 mL 2    latanoprost (XALATAN) 0.005 % ophthalmic solution Place 1 drop into both eyes nightly 1 Bottle 3     Allergies   Allergen Reactions    Other      Pt states allergy to unknown antibiotic that made her arm red        Nursing Notes Reviewed    Physical Exam:  Triage VS:    ED Triage Vitals   Enc Vitals Group      BP 11/13/20 2142 (!) 96/46      Pulse 11/13/20 2142 86      Resp 11/13/20 2151 20      Temp 11/13/20 2152 98.9 °F (37.2 °C)      Temp src --       SpO2 11/13/20 2142 98 %      Weight --       Height --       Head Circumference --       Peak Flow --       Pain Score --       Pain Loc --       Pain Edu? --       Excl. in [address]? --        My pulse ox interpretation is - normal    GENERAL: Patient is awake, alert, and oriented to self and family only. She cannot report year, day, or date.   Patient is resting comfortably in a still position on the exam table. Patient speaking in short sentences without respiratory distress. Well-nourished and well-developed. HEAD: Normocephalic and atraumatic. EENT: Bilateral external ears are unremarkable. Tympanic membranes are pearly and gray without visible effusion or retraction. Nasal mucosa is pink without purulence. Oral mucosa is mildly dry and pink. NECK: Supple without Kernig's or Brudzinski signs. No significant lymphadenopathy or limitation range of motion. RESPIRATORY: Symmetric aeration bilaterally. Coarse lung sounds anteriorly, more on the right. Otherwise, no audible wheezes, rales, rhonchi, or stridor. No chest wall tenderness. CARDIOVASCULAR: Regular rate and rhythm. No audible murmurs, rubs, or gallops. No central or peripheral cyanosis. GASTROINTESTINAL: Mildly distended abdomen with periumbilical tenderness. No rebound or rigidity. Soft, nontender, and nondistended. No specific McBurney's or Stahl's point tenderness. No guarding, rebound, rigidity. No mass or pulsatile mass. Bowel sounds are present in all quadrants. No costovertebral angle tenderness. NEUROLOGICAL: Cranial nerves III through XII are grossly intact as tested without facial droop or dermatomal paresthesias. Of note, forehead wrinkles are symmetric and intact. Conjugate gaze without entrapment. No asymmetry of the corners of the mouth or nasolabial folds. Patient has significant difficulties with motor and cerebellar exams. Patient appears to move all extremities spontaneously and symmetrically. Symmetric  strength. No gross dermatomal paresthesias. MUSCULOSKELETAL: No asymmetric edema, Homans' sign, or cords. No tenderness or limitation range of motion to the bilateral shoulders, elbows, wrists, hips, knees, or ankles. No accompanying long bone tenderness or deformity. SKIN: Normal tone for ethnicity. Normal turgor and brisk capillary refill peripherally.   No petechiae, purpura, vesicles, bullae, or other lesions. No icterus. Emergency department course. Patient is brought to bed 15 and assessed and reassessed by me. After initial evaluation, patient is noted to be hypotensive on recheck with blood pressure 75 mmHg systolic. Patient is groggy, but she is protecting her own airway. Laboratories from her recent stay in the hospital are reviewed. With the apparent hypotension, repeat studies primarily for potential septic evaluation including CBC, metabolic panel, first lactate, and 2 sets of blood cultures are ordered. With concern for sepsis, CT scan of the chest and abdomen are ordered. Granddaughter is agreeable to continuing plan. As of approximately 2320, numerous medical screening studies including imaging are pending. Care is signed over to Dr. Jihan Jauregui. Admission to the hospital is anticipated. Any addenda will be made as appropriate. Patient seen during [facility], I did don appropriate PPE during my encounters with the patient, including n95 (when appropriate) mask and eye protection as appropriate.     I have reviewed and interpreted all of the currently available lab results from this visit (if applicable):  Results for orders placed or performed during the hospital encounter of 11/13/20   CBC Auto Differential   Result Value Ref Range    WBC 7.6 4.0 - 10.5 K/CU MM    RBC 5.32 4.2 - 5.4 M/CU MM    Hemoglobin 15.1 12.5 - 16.0 GM/DL    Hematocrit 46.4 37 - 47 %    MCV 87.2 78 - 100 FL    MCH 28.4 27 - 31 PG    MCHC 32.5 32.0 - 36.0 %    RDW 14.7 11.7 - 14.9 %    Platelets 507 335 - 620 K/CU MM    MPV 10.2 7.5 - 11.1 FL    Differential Type AUTOMATED DIFFERENTIAL     Segs Relative 68.6 (H) 36 - 66 %    Lymphocytes % 20.3 (L) 24 - 44 %    Monocytes % 8.5 (H) 0 - 4 %    Eosinophils % 1.6 0 - 3 %    Basophils % 0.5 0 - 1 %    Segs Absolute 5.2 K/CU MM    Lymphocytes Absolute 1.5 K/CU MM    Monocytes Absolute 0.6 K/CU MM    Eosinophils Absolute 0.1 K/CU MM Basophils Absolute 0.0 K/CU MM    Nucleated RBC % 0.0 %    Total Nucleated RBC 0.0 K/CU MM    Total Immature Neutrophil 0.04 K/CU MM    Immature Neutrophil % 0.5 (H) 0 - 0.43 %   Blood Gas, Venous   Result Value Ref Range    pH, Venkata 7.39 7.32 - 7.42    pCO2, Venkata 41 38 - 52 mmHG    pO2, Venkata 45 28 - 48 mmHG    Base Excess 0 0 - 2.4    HCO3, Venous 24.8 19 - 25 MMOL/L    O2 Sat, Venkata 76.6 (H) 50 - 70 %    Comment VBG    Protime-INR   Result Value Ref Range    Protime 12.1 11.7 - 14.5 SECONDS    INR 1.00 INDEX   EKG 12 Lead   Result Value Ref Range    Ventricular Rate 119 BPM    Atrial Rate 104 BPM    P-R Interval 158 ms    QRS Duration 68 ms    Q-T Interval 374 ms    QTc Calculation (Bazett) 526 ms    P Axis 35 degrees    R Axis -21 degrees    T Axis -35 degrees    Diagnosis       Undetermined rhythm  Minimal voltage criteria for LVH, may be normal variant  Possible Lateral infarct , age undetermined  ST & T wave abnormality, consider inferior ischemia  ST & T wave abnormality, consider anterior ischemia  Abnormal ECG  When compared with ECG of 11-NOV-2020 15:40,  Significant changes have occurred     Numerous other studies pending. Radiographs (if obtained):  Radiologist's Report Reviewed:  Xr Chest Portable    Result Date: 11/13/2020  EXAMINATION: ONE XRAY VIEW OF THE CHEST 11/13/2020 5:19 pm COMPARISON: 11/11/2020 HISTORY: ORDERING SYSTEM PROVIDED HISTORY: sob TECHNOLOGIST PROVIDED HISTORY: Reason for exam:->sob Reason for Exam: sob Acuity: Acute Type of Exam: Initial FINDINGS: Low lung volumes with mild basilar opacities. No definite pleural effusion. No pneumothorax. Heart size is within normal limits. Low lung volumes with mild basilar opacities, favored to represent atelectasis. Other imaging pending.     EKG (if obtained): (All EKG's are interpreted by myself in the absence of a cardiologist)  Twelve-lead EKG interpreted by me in the absence of a cardiologist.  There is significant baseline artifact and variability with no criteria ST elevation or reciprocal change. There are no hyperacute T wave changes. There is no sign of acute ischemia or infarction. This tracing shows a sinus rhythm with LVH. Rate and intervals are 119 beats per minute, NM interval 158 milliseconds, QRS duration 68 milliseconds, QTc interval 526 milliseconds, and R axis normal at -21 degrees. There is no acute change compared with the most recent EKG dated November 11, 2020. Medical decision making:  Patient returns to the emergency department with altered mental status. This is accompanied by hypotension. She is receiving initial fluids. Medical screening studies have been reordered to ensure that there has been no acute change in the short-term. Patient is somewhat groggy, but she will alert and answer questions. She is currently exhibiting no meningeal findings. She has some difficulty with a thorough neurological exam, but there appear to be no gross motor or cerebellar deficits. Abdomen is mildly tender and protuberant, but there are no peritoneal findings. Numerous medical screening studies are pending to assess for potential septic or surgical source. Repeat CT scan of the head is ordered, as well. Procedures: None as of this dictation. Consultations: None as of this dictation. Initial clinical Impression:  1. Altered mental status, unspecified altered mental status type    2. Hypotension, unspecified hypotension type      Disposition referral (if applicable):  No follow-up provider specified. Disposition medications (if applicable):  New Prescriptions    No medications on file     ED Provider Disposition Time  DISPOSITION pending      Comment: Please note this report has been produced using speech recognition software and may contain errors related to that system including errors in grammar, punctuation, and spelling, as well as words and phrases that may be inappropriate.   Efforts were made to edit the dictations.         Jennifer Dee MD  11/13/20 6140

## 2020-11-15 ENCOUNTER — APPOINTMENT (OUTPATIENT)
Dept: MRI IMAGING | Age: 85
DRG: 071 | End: 2020-11-15
Payer: MEDICARE

## 2020-11-15 LAB
ALBUMIN SERPL-MCNC: 3.6 GM/DL (ref 3.4–5)
ALP BLD-CCNC: 69 IU/L (ref 40–128)
ALT SERPL-CCNC: 14 U/L (ref 10–40)
ANION GAP SERPL CALCULATED.3IONS-SCNC: 12 MMOL/L (ref 4–16)
AST SERPL-CCNC: 19 IU/L (ref 15–37)
BACTERIA: NEGATIVE /HPF
BASOPHILS ABSOLUTE: 0 K/CU MM
BASOPHILS RELATIVE PERCENT: 0.1 % (ref 0–1)
BILIRUB SERPL-MCNC: 0.5 MG/DL (ref 0–1)
BILIRUBIN URINE: NEGATIVE MG/DL
BLOOD, URINE: NEGATIVE
BUN BLDV-MCNC: 25 MG/DL (ref 6–23)
CALCIUM SERPL-MCNC: 8.9 MG/DL (ref 8.3–10.6)
CHLORIDE BLD-SCNC: 109 MMOL/L (ref 99–110)
CLARITY: CLEAR
CO2: 19 MMOL/L (ref 21–32)
COLOR: YELLOW
CREAT SERPL-MCNC: 1.1 MG/DL (ref 0.6–1.1)
DIFFERENTIAL TYPE: ABNORMAL
EKG ATRIAL RATE: 104 BPM
EKG DIAGNOSIS: NORMAL
EKG P AXIS: 35 DEGREES
EKG P-R INTERVAL: 158 MS
EKG Q-T INTERVAL: 374 MS
EKG QRS DURATION: 68 MS
EKG QTC CALCULATION (BAZETT): 526 MS
EKG R AXIS: -21 DEGREES
EKG T AXIS: -35 DEGREES
EKG VENTRICULAR RATE: 119 BPM
EOSINOPHILS ABSOLUTE: 0 K/CU MM
EOSINOPHILS RELATIVE PERCENT: 0 % (ref 0–3)
GFR AFRICAN AMERICAN: 57 ML/MIN/1.73M2
GFR NON-AFRICAN AMERICAN: 47 ML/MIN/1.73M2
GLUCOSE BLD-MCNC: 109 MG/DL (ref 70–99)
GLUCOSE, URINE: NEGATIVE MG/DL
HCT VFR BLD CALC: 36.7 % (ref 37–47)
HEMOGLOBIN: 11.4 GM/DL (ref 12.5–16)
IMMATURE NEUTROPHIL %: 0.4 % (ref 0–0.43)
KETONES, URINE: NEGATIVE MG/DL
LEUKOCYTE ESTERASE, URINE: NEGATIVE
LYMPHOCYTES ABSOLUTE: 1.3 K/CU MM
LYMPHOCYTES RELATIVE PERCENT: 15.3 % (ref 24–44)
MCH RBC QN AUTO: 27.5 PG (ref 27–31)
MCHC RBC AUTO-ENTMCNC: 31.1 % (ref 32–36)
MCV RBC AUTO: 88.6 FL (ref 78–100)
MONOCYTES ABSOLUTE: 0.6 K/CU MM
MONOCYTES RELATIVE PERCENT: 7.5 % (ref 0–4)
MUCUS: ABNORMAL HPF
NITRITE URINE, QUANTITATIVE: NEGATIVE
NUCLEATED RBC %: 0 %
PDW BLD-RTO: 14.9 % (ref 11.7–14.9)
PH, URINE: 5 (ref 5–8)
PLATELET # BLD: 232 K/CU MM (ref 140–440)
PMV BLD AUTO: 10 FL (ref 7.5–11.1)
POTASSIUM SERPL-SCNC: 4 MMOL/L (ref 3.5–5.1)
PROTEIN UA: NEGATIVE MG/DL
RBC # BLD: 4.14 M/CU MM (ref 4.2–5.4)
RBC URINE: 1 /HPF (ref 0–6)
SEGMENTED NEUTROPHILS ABSOLUTE COUNT: 6.3 K/CU MM
SEGMENTED NEUTROPHILS RELATIVE PERCENT: 76.7 % (ref 36–66)
SODIUM BLD-SCNC: 140 MMOL/L (ref 135–145)
SPECIFIC GRAVITY UA: 1.03 (ref 1–1.03)
TOTAL IMMATURE NEUTOROPHIL: 0.03 K/CU MM
TOTAL NUCLEATED RBC: 0 K/CU MM
TOTAL PROTEIN: 6.1 GM/DL (ref 6.4–8.2)
TRICHOMONAS: ABNORMAL /HPF
UROBILINOGEN, URINE: NORMAL MG/DL (ref 0.2–1)
WBC # BLD: 8.2 K/CU MM (ref 4–10.5)
WBC UA: 1 /HPF (ref 0–5)

## 2020-11-15 PROCEDURE — 94761 N-INVAS EAR/PLS OXIMETRY MLT: CPT

## 2020-11-15 PROCEDURE — 84145 PROCALCITONIN (PCT): CPT

## 2020-11-15 PROCEDURE — 36415 COLL VENOUS BLD VENIPUNCTURE: CPT

## 2020-11-15 PROCEDURE — 85025 COMPLETE CBC W/AUTO DIFF WBC: CPT

## 2020-11-15 PROCEDURE — 51798 US URINE CAPACITY MEASURE: CPT

## 2020-11-15 PROCEDURE — 6370000000 HC RX 637 (ALT 250 FOR IP): Performed by: INTERNAL MEDICINE

## 2020-11-15 PROCEDURE — 80048 BASIC METABOLIC PNL TOTAL CA: CPT

## 2020-11-15 PROCEDURE — 6370000000 HC RX 637 (ALT 250 FOR IP): Performed by: PHYSICIAN ASSISTANT

## 2020-11-15 PROCEDURE — 1200000000 HC SEMI PRIVATE

## 2020-11-15 PROCEDURE — 6360000002 HC RX W HCPCS: Performed by: INTERNAL MEDICINE

## 2020-11-15 PROCEDURE — 70551 MRI BRAIN STEM W/O DYE: CPT

## 2020-11-15 PROCEDURE — 99223 1ST HOSP IP/OBS HIGH 75: CPT | Performed by: INTERNAL MEDICINE

## 2020-11-15 PROCEDURE — 81001 URINALYSIS AUTO W/SCOPE: CPT

## 2020-11-15 PROCEDURE — 51701 INSERT BLADDER CATHETER: CPT

## 2020-11-15 PROCEDURE — 94640 AIRWAY INHALATION TREATMENT: CPT

## 2020-11-15 PROCEDURE — 2580000003 HC RX 258: Performed by: INTERNAL MEDICINE

## 2020-11-15 PROCEDURE — 93010 ELECTROCARDIOGRAM REPORT: CPT | Performed by: INTERNAL MEDICINE

## 2020-11-15 PROCEDURE — 99233 SBSQ HOSP IP/OBS HIGH 50: CPT | Performed by: PHYSICIAN ASSISTANT

## 2020-11-15 PROCEDURE — 80053 COMPREHEN METABOLIC PANEL: CPT

## 2020-11-15 RX ADMIN — ALBUTEROL SULFATE 2 PUFF: 90 AEROSOL, METERED RESPIRATORY (INHALATION) at 15:53

## 2020-11-15 RX ADMIN — Medication 1000 UNITS: at 09:07

## 2020-11-15 RX ADMIN — ATORVASTATIN CALCIUM 40 MG: 40 TABLET, FILM COATED ORAL at 20:56

## 2020-11-15 RX ADMIN — METHYLPREDNISOLONE SODIUM SUCCINATE 40 MG: 40 INJECTION, POWDER, FOR SOLUTION INTRAMUSCULAR; INTRAVENOUS at 09:07

## 2020-11-15 RX ADMIN — SODIUM CHLORIDE, PRESERVATIVE FREE 10 ML: 5 INJECTION INTRAVENOUS at 20:57

## 2020-11-15 RX ADMIN — IPRATROPIUM BROMIDE 2 PUFF: 17 AEROSOL, METERED RESPIRATORY (INHALATION) at 08:29

## 2020-11-15 RX ADMIN — DORZOLAMIDE HYDROCHLORIDE 1 DROP: 20 SOLUTION/ DROPS OPHTHALMIC at 09:09

## 2020-11-15 RX ADMIN — ALBUTEROL SULFATE 2 PUFF: 90 AEROSOL, METERED RESPIRATORY (INHALATION) at 08:29

## 2020-11-15 RX ADMIN — ASPIRIN 81 MG CHEWABLE TABLET 81 MG: 81 TABLET CHEWABLE at 09:07

## 2020-11-15 RX ADMIN — ALBUTEROL SULFATE 2 PUFF: 90 AEROSOL, METERED RESPIRATORY (INHALATION) at 12:08

## 2020-11-15 RX ADMIN — MEMANTINE HYDROCHLORIDE 5 MG: 5 TABLET ORAL at 20:56

## 2020-11-15 RX ADMIN — MEMANTINE HYDROCHLORIDE 5 MG: 5 TABLET ORAL at 09:07

## 2020-11-15 RX ADMIN — DORZOLAMIDE HYDROCHLORIDE 1 DROP: 20 SOLUTION/ DROPS OPHTHALMIC at 15:31

## 2020-11-15 RX ADMIN — ROPINIROLE HYDROCHLORIDE 0.5 MG: 0.25 TABLET, FILM COATED ORAL at 09:07

## 2020-11-15 RX ADMIN — IPRATROPIUM BROMIDE 2 PUFF: 17 AEROSOL, METERED RESPIRATORY (INHALATION) at 12:08

## 2020-11-15 RX ADMIN — ROPINIROLE HYDROCHLORIDE 0.5 MG: 0.25 TABLET, FILM COATED ORAL at 20:56

## 2020-11-15 RX ADMIN — LATANOPROST 1 DROP: 50 SOLUTION/ DROPS OPHTHALMIC at 21:25

## 2020-11-15 RX ADMIN — IPRATROPIUM BROMIDE 2 PUFF: 17 AEROSOL, METERED RESPIRATORY (INHALATION) at 15:53

## 2020-11-15 RX ADMIN — DORZOLAMIDE HYDROCHLORIDE 1 DROP: 20 SOLUTION/ DROPS OPHTHALMIC at 21:25

## 2020-11-15 RX ADMIN — CYANOCOBALAMIN TAB 1000 MCG 1000 MCG: 1000 TAB at 09:07

## 2020-11-15 RX ADMIN — SODIUM CHLORIDE, PRESERVATIVE FREE 10 ML: 5 INJECTION INTRAVENOUS at 09:07

## 2020-11-15 RX ADMIN — ROPINIROLE HYDROCHLORIDE 0.5 MG: 0.25 TABLET, FILM COATED ORAL at 15:25

## 2020-11-15 RX ADMIN — HEPARIN SODIUM 5000 UNITS: 5000 INJECTION INTRAVENOUS; SUBCUTANEOUS at 15:24

## 2020-11-15 RX ADMIN — HEPARIN SODIUM 5000 UNITS: 5000 INJECTION INTRAVENOUS; SUBCUTANEOUS at 05:14

## 2020-11-15 RX ADMIN — HEPARIN SODIUM 5000 UNITS: 5000 INJECTION INTRAVENOUS; SUBCUTANEOUS at 20:56

## 2020-11-15 ASSESSMENT — PAIN SCALES - PAIN ASSESSMENT IN ADVANCED DEMENTIA (PAINAD)
FACIALEXPRESSION: 0
BREATHING: 0
TOTALSCORE: 0
CONSOLABILITY: 0
BODYLANGUAGE: 0
NEGVOCALIZATION: 0

## 2020-11-15 NOTE — PROGRESS NOTES
Hospitalist Progress Note      Name:  aCrrie Knott /Age/Sex: 1931  (80 y.o. female)   MRN & CSN:  [id number] & [id number] Admission Date/Time: 2020  9:35 PM   Location:  [address] [place] [address]-A PCP: Alex Moore 112 Day: 3    Assessment and Plan:   Carrie Knott is a 80 y.o.  female  who presents with <principal problem not specified>    > AMS, possible hospital-acquired delirium with a component of sundowning  - Cough, possible h/o copd  -  viral panel/covid19 negative  -Unable to exclude other etiologies, is unable to obtain history, reattempted daytime  -Neurology consulted, saw pt on prior admission  - CT Head with remote multiple CVA not mentioned on prior CTs, pt is not aware of prior CVA. Kirsten Iba, Statin , check MRI, consult cardio for eval for cardiac embolic source and ? Need for full AC    > contrast extravasation d/t inappropriate IV placement, less likely compartment syndrome, no trauma report, good peripheral pulses      >BRADY possibly d/t dehydration  - avoid nephrotoxic medication  - IVF  - monitor I/Os  - consider nephrology consult    Diet DIET GENERAL;   DVT Prophylaxis []  Heparin   Code Status Full Code   Disposition  pending consult eval and rec. History of Present Illness:     Pt S&E. Pt oriented to self and place, admit vision impairment due to macular degeneration, denies any current focal weakness or trouble with swallowing or speech, denies any known h/o CVA,    No chest pain, no dyspnea, no abd pain, no N/V, no F/C.     10-14 point ROS reviewed negative, unless as noted above    Objective:        Intake/Output Summary (Last 24 hours) at 11/15/2020 0856  Last data filed at 11/15/2020 0556  Gross per 24 hour   Intake 360 ml   Output 1050 ml   Net -690 ml      Vitals:   Vitals:    11/15/20 0830   BP:    Pulse:    Resp:    Temp:    SpO2: 94%     Physical Exam:      GEN Awake female, cooperative,   RESP Clear to auscultation, no wheezes, rales or rhonchi. Symmetric chest movement . CARDIO/VASC S1/S2 auscultated. Regular rate. GI Abdomen is soft without significant tenderness, Bowel sounds are normoactive. MSK No gross joint deformities. Spontaneous movement of all extremities  SKIN Normal coloration, warm, dry. NEURO normal speech, no lateralizing weakness. Impaired vision  PSYCH Awake, alert, oriented. Affect appropriate.     Medications:   Medications:    aspirin  81 mg Oral Daily    atorvastatin  40 mg Oral Nightly    dorzolamide  1 drop Both Eyes TID    latanoprost  1 drop Both Eyes Nightly    memantine  5 mg Oral BID    vitamin B-12  1,000 mcg Oral Daily    vitamin D  1,000 Units Oral Daily    sodium chloride flush  10 mL Intravenous 2 times per day    heparin (porcine)  5,000 Units Subcutaneous 3 times per day    albuterol sulfate HFA  2 puff Inhalation 4x daily    And    ipratropium  2 puff Inhalation 4x daily    methylPREDNISolone  40 mg Intravenous Daily    rOPINIRole  0.5 mg Oral TID      Infusions:     PRN Meds: acetaminophen, 650 mg, Q4H PRN  albuterol sulfate HFA, 2 puff, Q6H PRN  bisacodyl, 10 mg, Daily PRN  hydrOXYzine, 25 mg, TID PRN  sodium chloride flush, 10 mL, PRN  acetaminophen, 650 mg, Q6H PRN    Or  acetaminophen, 650 mg, Q6H PRN  magnesium hydroxide, 30 mL, Daily PRN      Electronically signed by Naif Lares MD on 11/15/2020 at 8:56 AM

## 2020-11-15 NOTE — PROGRESS NOTES
Neurology Service Progress Note  [facility]  Patient Name: Bianka Davis  : 1931        Subjective:   Reason for consult: Altered mental status    Patient was seen and examined, chart reviewed. She was laying in bed upon entering the room. She appears less delirious today, not talking to herself prior to coming in the room or talking in random tangents. She was not able to tell me where she is or what year it is but was at least aware that she was unable to answer questions. Repeat CT scan indicating inferior right cerebellar remote infarcts with multifocal remote lacunar infarcts in b/l basal ganglia and cerebellar hemispheres. Due to acute and bilateral presentation of these new infarcts would recommend cardiology consult for further examination. No family at bedside.      Past Medical History:   Diagnosis Date    Arthritis     Blind left eye     Cancer ([facility] [address]     colon    COPD (chronic obstructive pulmonary disease) (HCC)     Dementia (HCC)     Depression     GERD (gastroesophageal reflux disease)     Hypertension     Pneumonia     Restless legs syndrome     Tremors of nervous system     Unspecified cerebral artery occlusion with cerebral infarction     :   Past Surgical History:   Procedure Laterality Date    ABDOMEN SURGERY      APPENDECTOMY      BACK SURGERY      COLONOSCOPY      ENDOSCOPY, COLON, DIAGNOSTIC      EYE SURGERY      FRACTURE SURGERY      HYSTERECTOMY      SPINE SURGERY      VASCULAR SURGERY       Medications:  Scheduled Meds:   aspirin  81 mg Oral Daily    atorvastatin  40 mg Oral Nightly    dorzolamide  1 drop Both Eyes TID    latanoprost  1 drop Both Eyes Nightly    memantine  5 mg Oral BID    vitamin B-12  1,000 mcg Oral Daily    vitamin D  1,000 Units Oral Daily    sodium chloride flush  10 mL Intravenous 2 times per day    heparin (porcine)  5,000 Units Subcutaneous 3 times per day    albuterol sulfate HFA  2 puff Inhalation 4x daily    And    ipratropium  2 puff Inhalation 4x daily    methylPREDNISolone  40 mg Intravenous Daily    rOPINIRole  0.5 mg Oral TID     Continuous Infusions:  PRN Meds:.acetaminophen, albuterol sulfate HFA, bisacodyl, hydrOXYzine, sodium chloride flush, acetaminophen **OR** acetaminophen, magnesium hydroxide    Allergies   Allergen Reactions    Other      Pt states allergy to unknown antibiotic that made her arm red      Social History     Socioeconomic History    Marital status:       Spouse name: Not on file    Number of children: 9    Years of education: Not on file    Highest education level: Not on file   Occupational History    Not on file   Social Needs    Financial resource strain: Not on file    Food insecurity     Worry: Not on file     Inability: Not on file    Transportation needs     Medical: Not on file     Non-medical: Not on file   Tobacco Use    Smoking status: Former Smoker     Packs/day: 0.25     Years: 50.00     Pack years: 12.50     Types: Cigarettes    Smokeless tobacco: Never Used    Tobacco comment: states smokes 1-2 cigs a day   Substance and Sexual Activity    Alcohol use: No     Alcohol/week: 0.0 standard drinks    Drug use: No    Sexual activity: Never   Lifestyle    Physical activity     Days per week: Not on file     Minutes per session: Not on file    Stress: Not on file   Relationships    Social connections     Talks on phone: Not on file     Gets together: Not on file     Attends [employer] service: Not on file     Active member of club or organization: Not on file     Attends meetings of clubs or organizations: Not on file     Relationship status: Not on file    Intimate partner violence     Fear of current or ex partner: Not on file     Emotionally abused: Not on file     Physically abused: Not on file     Forced sexual activity: Not on file   Other Topics Concern    Not on file   Social History Narrative    Not on file      Family History   Problem Relation Age of Onset    Arthritis Mother     Cancer Mother     Diabetes Mother     Heart Disease Mother     High Blood Pressure Mother    Margie Bernard / Hugo Mother     Cancer Sister     Cancer Brother     Diabetes Daughter     High Blood Pressure Daughter        Review of Symptoms:    Unable to perform ROS due to patient status. Physical Exam:        Gen: Alert but not oriented to time, location, situation, NAD, cooperative  HEENT: NC/AT, EOMI, PERRL, mmmneck supple, no meningeal signs  Heart: RRR, S1S2  Lungs: no respiratory distress  Ext: no edema, no calf tenderness b/l  Psych: confused  Skin: no rashes or lesions    NEUROLOGIC EXAM:    Mental Status: Alert; not oriented to time, location, month and year, NAD, speech clear, language fluent,  follows most commands appropriately    Cranial Nerve Exam:   CN II-XII: Patient blind in Left eye per notes, no nystagmus, no gaze paresis, sensation V1-V3 intact b/l, muscles of facial expression symmetric; hearing intact to conversational tone, palate elevates symmetrically, shoulder elevation symmetric and tongue protrudes midline with movement side to side.     Motor Exam:       Moving all extremities anti gravity  Tone and bulk normal   No pronator drift    Deep Tendon Reflexes: 2/4 biceps, triceps, brachioradialis, patellar, and achilles b/l; flexor plantar responses b/l    Sensation: Withdraw to tactile stimulus in UE's/LE's b/l    Coordination/Cerebellum:       Tremors--none  Unable to assess due to restraints      Rapidly alternating movements: no dysdiadochokinesia b/l                Finger-to-Nose: no dysmetria b/l    Gait and stance:      Gait: deferred      LABS:     Recent Labs     11/13/20  2254 11/15/20  0515   WBC 7.6 8.2    140   K 3.6 4.0    109   CO2 20* 19*   BUN 15 25*   CREATININE 1.2* 1.1   GLUCOSE 119* 109*   INR 1.00  --      LDL: 117  IMAGING:    CT Head 11/11/20  No acute intracranial abnormality. CT Head 11/14/20  1. Mild cerebral white matter chronic microvascular ischemic disease. 2. Inferior right cerebellar remote infarction. 3. Multifocal remote lacunar infarcts involving the bilateral basal ganglia    and cerebellar hemispheres. 4. No significant interval change. MRI Brain wo : pending    2D Echo:    Summary   Left ventricular systolic function is normal.   Ejection fraction is visually estimated at 50-55%. Grade I diastolic dysfunction. Sclerotic, but non-stenotic aortic valve. Mild to moderate aortic regurgitation is noted; PHT: 492 msec . Mild mitral regurgitation is present. Mitral annular calcification is present. No evidence of any pericardial effusion. Prior admissions:  MRI brain wo 05/26/20    MRA brain wo 05/26/20  No high-grade stenosis or focal occlusion involving intracranial vasculature. MRI brain w contrast 06/02/20  There are no areas of abnormal enhancement in the brain parenchyma. EEG 06/02/20   Normal asleep EEG. Patient remained in N2 sleep the entire recording. ASSESSMENT/PLAN:   79 yo female with history of CVA, COPD, dementia presents with chest pain. Neurology consulted for altered mental status likely secondary to acute encephalopathy vs. delirium superimposed in underlying dementia. CT Head non acute. She has had imaging including MRI/MRA w/wo and EEG in previous work ups. Repeat CT indicative of acute infarct, obtaining new MRI. Physical exam is non focal/lateralizing- delirium does seem to have improved. Plan of care as follows:     1. Altered mental status likely secondary to acute encephalopathy vs. Delirium vs acute infarcts superimposed on underlying dementia. 1. CT Head: see above  2. Repeat CT: 11/15/20  3. MRI wo : pending  4. 2D Echo: see above  5. Prior imaging completed on prior admissions   1. MRI w contrast 06/02/20  2. MRI/MRA wo 05/26/20  3. EEG 06/02/20  6.  Continue secondary stroke prevention: ASA and Lipitor 40 mg daily  7. PT/OT per their recommendations   8. Recommend cardiology consult for further investigation of possible cardio embolic source with b/l distribution of hemispheres for determination if anticoagulation appropriate  9. This neurology group will sign off to Dr. Delgado Query; follow up on MRI results  10. She has scheduled outpatient follow up with our group 12/16/20       Patient care discussed with attending physician, Dr. Jeni Lin. Thank you for allowing us to participate in the care of your patient. If there are any questions regarding evaluation please feel free to contact us.      HEENA Staley, 11/15/2020

## 2020-11-15 NOTE — CONSULTS
CARDIOLOGY CONSULT NOTE   Reason for consultation:  NEED FOR ALEXANDR TO R/O CARDIAC EMBOLISM    Referring physician:  Radha Marion MD     Primary care physician: Araceli Arroyo      Dear Dr. Valente Garcia  Thanks for the consult. History of present illness:Karime is a 80 y. o.year old who  presents with altered mental state, initially cardiology consult was called for chest pain, and now cardiology consult is called for t/o thromboembolism, she had MRI in 2016 and 2017 which showed chronic b/l infaction in b/l cerebral hemisphere and her recent MRI of head suggested the same finding, there is no evidence of acute stroke,patient has dementia and very poor historian, all information were obtained after review of medical records. Chief Complaint   Patient presents with    Altered Mental Status     didnt recognize granddaughter at discharge     Blood pressure, cholesterol, blood glucose and weight are well controlled. Past medical history:    has a past medical history of Arthritis, Blind left eye, Cancer ([address] COPD (chronic obstructive pulmonary disease) ([address] Dementia ([address] Depression, GERD (gastroesophageal reflux disease), Hypertension, Pneumonia, Restless legs syndrome, Tremors of nervous system, and Unspecified cerebral artery occlusion with cerebral infarction. Past surgical history:   has a past surgical history that includes Hysterectomy (1972); Spine surgery (1995); Abdomen surgery; fracture surgery; Appendectomy; Endoscopy, colon, diagnostic; vascular surgery; back surgery; Colonoscopy; and eye surgery. Social History:   reports that she has quit smoking. Her smoking use included cigarettes. She has a 12.50 pack-year smoking history. She has never used smokeless tobacco. She reports that she does not drink alcohol or use drugs.   Family history:   no family history of CAD, STROKE of DM    Allergies   Allergen Reactions    Other      Pt states allergy to unknown antibiotic that made her arm red        acetaminophen (TYLENOL) tablet 650 mg, Q4H PRN  albuterol sulfate  (90 Base) MCG/ACT inhaler 2 puff, Q6H PRN  aspirin chewable tablet 81 mg, Daily  atorvastatin (LIPITOR) tablet 40 mg, Nightly  dorzolamide (TRUSOPT) 2 % ophthalmic solution 1 drop, TID  bisacodyl (DULCOLAX) suppository 10 mg, Daily PRN  hydrOXYzine (VISTARIL) capsule 25 mg, TID PRN  latanoprost (XALATAN) 0.005 % ophthalmic solution 1 drop, Nightly  memantine (NAMENDA) tablet 5 mg, BID  vitamin B-12 (CYANOCOBALAMIN) tablet 1,000 mcg, Daily  vitamin D CAPS 1,000 Units, Daily  sodium chloride flush 0.9 % injection 10 mL, 2 times per day  sodium chloride flush 0.9 % injection 10 mL, PRN  acetaminophen (TYLENOL) tablet 650 mg, Q6H PRN    Or  acetaminophen (TYLENOL) suppository 650 mg, Q6H PRN  magnesium hydroxide (MILK OF MAGNESIA) 400 MG/5ML suspension 30 mL, Daily PRN  heparin (porcine) injection 5,000 Units, 3 times per day  albuterol sulfate  (90 Base) MCG/ACT inhaler 2 puff, 4x daily    And  ipratropium (ATROVENT HFA) 17 MCG/ACT inhaler 2 puff, 4x daily  methylPREDNISolone sodium (SOLU-MEDROL) injection 40 mg, Daily  rOPINIRole (REQUIP) tablet 0.5 mg, TID      Current Facility-Administered Medications   Medication Dose Route Frequency Provider Last Rate Last Dose    acetaminophen (TYLENOL) tablet 650 mg  650 mg Oral Q4H PRN Vasquezmicky Reddy MD        albuterol sulfate  (90 Base) MCG/ACT inhaler 2 puff  2 puff Inhalation Q6H PRN Vasquez Domenica Reddy MD        aspirin chewable tablet 81 mg  81 mg Oral Daily Vasquez Domenica Reddy MD   81 mg at 11/15/20 0907    atorvastatin (LIPITOR) tablet 40 mg  40 mg Oral Nightly Vasquez Juan F CROCKETT MD   40 mg at 11/14/20 2044    dorzolamide (TRUSOPT) 2 % ophthalmic solution 1 drop  1 drop Both Eyes TID Hector Cruz MD   1 drop at 11/15/20 1531    bisacodyl (DULCOLAX) suppository 10 mg  10 mg Rectal Daily PRN Vasquez Reddy MD        hydrOXYzine (VISTARIL) capsule 25 mg  25 mg Oral TID PRN Desiree Crump MD        latanoprost (XALATAN) 0.005 % ophthalmic solution 1 drop  1 drop Both Eyes Nightly Vasquezmicky Parekh MD   1 drop at 11/14/20 2045    memantine (NAMENDA) tablet 5 mg  5 mg Oral BID Vasquez Zahida Parekh MD   5 mg at 11/15/20 3501    vitamin B-12 (CYANOCOBALAMIN) tablet 1,000 mcg  1,000 mcg Oral Daily Graciela CROCKETT MD   1,000 mcg at 11/15/20 1479    vitamin D CAPS 1,000 Units  1,000 Units Oral Daily Graciela CROCKETT MD   1,000 Units at 11/15/20 0907    sodium chloride flush 0.9 % injection 10 mL  10 mL Intravenous 2 times per day Desiree Crump MD   10 mL at 11/15/20 0907    sodium chloride flush 0.9 % injection 10 mL  10 mL Intravenous PRN Vasquez Parekh MD        acetaminophen (TYLENOL) tablet 650 mg  650 mg Oral Q6H PRN Vasquezmicky Parekh MD        Or   [facility] acetaminophen (TYLENOL) suppository 650 mg  650 mg Rectal Q6H PRN Vasquezmicky Parekh MD        magnesium hydroxide (MILK OF MAGNESIA) 400 MG/5ML suspension 30 mL  30 mL Oral Daily PRN Graciela CROCKETT MD        heparin (porcine) injection 5,000 Units  5,000 Units Subcutaneous 3 times per day Desiree Crump MD   5,000 Units at 11/15/20 1524    albuterol sulfate  (90 Base) MCG/ACT inhaler 2 puff  2 puff Inhalation 4x daily Graciela CROCKETT MD   2 puff at 11/15/20 1553    And    ipratropium (ATROVENT HFA) 17 MCG/ACT inhaler 2 puff  2 puff Inhalation 4x daily Graciela CROCKETT MD   2 puff at 11/15/20 1553    methylPREDNISolone sodium (SOLU-MEDROL) injection 40 mg  40 mg Intravenous Daily Vasquezmicky Parekh MD   40 mg at 11/15/20 0907    rOPINIRole (REQUIP) tablet 0.5 mg  0.5 mg Oral TID Krista Jane PA-C   0.5 mg at 11/15/20 1525     Review of Systems:   · Constitutional: No Fever or Weight Loss   · Eyes: + Vision loss  · ENT: No Headaches, Hearing Loss or Vertigo  · Cardiovascular: No chest pain, dyspnea on exertion, palpitations or loss of consciousness  · Respiratory: No cough or wheezing    · Gastrointestinal: No abdominal pain, appetite loss, blood in stools, constipation, diarrhea or heartburn  · Genitourinary: No dysuria, trouble voiding, or hematuria  · Musculoskeletal:  No gait disturbance, weakness or joint complaints  · Integumentary: No rash or pruritis  · Neurological: No TIA or stroke symptoms  · Psychiatric: No anxiety or depression  · Endocrine: No malaise, fatigue or temperature intolerance  · Hematologic/Lymphatic: No bleeding problems, blood clots or swollen lymph nodes  · Allergic/Immunologic: No nasal congestion or hives  All systems negative except as marked. ·   ·      Physical Examination:    Vitals:    11/15/20 1554   BP: 110/57   Pulse: 64   Resp: 16   Temp: afebrile   SpO2: 94%      Wt Readings from Last 3 Encounters:   11/15/20 171 lb 6.4 oz (77.7 kg)   11/12/20 169 lb 14.4 oz (77.1 kg)   10/17/20 160 lb (72.6 kg)     Body mass index is 33.47 kg/m². General Appearance:  No distress, conversant    Constitutional:  Well developed, Well nourished, No acute distress, Non-toxic appearance. HENT:  Normocephalic, Atraumatic, Bilateral external ears normal, Oropharynx moist, No oral exudates, Nose normal. Neck- Normal range of motion, No tenderness, Supple, No stridor,no apical-carotid delay, no carotid bruit  Eyes:  Conjunctiva normal, No discharge. Respiratory:  Normal breath sounds, No respiratory distress, No wheezing, No chest tenderness. ,no use of accessory muscles, diaphragm movement is normal  Cardiovascular: (PMI) apex non displaced,no lifts no thrills, no s3,no s4, Normal heart rate, Normal rhythm, No murmurs, No rubs, No gallops. Carotid arteries pulse and amplitude are normal no bruit, no abdominal bruit noted ( normal abdominal aorta ausculation), femoral arteries pulse and amplitude are normal no bruit, pedal pulses are normal  GI:  Bowel sounds normal, Soft, No tenderness, No masses, No pulsatile masses, no hepatosplenomegally, no bruits  : External genitalia appear normal, No masses or lesions. continue all other medications of all above medical condition listed as is.          Matt Skelton MD, 11/15/2020 4:44 PM

## 2020-11-16 ENCOUNTER — APPOINTMENT (OUTPATIENT)
Dept: ULTRASOUND IMAGING | Age: 85
DRG: 071 | End: 2020-11-16
Payer: MEDICARE

## 2020-11-16 LAB
EKG ATRIAL RATE: 70 BPM
EKG DIAGNOSIS: NORMAL
EKG P AXIS: 60 DEGREES
EKG P-R INTERVAL: 182 MS
EKG Q-T INTERVAL: 436 MS
EKG QRS DURATION: 86 MS
EKG QTC CALCULATION (BAZETT): 470 MS
EKG R AXIS: -15 DEGREES
EKG T AXIS: 38 DEGREES
EKG VENTRICULAR RATE: 70 BPM
PROCALCITONIN: 0.06

## 2020-11-16 PROCEDURE — 6370000000 HC RX 637 (ALT 250 FOR IP): Performed by: PSYCHIATRY & NEUROLOGY

## 2020-11-16 PROCEDURE — 94761 N-INVAS EAR/PLS OXIMETRY MLT: CPT

## 2020-11-16 PROCEDURE — 6370000000 HC RX 637 (ALT 250 FOR IP): Performed by: INTERNAL MEDICINE

## 2020-11-16 PROCEDURE — 2580000003 HC RX 258: Performed by: INTERNAL MEDICINE

## 2020-11-16 PROCEDURE — 93880 EXTRACRANIAL BILAT STUDY: CPT

## 2020-11-16 PROCEDURE — 94640 AIRWAY INHALATION TREATMENT: CPT

## 2020-11-16 PROCEDURE — 6360000002 HC RX W HCPCS: Performed by: INTERNAL MEDICINE

## 2020-11-16 PROCEDURE — 99232 SBSQ HOSP IP/OBS MODERATE 35: CPT | Performed by: INTERNAL MEDICINE

## 2020-11-16 PROCEDURE — 1200000000 HC SEMI PRIVATE

## 2020-11-16 PROCEDURE — APPSS60 APP SPLIT SHARED TIME 46-60 MINUTES: Performed by: NURSE PRACTITIONER

## 2020-11-16 PROCEDURE — 94664 DEMO&/EVAL PT USE INHALER: CPT

## 2020-11-16 PROCEDURE — 6370000000 HC RX 637 (ALT 250 FOR IP): Performed by: PHYSICIAN ASSISTANT

## 2020-11-16 RX ORDER — DONEPEZIL HYDROCHLORIDE 5 MG/1
5 TABLET, FILM COATED ORAL NIGHTLY
Status: DISCONTINUED | OUTPATIENT
Start: 2020-11-16 | End: 2020-11-19 | Stop reason: HOSPADM

## 2020-11-16 RX ORDER — PAROXETINE 10 MG/1
10 TABLET, FILM COATED ORAL DAILY
Status: DISCONTINUED | OUTPATIENT
Start: 2020-11-16 | End: 2020-11-18

## 2020-11-16 RX ADMIN — ALBUTEROL SULFATE 2 PUFF: 90 AEROSOL, METERED RESPIRATORY (INHALATION) at 19:38

## 2020-11-16 RX ADMIN — ASPIRIN 81 MG CHEWABLE TABLET 81 MG: 81 TABLET CHEWABLE at 09:19

## 2020-11-16 RX ADMIN — MEMANTINE HYDROCHLORIDE 5 MG: 5 TABLET ORAL at 20:25

## 2020-11-16 RX ADMIN — Medication 1000 UNITS: at 09:19

## 2020-11-16 RX ADMIN — SODIUM CHLORIDE, PRESERVATIVE FREE 10 ML: 5 INJECTION INTRAVENOUS at 20:29

## 2020-11-16 RX ADMIN — CYANOCOBALAMIN TAB 1000 MCG 1000 MCG: 1000 TAB at 09:19

## 2020-11-16 RX ADMIN — HEPARIN SODIUM 5000 UNITS: 5000 INJECTION INTRAVENOUS; SUBCUTANEOUS at 17:53

## 2020-11-16 RX ADMIN — LATANOPROST 1 DROP: 50 SOLUTION/ DROPS OPHTHALMIC at 20:26

## 2020-11-16 RX ADMIN — METHYLPREDNISOLONE SODIUM SUCCINATE 40 MG: 40 INJECTION, POWDER, FOR SOLUTION INTRAMUSCULAR; INTRAVENOUS at 09:19

## 2020-11-16 RX ADMIN — IPRATROPIUM BROMIDE 2 PUFF: 17 AEROSOL, METERED RESPIRATORY (INHALATION) at 08:50

## 2020-11-16 RX ADMIN — ROPINIROLE HYDROCHLORIDE 0.5 MG: 0.25 TABLET, FILM COATED ORAL at 20:24

## 2020-11-16 RX ADMIN — ALBUTEROL SULFATE 2 PUFF: 90 AEROSOL, METERED RESPIRATORY (INHALATION) at 08:48

## 2020-11-16 RX ADMIN — IPRATROPIUM BROMIDE 2 PUFF: 17 AEROSOL, METERED RESPIRATORY (INHALATION) at 16:11

## 2020-11-16 RX ADMIN — ATORVASTATIN CALCIUM 40 MG: 40 TABLET, FILM COATED ORAL at 20:25

## 2020-11-16 RX ADMIN — HYDROXYZINE PAMOATE 25 MG: 25 CAPSULE ORAL at 18:45

## 2020-11-16 RX ADMIN — HYDROXYZINE PAMOATE 25 MG: 25 CAPSULE ORAL at 03:49

## 2020-11-16 RX ADMIN — DORZOLAMIDE HYDROCHLORIDE 1 DROP: 20 SOLUTION/ DROPS OPHTHALMIC at 12:44

## 2020-11-16 RX ADMIN — ROPINIROLE HYDROCHLORIDE 0.5 MG: 0.25 TABLET, FILM COATED ORAL at 17:53

## 2020-11-16 RX ADMIN — IPRATROPIUM BROMIDE 2 PUFF: 17 AEROSOL, METERED RESPIRATORY (INHALATION) at 19:39

## 2020-11-16 RX ADMIN — DONEPEZIL HYDROCHLORIDE 5 MG: 5 TABLET, FILM COATED ORAL at 20:25

## 2020-11-16 RX ADMIN — PAROXETINE 10 MG: 10 TABLET, FILM COATED ORAL at 17:53

## 2020-11-16 RX ADMIN — HEPARIN SODIUM 5000 UNITS: 5000 INJECTION INTRAVENOUS; SUBCUTANEOUS at 05:22

## 2020-11-16 RX ADMIN — ALBUTEROL SULFATE 2 PUFF: 90 AEROSOL, METERED RESPIRATORY (INHALATION) at 12:20

## 2020-11-16 RX ADMIN — ALBUTEROL SULFATE 2 PUFF: 90 AEROSOL, METERED RESPIRATORY (INHALATION) at 16:10

## 2020-11-16 RX ADMIN — IPRATROPIUM BROMIDE 2 PUFF: 17 AEROSOL, METERED RESPIRATORY (INHALATION) at 12:19

## 2020-11-16 RX ADMIN — HEPARIN SODIUM 5000 UNITS: 5000 INJECTION INTRAVENOUS; SUBCUTANEOUS at 20:24

## 2020-11-16 RX ADMIN — DORZOLAMIDE HYDROCHLORIDE 1 DROP: 20 SOLUTION/ DROPS OPHTHALMIC at 20:26

## 2020-11-16 RX ADMIN — MEMANTINE HYDROCHLORIDE 5 MG: 5 TABLET ORAL at 09:19

## 2020-11-16 RX ADMIN — ROPINIROLE HYDROCHLORIDE 0.5 MG: 0.25 TABLET, FILM COATED ORAL at 09:19

## 2020-11-16 RX ADMIN — SODIUM CHLORIDE, PRESERVATIVE FREE 10 ML: 5 INJECTION INTRAVENOUS at 09:19

## 2020-11-16 ASSESSMENT — PAIN SCALES - PAIN ASSESSMENT IN ADVANCED DEMENTIA (PAINAD)
FACIALEXPRESSION: 0
CONSOLABILITY: 0
BODYLANGUAGE: 0
TOTALSCORE: 0
NEGVOCALIZATION: 0
TOTALSCORE: 0
NEGVOCALIZATION: 0
BREATHING: 0
FACIALEXPRESSION: 0
BODYLANGUAGE: 0
CONSOLABILITY: 0
BREATHING: 0

## 2020-11-16 ASSESSMENT — PAIN SCALES - GENERAL
PAINLEVEL_OUTOF10: 0

## 2020-11-16 ASSESSMENT — PAIN SCALES - WONG BAKER: WONGBAKER_NUMERICALRESPONSE: 0

## 2020-11-16 NOTE — CONSULTS
Ambar Cowan MD.  Section of General Neurology - Adult  Consult Note        Reason for Consult:    Requesting Physician:  No referring provider defined for this encounter. Thank you for your kind referral.    CHIEF COMPLAINT:  confusion         HISTORY OF PRESENT ILLNESS:              The patient is a 80 y.o. female with a history of confusion. pt had bronchitis prior to admission and was treated with antibiotics. pt got worse and was confused on admission with hypotension of 78 during admission. 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 pt has dementia and states has been depressed x few months. pt is on asa and namenda.     Past Medical History:        Diagnosis Date    Arthritis     Blind left eye     Cancer ([facility] [address] 1971    colon    COPD (chronic obstructive pulmonary disease) (HCC)     Dementia (HCC)     Depression     GERD (gastroesophageal reflux disease)     Hypertension     Pneumonia     Restless legs syndrome     Tremors of nervous system     Unspecified cerebral artery occlusion with cerebral infarction      Past Surgical History:        Procedure Laterality Date    ABDOMEN SURGERY      APPENDECTOMY      BACK SURGERY      COLONOSCOPY      ENDOSCOPY, COLON, DIAGNOSTIC      EYE SURGERY      FRACTURE SURGERY      HYSTERECTOMY  1972    SPINE SURGERY  1995    VASCULAR SURGERY       Current Medications:   Current Facility-Administered Medications: donepezil (ARICEPT) tablet 5 mg, 5 mg, Oral, Nightly  PARoxetine (PAXIL) tablet 10 mg, 10 mg, Oral, Daily  acetaminophen (TYLENOL) tablet 650 mg, 650 mg, Oral, Q4H PRN  albuterol sulfate  (90 Base) MCG/ACT inhaler 2 puff, 2 puff, Inhalation, Q6H PRN  aspirin chewable tablet 81 mg, 81 mg, Oral, Daily  atorvastatin (LIPITOR) tablet 40 mg, 40 mg, Oral, Nightly  dorzolamide (TRUSOPT) 2 % ophthalmic solution 1 drop, 1 drop, Both Eyes, TID  bisacodyl (DULCOLAX) suppository 10 mg, 10 mg, Rectal, Daily PRN  hydrOXYzine (VISTARIL) capsule 25 mg, 25 mg, Oral, TID PRN  latanoprost (XALATAN) 0.005 % ophthalmic solution 1 drop, 1 drop, Both Eyes, Nightly  memantine (NAMENDA) tablet 5 mg, 5 mg, Oral, BID  vitamin B-12 (CYANOCOBALAMIN) tablet 1,000 mcg, 1,000 mcg, Oral, Daily  vitamin D CAPS 1,000 Units, 1,000 Units, Oral, Daily  sodium chloride flush 0.9 % injection 10 mL, 10 mL, Intravenous, 2 times per day  sodium chloride flush 0.9 % injection 10 mL, 10 mL, Intravenous, PRN  acetaminophen (TYLENOL) tablet 650 mg, 650 mg, Oral, Q6H PRN **OR** acetaminophen (TYLENOL) suppository 650 mg, 650 mg, Rectal, Q6H PRN  magnesium hydroxide (MILK OF MAGNESIA) 400 MG/5ML suspension 30 mL, 30 mL, Oral, Daily PRN  heparin (porcine) injection 5,000 Units, 5,000 Units, Subcutaneous, 3 times per day  albuterol sulfate  (90 Base) MCG/ACT inhaler 2 puff, 2 puff, Inhalation, 4x daily **AND** ipratropium (ATROVENT HFA) 17 MCG/ACT inhaler 2 puff, 2 puff, Inhalation, 4x daily **AND** [CANCELED] MDI Treatment, , , 4x daily  methylPREDNISolone sodium (SOLU-MEDROL) injection 40 mg, 40 mg, Intravenous, Daily  rOPINIRole (REQUIP) tablet 0.5 mg, 0.5 mg, Oral, TID  Allergies: Other    Social History:  TOBACCO:   reports that she has quit smoking. Her smoking use included cigarettes. She has a 12.50 pack-year smoking history. She has never used smokeless tobacco.  ETOH:   reports no history of alcohol use. DRUGS:   reports no history of drug use.   Family History:       Problem Relation Age of Onset    Arthritis Mother     Cancer Mother     Diabetes Mother     Heart Disease Mother     High Blood Pressure Mother    [de-identified] / [place] Mother     Cancer Sister     Cancer Brother     Diabetes Daughter     High Blood Pressure Daughter        REVIEW OF SYSTEMS:  CONSTITUTIONAL:  negative  HEENT:  negative  RESPIRATORY:  negative  CARDIOVASCULAR:  negative  GASTROINTESTINAL:  negative  GENITOURINARY: negative  MUSCULOSKELETAL:  negative  BEHAVIOR/PSYCH:  Negative    ROS neg    Family hx neg    PHYSICAL EXAM  ------------------------  Vitals:  /64   Pulse 80   Temp 96.2 °F (35.7 °C) (Oral)   Resp 16   Ht 5' (1.524 m)   Wt 168 lb 12.8 oz (76.6 kg)   SpO2 95%   BMI 32.97 kg/m²      General:  Awake, alert, oriented X 1. Well developed, well nourished, well groomed. No apparent distress. HEENT:  Normocephalic, atraumatic. Pupils equal, round, reactive to light. No scleral icterus. No conjunctival injection. Normal lips, teeth, and gums. No nasal discharge. Neck:  Supple  Heart:  RRR, no murmurs, gallops, rubs  Lungs:  CTA bilaterally, bilat symmetrical expansion, no wheeze, rales, or rhonchi  Abdomen: Bowel sounds present, soft, nontender, no masses, no organomegaly, no peritoneal signs  Extremities:  No clubbing, cyanosis, or edema  Skin:  Warm and dry, no open lesions or rash  Breast: deferred  Rectal: deferred  Genitalia:  deferred    NEUROLOGICAL EXAM  ---------------------------------    Mental Status Exam:             Alert and oriented times one,follows commands,speech and language orineted to person not to place year month and other MMSE testing. Cranial [id number]-LGT Intact.         Cranial nerve II           Visual acuity:  normal                 Cranial nerve III           Pupils:  equal, round, reactive to light      Cranial nerves III, IV, VI           Extraocular Movements: intact except right eyelid ptosis which pt states is old from her old stroke      Cranial nerve V           Facial sensation:  intact      Cranial nerve VII           Facial strength: intact      Cranial nerve VIII           Hearing:  intact      Cranial nerve IX           Palate:  intact      Cranial nerve XI         Shoulder shrug:  intact      Cranial nerve XII          Tongue movement:  normal    Motor:    Drift:  absent  Motor exam is symmetrical 5 out of 5 all extremities bilaterally  Tone: normal  Abnormal Movements:  Absent    DTRs-1+ biceps,triceps,brachioradialis,knee jerks and ankle jerks bilaterally symmetrical.  Toes-downgoing bilaterally            Sensory:could not test sensation              CBC with Differential:    Lab Results   Component Value Date    WBC 8.2 11/15/2020    RBC 4.14 11/15/2020    HGB 11.4 11/15/2020    HCT 36.7 11/15/2020     11/15/2020    MCV 88.6 11/15/2020    MCH 27.5 11/15/2020    MCHC 31.1 11/15/2020    RDW 14.9 11/15/2020    SEGSPCT 76.7 11/15/2020    BANDSPCT 7 10/06/2014    LYMPHOPCT 15.3 11/15/2020    MONOPCT 7.5 11/15/2020    MYELOPCT 1 10/04/2014    EOSPCT 3.4 08/07/2011    BASOPCT 0.1 11/15/2020    MONOSABS 0.6 11/15/2020    LYMPHSABS 1.3 11/15/2020    EOSABS 0.0 11/15/2020    BASOSABS 0.0 11/15/2020    DIFFTYPE AUTOMATED DIFFERENTIAL 11/15/2020     CMP:    Lab Results   Component Value Date     11/15/2020    K 4.0 11/15/2020     11/15/2020    CO2 19 11/15/2020    BUN 25 11/15/2020    CREATININE 1.1 11/15/2020    GFRAA 57 11/15/2020    LABGLOM 47 11/15/2020    GLUCOSE 109 11/15/2020    PROT 6.1 11/15/2020    PROT 6.8 08/07/2011    LABALBU 3.6 11/15/2020    CALCIUM 8.9 11/15/2020    BILITOT 0.5 11/15/2020    ALKPHOS 69 11/15/2020    AST 19 11/15/2020    ALT 14 11/15/2020     BMP:    Lab Results   Component Value Date     11/15/2020    K 4.0 11/15/2020     11/15/2020    CO2 19 11/15/2020    BUN 25 11/15/2020    LABALBU 3.6 11/15/2020    CREATININE 1.1 11/15/2020    CALCIUM 8.9 11/15/2020    GFRAA 57 11/15/2020    LABGLOM 47 11/15/2020    GLUCOSE 109 11/15/2020     PT/INR:    Lab Results   Component Value Date    PROTIME 12.1 11/13/2020    PROTIME 10.6 08/07/2011    INR 1.00 11/13/2020     PTT:    Lab Results   Component Value Date    APTT 31.5 11/13/2020   [APTT  U/A:    Lab Results   Component Value Date    COLORU YELLOW 11/15/2020    PHUR 6.0 03/03/2020    PHUR 6.0 03/03/2020    WBCUA 1 11/15/2020    RBCUA 1 11/15/2020    MUCUS RARE 11/15/2020    TRICHOMONAS NONE SEEN 11/15/2020    BACTERIA NEGATIVE 11/15/2020    CLARITYU CLEAR 11/15/2020    SPECGRAV 1.032 11/15/2020    LEUKOCYTESUR NEGATIVE 11/15/2020    UROBILINOGEN NORMAL 11/15/2020    BILIRUBINUR NEGATIVE 11/15/2020    BLOODU NEGATIVE 11/15/2020    GLUCOSEU Negative 03/03/2020     TSH:  No results found for: TSH  VITAMIN B12: No components found for: B12  FOLATE:    Lab Results   Component Value Date    FOLATE 17.9 09/22/2020     RPR:  No results found for: RPR  JULIETTE:  No results found for: ANATITER, JULIETTE  Urine Toxicology:  No components found for: IAMMENTA, IBARBIT, IBENZO, ICOCAINE, IMARTHC, IOPIATES, IPHENCYC     IMPRESSION:    Metabolic encephalopathy    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 Radha Canchola MD  BOARD CERTIFIED-NEUROLOGY.

## 2020-11-16 NOTE — PROGRESS NOTES
Cardiology Progress Note     Today's Plan: will sign off     Admit Date:  11/13/2020    Consult reason/ Seen today for: cardiac embolism    Subjective and  Overnight Events: Confused      Assessment / Plan / Recommendation:     1. R/o cardiac embolism: she had MRI in 2016 and  2017 which showed chronic b/l  infaction in b/l cerebral hemisphere and her recent MRI of head suggested the same finding, there is no evidence of acute stroke,patient has dementia and very poor historian,will not recommend to pursue cardiac embolism at this time  2. HTN: Stable continue current medications  3. DEMENTIA:STABLE  4. DVT prophylaxis if not contraindicated. 5. We will sign off please reconsult for any new cardiac complaints or concerns. History of Presenting Illness:    Chief complain on admission : 80 y. o.year old who is admitted for  Chief Complaint   Patient presents with    Altered Mental Status     didnt recognize granddaughter at discharge        Past medical history:    has a past medical history of Arthritis, Blind left eye, Cancer ([address] COPD (chronic obstructive pulmonary disease) ([address] Dementia ([address] Depression, GERD (gastroesophageal reflux disease), Hypertension, Pneumonia, Restless legs syndrome, Tremors of nervous system, and Unspecified cerebral artery occlusion with cerebral infarction. Past surgical history:   has a past surgical history that includes Hysterectomy (1972); Spine surgery (1995); Abdomen surgery; fracture surgery; Appendectomy; Endoscopy, colon, diagnostic; vascular surgery; back surgery; Colonoscopy; and eye surgery. Social History:   reports that she has quit smoking. Her smoking use included cigarettes. She has a 12.50 pack-year smoking history. She has never used smokeless tobacco. She reports that she does not drink alcohol or use drugs.   Family history:  family history includes Arthritis in her mother; Cancer in her brother, mother, and sister; Diabetes in her daughter and mother; Heart Disease in her mother; High Blood Pressure in her daughter and mother; Lizzie Care / [place] in her mother. Allergies   Allergen Reactions    Other      Pt states allergy to unknown antibiotic that made her arm red        Review of Systems:  Review of Systems   Unable to perform ROS: Dementia        /64   Pulse 80   Temp 96.2 °F (35.7 °C) (Oral)   Resp 16   Ht 5' (1.524 m)   Wt 168 lb 12.8 oz (76.6 kg)   SpO2 97%   BMI 32.97 kg/m²       Intake/Output Summary (Last 24 hours) at 11/16/2020 1120  Last data filed at 11/15/2020 1518  Gross per 24 hour   Intake --   Output 900 ml   Net -900 ml       Physical Exam:  Constitutional:  Well developed, Well nourished, No acute distress  HENT:  Normocephalic, Atraumatic, Bilateral external ears normal,  Nose normal.   Neck- trachea is midline  Eyes:  PERRL, Conjunctiva normal  Respiratory:  Normal breath sounds, No respiratory distress, No wheezing, No chest tenderness. Cardiovascular:  Normal heart rate, Normal rhythm, no murmurs appreciated, No rubs appreciated, No gallops appreciated, JVP not elevated  Abdomen/GI:  Soft, No tenderness  Musculoskeletal:  Intact distal pulses, no edema, No tenderness, No cyanosis, No clubbing. Integument:  Warm, Dry  Lymphatic:  No lymphadenopathy noted.    Neurologic:  confused  Psychiatric:  Affect and Mood :pleasant     Medications:    aspirin  81 mg Oral Daily    atorvastatin  40 mg Oral Nightly    dorzolamide  1 drop Both Eyes TID    latanoprost  1 drop Both Eyes Nightly    memantine  5 mg Oral BID    vitamin B-12  1,000 mcg Oral Daily    vitamin D  1,000 Units Oral Daily    sodium chloride flush  10 mL Intravenous 2 times per day    heparin (porcine)  5,000 Units Subcutaneous 3 times per day    albuterol sulfate HFA  2 puff Inhalation 4x daily    And    ipratropium  2 puff Inhalation 4x daily    methylPREDNISolone  40 mg Intravenous Daily    rOPINIRole  0.5 mg Oral TID       acetaminophen, albuterol sulfate HFA, bisacodyl, hydrOXYzine, sodium chloride flush, acetaminophen **OR** acetaminophen, magnesium hydroxide    Lab Data:  CBC:   Recent Labs     11/13/20  2254 11/15/20  0515   WBC 7.6 8.2   HGB 15.1 11.4*   HCT 46.4 36.7*   MCV 87.2 88.6    232     BMP:   Recent Labs     11/13/20  2254 11/15/20  0515    140   K 3.6 4.0    109   CO2 20* 19*   BUN 15 25*   CREATININE 1.2* 1.1     PT/INR:   Recent Labs     11/13/20 2254   PROTIME 12.1   INR 1.00     BNP:    Recent Labs     11/13/20 2254   PROBNP 689.4*     TROPONIN:   Recent Labs     11/13/20 2254   TROPONINT <0.010        Impression:  Active Problems:    AMS (altered mental status)  Resolved Problems:    * No resolved hospital problems. *       All labs, medications and tests reviewed by myself, continue all other medications of all above medical condition listed as is except for changes mentioned above. Thank you   Please call with questions. Electronically signed by Princess Newsome. DUANE Ford - CNP on 11/16/2020 at 11:20 AM     I have seen ,spoken to  and examined this patient personally, independently of the nurse practitioner. I have reviewed the hospital care given to date and reviewed all pertinent labs and imaging. The plan was developed mutually at the time of the visit with the patient,  NP  and myself. I have spoken with patient, nursing staff and provided written and verbal instructions . The above note has been reviewed and I agree with the assessment, diagnosis, and treatment plan with changes made by me as follows     CARDIOLOGY ATTENDING ADDENDUM    HPI:  I have reviewed the above HPI  And agree with above   Samy Smith is a 80 y. o.year old who and presents with had concerns including Altered Mental Status (didnt recognize granddaughter at discharge).   Chief Complaint   Patient presents with    Altered Mental Status didnt recognize granddaughter at discharge     Interval history:  ok    Physical Exam:  General:  Awake, alert, NAD  Head:normal  Eye:normal  Neck:  No JVD   Chest:  Clear to auscultation, respiration easy  Cardiovascular:  RRR S1S2  Abdomen:   nontender  Extremities:  no edema  Pulses; palpable  Neuro: grossly normal      MEDICAL DECISION MAKING;    I agree with the above plan, which was planned by myself and discussed with NP.

## 2020-11-16 NOTE — PROGRESS NOTES
Hospitalist Progress Note      Name:  Angelina Cole /Age/Sex: 1931  (80 y.o. female)   MRN & CSN:  [id number] & [id number] Admission Date/Time: 2020  9:35 PM   Location:  [address] [place] [address]-A PCP: Raj Betancourt 15 Day: 4    Assessment and Plan:   Angelina Cole is a 80 y.o.  female  who presents with <principal problem not specified>    > AMS sec to Metabolic encephalopathy and  > Dementia  > Depression  > Old CVA  - Cough, possible h/o copd  -  viral panel/covid19 negative  -Unable to exclude other etiologies, is unable to obtain history, reattempted daytime  -Neurology consulted, saw pt on prior admission  - CT Head with remote multiple CVA not mentioned on prior CTs, pt is not aware of prior CVA. - Megan Embs,  MRI with previous multiple CVA,   - consulted cardio for eval for cardiac embolic source, evaluation deferred due to CVA being chronic  - carotid doppler, B12/folate, Homocysteine ordered, started on aricept , namenda and paxil    > contrast extravasation d/t inappropriate IV placement, less likely compartment syndrome, no trauma report, good peripheral pulses      >BRADY possibly d/t dehydration- resolved  - avoid nephrotoxic medication  - s/p IVF    Diet DIET GENERAL;   DVT Prophylaxis []  Heparin   Code Status Full Code   Disposition  Home with [facility] anticipate tomorrow     History of Present Illness:     Pt S&E. Pt oriented to self and place, admit vision impairment due to macular degeneration, denies any current focal weakness or trouble with swallowing or speech    No abd pain, no n/V, no F/C, no chest pain    Discussed with pt's Granddaughter imaging findings and dx and plan of care. 10-14 point ROS reviewed negative, unless as noted above    Objective:        Intake/Output Summary (Last 24 hours) at 2020 1212  Last data filed at 11/15/2020 1518  Gross per 24 hour   Intake --   Output 900 ml   Net -900 ml      Vitals:   Vitals:    20 0848 BP:    Pulse:    Resp:    Temp:    SpO2: 97%     Physical Exam:      GEN Awake female, cooperative,   RESP Clear to auscultation, no wheezes, rales or rhonchi. Symmetric chest movement . CARDIO/VASC S1/S2 auscultated. Regular rate. GI Abdomen is soft without significant tenderness, Bowel sounds are normoactive. MSK No gross joint deformities. Spontaneous movement of all extremities  SKIN Normal coloration, warm, dry. NEURO normal speech, no lateralizing weakness. Impaired vision  PSYCH Awake, alert, oriented. Affect appropriate.     Medications:   Medications:    aspirin  81 mg Oral Daily    atorvastatin  40 mg Oral Nightly    dorzolamide  1 drop Both Eyes TID    latanoprost  1 drop Both Eyes Nightly    memantine  5 mg Oral BID    vitamin B-12  1,000 mcg Oral Daily    vitamin D  1,000 Units Oral Daily    sodium chloride flush  10 mL Intravenous 2 times per day    heparin (porcine)  5,000 Units Subcutaneous 3 times per day    albuterol sulfate HFA  2 puff Inhalation 4x daily    And    ipratropium  2 puff Inhalation 4x daily    methylPREDNISolone  40 mg Intravenous Daily    rOPINIRole  0.5 mg Oral TID      Infusions:     PRN Meds: acetaminophen, 650 mg, Q4H PRN  albuterol sulfate HFA, 2 puff, Q6H PRN  bisacodyl, 10 mg, Daily PRN  hydrOXYzine, 25 mg, TID PRN  sodium chloride flush, 10 mL, PRN  acetaminophen, 650 mg, Q6H PRN    Or  acetaminophen, 650 mg, Q6H PRN  magnesium hydroxide, 30 mL, Daily PRN      Electronically signed by Kevin Vee MD on 11/16/2020 at 12:12 PM

## 2020-11-17 ENCOUNTER — APPOINTMENT (OUTPATIENT)
Dept: GENERAL RADIOLOGY | Age: 85
DRG: 071 | End: 2020-11-17
Payer: MEDICARE

## 2020-11-17 ENCOUNTER — APPOINTMENT (OUTPATIENT)
Dept: CT IMAGING | Age: 85
DRG: 071 | End: 2020-11-17
Payer: MEDICARE

## 2020-11-17 LAB
FOLATE: >20 NG/ML (ref 3.1–17.5)
HOMOCYSTEINE: 11.2 UMOL/L (ref 0–10)
VITAMIN B-12: 791.3 PG/ML (ref 211–911)

## 2020-11-17 PROCEDURE — 83090 ASSAY OF HOMOCYSTEINE: CPT

## 2020-11-17 PROCEDURE — 97116 GAIT TRAINING THERAPY: CPT

## 2020-11-17 PROCEDURE — 36415 COLL VENOUS BLD VENIPUNCTURE: CPT

## 2020-11-17 PROCEDURE — 82607 VITAMIN B-12: CPT

## 2020-11-17 PROCEDURE — 84145 PROCALCITONIN (PCT): CPT

## 2020-11-17 PROCEDURE — 6360000002 HC RX W HCPCS: Performed by: INTERNAL MEDICINE

## 2020-11-17 PROCEDURE — 6370000000 HC RX 637 (ALT 250 FOR IP): Performed by: INTERNAL MEDICINE

## 2020-11-17 PROCEDURE — 97166 OT EVAL MOD COMPLEX 45 MIN: CPT

## 2020-11-17 PROCEDURE — 94761 N-INVAS EAR/PLS OXIMETRY MLT: CPT

## 2020-11-17 PROCEDURE — 97162 PT EVAL MOD COMPLEX 30 MIN: CPT

## 2020-11-17 PROCEDURE — 72110 X-RAY EXAM L-2 SPINE 4/>VWS: CPT

## 2020-11-17 PROCEDURE — 2580000003 HC RX 258: Performed by: INTERNAL MEDICINE

## 2020-11-17 PROCEDURE — 1200000000 HC SEMI PRIVATE

## 2020-11-17 PROCEDURE — 97530 THERAPEUTIC ACTIVITIES: CPT

## 2020-11-17 PROCEDURE — 6370000000 HC RX 637 (ALT 250 FOR IP): Performed by: PHYSICIAN ASSISTANT

## 2020-11-17 PROCEDURE — 72131 CT LUMBAR SPINE W/O DYE: CPT

## 2020-11-17 PROCEDURE — 6370000000 HC RX 637 (ALT 250 FOR IP): Performed by: PSYCHIATRY & NEUROLOGY

## 2020-11-17 PROCEDURE — 82746 ASSAY OF FOLIC ACID SERUM: CPT

## 2020-11-17 PROCEDURE — 94640 AIRWAY INHALATION TREATMENT: CPT

## 2020-11-17 RX ORDER — DIAZEPAM 5 MG/1
5 TABLET ORAL EVERY 6 HOURS PRN
Status: ON HOLD | COMMUNITY
End: 2020-11-19 | Stop reason: HOSPADM

## 2020-11-17 RX ORDER — BACLOFEN 10 MG/1
5 TABLET ORAL 2 TIMES DAILY
Status: DISCONTINUED | OUTPATIENT
Start: 2020-11-17 | End: 2020-11-19 | Stop reason: HOSPADM

## 2020-11-17 RX ORDER — ALBUTEROL SULFATE 90 UG/1
2 AEROSOL, METERED RESPIRATORY (INHALATION) EVERY 4 HOURS PRN
Status: DISCONTINUED | OUTPATIENT
Start: 2020-11-17 | End: 2020-11-19 | Stop reason: HOSPADM

## 2020-11-17 RX ADMIN — DORZOLAMIDE HYDROCHLORIDE 1 DROP: 20 SOLUTION/ DROPS OPHTHALMIC at 20:05

## 2020-11-17 RX ADMIN — ROPINIROLE HYDROCHLORIDE 0.5 MG: 0.25 TABLET, FILM COATED ORAL at 20:06

## 2020-11-17 RX ADMIN — SODIUM CHLORIDE, PRESERVATIVE FREE 10 ML: 5 INJECTION INTRAVENOUS at 20:07

## 2020-11-17 RX ADMIN — ACETAMINOPHEN 650 MG: 325 TABLET ORAL at 13:18

## 2020-11-17 RX ADMIN — Medication 1000 UNITS: at 08:49

## 2020-11-17 RX ADMIN — ROPINIROLE HYDROCHLORIDE 0.5 MG: 0.25 TABLET, FILM COATED ORAL at 13:17

## 2020-11-17 RX ADMIN — SODIUM CHLORIDE, PRESERVATIVE FREE 10 ML: 5 INJECTION INTRAVENOUS at 08:12

## 2020-11-17 RX ADMIN — DORZOLAMIDE HYDROCHLORIDE 1 DROP: 20 SOLUTION/ DROPS OPHTHALMIC at 09:00

## 2020-11-17 RX ADMIN — HEPARIN SODIUM 5000 UNITS: 5000 INJECTION INTRAVENOUS; SUBCUTANEOUS at 13:18

## 2020-11-17 RX ADMIN — ATORVASTATIN CALCIUM 40 MG: 40 TABLET, FILM COATED ORAL at 20:29

## 2020-11-17 RX ADMIN — DORZOLAMIDE HYDROCHLORIDE 1 DROP: 20 SOLUTION/ DROPS OPHTHALMIC at 13:17

## 2020-11-17 RX ADMIN — ALBUTEROL SULFATE 2 PUFF: 90 AEROSOL, METERED RESPIRATORY (INHALATION) at 08:14

## 2020-11-17 RX ADMIN — MEMANTINE HYDROCHLORIDE 5 MG: 5 TABLET ORAL at 20:05

## 2020-11-17 RX ADMIN — HEPARIN SODIUM 5000 UNITS: 5000 INJECTION INTRAVENOUS; SUBCUTANEOUS at 20:07

## 2020-11-17 RX ADMIN — ROPINIROLE HYDROCHLORIDE 0.5 MG: 0.25 TABLET, FILM COATED ORAL at 08:48

## 2020-11-17 RX ADMIN — CYANOCOBALAMIN TAB 1000 MCG 1000 MCG: 1000 TAB at 08:49

## 2020-11-17 RX ADMIN — HYDROXYZINE PAMOATE 25 MG: 25 CAPSULE ORAL at 08:10

## 2020-11-17 RX ADMIN — PAROXETINE 10 MG: 10 TABLET, FILM COATED ORAL at 08:48

## 2020-11-17 RX ADMIN — DONEPEZIL HYDROCHLORIDE 5 MG: 5 TABLET, FILM COATED ORAL at 20:05

## 2020-11-17 RX ADMIN — ASPIRIN 81 MG CHEWABLE TABLET 81 MG: 81 TABLET CHEWABLE at 08:48

## 2020-11-17 RX ADMIN — BACLOFEN 5 MG: 10 TABLET ORAL at 20:06

## 2020-11-17 RX ADMIN — LATANOPROST 1 DROP: 50 SOLUTION/ DROPS OPHTHALMIC at 20:05

## 2020-11-17 RX ADMIN — METHYLPREDNISOLONE SODIUM SUCCINATE 40 MG: 40 INJECTION, POWDER, FOR SOLUTION INTRAMUSCULAR; INTRAVENOUS at 08:11

## 2020-11-17 RX ADMIN — ACETAMINOPHEN 650 MG: 325 TABLET ORAL at 03:28

## 2020-11-17 RX ADMIN — ACETAMINOPHEN 650 MG: 325 TABLET ORAL at 08:10

## 2020-11-17 RX ADMIN — IPRATROPIUM BROMIDE 2 PUFF: 17 AEROSOL, METERED RESPIRATORY (INHALATION) at 08:14

## 2020-11-17 RX ADMIN — HEPARIN SODIUM 5000 UNITS: 5000 INJECTION INTRAVENOUS; SUBCUTANEOUS at 06:13

## 2020-11-17 RX ADMIN — MEMANTINE HYDROCHLORIDE 5 MG: 5 TABLET ORAL at 08:48

## 2020-11-17 ASSESSMENT — PAIN DESCRIPTION - PROGRESSION: CLINICAL_PROGRESSION: GRADUALLY WORSENING

## 2020-11-17 ASSESSMENT — PAIN SCALES - GENERAL
PAINLEVEL_OUTOF10: 0
PAINLEVEL_OUTOF10: 3
PAINLEVEL_OUTOF10: 8
PAINLEVEL_OUTOF10: 0
PAINLEVEL_OUTOF10: 10
PAINLEVEL_OUTOF10: 0
PAINLEVEL_OUTOF10: 10
PAINLEVEL_OUTOF10: 0

## 2020-11-17 ASSESSMENT — PAIN DESCRIPTION - DESCRIPTORS: DESCRIPTORS: ACHING

## 2020-11-17 ASSESSMENT — PAIN DESCRIPTION - PAIN TYPE: TYPE: ACUTE PAIN

## 2020-11-17 ASSESSMENT — PAIN DESCRIPTION - ONSET: ONSET: ON-GOING

## 2020-11-17 ASSESSMENT — PAIN DESCRIPTION - FREQUENCY: FREQUENCY: CONTINUOUS

## 2020-11-17 ASSESSMENT — PAIN - FUNCTIONAL ASSESSMENT: PAIN_FUNCTIONAL_ASSESSMENT: PREVENTS OR INTERFERES SOME ACTIVE ACTIVITIES AND ADLS

## 2020-11-17 ASSESSMENT — PAIN DESCRIPTION - LOCATION: LOCATION: LEG

## 2020-11-17 ASSESSMENT — PAIN DESCRIPTION - ORIENTATION: ORIENTATION: RIGHT;LEFT

## 2020-11-17 ASSESSMENT — PAIN SCALES - WONG BAKER: WONGBAKER_NUMERICALRESPONSE: 0

## 2020-11-17 NOTE — PROGRESS NOTES
Hospitalist Progress Note      Name:  Bruno  /Age/Sex: 1931  (80 y.o. female)   MRN & CSN:  [id number] & [id number] Admission Date/Time: 2020  9:35 PM   Location:  [address] [place] [address]- PCP: Alex Capellan 112 Day: 5    Assessment and Plan:   Bruno  is a 80 y.o.  female  who presents with AMS      #AMS sec to Metabolic encephalopathy from BRADY  #Dementia  #Depression  #Old CVA  -CT brain: No acute pathology  - Asa, Statin ,  MRI with previous multiple CVA,   - consulted cardio for eval for cardiac embolic source, evaluation deferred due to CVA being chronic  - carotid doppler, B12/folate, Homocysteine ordered, started on aricept , namenda and paxil     #contrast extravasation d/t inappropriate IV placement, less likely compartment syndrome, no trauma report, good peripheral pulses      #BRADY possibly d/t dehydration- resolved  - avoid nephrotoxic medication  - s/p IVF  -Monitor closely    Diet DIET GENERAL;   DVT Prophylaxis [] Lovenox, []  Heparin, [] SCDs, [] Ambulation   GI Prophylaxis [] PPI,  [] H2 Blocker,  [] Carafate,  [] Diet/Tube Feeds   Code Status Full Code   Disposition Patient pending placement         History of Present Illness:     Chief Complaint: <principal problem not specified>  Bruno  is a 80 y.o.  female  who presents with AMS       Ten point ROS reviewed negative, unless as noted above    Objective: Intake/Output Summary (Last 24 hours) at 2020 1611  Last data filed at 2020 1002  Gross per 24 hour   Intake --   Output 1500 ml   Net -1500 ml      Vitals:   Vitals:    20 1539   BP: 135/64   Pulse: 58   Resp: 15   Temp: 98.2 °F (36.8 °C)   SpO2: 94%     Physical Exam:   GEN Awake female, sitting upright in bed in no apparent distress. Appears given age. EYES Pupils are equally round. No scleral erythema, discharge, or conjunctivitis.   HENT Mucous membranes are moist. Oral pharynx without exudates, no evidence of thrush. NECK Supple, no apparent thyromegaly or masses. RESP Clear to auscultation, no wheezes, rales or rhonchi. Symmetric chest movement while on room air. CARDIO/VASC S1/S2 auscultated. Regular rate without appreciable murmurs, rubs, or gallops. No JVD or carotid bruits. Peripheral pulses equal bilaterally and palpable. No peripheral edema. GI Abdomen is soft without significant tenderness, masses, or guarding. Bowel sounds are normoactive. Rectal exam deferred.  No costovertebral angle tenderness. Normal appearing external genitalia. Peterson catheter is not present. HEME/LYMPH No palpable cervical lymphadenopathy and no hepatosplenomegaly. No petechiae or ecchymoses. MSK No gross joint deformities. SKIN Normal coloration, warm, dry. NEURO blindness bilateral., normal speech, no lateralizing weakness. PSYCH Awake, alert, oriented x 1 self. Affect appropriate.     Medications:   Medications:    baclofen  5 mg Oral BID    donepezil  5 mg Oral Nightly    PARoxetine  10 mg Oral Daily    aspirin  81 mg Oral Daily    atorvastatin  40 mg Oral Nightly    dorzolamide  1 drop Both Eyes TID    latanoprost  1 drop Both Eyes Nightly    memantine  5 mg Oral BID    vitamin B-12  1,000 mcg Oral Daily    vitamin D  1,000 Units Oral Daily    sodium chloride flush  10 mL Intravenous 2 times per day    heparin (porcine)  5,000 Units Subcutaneous 3 times per day    albuterol sulfate HFA  2 puff Inhalation 4x daily    And    ipratropium  2 puff Inhalation 4x daily    methylPREDNISolone  40 mg Intravenous Daily    rOPINIRole  0.5 mg Oral TID      Infusions:   PRN Meds: acetaminophen, 650 mg, Q4H PRN  albuterol sulfate HFA, 2 puff, Q6H PRN  bisacodyl, 10 mg, Daily PRN  hydrOXYzine, 25 mg, TID PRN  sodium chloride flush, 10 mL, PRN  acetaminophen, 650 mg, Q6H PRN    Or  acetaminophen, 650 mg, Q6H PRN  magnesium hydroxide, 30 mL, Daily PRN          Electronically signed by Adrian Salter MD on

## 2020-11-17 NOTE — CARE COORDINATION
CM reviewed notes from OT. CM called grd dtr [place] and updated her. CM informed per dtr that pt was confused last night. CM informed/educated grd dtr that there are times when a pt has dementia and taken out of the surroundings that their confusion increases laura in the kim/HS. CM informed her that CM will wait for PT to see with recommendation and update her.  [address]
CM reviewed notes. Pt is from home with grand maricruz Mcclendon. Pt is known to this CM from previous admission. Pt is blind. CM collaborated with Dr Gunner Ramos regarding pt/ot evals. CM placed the orders placed whiteboard. LH  1030 CM into see pt when    1525 CM called grand maricruz Mcclendon. CM informed that when pt is home she may forget dates but nothing like she was when she was discharged. Pt is blind, beginning stages of dementia. Grand dtr shared that pt will not stay in bed because she has restless leg syndrome and she needs to stand due to help her pain. Grand dtr shared that pt went blind at age of [de-identified]. [address] dtr wants to take pt [facility] with [facility] . She does not want her to go to a facility  CM discussed with Dr Gunner Ramos. Regarding the above conversation.  CM will follow [address]
and No Restraints. CM explained to [place] what happened w/restraints at last admission and family does not wish for patient to be in restraints again.

## 2020-11-17 NOTE — PROGRESS NOTES
Pt seated in chair, leaning head against bed rail, leaning over arm rest of chair. Pt's bed linen straightened in preparation for transfer, a total of 3 pills were found in bed and on floor. Medications destroyed. Pt transferred back to bed, bed bath provided. Primary RN notified of medications found.

## 2020-11-17 NOTE — PROGRESS NOTES
Physical Therapy  Attempted PT/OT evaluation this PM.  Patient was sleeping and not arousable at this time. RN notified. Will re-attempt as able and appropriate.     Aminata Marinelli, PT, DPT  License #: [id number]

## 2020-11-17 NOTE — PROGRESS NOTES
Occupational Therapy    [facility]UTE [facility]ARE OCCUPATIONAL THERAPY EVALUATION    Anna Feliz, 1/30/1931, 4125/4125-A, 11/17/2020    Discharge Recommendation: Home with continuous 24 hour supervision/assistance      History:  [place]:  The primary encounter diagnosis was Altered mental status, unspecified altered mental status type. Diagnoses of Hypotension, unspecified hypotension type and Near syncope were also pertinent to this visit. Subjective:  Patient states: \"My granddaughter takes really good care of me at home. \"  Pain: Pt denied pain this date  Communication with other providers: PT Nazanin Yang, RN JOSIAH Andre  Restrictions: General Precautions, Fall Risk, Peterson, Bed/chair alarm    Home Setup/Prior level of function:  Social/Functional History  Lives With: Family (Granddaughter and great-granddaughter)  Type of Home: House  Home Layout: Two level, Performs ADL's on one level, Able to Live on Main level with bedroom/bathroom  Home Access: Stairs to enter with rails  Entrance Stairs - Number of Steps: 4  Entrance Stairs - Rails: Right  Home Equipment: 4 wheeled walker  ADL Assistance: Needs assistance (granddaughter assists with bathing/dressing/toilet; able to self-feed with setup)  Homemaking Assistance: Needs assistance  Homemaking Responsibilities: No  Ambulation Assistance: Needs assistance (pt reports she always has hand-held assist or physical support when using her 4ww at home)  Transfer Assistance: Needs assistance (supervision/steadying assist PRN)  Active : No  Occupation: Retired  Additional Comments: Pt not a fully reliable historian this date. All above information should be verified by granddaughter. Examination:  · Observation: Sitting in chair upon arrival finishing cereal. Agreeable to evaluation with education.    · Vision: Legally blind (able to see large objects in environment but vision significantly impaired, as pt unable to identify number of fingers OT was holding up from <1 foot away)  · Hearing: [facility]  · Vitals: Stable vitals throughout session    Body Systems and functions:  · ROM: Grossly WFL all joints in BL UEs observed functionally   · Strength: At least 4-/5 MMT all major muscle groups BL UEs observed functionally  · Sensation: WFL (denies numbness/tingling)  · Tone: Normal  · Coordination: LE shaking/intention tremors with standing; WFL for ADLs in BL UEs (given vision impairments)    Activities of Daily Living (ADLs):  · Feeding: Supervision/setup (observed eating cereal upon arrival and began eating bhatia at end of session; setup for cutting up food and orienting pt to tray with tactile cues)  · Grooming: Supervision/setup (seated facial hygiene)  · UB bathing: SBA   · LB bathing: Mod A (reaching distal LEs/buttocks; has assistance at baseline)  · UB dressing: Min A (anticipate assist for mgmt of shirt overhead)  · LB dressing: Max A (dependent with donning BL socks, anticipate pt able to participate in mgmt of pants to hips in standing)  · Toileting: Mod A (anticipate assist for marah care)    Cognitive and Psychosocial Functioning:  · Overall cognitive status: Impaired (history of dementia; pt disoriented to full situation, memory impairments/unreliable historian, poor overall insight/safety awareness)  · Affect: Normal     Balance:   · Sitting: SBA at edge of chair  · Standing: CGA with RW; intermittent LE shaking but no knee buckling or LOB    Functional Mobility:  · Bed Mobility: Not observed. Pt received sitting in chair. · Transfers: CGA to and from recliner (min cues for scooting hips forward/safe hand placement)  · Ambulation: CGA with RW 50 ft; intermittent LE shaking but no LOB; likely at/near pt's baseline. Required cues for RW mgmt and navigation due to visual impairments. AM-PAC 6 click short form for inpatient daily activity:   How much help from another person does the patient currently need. ..  Unable  Dep A Lot  Max A A Lot   Mod A A Little  Min A A Little   CGA  SBA None   Mod I  Indep  Sup   1. Putting on and taking off regular lower body clothing? [] 1    [x] 2   [] 2   [] 3   [] 3   [] 4      2. Bathing (including washing, rinsing, drying)? [] 1   [] 2   [x] 2 [] 3 [] 3 [] 4   3. Toileting, which includes using toilet, bedpan, or urinal? [] 1    [] 2   [x] 2   [] 3   [] 3   [] 4     4. Putting on and taking off regular upper body clothing? [] 1   [] 2   [] 2   [x] 3   [] 3    [] 4      5. Taking care of personal grooming such as brushing teeth? [] 1   [] 2    [] 2 [] 3    [x] 3   [] 4      6. Eating meals? [] 1   [] 2   [] 2   [] 3   [x] 3   [] 4      Raw Score:  15    [24=0% impaired(CH), 23=1-19%(CI), 20-22=20-39%(CJ), 15-19=40-59%(CK), 10-14=60-79%(CL), 7-9=80-99%(CM), 6=100%(CN)]     Treatment:  Therapeutic Activity Training:   Therapeutic activity training was instructed today. Cues were given for safety, sequence, UE/LE placement, awareness, and balance. Activities performed today included UB/LB dressing tasks, transfer training, HH mobility with RW, self-feeding, education on role of OT, POC, importance of OOB activity, d/c planning      Safety Measures: Gait belt used, Left in Chair, Alarm in place    Assessment:  Pt is an 80year old female with a past medical history of Arthritis, Blind left eye, Cancer ([facility] COPD (chronic obstructive pulmonary disease) ([facility] Dementia ([facility] Depression, GERD (gastroesophageal reflux disease), Hypertension, Pneumonia, Restless legs syndrome, Tremors of nervous system, and Unspecified cerebral artery occlusion with cerebral infarction. Pt admitted with altered mental status and BRADY. Pt lives at home with her granddaughter and has significant assistance at baseline. Pt likely at/near baseline level of functioning. Recommend d/c to home with continuous 24 care. Complexity: Moderate  Prognosis: Fair  Plan: 2x/week      Goals:  1.  Pt will complete all aspects of bed mobility for EOB/OOB ADLs CGA/HOB flat  2. Pt will complete UB/LB bathing min A with setup   3. Pt will complete all aspects of LB dressing min A with setup  4. Pt will complete all functional transfers to and from bed, chair, toilet, shower chair SBA/and min cues for safe hand placement  5. Pt will ambulate HH distance to bathroom for toileting SBA with RW/and min cues for RW mgmt  6. Pt will complete all aspects of toileting task min A  7.  Pt will complete ther ex/ther act with focus on functional standing tolerance and cognitive re-orientation      Time:   Time in: 915  Time out: 940  Timed treatment minutes: 10  Total time: 25      Electronically signed by:    BRII Fernandez/L, [place], [id number]

## 2020-11-18 LAB
CULTURE: NORMAL
CULTURE: NORMAL
Lab: NORMAL
Lab: NORMAL
PROCALCITONIN: 0.04
SPECIMEN: NORMAL
SPECIMEN: NORMAL
TROPONIN T: <0.01 NG/ML

## 2020-11-18 PROCEDURE — 6370000000 HC RX 637 (ALT 250 FOR IP): Performed by: INTERNAL MEDICINE

## 2020-11-18 PROCEDURE — 36415 COLL VENOUS BLD VENIPUNCTURE: CPT

## 2020-11-18 PROCEDURE — 6360000002 HC RX W HCPCS: Performed by: INTERNAL MEDICINE

## 2020-11-18 PROCEDURE — 6370000000 HC RX 637 (ALT 250 FOR IP): Performed by: PHYSICIAN ASSISTANT

## 2020-11-18 PROCEDURE — 6370000000 HC RX 637 (ALT 250 FOR IP): Performed by: PSYCHIATRY & NEUROLOGY

## 2020-11-18 PROCEDURE — 84484 ASSAY OF TROPONIN QUANT: CPT

## 2020-11-18 PROCEDURE — 1200000000 HC SEMI PRIVATE

## 2020-11-18 PROCEDURE — 94761 N-INVAS EAR/PLS OXIMETRY MLT: CPT

## 2020-11-18 PROCEDURE — 2580000003 HC RX 258: Performed by: INTERNAL MEDICINE

## 2020-11-18 PROCEDURE — 93005 ELECTROCARDIOGRAM TRACING: CPT | Performed by: NURSE PRACTITIONER

## 2020-11-18 RX ORDER — MEMANTINE HYDROCHLORIDE 10 MG/1
10 TABLET ORAL 2 TIMES DAILY
Status: DISCONTINUED | OUTPATIENT
Start: 2020-11-18 | End: 2020-11-19 | Stop reason: HOSPADM

## 2020-11-18 RX ORDER — PAROXETINE HYDROCHLORIDE 20 MG/1
20 TABLET, FILM COATED ORAL DAILY
Status: DISCONTINUED | OUTPATIENT
Start: 2020-11-19 | End: 2020-11-19 | Stop reason: HOSPADM

## 2020-11-18 RX ORDER — B12/LEVOMEFOLATE CALCIUM/B-6 2-1.13-25
1 TABLET ORAL DAILY
Status: DISCONTINUED | OUTPATIENT
Start: 2020-11-18 | End: 2020-11-19 | Stop reason: HOSPADM

## 2020-11-18 RX ADMIN — DORZOLAMIDE HYDROCHLORIDE 1 DROP: 20 SOLUTION/ DROPS OPHTHALMIC at 13:21

## 2020-11-18 RX ADMIN — ROPINIROLE HYDROCHLORIDE 0.5 MG: 0.25 TABLET, FILM COATED ORAL at 08:18

## 2020-11-18 RX ADMIN — ROPINIROLE HYDROCHLORIDE 0.5 MG: 0.25 TABLET, FILM COATED ORAL at 13:16

## 2020-11-18 RX ADMIN — Medication 1 TABLET: at 14:14

## 2020-11-18 RX ADMIN — LATANOPROST 1 DROP: 50 SOLUTION/ DROPS OPHTHALMIC at 22:23

## 2020-11-18 RX ADMIN — HEPARIN SODIUM 5000 UNITS: 5000 INJECTION INTRAVENOUS; SUBCUTANEOUS at 13:16

## 2020-11-18 RX ADMIN — HEPARIN SODIUM 5000 UNITS: 5000 INJECTION INTRAVENOUS; SUBCUTANEOUS at 22:23

## 2020-11-18 RX ADMIN — CYANOCOBALAMIN TAB 1000 MCG 1000 MCG: 1000 TAB at 08:17

## 2020-11-18 RX ADMIN — MEMANTINE HYDROCHLORIDE 5 MG: 5 TABLET ORAL at 08:17

## 2020-11-18 RX ADMIN — Medication 1000 UNITS: at 08:17

## 2020-11-18 RX ADMIN — BACLOFEN 5 MG: 10 TABLET ORAL at 22:24

## 2020-11-18 RX ADMIN — HEPARIN SODIUM 5000 UNITS: 5000 INJECTION INTRAVENOUS; SUBCUTANEOUS at 05:24

## 2020-11-18 RX ADMIN — ATORVASTATIN CALCIUM 40 MG: 40 TABLET, FILM COATED ORAL at 22:24

## 2020-11-18 RX ADMIN — ROPINIROLE HYDROCHLORIDE 0.5 MG: 0.25 TABLET, FILM COATED ORAL at 22:24

## 2020-11-18 RX ADMIN — MEMANTINE 10 MG: 10 TABLET ORAL at 22:24

## 2020-11-18 RX ADMIN — METHYLPREDNISOLONE SODIUM SUCCINATE 40 MG: 40 INJECTION, POWDER, FOR SOLUTION INTRAMUSCULAR; INTRAVENOUS at 10:11

## 2020-11-18 RX ADMIN — SODIUM CHLORIDE, PRESERVATIVE FREE 10 ML: 5 INJECTION INTRAVENOUS at 08:18

## 2020-11-18 RX ADMIN — DORZOLAMIDE HYDROCHLORIDE 1 DROP: 20 SOLUTION/ DROPS OPHTHALMIC at 22:23

## 2020-11-18 RX ADMIN — DONEPEZIL HYDROCHLORIDE 5 MG: 5 TABLET, FILM COATED ORAL at 22:24

## 2020-11-18 RX ADMIN — DORZOLAMIDE HYDROCHLORIDE 1 DROP: 20 SOLUTION/ DROPS OPHTHALMIC at 08:19

## 2020-11-18 RX ADMIN — PAROXETINE 10 MG: 10 TABLET, FILM COATED ORAL at 08:17

## 2020-11-18 RX ADMIN — BACLOFEN 5 MG: 10 TABLET ORAL at 08:18

## 2020-11-18 RX ADMIN — ASPIRIN 81 MG CHEWABLE TABLET 81 MG: 81 TABLET CHEWABLE at 08:17

## 2020-11-18 ASSESSMENT — PAIN SCALES - GENERAL
PAINLEVEL_OUTOF10: 0
PAINLEVEL_OUTOF10: 0

## 2020-11-18 NOTE — PROGRESS NOTES
Guille Pablo MD.  Section of General Neurology - Adult  Consult Note        Reason for Consult:    Requesting Physician:  No referring provider defined for this encounter.   Thank you for your kind referral.    Pt states she is depressed    Denies any new symptoms    Denies any side effects    Complaint with meds    Past Medical History:        Diagnosis Date    Arthritis     Blind left eye     Cancer ([facility] 1971    colon    COPD (chronic obstructive pulmonary disease) (HCC)     Dementia (HCC)     Depression     GERD (gastroesophageal reflux disease)     Hypertension     Pneumonia     Restless legs syndrome     Tremors of nervous system     Unspecified cerebral artery occlusion with cerebral infarction      Past Surgical History:        Procedure Laterality Date    ABDOMEN SURGERY      APPENDECTOMY      BACK SURGERY      COLONOSCOPY      ENDOSCOPY, COLON, DIAGNOSTIC      EYE SURGERY      FRACTURE SURGERY      HYSTERECTOMY  1972    SPINE SURGERY  1995    VASCULAR SURGERY       Current Medications:   Current Facility-Administered Medications: [START ON 11/19/2020] PARoxetine (PAXIL) tablet 20 mg, 20 mg, Oral, Daily  memantine (NAMENDA) tablet 10 mg, 10 mg, Oral, BID  baclofen (LIORESAL) tablet 5 mg, 5 mg, Oral, BID  albuterol sulfate  (90 Base) MCG/ACT inhaler 2 puff, 2 puff, Inhalation, Q4H PRN **AND** ipratropium (ATROVENT HFA) 17 MCG/ACT inhaler 2 puff, 2 puff, Inhalation, PRN **AND** [CANCELED] MDI Treatment, , , 4x daily  donepezil (ARICEPT) tablet 5 mg, 5 mg, Oral, Nightly  acetaminophen (TYLENOL) tablet 650 mg, 650 mg, Oral, Q4H PRN  aspirin chewable tablet 81 mg, 81 mg, Oral, Daily  atorvastatin (LIPITOR) tablet 40 mg, 40 mg, Oral, Nightly  dorzolamide (TRUSOPT) 2 % ophthalmic solution 1 drop, 1 drop, Both Eyes, TID  bisacodyl (DULCOLAX) suppository 10 mg, 10 mg, Rectal, Daily PRN  hydrOXYzine (VISTARIL) capsule 25 mg, 25 mg, Oral, TID PRN  latanoprost (XALATAN) 0.005 % ophthalmic solution 1 drop, 1 drop, Both Eyes, Nightly  vitamin B-12 (CYANOCOBALAMIN) tablet 1,000 mcg, 1,000 mcg, Oral, Daily  vitamin D CAPS 1,000 Units, 1,000 Units, Oral, Daily  sodium chloride flush 0.9 % injection 10 mL, 10 mL, Intravenous, 2 times per day  sodium chloride flush 0.9 % injection 10 mL, 10 mL, Intravenous, PRN  acetaminophen (TYLENOL) tablet 650 mg, 650 mg, Oral, Q6H PRN **OR** acetaminophen (TYLENOL) suppository 650 mg, 650 mg, Rectal, Q6H PRN  magnesium hydroxide (MILK OF MAGNESIA) 400 MG/5ML suspension 30 mL, 30 mL, Oral, Daily PRN  heparin (porcine) injection 5,000 Units, 5,000 Units, Subcutaneous, 3 times per day  methylPREDNISolone sodium (SOLU-MEDROL) injection 40 mg, 40 mg, Intravenous, Daily  rOPINIRole (REQUIP) tablet 0.5 mg, 0.5 mg, Oral, TID  Allergies: Other    Social History:  TOBACCO:   reports that she has quit smoking. Her smoking use included cigarettes. She has a 12.50 pack-year smoking history. She has never used smokeless tobacco.  ETOH:   reports no history of alcohol use. DRUGS:   reports no history of drug use. Family History:       Problem Relation Age of Onset    Arthritis Mother     Cancer Mother     Diabetes Mother     Heart Disease Mother     High Blood Pressure Mother    [de-identified] / [place] Mother     Cancer Sister     Cancer Brother     Diabetes Daughter     High Blood Pressure Daughter        REVIEW OF SYSTEMS:  CONSTITUTIONAL:  negative  HEENT:  negative  RESPIRATORY:  negative  CARDIOVASCULAR:  negative  GASTROINTESTINAL:  negative  GENITOURINARY:  negative  MUSCULOSKELETAL:  negative  BEHAVIOR/PSYCH:  Negative    ROS neg    Family hx neg    PHYSICAL EXAM  ------------------------  Vitals:  /65   Pulse 56   Temp 98.4 °F (36.9 °C) (Oral)   Resp 12   Ht 5' (1.524 m)   Wt 168 lb 12.8 oz (76.6 kg)   SpO2 94%   BMI 32.97 kg/m²      General:  Awake, alert, oriented X 1. Well developed, well nourished, well groomed.   No apparent 11/15/2020    MCH 27.5 11/15/2020    MCHC 31.1 11/15/2020    RDW 14.9 11/15/2020    SEGSPCT 76.7 11/15/2020    BANDSPCT 7 10/06/2014    LYMPHOPCT 15.3 11/15/2020    MONOPCT 7.5 11/15/2020    MYELOPCT 1 10/04/2014    EOSPCT 3.4 08/07/2011    BASOPCT 0.1 11/15/2020    MONOSABS 0.6 11/15/2020    LYMPHSABS 1.3 11/15/2020    EOSABS 0.0 11/15/2020    BASOSABS 0.0 11/15/2020    DIFFTYPE AUTOMATED DIFFERENTIAL 11/15/2020     CMP:    Lab Results   Component Value Date     11/15/2020    K 4.0 11/15/2020     11/15/2020    CO2 19 11/15/2020    BUN 25 11/15/2020    CREATININE 1.1 11/15/2020    GFRAA 57 11/15/2020    LABGLOM 47 11/15/2020    GLUCOSE 109 11/15/2020    PROT 6.1 11/15/2020    PROT 6.8 08/07/2011    LABALBU 3.6 11/15/2020    CALCIUM 8.9 11/15/2020    BILITOT 0.5 11/15/2020    ALKPHOS 69 11/15/2020    AST 19 11/15/2020    ALT 14 11/15/2020     BMP:    Lab Results   Component Value Date     11/15/2020    K 4.0 11/15/2020     11/15/2020    CO2 19 11/15/2020    BUN 25 11/15/2020    LABALBU 3.6 11/15/2020    CREATININE 1.1 11/15/2020    CALCIUM 8.9 11/15/2020    GFRAA 57 11/15/2020    LABGLOM 47 11/15/2020    GLUCOSE 109 11/15/2020     PT/INR:    Lab Results   Component Value Date    PROTIME 12.1 11/13/2020    PROTIME 10.6 08/07/2011    INR 1.00 11/13/2020     PTT:    Lab Results   Component Value Date    APTT 31.5 11/13/2020   [APTT  U/A:    Lab Results   Component Value Date    COLORU YELLOW 11/15/2020    PHUR 6.0 03/03/2020    PHUR 6.0 03/03/2020    WBCUA 1 11/15/2020    RBCUA 1 11/15/2020    MUCUS RARE 11/15/2020    TRICHOMONAS NONE SEEN 11/15/2020    BACTERIA NEGATIVE 11/15/2020    CLARITYU CLEAR 11/15/2020    SPECGRAV 1.032 11/15/2020    LEUKOCYTESUR NEGATIVE 11/15/2020    UROBILINOGEN NORMAL 11/15/2020    BILIRUBINUR NEGATIVE 11/15/2020    BLOODU NEGATIVE 11/15/2020    GLUCOSEU Negative 03/03/2020     TSH:  No results found for: TSH  VITAMIN B12: No components found for: B12  FOLATE:    Lab Results   Component Value Date    FOLATE >20.0 11/17/2020     RPR:  No results found for: RPR  JULIETTE:  No results found for: ANATITER, JULIETTE  Urine Toxicology:  No components found for: IAMMENTA, IBARBIT, IBENZO, ICOCAINE, IMARTHC, IOPIATES, IPHENCYC     IMPRESSION:    Metabolic encephalopathy    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 Em Villagran MD  BOARD CERTIFIED-NEUROLOGY.

## 2020-11-18 NOTE — PROGRESS NOTES
Hospitalist Progress Note      Name:  Joss Kendrick /Age/Sex: 1931  (80 y.o. female)   MRN & CSN:  [id number] & [id number] Admission Date/Time: 2020  9:35 PM   Location:  [address] [place] [address]- PCP: Alex Begum 112 Day: 7    Assessment and Plan:   Gopi Gallego a 80 y.o.  female  who presents with AMS      #AMS sec to Metabolic encephalopathy from BRADY  #Dementia  #Depression  #Old CVA  -CT brain: No acute pathology  - Hansen Jackman,  MRI with previous multiple CVA,   - consulted cardio for eval for cardiac embolic source, evaluation deferred due to CVA being chronic  - continue on aricept , namenda and paxil. Foltx added     #contrast extravasation d/t inappropriate IV placement, less likely compartment syndrome, no trauma report, good peripheral pulses      #BRADY possibly d/t dehydration- resolved  - avoid nephrotoxic medication  - s/p IVF  -Monitor closely    #disposition: Granddaughter called, went to voicemail repeat call tomorrow      Diet DIET GENERAL;   DVT Prophylaxis [] Lovenox, []  Heparin, [] SCDs, [] Ambulation   GI Prophylaxis [] PPI,  [] H2 Blocker,  [] Carafate,  [] Diet/Tube Feeds   Code Status Full Code   Disposition Patient pending placement         History of Present Illness:     Chief Complaint: <principal problem not specified>  Joss Kendrick is a 80 y.o.  female  who presents with AMS. Ten point ROS reviewed negative, unless as noted above    Objective: Intake/Output Summary (Last 24 hours) at 2020 1145  Last data filed at 2020 0817  Gross per 24 hour   Intake 200 ml   Output --   Net 200 ml      Vitals:   Vitals:    20 0817   BP: (!) 144/71   Pulse: 52   Resp: 16   Temp: 98.1 °F (36.7 °C)   SpO2: 94%     Physical Exam:   GEN Awake female, sitting upright in bed in no apparent distress. Appears given age. EYES Pupils are equally round. No scleral erythema, discharge, or conjunctivitis.   HENT Mucous membranes are moist. Oral pharynx without exudates, no evidence of thrush. NECK Supple, no apparent thyromegaly or masses. RESP Clear to auscultation, no wheezes, rales or rhonchi. Symmetric chest movement while on room air. CARDIO/VASC S1/S2 auscultated. Regular rate without appreciable murmurs, rubs, or gallops. No JVD or carotid bruits. Peripheral pulses equal bilaterally and palpable. No peripheral edema. GI Abdomen is soft without significant tenderness, masses, or guarding. Bowel sounds are normoactive. Rectal exam deferred.  No costovertebral angle tenderness. Normal appearing external genitalia. Peterson catheter is not present. HEME/LYMPH No palpable cervical lymphadenopathy and no hepatosplenomegaly. No petechiae or ecchymoses. MSK No gross joint deformities. SKIN Normal coloration, warm, dry. NEURO Patient is blind, normal speech, no lateralizing weakness. PSYCH Awake, alert, oriented. Affect appropriate.     Medications:   Medications:    PARoxetine  20 mg Oral Daily    memantine  10 mg Oral BID    folic acid-pyridoxine-cyancobalamin  1 tablet Oral Daily    baclofen  5 mg Oral BID    donepezil  5 mg Oral Nightly    aspirin  81 mg Oral Daily    atorvastatin  40 mg Oral Nightly    dorzolamide  1 drop Both Eyes TID    latanoprost  1 drop Both Eyes Nightly    vitamin B-12  1,000 mcg Oral Daily    vitamin D  1,000 Units Oral Daily    sodium chloride flush  10 mL Intravenous 2 times per day    heparin (porcine)  5,000 Units Subcutaneous 3 times per day    rOPINIRole  0.5 mg Oral TID      Infusions:   PRN Meds: albuterol sulfate HFA, 2 puff, Q4H PRN    And  ipratropium, 2 puff, PRN  bisacodyl, 10 mg, Daily PRN  hydrOXYzine, 25 mg, TID PRN  sodium chloride flush, 10 mL, PRN  acetaminophen, 650 mg, Q6H PRN  magnesium hydroxide, 30 mL, Daily PRN          Electronically signed by Jovanny Gonsalez MD on 11/19/2020 at 11:45 AM

## 2020-11-18 NOTE — PLAN OF CARE
Problem: Falls - Risk of:  Goal: Will remain free from falls  Description: Will remain free from falls  11/18/2020 1250 by Kartik Garcia LPN  Outcome: Ongoing  11/18/2020 0031 by Laly Coker  Outcome: Ongoing  Goal: Absence of physical injury  Description: Absence of physical injury  11/18/2020 1250 by Kartik Garcia LPN  Outcome: Ongoing  11/18/2020 0031 by Laly Coker  Outcome: Ongoing     Problem: Skin Integrity:  Goal: Will show no infection signs and symptoms  Description: Will show no infection signs and symptoms  11/18/2020 1250 by Kartik Garcia LPN  Outcome: Ongoing  11/18/2020 0031 by Laly Coker  Outcome: Ongoing  Goal: Absence of new skin breakdown  Description: Absence of new skin breakdown  11/18/2020 1250 by Kartik Garcia LPN  Outcome: Ongoing  11/18/2020 0031 by Laly Coker  Outcome: Ongoing     Problem: Coping:  Goal: Ability to remain calm will improve  Description: Ability to remain calm will improve  11/18/2020 1250 by Kartik Garcia LPN  Outcome: Ongoing  11/18/2020 0031 by Laly Coker  Outcome: Ongoing     Problem: Safety:  Goal: Ability to remain free from injury will improve  Description: Ability to remain free from injury will improve  11/18/2020 1250 by Kartik Garcia LPN  Outcome: Ongoing  11/18/2020 0031 by Laly Coker  Outcome: Ongoing     Problem: Self-Care:  Goal: Ability to participate in self-care as condition permits will improve  Description: Ability to participate in self-care as condition permits will improve  11/18/2020 1250 by Kartik Garcia LPN  Outcome: Ongoing  11/18/2020 0031 by Laly Coker  Outcome: Ongoing     Problem: Pain:  Goal: Pain level will decrease  Description: Pain level will decrease  11/18/2020 1250 by Kartik Garcia LPN  Outcome: Ongoing  11/18/2020 0031 by Laly Coker  Outcome: Ongoing  Goal: Control of acute pain  Description: Control of acute pain  11/18/2020 1250 by Kartik Garcia LPN  Outcome: Ongoing  11/18/2020

## 2020-11-18 NOTE — PROGRESS NOTES
Gaviota Daniel MD.  Section of General Neurology - Adult  Consult Note        Reason for Consult:    Requesting Physician:  No referring provider defined for this encounter. Thank you for your kind referral.    CHIEF COMPLAINT:  confusion         HISTORY OF PRESENT ILLNESS:              The patient is a 80 y.o. female with a history of confusion. pt had bronchitis prior to admission and was treated with antibiotics. pt got worse and was confused on admission with hypotension of 78 during admission. 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 pt has dementia and states has been depressed x few months. pt is on asa and namenda.     Past Medical History:        Diagnosis Date    Arthritis     Blind left eye     Cancer ([facility] [address] 1971    colon    COPD (chronic obstructive pulmonary disease) (HCC)     Dementia (HCC)     Depression     GERD (gastroesophageal reflux disease)     Hypertension     Pneumonia     Restless legs syndrome     Tremors of nervous system     Unspecified cerebral artery occlusion with cerebral infarction      Past Surgical History:        Procedure Laterality Date    ABDOMEN SURGERY      APPENDECTOMY      BACK SURGERY      COLONOSCOPY      ENDOSCOPY, COLON, DIAGNOSTIC      EYE SURGERY      FRACTURE SURGERY      HYSTERECTOMY  1972    SPINE SURGERY  1995    VASCULAR SURGERY       Current Medications:   Current Facility-Administered Medications: baclofen (LIORESAL) tablet 5 mg, 5 mg, Oral, BID  albuterol sulfate  (90 Base) MCG/ACT inhaler 2 puff, 2 puff, Inhalation, Q4H PRN **AND** ipratropium (ATROVENT HFA) 17 MCG/ACT inhaler 2 puff, 2 puff, Inhalation, PRN **AND** [CANCELED] MDI Treatment, , , 4x daily  donepezil (ARICEPT) tablet 5 mg, 5 mg, Oral, Nightly  PARoxetine (PAXIL) tablet 10 mg, 10 mg, Oral, Daily  acetaminophen (TYLENOL) tablet 650 mg, 650 mg, Oral, Q4H PRN  aspirin chewable tablet 81 mg, 81 mg, Oral, Daily  atorvastatin (LIPITOR) tablet 40 mg, 40 mg, Oral, Nightly  dorzolamide (TRUSOPT) 2 % ophthalmic solution 1 drop, 1 drop, Both Eyes, TID  bisacodyl (DULCOLAX) suppository 10 mg, 10 mg, Rectal, Daily PRN  hydrOXYzine (VISTARIL) capsule 25 mg, 25 mg, Oral, TID PRN  latanoprost (XALATAN) 0.005 % ophthalmic solution 1 drop, 1 drop, Both Eyes, Nightly  memantine (NAMENDA) tablet 5 mg, 5 mg, Oral, BID  vitamin B-12 (CYANOCOBALAMIN) tablet 1,000 mcg, 1,000 mcg, Oral, Daily  vitamin D CAPS 1,000 Units, 1,000 Units, Oral, Daily  sodium chloride flush 0.9 % injection 10 mL, 10 mL, Intravenous, 2 times per day  sodium chloride flush 0.9 % injection 10 mL, 10 mL, Intravenous, PRN  acetaminophen (TYLENOL) tablet 650 mg, 650 mg, Oral, Q6H PRN **OR** acetaminophen (TYLENOL) suppository 650 mg, 650 mg, Rectal, Q6H PRN  magnesium hydroxide (MILK OF MAGNESIA) 400 MG/5ML suspension 30 mL, 30 mL, Oral, Daily PRN  heparin (porcine) injection 5,000 Units, 5,000 Units, Subcutaneous, 3 times per day  methylPREDNISolone sodium (SOLU-MEDROL) injection 40 mg, 40 mg, Intravenous, Daily  rOPINIRole (REQUIP) tablet 0.5 mg, 0.5 mg, Oral, TID  Allergies: Other    Social History:  TOBACCO:   reports that she has quit smoking. Her smoking use included cigarettes. She has a 12.50 pack-year smoking history. She has never used smokeless tobacco.  ETOH:   reports no history of alcohol use. DRUGS:   reports no history of drug use.   Family History:       Problem Relation Age of Onset    Arthritis Mother     Cancer Mother     Diabetes Mother     Heart Disease Mother     High Blood Pressure Mother    [de-identified] / [place] Mother     Cancer Sister     Cancer Brother     Diabetes Daughter     High Blood Pressure Daughter        REVIEW OF SYSTEMS:  CONSTITUTIONAL:  negative  HEENT:  negative  RESPIRATORY:  negative  CARDIOVASCULAR:  negative  GASTROINTESTINAL:  negative  GENITOURINARY:  negative  MUSCULOSKELETAL: negative  BEHAVIOR/PSYCH:  Negative    ROS neg    Family hx neg    PHYSICAL EXAM  ------------------------  Vitals:  /65   Pulse 56   Temp 98.4 °F (36.9 °C) (Oral)   Resp 12   Ht 5' (1.524 m)   Wt 168 lb 12.8 oz (76.6 kg)   SpO2 94%   BMI 32.97 kg/m²      General:  Awake, alert, oriented X 1. Well developed, well nourished, well groomed. No apparent distress. HEENT:  Normocephalic, atraumatic. Pupils equal, round, reactive to light. No scleral icterus. No conjunctival injection. Normal lips, teeth, and gums. No nasal discharge. Neck:  Supple  Heart:  RRR, no murmurs, gallops, rubs  Lungs:  CTA bilaterally, bilat symmetrical expansion, no wheeze, rales, or rhonchi  Abdomen: Bowel sounds present, soft, nontender, no masses, no organomegaly, no peritoneal signs  Extremities:  No clubbing, cyanosis, or edema  Skin:  Warm and dry, no open lesions or rash  Breast: deferred  Rectal: deferred  Genitalia:  deferred    NEUROLOGICAL EXAM  ---------------------------------    Mental Status Exam:             Alert and oriented times one,follows commands,speech and language orineted to person not to place year month and other MMSE testing. Cranial Epyiae-VV-GTI Intact.         Cranial nerve II           Visual acuity:  normal                 Cranial nerve III           Pupils:  equal, round, reactive to light      Cranial nerves III, IV, VI           Extraocular Movements: intact except right eyelid ptosis which pt states is old from her old stroke      Cranial nerve V           Facial sensation:  intact      Cranial nerve VII           Facial strength: intact      Cranial nerve VIII           Hearing:  intact      Cranial nerve IX           Palate:  intact      Cranial nerve XI         Shoulder shrug:  intact      Cranial nerve XII          Tongue movement:  normal    Motor:    Drift:  absent  Motor exam is symmetrical 5 out of 5 all extremities bilaterally  Tone:  normal  Abnormal Movements: Absent    DTRs-1+ biceps,triceps,brachioradialis,knee jerks and ankle jerks bilaterally symmetrical.  Toes-downgoing bilaterally            Sensory:could not test sensation              CBC with Differential:    Lab Results   Component Value Date    WBC 8.2 11/15/2020    RBC 4.14 11/15/2020    HGB 11.4 11/15/2020    HCT 36.7 11/15/2020     11/15/2020    MCV 88.6 11/15/2020    MCH 27.5 11/15/2020    MCHC 31.1 11/15/2020    RDW 14.9 11/15/2020    SEGSPCT 76.7 11/15/2020    BANDSPCT 7 10/06/2014    LYMPHOPCT 15.3 11/15/2020    MONOPCT 7.5 11/15/2020    MYELOPCT 1 10/04/2014    EOSPCT 3.4 08/07/2011    BASOPCT 0.1 11/15/2020    MONOSABS 0.6 11/15/2020    LYMPHSABS 1.3 11/15/2020    EOSABS 0.0 11/15/2020    BASOSABS 0.0 11/15/2020    DIFFTYPE AUTOMATED DIFFERENTIAL 11/15/2020     CMP:    Lab Results   Component Value Date     11/15/2020    K 4.0 11/15/2020     11/15/2020    CO2 19 11/15/2020    BUN 25 11/15/2020    CREATININE 1.1 11/15/2020    GFRAA 57 11/15/2020    LABGLOM 47 11/15/2020    GLUCOSE 109 11/15/2020    PROT 6.1 11/15/2020    PROT 6.8 08/07/2011    LABALBU 3.6 11/15/2020    CALCIUM 8.9 11/15/2020    BILITOT 0.5 11/15/2020    ALKPHOS 69 11/15/2020    AST 19 11/15/2020    ALT 14 11/15/2020     BMP:    Lab Results   Component Value Date     11/15/2020    K 4.0 11/15/2020     11/15/2020    CO2 19 11/15/2020    BUN 25 11/15/2020    LABALBU 3.6 11/15/2020    CREATININE 1.1 11/15/2020    CALCIUM 8.9 11/15/2020    GFRAA 57 11/15/2020    LABGLOM 47 11/15/2020    GLUCOSE 109 11/15/2020     PT/INR:    Lab Results   Component Value Date    PROTIME 12.1 11/13/2020    PROTIME 10.6 08/07/2011    INR 1.00 11/13/2020     PTT:    Lab Results   Component Value Date    APTT 31.5 11/13/2020   [APTT  U/A:    Lab Results   Component Value Date    COLORU YELLOW 11/15/2020    PHUR 6.0 03/03/2020    PHUR 6.0 03/03/2020    WBCUA 1 11/15/2020    RBCUA 1 11/15/2020    MUCUS RARE 11/15/2020    TRICHOMONAS NONE SEEN 11/15/2020    BACTERIA NEGATIVE 11/15/2020    CLARITYU CLEAR 11/15/2020    SPECGRAV 1.032 11/15/2020    LEUKOCYTESUR NEGATIVE 11/15/2020    UROBILINOGEN NORMAL 11/15/2020    BILIRUBINUR NEGATIVE 11/15/2020    BLOODU NEGATIVE 11/15/2020    GLUCOSEU Negative 03/03/2020     TSH:  No results found for: TSH  VITAMIN B12: No components found for: B12  FOLATE:    Lab Results   Component Value Date    FOLATE >20.0 11/17/2020     RPR:  No results found for: RPR  JULIETTE:  No results found for: ANATITER, JULIETTE  Urine Toxicology:  No components found for: IAMMENTA, IBARBIT, IBENZO, ICOCAINE, IMARTHC, IOPIATES, IPHENCYC     IMPRESSION:    Metabolic encephalopathy    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 Discussed plan of care with pts hospitalist.. Kenisha Brain MD  BOARD CERTIFIED-NEUROLOGY.

## 2020-11-18 NOTE — PLAN OF CARE
Problem: Falls - Risk of:  Goal: Will remain free from falls  Description: Will remain free from falls  Outcome: Ongoing  Goal: Absence of physical injury  Description: Absence of physical injury  Outcome: Ongoing     Problem: Skin Integrity:  Goal: Will show no infection signs and symptoms  Description: Will show no infection signs and symptoms  Outcome: Ongoing  Goal: Absence of new skin breakdown  Description: Absence of new skin breakdown  Outcome: Ongoing     Problem: Coping:  Goal: Ability to remain calm will improve  Description: Ability to remain calm will improve  Outcome: Ongoing     Problem: Safety:  Goal: Ability to remain free from injury will improve  Description: Ability to remain free from injury will improve  Outcome: Ongoing     Problem: Self-Care:  Goal: Ability to participate in self-care as condition permits will improve  Description: Ability to participate in self-care as condition permits will improve  Outcome: Ongoing     Problem: Pain:  Goal: Pain level will decrease  Description: Pain level will decrease  Outcome: Ongoing  Goal: Control of acute pain  Description: Control of acute pain  Outcome: Ongoing  Goal: Control of chronic pain  Description: Control of chronic pain  Outcome: Ongoing

## 2020-11-19 VITALS
WEIGHT: 168.8 LBS | TEMPERATURE: 98.1 F | DIASTOLIC BLOOD PRESSURE: 71 MMHG | SYSTOLIC BLOOD PRESSURE: 144 MMHG | BODY MASS INDEX: 33.14 KG/M2 | OXYGEN SATURATION: 94 % | RESPIRATION RATE: 16 BRPM | HEIGHT: 60 IN | HEART RATE: 52 BPM

## 2020-11-19 LAB
EKG ATRIAL RATE: 63 BPM
EKG DIAGNOSIS: NORMAL
EKG P AXIS: 53 DEGREES
EKG P-R INTERVAL: 162 MS
EKG Q-T INTERVAL: 420 MS
EKG QRS DURATION: 88 MS
EKG QTC CALCULATION (BAZETT): 429 MS
EKG R AXIS: -5 DEGREES
EKG T AXIS: 39 DEGREES
EKG VENTRICULAR RATE: 63 BPM
TROPONIN T: <0.01 NG/ML
TROPONIN T: <0.01 NG/ML

## 2020-11-19 PROCEDURE — 97530 THERAPEUTIC ACTIVITIES: CPT

## 2020-11-19 PROCEDURE — 36415 COLL VENOUS BLD VENIPUNCTURE: CPT

## 2020-11-19 PROCEDURE — 84145 PROCALCITONIN (PCT): CPT

## 2020-11-19 PROCEDURE — 84484 ASSAY OF TROPONIN QUANT: CPT

## 2020-11-19 PROCEDURE — 97535 SELF CARE MNGMENT TRAINING: CPT

## 2020-11-19 PROCEDURE — 94761 N-INVAS EAR/PLS OXIMETRY MLT: CPT

## 2020-11-19 PROCEDURE — 6370000000 HC RX 637 (ALT 250 FOR IP): Performed by: PSYCHIATRY & NEUROLOGY

## 2020-11-19 PROCEDURE — 6370000000 HC RX 637 (ALT 250 FOR IP): Performed by: INTERNAL MEDICINE

## 2020-11-19 PROCEDURE — 93010 ELECTROCARDIOGRAM REPORT: CPT | Performed by: INTERNAL MEDICINE

## 2020-11-19 PROCEDURE — 6360000002 HC RX W HCPCS: Performed by: INTERNAL MEDICINE

## 2020-11-19 PROCEDURE — 6370000000 HC RX 637 (ALT 250 FOR IP): Performed by: PHYSICIAN ASSISTANT

## 2020-11-19 PROCEDURE — 2580000003 HC RX 258: Performed by: INTERNAL MEDICINE

## 2020-11-19 RX ORDER — BACLOFEN 5 MG/1
5 TABLET ORAL 2 TIMES DAILY
Qty: 60 TABLET | Refills: 3 | Status: SHIPPED | OUTPATIENT
Start: 2020-11-19

## 2020-11-19 RX ORDER — GABAPENTIN 100 MG/1
200 CAPSULE ORAL 3 TIMES DAILY
Status: DISCONTINUED | OUTPATIENT
Start: 2020-11-19 | End: 2020-11-19

## 2020-11-19 RX ORDER — OXYCODONE HYDROCHLORIDE AND ACETAMINOPHEN 5; 325 MG/1; MG/1
1 TABLET ORAL ONCE
Status: DISCONTINUED | OUTPATIENT
Start: 2020-11-19 | End: 2020-11-19

## 2020-11-19 RX ORDER — DONEPEZIL HYDROCHLORIDE 5 MG/1
5 TABLET, FILM COATED ORAL NIGHTLY
Qty: 30 TABLET | Refills: 3 | Status: SHIPPED | OUTPATIENT
Start: 2020-11-19 | End: 2022-04-14

## 2020-11-19 RX ORDER — PAROXETINE HYDROCHLORIDE 20 MG/1
20 TABLET, FILM COATED ORAL DAILY
Qty: 30 TABLET | Refills: 3 | Status: SHIPPED | OUTPATIENT
Start: 2020-11-20 | End: 2022-04-14

## 2020-11-19 RX ORDER — B12/LEVOMEFOLATE CALCIUM/B-6 2-1.13-25
1 TABLET ORAL DAILY
Qty: 30 TABLET | Refills: 3 | Status: SHIPPED | OUTPATIENT
Start: 2020-11-20 | End: 2020-12-20

## 2020-11-19 RX ORDER — MEMANTINE HYDROCHLORIDE 10 MG/1
10 TABLET ORAL 2 TIMES DAILY
Qty: 60 TABLET | Refills: 3 | Status: SHIPPED | OUTPATIENT
Start: 2020-11-19

## 2020-11-19 RX ADMIN — SODIUM CHLORIDE, PRESERVATIVE FREE 10 ML: 5 INJECTION INTRAVENOUS at 09:12

## 2020-11-19 RX ADMIN — METHYLPREDNISOLONE SODIUM SUCCINATE 40 MG: 40 INJECTION, POWDER, FOR SOLUTION INTRAMUSCULAR; INTRAVENOUS at 09:19

## 2020-11-19 RX ADMIN — BACLOFEN 5 MG: 10 TABLET ORAL at 09:49

## 2020-11-19 RX ADMIN — Medication 1000 UNITS: at 09:14

## 2020-11-19 RX ADMIN — ASPIRIN 81 MG CHEWABLE TABLET 81 MG: 81 TABLET CHEWABLE at 09:15

## 2020-11-19 RX ADMIN — HEPARIN SODIUM 5000 UNITS: 5000 INJECTION INTRAVENOUS; SUBCUTANEOUS at 06:36

## 2020-11-19 RX ADMIN — DORZOLAMIDE HYDROCHLORIDE 1 DROP: 20 SOLUTION/ DROPS OPHTHALMIC at 09:13

## 2020-11-19 RX ADMIN — Medication 1 TABLET: at 09:14

## 2020-11-19 RX ADMIN — ROPINIROLE HYDROCHLORIDE 0.5 MG: 0.25 TABLET, FILM COATED ORAL at 09:15

## 2020-11-19 RX ADMIN — PAROXETINE HYDROCHLORIDE 20 MG: 20 TABLET, FILM COATED ORAL at 09:16

## 2020-11-19 RX ADMIN — CYANOCOBALAMIN TAB 1000 MCG 1000 MCG: 1000 TAB at 09:16

## 2020-11-19 RX ADMIN — MEMANTINE 10 MG: 10 TABLET ORAL at 09:17

## 2020-11-19 ASSESSMENT — PAIN SCALES - GENERAL: PAINLEVEL_OUTOF10: 0

## 2020-11-19 NOTE — PROGRESS NOTES
[facility] Liaison spoke with grad daughter that takes care of pt & is aware of discharge & will initiate Oralia Quintanilla.

## 2020-11-19 NOTE — DISCHARGE SUMMARY
Discharge Summary    Name:  Bianca Galan /Age/Sex: 1931  (80 y.o. female)   MRN & CSN:  [id number] & [id number] Admission Date/Time: 2020  9:35 PM   Attending:  Samantha Ca MD Discharging Physician: Rohan Mckinney MD     Hospital Course:   Marianna Domingo a 80 y.o.  female  who presents with AMS      #AMS sec to Metabolic encephalopathy from BRADY-improved  #Dementia  #Depression  #Old CVA  -CT brain: No acute pathology  -Orestes Naval,  MRI with previous multiple CVA,   -consulted cardio for eval for cardiac embolic source, evaluation deferred due to CVA being chronic  -continue on aricept , namenda and paxil. Foltx added     #contrast extravasation d/t inappropriate IV placement, less likely compartment syndrome, no trauma report, good peripheral pulses      #BRADY possibly d/t dehydration- resolved  - avoid nephrotoxic medication  - s/p IVF  -Monitor closely     #disposition:  Patient will go home with home health care. Daughter called, questions and concerns addressed. She is concerned about Paxil, however patient has been stable here. patient will follow up with neurology as outpatient and take a final decision.         The patient expressed appropriate understanding of and agreement with the discharge recommendations, medications, and plan.      Consults this admission:  IP CONSULT TO PRIMARY CARE PROVIDER  IP CONSULT TO HOSPITALIST  IP CONSULT TO NEUROLOGY  IP CONSULT TO CARDIOLOGY  IP CONSULT TO CARDIOLOGY    Discharge Instruction:   Follow up appointments: Neurology  Primary care physician:  within 2 weeks    Diet:  cardiac diet   Activity: activity as tolerated  Disposition: Discharged to:   []Home, [x]HHC, []SNF, []Acute Rehab, []Hospice   Condition on discharge: Stable    Discharge Medications:      Meredith Buddhist   Home Medication Instructions GOO:[id number]    Printed on:20 1118   Medication Information                      acetaminophen 650 MG TABS  Take 650 mg by mouth every 4 hours as needed             albuterol sulfate  (90 Base) MCG/ACT inhaler  Inhale 2 puffs into the lungs every 6 hours as needed for Shortness of Breath              aspirin 81 MG chewable tablet  Take 1 tablet by mouth daily             atorvastatin (LIPITOR) 40 MG tablet  Take 1 tablet by mouth nightly             bisacodyl (DULCOLAX) 10 MG suppository  Place 10 mg rectally daily as needed for Constipation              diazePAM (VALIUM) 5 MG tablet  Take 5 mg by mouth every 6 hours as needed for Anxiety. dorzolamide (TRUSOPT) 2 % ophthalmic solution  Place 1 drop into both eyes 3 times daily             hydrALAZINE (APRESOLINE) 50 MG tablet  Take 1 tablet by mouth every 8 hours             hydrOXYzine (VISTARIL) 25 MG capsule  Take 1 capsule by mouth 3 times daily as needed for Anxiety             latanoprost (XALATAN) 0.005 % ophthalmic solution  Place 1 drop into both eyes nightly             magnesium hydroxide (MILK OF MAGNESIA) 400 MG/5ML suspension  Take 30 mLs by mouth daily as needed for Constipation             memantine (NAMENDA) 5 MG tablet  Take 1 tablet by mouth 2 times daily             nitroGLYCERIN (NITROSTAT) 0.4 MG SL tablet  up to max of 3 total doses. If no relief after 1 dose, call 911. rOPINIRole (REQUIP) 0.5 MG tablet  Take 5 tablets by mouth 3 times daily             vitamin B-12 (CYANOCOBALAMIN) 1000 MCG tablet  Take 1,000 mcg by mouth daily             vitamin D (CHOLECALCIFEROL) 1000 UNIT TABS tablet  Take 1,000 Units by mouth daily                 Objective Findings at Discharge:   BP (!) 144/71   Pulse 52   Temp 98.1 °F (36.7 °C) (Oral)   Resp 16   Ht 5' (1.524 m)   Wt 168 lb 12.8 oz (76.6 kg)   SpO2 94%   BMI 32.97 kg/m²            PHYSICAL EXAM   GEN Awake female, sitting upright in bed in no apparent distress. Appears given age. EYES Pupils are equally round. No scleral erythema, discharge, or conjunctivitis.   HENT Mucous membranes are moist. Oral pharynx without exudates, no evidence of thrush. NECK Supple, no apparent thyromegaly or masses. RESP Clear to auscultation, no wheezes, rales or rhonchi. Symmetric chest movement while on room air. CARDIO/VASC S1/S2 auscultated. Regular rate without appreciable murmurs, rubs, or gallops. No JVD or carotid bruits. Peripheral pulses equal bilaterally and palpable. No peripheral edema. GI Abdomen is soft without significant tenderness, masses, or guarding. Bowel sounds are normoactive. Rectal exam deferred.  No costovertebral angle tenderness. Normal appearing external genitalia. Peterson catheter is not present. HEME/LYMPH No palpable cervical lymphadenopathy and no hepatosplenomegaly. No petechiae or ecchymoses. MSK No gross joint deformities. SKIN Normal coloration, warm, dry. NEURO Blindness, normal speech, no lateralizing weakness. PSYCH Awake, alert. Affect appropriate. BMP/CBC  No results for input(s): NA, K, CL, CO2, BUN, CREATININE, WBC, HEMOGLOBIN, HCT, PLT in the last 72 hours.     Invalid input(s): GLU    IMAGING:    Discharge Time of 35 minutes    Electronically signed by Jacob Santoro MD on 11/19/2020 at 11:18 AM

## 2020-11-19 NOTE — DISCHARGE INSTR - COC
Continuity of Care Form    Patient Name: Abbi Jean   :  1931  MRN:  [id number]    Admit date:  2020  Discharge date:  ***    Code Status Order: Full Code   Advance Directives:     Admitting Physician:  Alonzo Peterson MD  PCP: DUANE Paula CNP    Discharging Nurse: [facility] Unit/Room#: 7888/0471-G  Discharging Unit Phone Number: ***    Emergency Contact:   Extended Emergency Contact Information  Primary Emergency Contact: [address], [address] [facility] Phone: [phone]  Relation: Grandchild  Secondary Emergency Contact: [address] Phone: [phone]  Relation: Child    Past Surgical History:  Past Surgical History:   Procedure Laterality Date    ABDOMEN SURGERY      APPENDECTOMY      BACK SURGERY      COLONOSCOPY      ENDOSCOPY, COLON, DIAGNOSTIC      EYE SURGERY      FRACTURE SURGERY      HYSTERECTOMY  1972    SPINE SURGERY      VASCULAR SURGERY         Immunization History:   Immunization History   Administered Date(s) Administered    Influenza Vaccine, unspecified formulation 2015    Influenza, Quadv, IM, PF (6 mo and older Fluzone, Flulaval, Fluarix, and 3 yrs and older Afluria) 2020       Active Problems:  Patient Active Problem List   Diagnosis Code    Hypertension I10    Restless leg syndrome, uncontrolled G25.81    Blind left eye H54.40    Pneumonia due to infectious organism J18.9    COPD (chronic obstructive pulmonary disease) ([facility] [address] J44.9    Tobacco abuse Z72.0    Blurry vision, right eye H53.8    Risk for falls Z91.81    History of TIA (transient ischemic attack) Z86.73    Acute metabolic encephalopathy P98.75    Cellulitis of right lower extremity L03.115    Bradycardia by electrocardiogram R00.1    Acute encephalopathy G93.40    Sepsis (HCC) A41.9    GERD (gastroesophageal reflux disease) K21.9    GI bleed K92.2    COPD exacerbation ([address] J44.1    Agitation R45.1    Psychosis ([facility] [address] F29    Dementia ([facility] [address] F03.90    Seizure-like activity (HCC) R56.9    Chest pain R07.9    AMS (altered mental status) R41.82       Isolation/Infection:   Isolation          No Isolation        Patient Infection Status     Infection Onset Added Last Indicated Last Indicated By Review Planned Expiration Resolved Resolved By    None active    Resolved    COVID-19 Rule Out 11/14/20 11/14/20 11/14/20 Respiratory Panel, Molecular, with COVID-19 (Restricted: peds pts or suitable admitted adults) (Ordered)   11/14/20 Rule-Out Test Resulted    COVID-19 Rule Out 11/11/20 11/11/20 11/11/20 COVID-19 (Ordered)   11/11/20 Rule-Out Test Resulted          Nurse Assessment:  Last Vital Signs: BP (!) 144/71   Pulse 52   Temp 98.1 °F (36.7 °C) (Oral)   Resp 16   Ht 5' (1.524 m)   Wt 168 lb 12.8 oz (76.6 kg)   SpO2 94%   BMI 32.97 kg/m²     Last documented pain score (0-10 scale): Pain Level: 0  Last Weight:   Wt Readings from Last 1 Encounters:   11/18/20 168 lb 12.8 oz (76.6 kg)     Mental Status:  oriented and alert    IV Access:  - None    Nursing Mobility/ADLs:  Walking   Assisted  Transfer  Assisted  Bathing  Assisted  Dressing  Assisted  Toileting  Assisted  Feeding  Independent  Med Admin  Independent  Med Delivery   whole    Wound Care Documentation and Therapy:  Wound 11/17/20 Buttocks Right red, waxy appearance (Active)   Dressing Status New dressing applied 11/19/20 0842   Number of days: 1        Elimination:  Continence:   · Bowel: Yes  · Bladder: Yes  Urinary Catheter: None   Colostomy/Ileostomy/Ileal Conduit: No       Date of Last BM: 11/18/2020    Intake/Output Summary (Last 24 hours) at 11/19/2020 1148  Last data filed at 11/19/2020 0817  Gross per 24 hour   Intake 200 ml   Output --   Net 200 ml     No intake/output data recorded. Safety Concerns:      At Risk for Falls    Impairments/Disabilities:      Vision and Hearing    Nutrition Therapy:  Current Nutrition Therapy:   - Oral Diet:  General    Routes of Feeding: Oral  Liquids: No Restrictions  Daily Fluid Restriction: no  Last Modified Barium Swallow with Video (Video Swallowing Test): not done    Treatments at the Time of Hospital Discharge:   Respiratory Treatments: -  Oxygen Therapy:  is not on home oxygen therapy. Ventilator:    - No ventilator support    Rehab Therapies: Physical Therapy and Occupational Therapy  Weight Bearing Status/Restrictions: No weight bearing restirctions  Other Medical Equipment (for information only, NOT a DME order):  walker  Other Treatments: -    Patient's personal belongings (please select all that are sent with patient):  None    RN SIGNATURE:  Electronically signed by Jacquelin Ramirez RN on 20 at 11:51 AM EST    CASE MANAGEMENT/SOCIAL WORK SECTION    Inpatient Status Date: ***    Readmission Risk Assessment Score:  Readmission Risk              Risk of Unplanned Readmission:        20           Discharging to Facility/ Agency   · Name:   · Address:  · Phone:  · Fax:    Dialysis Facility (if applicable)   · Name:  · Address:  · Dialysis Schedule:  · Phone:  · Fax:    / signature: {Esignature:[id number]}    PHYSICIAN SECTION    Prognosis: {Prognosis:7042631246}    Condition at Discharge: [address] [place] [address] Patient Condition:[id number]}    Rehab Potential (if transferring to Rehab): {Prognosis:0115066380}    Recommended Labs or Other Treatments After Discharge: ***    Physician Certification: I certify the above information and transfer of Catarino Watkins  is necessary for the continuing treatment of the diagnosis listed and that she requires {Admit to Appropriate Level of Care:52096} for {GREATER/LESS:880909014} 30 days.      Update Admission H&P: {CHP DME Changes in HVPI}    PHYSICIAN SIGNATURE:  {Esignature:[id number]}

## 2020-11-19 NOTE — PROGRESS NOTES
Occupational Therapy      Occupational Therapy Treatment Note    Name: Bernadette Wilhelm MRN: [id number] :   1931   Date:  2020   Admission Date: 2020 Room:  [address] [place] [address]-A     Primary Problem: Altered mental status, Acute kidney injury    Restrictions/Precautions: General Precautions, Fall Risk, Legally Blind, [place], Bed/chair alarm    Communication with other providers: MENA Asher    Subjective:  Patient states: \"It's hard to drink my coffee in this position. \" (referring to [place]'s position in bed)  Pain: Pt denied pain this date    Objective:    Observation: Pt received in [place] position upon arrival. Agreeable to OT treatment. Objective Measures: Pt disoriented to time/full situation    Treatment, including education:  Therapeutic Activity Training:   Therapeutic activity training was instructed today. Cues were given for safety, sequence, UE/LE placement, awareness, and balance. Self Care Training:   Cues were given for safety, sequence, UE/LE placement, visual cues, and balance. Pt received in [place] position upon arrival. Pt re-educated on role of OT and POC. Pt transferred supine to sitting EOB SBA with HOB elevated to 30' and min tactile cues for use of bed rail due to visual impairments. Pt able to grasp rail and pull herself to edge without assist. Pt demonstrated good static sitting balance EOB and able to sit unsupported with supervision. Pt dependent for donning BL socks. Pt donned clean brief max A with assist for threading legs through openings and mgmt of brief to knees in sitting. Pt stood from bed CGA with mod tactile cues and able to participate in brief mgmt to hips in standing with CGA for balance. Pt ambulated approximately 75 ft CGA with RW but max verbal and tactile cues required for navigating with RW due to visual impairments. Pt returned to room and transferred stand to sit to chair CGA with min cues for reaching back with arms.  Pt setup for feeding tasks of drinking coffee and milk. Setup required for adjustment of coffee lid to maximize ease for pt with bringing to mouth as well as setup for opening milk carton and applying straw. Tactile cues required to familiarize pt with where items were located on tray due to visual impairments. Pt left positioned for comfort in chair with all needs within reach and chair alarm on. Assessment / Impression:    Patient's tolerance of treatment: Well  Adverse Reaction: None  Significant change in status and impact: Similar presentation from initial evaluation. Slightly improved mental status noted. Barriers to improvement: Visual impairments, Dementia, Overall weakness/debility      Plan for Next Session:    Continue per OT POC. Continue to recommend home with continuous 24 hour care.       Time in: 1015  Time out: 1039  Timed treatment minutes: 24  Total treatment time: 24      Electronically signed by:    BRII Wagner/L, BRAWINKL, [id number]

## 2020-11-19 NOTE — PROGRESS NOTES
David Cid BSN RN clinical  for [facility] SURGICAL [facility] RN students 07-19:00 this date.

## 2020-11-19 NOTE — ADT AUTH CERT
- s/p IVF    -Monitor closely       Neuro MD Notes    PLAN:         Mri brain neg for acute infarcts-old cerebellar infarcts         C doppler neg         Echo neg         B 12 folate TSH nl         High homocysteine level-add foltx         Continue asa         increase namenda         Continue  aricept         increase paxil         Discussed plan of care with pts hospitalist..       Anticipated Discharge Plan: therapy rec. Home with 24/7 assistance (previously cared for by family 24/7)                Neurology [address] - [facility] Day 4 (11/17/2020) by Jolynn Ko         Review Status  Review Entered    Completed  11/19/2020 13:50        Criteria Review       Care Day: 4 Care Date: 11/17/2020 Level of Care: Telemetry    Guideline Day 3    Level Of Care    ( ) * Activity level acceptable    ( ) Floor to discharge    11/19/2020 1:50 PM EST by Samara Leonard      med surg    ( ) * Complete discharge planning    Clinical Status    (X) * No infection, or status acceptable    (X) * Isolation not needed, or status acceptable    (X) * Respiratory status acceptable    (X) * Pain and nausea absent or adequately managed    (X) * Ventilatory status acceptable    ( ) * Neurologic problems absent or stabilized    (X) * Muscle or nerve damage absent or stable    ( ) * General Discharge Criteria met    Interventions    ( ) * Intake acceptable    ( ) * No inpatient interventions needed    * Milestone    Additional Notes    Continued Stay Review Note         11/17/20         Patient Visit Status: inpt    Level of Care: med surg         Last set of vitals:     BP: 135/64    Pulse: 58    Resp: 15    Temp: 98.2 °F (36.8 °C)    SpO2: 94%       Current Treatments: Remains on neuro checks q4h.  Pt/Ot and Neuro following.         Review/Comments:    #AMS sec to Metabolic encephalopathy from BRADY    #Dementia    #Depression    #Old CVA    - ugh, possible h/o copd    -viral panel/covid19 negative    -CT brain: No acute pathology    - Asa, Statin ,  MRI with previous multiple CVA,     - consulted cardio for eval for cardiac embolic source, evaluation deferred due to CVA being chronic    - carotid doppler, B12/folate, Homocysteine ordered, started on aricept , namenda and paxil         #contrast extravasation d/t inappropriate IV placement, less likely compartment syndrome, no trauma report, good peripheral pulses         #BRADY possibly d/t dehydration- resolved    - avoid nephrotoxic medication    - s/p IVF    -Monitor closely         Anticipated Discharge Plan: therapy rec. Home with 24/7 assistance (previously cared for by family 24/7)                Neurology [address] - Care Day 3 (11/16/2020) by Queta Zaidi         Review Status  Review Entered    Completed  11/19/2020 13:42        Criteria Review       Care Day: 3 Care Date: 11/16/2020 Level of Care: Telemetry    Guideline Day 2    Level Of Care    (X) Floor    11/19/2020 1:42 PM EST by Oswaldo Jean Baptiste      med surg    Clinical Status    (X) * No ICU or intermediate care needs    Interventions    (X) Inpatient interventions continue    11/19/2020 1:42 PM EST by Oswaldo Jean Baptiste      neuro checks q4h    * Milestone    Additional Notes    Continued Stay Review Note         11/16/20         Patient Visit Status: inpt    Level of Care: med surg         Last set of vitals: /64   Pulse 80   Temp 96.2 °F (35.7 °C) (Oral)   Resp 16   Ht 5' (1.524 m)   Wt 168 lb 12.8 oz (76.6 kg)   SpO2 97%   BMI 32.97 kg/m²           Current Treatments: Remains on neuro checks q4h. Cardiology following.  Pt/Ot          Review/Comments:     > AMS sec to Metabolic encephalopathy and    > Dementia    > Depression    > Old CVA    - Cough, possible h/o copd    -  viral panel/covid19 negative    -Unable to exclude other etiologies, is unable to obtain history, reattempted daytime    -Neurology consulted, saw pt on prior admission    - CT Head with remote multiple CVA not mentioned on prior CTs, pt is not aware of prior CVA. - Rhys Carballo,  MRI with previous multiple CVA,    - consulted cardio for eval for cardiac embolic source, evaluation deferred due to CVA being chronic    - carotid doppler, B12/folate, Homocysteine ordered, started on aricept , namenda and paxil         > contrast extravasation d/t inappropriate IV placement, less likely compartment syndrome, no trauma report, good peripheral pulses         >BRADY possibly d/t dehydration- resolved    - avoid nephrotoxic medication    - s/p IVF       Cardiology MD Notes    1. 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 HTN: Stable continue current medications    3. DEMENTIA:STABLE    4. DVT prophylaxis if not contraindicated.         Neuro MD Notes    Mri brain neg for acute infarcts-old cerebellar infarcts         C doppler         Echo         B 12 folate TSH homocysteine level         Continue asa         Continue namenda         Start aricept         Start paxil       Anticipated Discharge Plan: therapy rec.  Home with 24/7 assistance (previously cared for by family 24/7)

## 2020-11-20 NOTE — PROGRESS NOTES
Daniele Han MD.  Section of General Neurology - Adult  Consult Note        Reason for Consult:    Requesting Physician:  No referring provider defined for this encounter. Thank you for your kind referral.    CHIEF COMPLAINT:  confusion         HISTORY OF PRESENT ILLNESS:              The patient is a 80 y.o. female with a history of confusion. pt had bronchitis prior to admission and was treated with antibiotics. pt got worse and was confused on admission with hypotension of 78 during admission. 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 pt has dementia and states has been depressed x few months. pt is on asa and namenda. Past Medical History:        Diagnosis Date    Arthritis     Blind left eye     Cancer ([facility] [address] 1971    colon    COPD (chronic obstructive pulmonary disease) (HCC)     Dementia (HCC)     Depression     GERD (gastroesophageal reflux disease)     Hypertension     Pneumonia     Restless legs syndrome     Tremors of nervous system     Unspecified cerebral artery occlusion with cerebral infarction      Past Surgical History:        Procedure Laterality Date    ABDOMEN SURGERY      APPENDECTOMY      BACK SURGERY      COLONOSCOPY      ENDOSCOPY, COLON, DIAGNOSTIC      EYE SURGERY      FRACTURE SURGERY      HYSTERECTOMY  1972    SPINE SURGERY  1995    VASCULAR SURGERY       Current Medications:   No current facility-administered medications for this encounter. Allergies: Other    Social History:  TOBACCO:   reports that she has quit smoking. Her smoking use included cigarettes. She has a 12.50 pack-year smoking history. She has never used smokeless tobacco.  ETOH:   reports no history of alcohol use. DRUGS:   reports no history of drug use.   Family History:       Problem Relation Age of Onset    Arthritis Mother     Cancer Mother     Diabetes Mother     Heart Disease Mother     High Blood Pressure Mother     Miscarriages / [place] Mother     Cancer Sister     Cancer Brother     Diabetes Daughter     High Blood Pressure Daughter        REVIEW OF SYSTEMS:  CONSTITUTIONAL:  negative  HEENT:  negative  RESPIRATORY:  negative  CARDIOVASCULAR:  negative  GASTROINTESTINAL:  negative  GENITOURINARY:  negative  MUSCULOSKELETAL:  negative  BEHAVIOR/PSYCH:  Negative    ROS neg    Family hx neg    PHYSICAL EXAM  ------------------------  Vitals:  BP (!) 144/71   Pulse 52   Temp 98.1 °F (36.7 °C) (Oral)   Resp 16   Ht 5' (1.524 m)   Wt 168 lb 12.8 oz (76.6 kg)   SpO2 94%   BMI 32.97 kg/m²      General:  Awake, alert, oriented X 1. Well developed, well nourished, well groomed. No apparent distress. HEENT:  Normocephalic, atraumatic. Pupils equal, round, reactive to light. No scleral icterus. No conjunctival injection. Normal lips, teeth, and gums. No nasal discharge. Neck:  Supple  Heart:  RRR, no murmurs, gallops, rubs  Lungs:  CTA bilaterally, bilat symmetrical expansion, no wheeze, rales, or rhonchi  Abdomen: Bowel sounds present, soft, nontender, no masses, no organomegaly, no peritoneal signs  Extremities:  No clubbing, cyanosis, or edema  Skin:  Warm and dry, no open lesions or rash  Breast: deferred  Rectal: deferred  Genitalia:  deferred    NEUROLOGICAL EXAM  ---------------------------------    Mental Status Exam:             Alert and oriented times one,follows commands,speech and language orineted to person not to place year month and other MMSE testing. Cranial [id number] Intact.         Cranial nerve II           Visual acuity:  normal                 Cranial nerve III           Pupils:  equal, round, reactive to light      Cranial nerves III, IV, VI           Extraocular Movements: intact except right eyelid ptosis which pt states is old from her old stroke      Cranial nerve V           Facial sensation:  intact      Cranial nerve VII           Facial strength: intact      Cranial nerve VIII           Hearing:  intact      Cranial nerve IX           Palate:  intact      Cranial nerve XI         Shoulder shrug:  intact      Cranial nerve XII          Tongue movement:  normal    Motor:    Drift:  absent  Motor exam is symmetrical 5 out of 5 all extremities bilaterally  Tone:  normal  Abnormal Movements:  Absent    DTRs-1+ biceps,triceps,brachioradialis,knee jerks and ankle jerks bilaterally symmetrical.  Toes-downgoing bilaterally            Sensory:could not test sensation              CBC with Differential:    Lab Results   Component Value Date    WBC 8.2 11/15/2020    RBC 4.14 11/15/2020    HGB 11.4 11/15/2020    HCT 36.7 11/15/2020     11/15/2020    MCV 88.6 11/15/2020    MCH 27.5 11/15/2020    MCHC 31.1 11/15/2020    RDW 14.9 11/15/2020    SEGSPCT 76.7 11/15/2020    BANDSPCT 7 10/06/2014    LYMPHOPCT 15.3 11/15/2020    MONOPCT 7.5 11/15/2020    MYELOPCT 1 10/04/2014    EOSPCT 3.4 08/07/2011    BASOPCT 0.1 11/15/2020    MONOSABS 0.6 11/15/2020    LYMPHSABS 1.3 11/15/2020    EOSABS 0.0 11/15/2020    BASOSABS 0.0 11/15/2020    DIFFTYPE AUTOMATED DIFFERENTIAL 11/15/2020     CMP:    Lab Results   Component Value Date     11/15/2020    K 4.0 11/15/2020     11/15/2020    CO2 19 11/15/2020    BUN 25 11/15/2020    CREATININE 1.1 11/15/2020    GFRAA 57 11/15/2020    LABGLOM 47 11/15/2020    GLUCOSE 109 11/15/2020    PROT 6.1 11/15/2020    PROT 6.8 08/07/2011    LABALBU 3.6 11/15/2020    CALCIUM 8.9 11/15/2020    BILITOT 0.5 11/15/2020    ALKPHOS 69 11/15/2020    AST 19 11/15/2020    ALT 14 11/15/2020     BMP:    Lab Results   Component Value Date     11/15/2020    K 4.0 11/15/2020     11/15/2020    CO2 19 11/15/2020    BUN 25 11/15/2020    LABALBU 3.6 11/15/2020    CREATININE 1.1 11/15/2020    CALCIUM 8.9 11/15/2020    GFRAA 57 11/15/2020    LABGLOM 47 11/15/2020    GLUCOSE 109 11/15/2020     PT/INR:    Lab Results   Component Value Date    PROTIME 12.1 11/13/2020    PROTIME 10.6 08/07/2011    INR 1.00 11/13/2020     PTT:    Lab Results   Component Value Date    APTT 31.5 11/13/2020   [APTT  U/A:    Lab Results   Component Value Date    COLORU YELLOW 11/15/2020    PHUR 6.0 03/03/2020    PHUR 6.0 03/03/2020    WBCUA 1 11/15/2020    RBCUA 1 11/15/2020    MUCUS RARE 11/15/2020    TRICHOMONAS NONE SEEN 11/15/2020    BACTERIA NEGATIVE 11/15/2020    CLARITYU CLEAR 11/15/2020    SPECGRAV 1.032 11/15/2020    LEUKOCYTESUR NEGATIVE 11/15/2020    UROBILINOGEN NORMAL 11/15/2020    BILIRUBINUR NEGATIVE 11/15/2020    BLOODU NEGATIVE 11/15/2020    GLUCOSEU Negative 03/03/2020     TSH:  No results found for: TSH  VITAMIN B12: No components found for: B12  FOLATE:    Lab Results   Component Value Date    FOLATE >20.0 11/17/2020     RPR:  No results found for: RPR  JULIETTE:  No results found for: ANATITER, JULIETTE  Urine Toxicology:  No components found for: IAMMENTA, IBARBIT, IBENZO, ICOCAINE, IMARTHC, IOPIATES, IPHENCYC     IMPRESSION:    Metabolic encephalopathy    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 Joselito Seo MD  BOARD CERTIFIED-NEUROLOGY.

## 2020-11-24 ENCOUNTER — HOSPITAL ENCOUNTER (OUTPATIENT)
Age: 85
Setting detail: SPECIMEN
Discharge: HOME | End: 2020-11-24
Payer: MEDICARE

## 2020-11-24 LAB
ANION GAP SERPL CALCULATED.3IONS-SCNC: 11 MMOL/L (ref 4–16)
BUN BLDV-MCNC: 25 MG/DL (ref 6–23)
CALCIUM SERPL-MCNC: 9 MG/DL (ref 8.3–10.6)
CHLORIDE BLD-SCNC: 104 MMOL/L (ref 99–110)
CO2: 25 MMOL/L (ref 21–32)
CREAT SERPL-MCNC: 0.8 MG/DL (ref 0.6–1.1)
GFR AFRICAN AMERICAN: >60 ML/MIN/1.73M2
GFR NON-AFRICAN AMERICAN: >60 ML/MIN/1.73M2
GLUCOSE BLD-MCNC: 130 MG/DL (ref 70–99)
POTASSIUM SERPL-SCNC: 3.8 MMOL/L (ref 3.5–5.1)
SODIUM BLD-SCNC: 140 MMOL/L (ref 135–145)

## 2020-11-24 PROCEDURE — 80048 BASIC METABOLIC PNL TOTAL CA: CPT

## 2021-01-24 ENCOUNTER — APPOINTMENT (OUTPATIENT)
Dept: ULTRASOUND IMAGING | Age: 86
End: 2021-01-24
Payer: MEDICARE

## 2021-01-24 ENCOUNTER — HOSPITAL ENCOUNTER (EMERGENCY)
Age: 86
Discharge: HOME | End: 2021-01-24
Payer: MEDICARE

## 2021-01-24 ENCOUNTER — APPOINTMENT (OUTPATIENT)
Dept: CT IMAGING | Age: 86
End: 2021-01-24
Payer: MEDICARE

## 2021-01-24 ENCOUNTER — APPOINTMENT (OUTPATIENT)
Dept: GENERAL RADIOLOGY | Age: 86
End: 2021-01-24
Payer: MEDICARE

## 2021-01-24 VITALS
SYSTOLIC BLOOD PRESSURE: 105 MMHG | HEART RATE: 77 BPM | OXYGEN SATURATION: 95 % | RESPIRATION RATE: 16 BRPM | DIASTOLIC BLOOD PRESSURE: 44 MMHG | TEMPERATURE: 98.6 F

## 2021-01-24 DIAGNOSIS — J44.1 COPD EXACERBATION (HCC): Primary | ICD-10-CM

## 2021-01-24 DIAGNOSIS — R74.8 ELEVATED ALKALINE PHOSPHATASE LEVEL: ICD-10-CM

## 2021-01-24 DIAGNOSIS — R60.0 BILATERAL LOWER EXTREMITY EDEMA: ICD-10-CM

## 2021-01-24 LAB
ALBUMIN SERPL-MCNC: 3.2 GM/DL (ref 3.4–5)
ALP BLD-CCNC: 248 IU/L (ref 40–129)
ALT SERPL-CCNC: 32 U/L (ref 10–40)
ANION GAP SERPL CALCULATED.3IONS-SCNC: 6 MMOL/L (ref 4–16)
AST SERPL-CCNC: 52 IU/L (ref 15–37)
BACTERIA: NEGATIVE /HPF
BASOPHILS ABSOLUTE: 0 K/CU MM
BASOPHILS RELATIVE PERCENT: 0.6 % (ref 0–1)
BILIRUB SERPL-MCNC: 0.4 MG/DL (ref 0–1)
BILIRUBIN URINE: NEGATIVE MG/DL
BLOOD, URINE: ABNORMAL
BUN BLDV-MCNC: 14 MG/DL (ref 6–23)
CALCIUM SERPL-MCNC: 9.2 MG/DL (ref 8.3–10.6)
CHLORIDE BLD-SCNC: 105 MMOL/L (ref 99–110)
CLARITY: CLEAR
CO2: 26 MMOL/L (ref 21–32)
COLOR: ABNORMAL
CREAT SERPL-MCNC: 1 MG/DL (ref 0.6–1.1)
D DIMER: 689 NG/ML(DDU)
DIFFERENTIAL TYPE: ABNORMAL
EOSINOPHILS ABSOLUTE: 0.3 K/CU MM
EOSINOPHILS RELATIVE PERCENT: 4.6 % (ref 0–3)
GFR AFRICAN AMERICAN: >60 ML/MIN/1.73M2
GFR NON-AFRICAN AMERICAN: 52 ML/MIN/1.73M2
GLUCOSE BLD-MCNC: 92 MG/DL (ref 70–99)
GLUCOSE, URINE: NEGATIVE MG/DL
HCT VFR BLD CALC: 37.5 % (ref 37–47)
HEMOGLOBIN: 11.6 GM/DL (ref 12.5–16)
IMMATURE NEUTROPHIL %: 0.5 % (ref 0–0.43)
KETONES, URINE: NEGATIVE MG/DL
LEUKOCYTE ESTERASE, URINE: NEGATIVE
LIPASE: 53 IU/L (ref 13–60)
LYMPHOCYTES ABSOLUTE: 2.2 K/CU MM
LYMPHOCYTES RELATIVE PERCENT: 35.4 % (ref 24–44)
MCH RBC QN AUTO: 27.7 PG (ref 27–31)
MCHC RBC AUTO-ENTMCNC: 30.9 % (ref 32–36)
MCV RBC AUTO: 89.5 FL (ref 78–100)
MONOCYTES ABSOLUTE: 0.7 K/CU MM
MONOCYTES RELATIVE PERCENT: 10.9 % (ref 0–4)
NITRITE URINE, QUANTITATIVE: NEGATIVE
NUCLEATED RBC %: 0 %
PDW BLD-RTO: 15.8 % (ref 11.7–14.9)
PH, URINE: 6 (ref 5–8)
PLATELET # BLD: 281 K/CU MM (ref 140–440)
PMV BLD AUTO: 11.2 FL (ref 7.5–11.1)
POTASSIUM SERPL-SCNC: 3.9 MMOL/L (ref 3.5–5.1)
PRO-BNP: 111.7 PG/ML
PROTEIN UA: NEGATIVE MG/DL
RBC # BLD: 4.19 M/CU MM (ref 4.2–5.4)
RBC URINE: ABNORMAL /HPF (ref 0–6)
SARS-COV-2, NAAT: NOT DETECTED
SEGMENTED NEUTROPHILS ABSOLUTE COUNT: 3 K/CU MM
SEGMENTED NEUTROPHILS RELATIVE PERCENT: 48 % (ref 36–66)
SODIUM BLD-SCNC: 137 MMOL/L (ref 135–145)
SOURCE: NORMAL
SPECIFIC GRAVITY UA: 1.02 (ref 1–1.03)
SQUAMOUS EPITHELIAL: 3 /HPF
TOTAL IMMATURE NEUTOROPHIL: 0.03 K/CU MM
TOTAL NUCLEATED RBC: 0 K/CU MM
TOTAL PROTEIN: 6.5 GM/DL (ref 6.4–8.2)
TRICHOMONAS: ABNORMAL /HPF
TROPONIN T: <0.01 NG/ML
UROBILINOGEN, URINE: NORMAL MG/DL (ref 0.2–1)
WBC # BLD: 6.3 K/CU MM (ref 4–10.5)
WBC UA: 3 /HPF (ref 0–5)

## 2021-01-24 PROCEDURE — 93970 EXTREMITY STUDY: CPT

## 2021-01-24 PROCEDURE — 71045 X-RAY EXAM CHEST 1 VIEW: CPT

## 2021-01-24 PROCEDURE — 80053 COMPREHEN METABOLIC PANEL: CPT

## 2021-01-24 PROCEDURE — U0002 COVID-19 LAB TEST NON-CDC: HCPCS

## 2021-01-24 PROCEDURE — 84484 ASSAY OF TROPONIN QUANT: CPT

## 2021-01-24 PROCEDURE — 83690 ASSAY OF LIPASE: CPT

## 2021-01-24 PROCEDURE — 93005 ELECTROCARDIOGRAM TRACING: CPT | Performed by: PHYSICIAN ASSISTANT

## 2021-01-24 PROCEDURE — 83880 ASSAY OF NATRIURETIC PEPTIDE: CPT

## 2021-01-24 PROCEDURE — 85025 COMPLETE CBC W/AUTO DIFF WBC: CPT

## 2021-01-24 PROCEDURE — 85379 FIBRIN DEGRADATION QUANT: CPT

## 2021-01-24 PROCEDURE — 6370000000 HC RX 637 (ALT 250 FOR IP): Performed by: PHYSICIAN ASSISTANT

## 2021-01-24 PROCEDURE — 81001 URINALYSIS AUTO W/SCOPE: CPT

## 2021-01-24 PROCEDURE — 99284 EMERGENCY DEPT VISIT MOD MDM: CPT

## 2021-01-24 RX ORDER — SODIUM CHLORIDE 0.9 % (FLUSH) 0.9 %
10 SYRINGE (ML) INJECTION 2 TIMES DAILY
Status: DISCONTINUED | OUTPATIENT
Start: 2021-01-24 | End: 2021-01-24 | Stop reason: HOSPADM

## 2021-01-24 RX ORDER — ALBUTEROL SULFATE 90 UG/1
2 AEROSOL, METERED RESPIRATORY (INHALATION) ONCE
Status: COMPLETED | OUTPATIENT
Start: 2021-01-24 | End: 2021-01-24

## 2021-01-24 RX ORDER — PREDNISONE 20 MG/1
40 TABLET ORAL ONCE
Status: COMPLETED | OUTPATIENT
Start: 2021-01-24 | End: 2021-01-24

## 2021-01-24 RX ORDER — PREDNISONE 20 MG/1
40 TABLET ORAL DAILY
Qty: 8 TABLET | Refills: 0 | Status: SHIPPED | OUTPATIENT
Start: 2021-01-24 | End: 2021-01-28

## 2021-01-24 RX ADMIN — ALBUTEROL SULFATE 2 PUFF: 90 AEROSOL, METERED RESPIRATORY (INHALATION) at 04:18

## 2021-01-24 RX ADMIN — Medication 2 PUFF: at 04:19

## 2021-01-24 RX ADMIN — PREDNISONE 40 MG: 20 TABLET ORAL at 05:22

## 2021-01-24 ASSESSMENT — PAIN DESCRIPTION - LOCATION: LOCATION: LEG

## 2021-01-24 ASSESSMENT — PAIN DESCRIPTION - ORIENTATION: ORIENTATION: LEFT;RIGHT;LOWER

## 2021-01-24 NOTE — ED PROVIDER NOTES
EMERGENCY DEPARTMENT ENCOUNTER        PCP: DUANE Odom - [place] [address]    Chief Complaint   Patient presents with    Shortness of Breath    Leg Swelling       This patient was not evaluated by the attending physician. I have independently evaluated this patient. My supervising physician was available for consultation. HPI      Nasrin Werner is a 80 y.o. female former smoker with PMH of COPD, HTN who presents with shortness of breath and bilateral lower extremity swelling. Patient reports that symptoms have been present for about a week. Patient reports that duration of symptoms have been constant since onset. Aggravating factors are keeping her legs down worsens the swelling. Patient denies aggravating factor for shortness of breath. Alleviating factors are propping her legs up and wearing compression stockings. Patient denies alleviating factors for shortness of breath. Patient denies any pain. Patient denies chest pain, hemoptysis, cough, nausea, vomiting, abdominal pain, diaphoresis. REVIEW OF SYSTEMS    Constitutional:  Denies diaphoresis, fever, chills  HENT:  Denies sore throat or ear pain   Cardiovascular:  Denies chest pain, palpitations  Respiratory: See HPI  GI:  Denies abdominal pain, nausea, vomiting, or diarrhea  :  Denies any urinary symptoms  Musculoskeletal:  + Bilateral lower extremity edema;  Denies back pain  Skin:  Denies rash  Neurologic:  Denies syncope, lightheadedness, dizziness, headache, focal weakness or sensory changes   Endocrine:  Denies polyuria or polydypsia   Lymphatic:  Denies swollen glands     All other review of systems are negative  See HPI and nursing notes for additional information         PAST MEDICAL & SURGICAL HISTORY    Past Medical History:   Diagnosis Date    Arthritis     Blind left eye     [facility]    colon    [facility]     Dementia ([facility]     Depression  GERD (gastroesophageal reflux disease)     Hypertension     Pneumonia     Restless legs syndrome     Tremors of nervous system     Unspecified cerebral artery occlusion with cerebral infarction      Past Surgical History:   Procedure Laterality Date    ABDOMEN SURGERY      APPENDECTOMY      BACK SURGERY      COLONOSCOPY      ENDOSCOPY, COLON, DIAGNOSTIC      EYE SURGERY      FRACTURE SURGERY      HYSTERECTOMY  1972    SPINE SURGERY  1995    VASCULAR SURGERY         CURRENT MEDICATIONS    Current Outpatient Rx   Medication Sig Dispense Refill    predniSONE (DELTASONE) 20 MG tablet Take 2 tablets by mouth daily for 4 days 8 tablet 0    PARoxetine (PAXIL) 20 MG tablet Take 1 tablet by mouth daily 30 tablet 3    folic acid-pyridoxine-cyancobalamin (FOLTX) 1.13-25-2 MG TABS Take 1 tablet by mouth daily 30 tablet 3    memantine (NAMENDA) 10 MG tablet Take 1 tablet by mouth 2 times daily 60 tablet 3    donepezil (ARICEPT) 5 MG tablet Take 1 tablet by mouth nightly 30 tablet 3    baclofen (LIORESAL) 5 MG tablet Take 1 tablet by mouth 2 times daily 60 tablet 3    vitamin D 25 MCG (1000 UT) CAPS Take 1 capsule by mouth daily 30 capsule 0    aspirin 81 MG chewable tablet Take 1 tablet by mouth daily 30 tablet 3    atorvastatin (LIPITOR) 40 MG tablet Take 1 tablet by mouth nightly 30 tablet 3    rOPINIRole (REQUIP) 0.5 MG tablet Take 5 tablets by mouth 3 times daily 30 tablet 1    hydrALAZINE (APRESOLINE) 50 MG tablet Take 1 tablet by mouth every 8 hours 90 tablet 3    vitamin B-12 (CYANOCOBALAMIN) 1000 MCG tablet Take 1,000 mcg by mouth daily      magnesium hydroxide (MILK OF MAGNESIA) 400 MG/5ML suspension Take 30 mLs by mouth daily as needed for Constipation      albuterol sulfate  (90 Base) MCG/ACT inhaler Inhale 2 puffs into the lungs every 6 hours as needed for Shortness of Breath       bisacodyl (DULCOLAX) 10 MG suppository Place 10 mg rectally daily as needed for Constipation  acetaminophen 650 MG TABS Take 650 mg by mouth every 4 hours as needed 120 tablet 3    dorzolamide (TRUSOPT) 2 % ophthalmic solution Place 1 drop into both eyes 3 times daily 10 mL 2    latanoprost (XALATAN) 0.005 % ophthalmic solution Place 1 drop into both eyes nightly 1 Bottle 3       ALLERGIES    Allergies   Allergen Reactions    Other      Pt states allergy to unknown antibiotic that made her arm red        SOCIAL & FAMILY HISTORY    Social History     Socioeconomic History    Marital status:       Spouse name: None    Number of children: 7    Years of education: None    Highest education level: None   Occupational History    None   Social Needs    Financial resource strain: None    Food insecurity     Worry: None     Inability: None    Transportation needs     Medical: None     Non-medical: None   Tobacco Use    Smoking status: Former Smoker     Packs/day: 0.25     Years: 50.00     Pack years: 12.50     Types: Cigarettes    Smokeless tobacco: Never Used    Tobacco comment: states smokes 1-2 cigs a day   Substance and Sexual Activity    Alcohol use: No     Alcohol/week: 0.0 standard drinks    Drug use: No    Sexual activity: Never   Lifestyle    Physical activity     Days per week: None     Minutes per session: None    Stress: None   Relationships    Social connections     Talks on phone: None     Gets together: None     Attends Alevism service: None     Active member of club or organization: None     Attends meetings of clubs or organizations: None     Relationship status: None    Intimate partner violence     Fear of current or ex partner: None     Emotionally abused: None     Physically abused: None     Forced sexual activity: None   Other Topics Concern    None   Social History Narrative    None     Family History   Problem Relation Age of Onset    Arthritis Mother     Cancer Mother     Diabetes Mother     Heart Disease Mother     High Blood Pressure Mother Zohreh Hernandezs / Everardoy Forestpar[place] Mother     Cancer Sister     Cancer Brother     Diabetes Daughter     High Blood Pressure Daughter        PHYSICAL EXAM    VITAL SIGNS: BP (!) 111/51   Pulse 71   Temp 98.6 °F (37 °C) (Oral)   Resp 15   SpO2 94%    Constitutional:  Well developed, well nourished, no acute distress   HENT:  Atraumatic, moist mucus membranes  Neck/Lymphatics: supple, no JVD, no swollen nodes  Respiratory:  Lungs with mild diffuse expiratory wheezing present in mid to lower lung fields, no retractions, no rhonchi/rales, no accessory muscle usage. Cardiovascular:  Rate regular, regular Rhythm, no murmurs/rubs/gallops. No carotid bruits or murmurs heard in carotids. Vascular: Radial and DP pulses 2+ equal bilaterally  GI:  Soft, nontender, normal bowel sounds  Musculoskeletal:  No acute gross deformities. Compartments are soft in bilateral lower extremities. No calf or thigh tenderness to palpation bilaterally but there is diffuse 2+ pitting edema of the bilateral lower extremities that is symmetric.   Integument:  Skin warm and dry, no petechiae   Neurologic:  Alert & oriented, normal speech  Psych: Pleasant affect, no hallucinations        LABS:  Results for orders placed or performed during the hospital encounter of 01/24/21   CBC Auto Differential   Result Value Ref Range    WBC 6.3 4.0 - 10.5 K/CU MM    RBC 4.19 (L) 4.2 - 5.4 M/CU MM    Hemoglobin 11.6 (L) 12.5 - 16.0 GM/DL    Hematocrit 37.5 37 - 47 %    MCV 89.5 78 - 100 FL    MCH 27.7 27 - 31 PG    MCHC 30.9 (L) 32.0 - 36.0 %    RDW 15.8 (H) 11.7 - 14.9 %    Platelets 549 446 - 764 K/CU MM    MPV 11.2 (H) 7.5 - 11.1 FL    Differential Type AUTOMATED DIFFERENTIAL     Segs Relative 48.0 36 - 66 %    Lymphocytes % 35.4 24 - 44 %    Monocytes % 10.9 (H) 0 - 4 %    Eosinophils % 4.6 (H) 0 - 3 %    Basophils % 0.6 0 - 1 %    Segs Absolute 3.0 K/CU MM    Lymphocytes Absolute 2.2 K/CU MM    Monocytes Absolute 0.7 K/CU MM Eosinophils Absolute 0.3 K/CU MM    Basophils Absolute 0.0 K/CU MM    Nucleated RBC % 0.0 %    Total Nucleated RBC 0.0 K/CU MM    Total Immature Neutrophil 0.03 K/CU MM    Immature Neutrophil % 0.5 (H) 0 - 0.43 %   Comprehensive Metabolic Panel w/ Reflex to MG   Result Value Ref Range    Sodium 137 135 - 145 MMOL/L    Potassium 3.9 3.5 - 5.1 MMOL/L    Chloride 105 99 - 110 mMol/L    CO2 26 21 - 32 MMOL/L    BUN 14 6 - 23 MG/DL    CREATININE 1.0 0.6 - 1.1 MG/DL    Glucose 92 70 - 99 MG/DL    Calcium 9.2 8.3 - 10.6 MG/DL    Alb 3.2 (L) 3.4 - 5.0 GM/DL    Total Protein 6.5 6.4 - 8.2 GM/DL    Total Bilirubin 0.4 0.0 - 1.0 MG/DL    ALT 32 10 - 40 U/L    AST 52 (H) 15 - 37 IU/L    Alkaline Phosphatase 248 (H) 40 - 129 IU/L    GFR Non- 52 (L) >60 mL/min/1.73m2    GFR African American >60 >60 mL/min/1.73m2    Anion Gap 6 4 - 16   Lipase   Result Value Ref Range    Lipase 53 13 - 60 IU/L   Troponin   Result Value Ref Range    Troponin T <0.010 <0.01 NG/ML   Brain Natriuretic Peptide   Result Value Ref Range    Pro-.7 <300 PG/ML   Urinalysis   Result Value Ref Range    Color, UA STRAW (A) YELLOW    Clarity, UA CLEAR CLEAR    Glucose, Urine NEGATIVE NEGATIVE MG/DL    Bilirubin Urine NEGATIVE NEGATIVE MG/DL    Ketones, Urine NEGATIVE NEGATIVE MG/DL    Specific Gravity, UA 1.016 1.001 - 1.035    Blood, Urine SMALL (A) NEGATIVE    pH, Urine 6.0 5.0 - 8.0    Protein, UA NEGATIVE NEGATIVE MG/DL    Urobilinogen, Urine NORMAL 0.2 - 1.0 MG/DL    Nitrite Urine, Quantitative NEGATIVE NEGATIVE    Leukocyte Esterase, Urine NEGATIVE NEGATIVE    RBC, UA NONE SEEN 0 - 6 /HPF    WBC, UA 3 0 - 5 /HPF    Bacteria, UA NEGATIVE NEGATIVE /HPF    Squam Epithel, UA 3 /HPF    Trichomonas, UA NONE SEEN NONE SEEN /HPF   D-Dimer, Quantitative   Result Value Ref Range    D-Dimer, Quant 689 (H) <230 NG/mL(DDU)   COVID-19    Specimen: Nasopharyngeal Swab   Result Value Ref Range    Source THROAT     SARS-CoV-2, NAAT NOT DETECTED EKG 12 Lead   Result Value Ref Range    Ventricular Rate 75 BPM    Atrial Rate 75 BPM    P-R Interval 166 ms    QRS Duration 84 ms    Q-T Interval 422 ms    QTc Calculation (Bazett) 471 ms    P Axis 47 degrees    R Axis -9 degrees    T Axis 28 degrees    Diagnosis       Normal sinus rhythm  Normal ECG  When compared with ECG of 27-AUG-2019 01:15,  No significant change was found           EKG: EKG Interpretation  Please see ED physician's note for EKG interpretation- Dr. Cornel Mcconnell    RADIOLOGY/PROCEDURES    VL DUP LOWER EXTREMITY VENOUS BILATERAL   Final Result   No evidence of DVT in either lower extremity. CTA PULMONARY W CONTRAST   Preliminary Result   No evidence of pulmonary embolism or acute pulmonary abnormality. XR CHEST PORTABLE   Final Result   Heart size at upper limits of normal but otherwise unremarkable exam.                   ED COURSE & MEDICAL DECISION MAKING       Vital signs and nursing notes reviewed during ED course. I have independently evaluated this patient. Supervising physician present in the Emergency Department, available for consultation, throughout entirety of patient care. Patient presents as above. Patient placed on telemetry monitoring as well as continuous pulse oximetry monitoring. While in the ED today, labs, imaging, and EKG were obtained. For EKG interpretation- please see ED physician's note. Labs reveal mild anemia with hemoglobin 11.6 and elevated D-dimer as well as elevated alk phos of 248. Troponin is negative. Chest xray obtained today and reveals heart size at upper limits of normal but otherwise unremarkable exam.  CTA pulmonary obtained to rule out PE and reveals no evidence of PE or acute pulmonary abnormality. Venous duplex ultrasound of the bilateral lower extremities obtained reveals no evidence of DVT. Compartments are soft in the bilateral lower extremitiesdo not suspect compartment syndrome. Patient has no lower extremity pain but rather reports edema that is worse with lifting her legs hanging over the the edge of a chair and improves with compression stockings and propping them up. I do suspect dependent edema playing a role in symptoms. Patient treated with Atrovent and albuterol MDIs in the ED with improvement in shortness of breath. Patient also treated with prednisone in the ED. I suspect COPD exacerbation. As patient has no cough do not feel patient requires antibiotics. Patient is comfortable with discharge home with close outpatient follow up with PCP. Patient is nontoxic appearing. Vital signs are stable. Patient is stable for outpatient management. Patient discharged with prescription for prednisonewe discussed medication. All pertinent Lab data and radiographic results reviewed with patient at bedside. The patient and/or the family were informed of the results of any tests/labs/imaging, the treatment plan, and time was allotted to answer questions. Diagnosis, disposition, and treatment plan reviewed in detail with patient. Patient understands and agrees to follow up with cardiologist for recheck as soon as possible and with PCP for recheck in 1-2 days. Patient understands and agrees to return to ED for new or worsening symptoms- strict return precautions given. See chart for details of medications given during the ED stay. Clinical  IMPRESSION    1. COPD exacerbation ([facility]    2. Bilateral lower extremity edema    3. Elevated alkaline phosphatase level        Disposition referral (if applicable):  DUANE Gonzalez - CNP  [facility]  [phone]    Call today  Recheck in 1-2 days    [facility] Emergency Department  [facility] [id number]  [phone]  Go to   Return to ED if symptoms worsen or new symptoms    [facility]  [address] [place] [address]  [phone]  Call today  Corie Chau in 2 days if unable to be seen by primary care physician      Disposition medications (if applicable):  New Prescriptions    PREDNISONE (DELTASONE) 20 MG TABLET    Take 2 tablets by mouth daily for 4 days       Comment: Please note this report has been produced using speech recognition software and may contain errors related to that system including errors in grammar, punctuation, and spelling, as well as words and phrases that may be inappropriate. If there are any questions or concerns please feel free to contact the dictating provider for clarification.           Rylie Philip PA-C  01/24/21 7628

## 2021-01-24 NOTE — ED PROVIDER NOTES
EKG Interpretation    Interpreted by emergency department physician    Rhythm: normal sinus   Rate: normal  Axis: normal  Ectopy: none  Conduction: normal  ST Segments: no acute change  T Waves: no acute change  Q Waves: none    Clinical Impression: no acute changes    Jamaal Avilez DO  01/24/21 1563

## 2021-01-24 NOTE — ED NOTES
Talked with Azam Ny, granddaughter via phone call and updated her on pt condition, results, and discharge status. Pt family member verbalizes understanding. All questions answered.       Faustina Vegas RN  01/24/21 9002

## 2021-01-25 PROCEDURE — 93010 ELECTROCARDIOGRAM REPORT: CPT | Performed by: INTERNAL MEDICINE

## 2021-01-29 LAB
EKG ATRIAL RATE: 75 BPM
EKG DIAGNOSIS: NORMAL
EKG P AXIS: 47 DEGREES
EKG P-R INTERVAL: 166 MS
EKG Q-T INTERVAL: 422 MS
EKG QRS DURATION: 84 MS
EKG QTC CALCULATION (BAZETT): 471 MS
EKG R AXIS: -9 DEGREES
EKG T AXIS: 28 DEGREES
EKG VENTRICULAR RATE: 75 BPM

## 2021-03-31 ENCOUNTER — HOSPITAL ENCOUNTER (EMERGENCY)
Age: 86
Discharge: HOME | End: 2021-03-31
Attending: EMERGENCY MEDICINE
Payer: MEDICARE

## 2021-03-31 VITALS
TEMPERATURE: 97.5 F | WEIGHT: 168 LBS | SYSTOLIC BLOOD PRESSURE: 128 MMHG | HEIGHT: 60 IN | OXYGEN SATURATION: 93 % | RESPIRATION RATE: 22 BRPM | DIASTOLIC BLOOD PRESSURE: 80 MMHG | HEART RATE: 100 BPM | BODY MASS INDEX: 32.98 KG/M2

## 2021-03-31 DIAGNOSIS — T50.905A ADVERSE EFFECT OF DRUG, INITIAL ENCOUNTER: Primary | ICD-10-CM

## 2021-03-31 PROCEDURE — 99285 EMERGENCY DEPT VISIT HI MDM: CPT

## 2021-03-31 NOTE — ED PROVIDER NOTES
Triage Chief Complaint:   Altered Mental Status (Pt started new medication today, Quetiapine 50mg. States shortly after taking first dose she starting feeling funny.)    [place]:  Bri Mahoney is a 80 y.o. female that presents with visual disturbance. The patient states that shortly after she took a new medication this morning, quetiapine, she thought she saw a bridge up close in her vision. States that she is feeling better now on his no longer having any visual hallucinations. Denies any headache, motor or sensory deficits, chest pain, shortness of breath, nausea, vomiting, diarrhea, abdominal pain. States that she has not sure why her doctor started her on this medication but that she does not want to take it anymore. Patient denies any urinary complaints. States that she is okay with going home at this time. ROS:  At least 10 systems reviewed and otherwise acutely negative except as in the [address]     Past Medical History:   Diagnosis Date    Arthritis     Blind left eye     [facility]    colon    COPD (chronic obstructive pulmonary disease) (HCC)     Dementia (HCC)     Depression     GERD (gastroesophageal reflux disease)     Hypertension     Pneumonia     Restless legs syndrome     Tremors of nervous system     Unspecified cerebral artery occlusion with cerebral infarction      Past Surgical History:   Procedure Laterality Date    ABDOMEN SURGERY      APPENDECTOMY      BACK SURGERY      COLONOSCOPY      ENDOSCOPY, COLON, DIAGNOSTIC      EYE SURGERY      FRACTURE SURGERY      HYSTERECTOMY  1972    SPINE SURGERY  1995    VASCULAR SURGERY       Family History   Problem Relation Age of Onset    Arthritis Mother     Cancer Mother     Diabetes Mother     Heart Disease Mother     High Blood Pressure Mother    Toi Burnham / Hugo Mother     Cancer Sister     Cancer Brother     Diabetes Daughter     High Blood Pressure Daughter      Social History     Socioeconomic History    Marital status:      Spouse name: Not on file    Number of children: 9    Years of education: Not on file    Highest education level: Not on file   Occupational History    Not on file   Social Needs    Financial resource strain: Not on file    Food insecurity     Worry: Not on file     Inability: Not on file    Transportation needs     Medical: Not on file     Non-medical: Not on file   Tobacco Use    Smoking status: Former Smoker     Packs/day: 0.25     Years: 50.00     Pack years: 12.50     Types: Cigarettes    Smokeless tobacco: Never Used    Tobacco comment: states smokes 1-2 cigs a day   Substance and Sexual Activity    Alcohol use: No     Alcohol/week: 0.0 standard drinks    Drug use: No    Sexual activity: Never   Lifestyle    Physical activity     Days per week: Not on file     Minutes per session: Not on file    Stress: Not on file   Relationships    Social connections     Talks on phone: Not on file     Gets together: Not on file     Attends Jew service: Not on file     Active member of club or organization: Not on file     Attends meetings of clubs or organizations: Not on file     Relationship status: Not on file    Intimate partner violence     Fear of current or ex partner: Not on file     Emotionally abused: Not on file     Physically abused: Not on file     Forced sexual activity: Not on file   Other Topics Concern    Not on file   Social History Narrative    Not on file     No current facility-administered medications for this encounter.       Current Outpatient Medications   Medication Sig Dispense Refill    PARoxetine (PAXIL) 20 MG tablet Take 1 tablet by mouth daily 30 tablet 3    folic acid-pyridoxine-cyancobalamin (FOLTX) 1.13-25-2 MG TABS Take 1 tablet by mouth daily 30 tablet 3    memantine (NAMENDA) 10 MG tablet Take 1 tablet by mouth 2 times daily 60 tablet 3    donepezil (ARICEPT) 5 MG tablet Take 1 tablet by mouth nightly 30 tablet 3    baclofen (LIORESAL) 5 MG tablet Take 1 tablet by mouth 2 times daily 60 tablet 3    vitamin D 25 MCG (1000 UT) CAPS Take 1 capsule by mouth daily 30 capsule 0    aspirin 81 MG chewable tablet Take 1 tablet by mouth daily 30 tablet 3    atorvastatin (LIPITOR) 40 MG tablet Take 1 tablet by mouth nightly 30 tablet 3    rOPINIRole (REQUIP) 0.5 MG tablet Take 5 tablets by mouth 3 times daily 30 tablet 1    hydrALAZINE (APRESOLINE) 50 MG tablet Take 1 tablet by mouth every 8 hours 90 tablet 3    vitamin B-12 (CYANOCOBALAMIN) 1000 MCG tablet Take 1,000 mcg by mouth daily      magnesium hydroxide (MILK OF MAGNESIA) 400 MG/5ML suspension Take 30 mLs by mouth daily as needed for Constipation      albuterol sulfate  (90 Base) MCG/ACT inhaler Inhale 2 puffs into the lungs every 6 hours as needed for Shortness of Breath       bisacodyl (DULCOLAX) 10 MG suppository Place 10 mg rectally daily as needed for Constipation       acetaminophen 650 MG TABS Take 650 mg by mouth every 4 hours as needed 120 tablet 3    dorzolamide (TRUSOPT) 2 % ophthalmic solution Place 1 drop into both eyes 3 times daily 10 mL 2    latanoprost (XALATAN) 0.005 % ophthalmic solution Place 1 drop into both eyes nightly 1 Bottle 3     Allergies   Allergen Reactions    Other      Pt states allergy to unknown antibiotic that made her arm red        Nursing Notes Reviewed    Physical Exam:  ED Triage Vitals [03/31/21 0316]   Enc Vitals Group      /69      Pulse 98      Resp 20      Temp 97.5 °F (36.4 °C)      Temp Source Oral      SpO2 93 %      Weight 168 lb (76.2 kg)      Height 5' (1.524 m)      Head Circumference       Peak Flow       Pain Score       Pain Loc       Pain Edu? Excl. in [address]? GENERAL APPEARANCE: Awake and alert. Cooperative. No acute distress. HEAD: Normocephalic. Atraumatic. EYES: EOM's grossly intact. Sclera anicteric. ENT: Mucous membranes are moist. Tolerates saliva. No trismus. NECK: Supple.  No meningismus. Trachea midline. HEART: RRR. Radial pulses 2+. LUNGS: Respirations unlabored. CTAB  ABDOMEN: Soft. Non-tender. No guarding or rebound. EXTREMITIES: No acute deformities. SKIN: Warm and dry. NEUROLOGICAL: No gross facial drooping. Moves all 4 extremities spontaneously. Pupils equal and reactive. No gaze palsy. No nystagmus. Full strength and sensation in all extremities. PSYCHIATRIC: Normal mood. I have reviewed and interpreted all of the currently available lab results from this visit (if applicable):  No results found for this visit on 03/31/21. Radiographs (if obtained):  [] The following radiograph was interpreted by myself in the absence of a radiologist:  [] Radiologist's Report Reviewed:    EKG (if obtained): (All EKG's are interpreted by myself in the absence of a cardiologist)    MDM:  Plan of care is discussed thoroughly with the patient and family if present. If performed, all imaging and lab work also discussed with patient. All relevant prior results and chart reviewed if available. Patient presents as above. She is in no acute distress and has normal vital signs. She has a nonfocal neurologic exam and states that symptoms have resolved. Denies any other acute complaints at this time. Symptoms could be secondary to adverse medication effect. Do not suspect other acute life-threatening or emergent process at this time based on overall presentation and exam.  Patient to discontinue quetiapine and discuss further medication changes with primary care physician this week. Discharged in good condition. Clinical Impression:  1.  Adverse effect of drug, initial encounter      (Please note that portions of this note may have been completed with a voice recognition program. Efforts were made to edit the dictations but occasionally words are mis-transcribed.)    MD Calin Faria MD  03/31/21 2199

## 2021-03-31 NOTE — ED NOTES
Call to granddaughter Hakan to  pt. States will be approx 25 min.       Claudette Guild, RN  03/31/21 1733

## 2021-04-16 ENCOUNTER — HOSPITAL ENCOUNTER (OUTPATIENT)
Age: 86
Setting detail: SPECIMEN
Discharge: HOME | End: 2021-04-16
Payer: MEDICARE

## 2021-04-16 LAB
SARS-COV-2: NOT DETECTED
SOURCE: NORMAL

## 2021-04-16 PROCEDURE — U0005 INFEC AGEN DETEC AMPLI PROBE: HCPCS

## 2021-04-16 PROCEDURE — U0003 INFECTIOUS AGENT DETECTION BY NUCLEIC ACID (DNA OR RNA); SEVERE ACUTE RESPIRATORY SYNDROME CORONAVIRUS 2 (SARS-COV-2) (CORONAVIRUS DISEASE [COVID-19]), AMPLIFIED PROBE TECHNIQUE, MAKING USE OF HIGH THROUGHPUT TECHNOLOGIES AS DESCRIBED BY CMS-2020-01-R: HCPCS

## 2021-10-12 ENCOUNTER — APPOINTMENT (OUTPATIENT)
Dept: CT IMAGING | Age: 86
End: 2021-10-12
Payer: MEDICARE

## 2021-10-12 ENCOUNTER — HOSPITAL ENCOUNTER (OUTPATIENT)
Age: 86
Setting detail: OBSERVATION
Discharge: HOME | End: 2021-10-13
Attending: EMERGENCY MEDICINE
Payer: MEDICARE

## 2021-10-12 DIAGNOSIS — R55 SYNCOPE AND COLLAPSE: Primary | ICD-10-CM

## 2021-10-12 DIAGNOSIS — R00.2 PALPITATIONS: ICD-10-CM

## 2021-10-12 DIAGNOSIS — J44.1 COPD EXACERBATION (HCC): ICD-10-CM

## 2021-10-12 LAB
ALBUMIN SERPL-MCNC: 3.7 GM/DL (ref 3.4–5)
ALP BLD-CCNC: 97 IU/L (ref 40–129)
ALT SERPL-CCNC: 12 U/L (ref 10–40)
ANION GAP SERPL CALCULATED.3IONS-SCNC: 12 MMOL/L (ref 4–16)
AST SERPL-CCNC: 18 IU/L (ref 15–37)
BACTERIA: ABNORMAL /HPF
BASE EXCESS MIXED: 0.1 (ref 0–2.3)
BASE EXCESS: ABNORMAL (ref 0–2.4)
BASOPHILS ABSOLUTE: 0 K/CU MM
BASOPHILS RELATIVE PERCENT: 0.5 % (ref 0–1)
BILIRUB SERPL-MCNC: 0.3 MG/DL (ref 0–1)
BILIRUBIN URINE: NEGATIVE MG/DL
BLOOD, URINE: ABNORMAL
BUN BLDV-MCNC: 12 MG/DL (ref 6–23)
CALCIUM SERPL-MCNC: 9.5 MG/DL (ref 8.3–10.6)
CAST TYPE: ABNORMAL /HPF
CHLORIDE BLD-SCNC: 106 MMOL/L (ref 99–110)
CLARITY: ABNORMAL
CO2: 24 MMOL/L (ref 21–32)
CO2: 25 MMOL/L (ref 21–32)
COLOR: ABNORMAL
CREAT SERPL-MCNC: 0.8 MG/DL (ref 0.6–1.1)
CRYSTAL TYPE: NEGATIVE /HPF
DIFFERENTIAL TYPE: ABNORMAL
EKG ATRIAL RATE: 70 BPM
EKG DIAGNOSIS: NORMAL
EKG P AXIS: 52 DEGREES
EKG P-R INTERVAL: 172 MS
EKG Q-T INTERVAL: 416 MS
EKG QRS DURATION: 82 MS
EKG QTC CALCULATION (BAZETT): 449 MS
EKG R AXIS: -10 DEGREES
EKG T AXIS: 54 DEGREES
EKG VENTRICULAR RATE: 70 BPM
EOSINOPHILS ABSOLUTE: 0.3 K/CU MM
EOSINOPHILS RELATIVE PERCENT: 5.5 % (ref 0–3)
EPITHELIAL CELLS, UA: 4 /HPF
GFR AFRICAN AMERICAN: >60 ML/MIN/1.73M2
GFR NON-AFRICAN AMERICAN: >60 ML/MIN/1.73M2
GLUCOSE BLD-MCNC: 101 MG/DL
GLUCOSE BLD-MCNC: 101 MG/DL (ref 70–99)
GLUCOSE BLD-MCNC: 93 MG/DL (ref 70–99)
GLUCOSE BLD-MCNC: 96 MG/DL (ref 70–99)
GLUCOSE, URINE: NEGATIVE MG/DL
HCO3 ARTERIAL: 24.2 MMOL/L (ref 18–23)
HCT VFR BLD CALC: 34 % (ref 37–47)
HCT VFR BLD CALC: 35 % (ref 37–47)
HEMOGLOBIN: 10.1 GM/DL (ref 12.5–16)
HEMOGLOBIN: 11.5 GM/DL (ref 12.5–16)
IMMATURE NEUTROPHIL %: 0.4 % (ref 0–0.43)
KETONES, URINE: NEGATIVE MG/DL
LEUKOCYTE ESTERASE, URINE: ABNORMAL
LIPASE: 26 IU/L (ref 13–60)
LYMPHOCYTES ABSOLUTE: 2 K/CU MM
LYMPHOCYTES RELATIVE PERCENT: 35.8 % (ref 24–44)
MCH RBC QN AUTO: 23.9 PG (ref 27–31)
MCHC RBC AUTO-ENTMCNC: 28.9 % (ref 32–36)
MCV RBC AUTO: 82.7 FL (ref 78–100)
MONOCYTES ABSOLUTE: 0.6 K/CU MM
MONOCYTES RELATIVE PERCENT: 11.4 % (ref 0–4)
NITRITE URINE, QUANTITATIVE: NEGATIVE
O2 SATURATION: 76.5 % (ref 96–97)
PCO2 ARTERIAL: 37.2 MMHG (ref 32–45)
PDW BLD-RTO: 17 % (ref 11.7–14.9)
PH BLOOD: 7.42 (ref 7.34–7.45)
PH, URINE: 7 (ref 5–8)
PLATELET # BLD: 268 K/CU MM (ref 140–440)
PMV BLD AUTO: 10.5 FL (ref 7.5–11.1)
PO2 ARTERIAL: 40.2 MMHG (ref 75–100)
POC CALCIUM: 1.18 MMOL/L (ref 1.12–1.32)
POC CHLORIDE: 109 MMOL/L (ref 98–109)
POC CREATININE: 0.8 MG/DL (ref 0.6–1.1)
POTASSIUM SERPL-SCNC: 3.5 MMOL/L (ref 3.5–4.5)
POTASSIUM SERPL-SCNC: 3.7 MMOL/L (ref 3.5–5.1)
PROTEIN UA: NEGATIVE MG/DL
RBC # BLD: 4.23 M/CU MM (ref 4.2–5.4)
RBC URINE: 4 /HPF (ref 0–6)
SEGMENTED NEUTROPHILS ABSOLUTE COUNT: 2.6 K/CU MM
SEGMENTED NEUTROPHILS RELATIVE PERCENT: 46.4 % (ref 36–66)
SODIUM BLD-SCNC: 142 MMOL/L (ref 135–145)
SODIUM BLD-SCNC: 145 MMOL/L (ref 138–146)
SOURCE, BLOOD GAS: ABNORMAL
SPECIFIC GRAVITY UA: 1.01 (ref 1–1.03)
TOTAL IMMATURE NEUTOROPHIL: 0.02 K/CU MM
TOTAL PROTEIN: 6.4 GM/DL (ref 6.4–8.2)
TROPONIN T: <0.01 NG/ML
UROBILINOGEN, URINE: 0.2 MG/DL (ref 0.2–1)
WBC # BLD: 5.6 K/CU MM (ref 4–10.5)
WBC UA: 24 /HPF (ref 0–5)

## 2021-10-12 PROCEDURE — 6370000000 HC RX 637 (ALT 250 FOR IP): Performed by: HOSPITALIST

## 2021-10-12 PROCEDURE — 70450 CT HEAD/BRAIN W/O DYE: CPT

## 2021-10-12 PROCEDURE — 85025 COMPLETE CBC W/AUTO DIFF WBC: CPT

## 2021-10-12 PROCEDURE — 87186 SC STD MICRODIL/AGAR DIL: CPT

## 2021-10-12 PROCEDURE — 84484 ASSAY OF TROPONIN QUANT: CPT

## 2021-10-12 PROCEDURE — 99283 EMERGENCY DEPT VISIT LOW MDM: CPT

## 2021-10-12 PROCEDURE — 80053 COMPREHEN METABOLIC PANEL: CPT

## 2021-10-12 PROCEDURE — 81001 URINALYSIS AUTO W/SCOPE: CPT

## 2021-10-12 PROCEDURE — 93005 ELECTROCARDIOGRAM TRACING: CPT | Performed by: EMERGENCY MEDICINE

## 2021-10-12 PROCEDURE — G0378 HOSPITAL OBSERVATION PER HR: HCPCS

## 2021-10-12 PROCEDURE — 87088 URINE BACTERIA CULTURE: CPT

## 2021-10-12 PROCEDURE — 87086 URINE CULTURE/COLONY COUNT: CPT

## 2021-10-12 PROCEDURE — 82962 GLUCOSE BLOOD TEST: CPT

## 2021-10-12 PROCEDURE — 83690 ASSAY OF LIPASE: CPT

## 2021-10-12 PROCEDURE — 82805 BLOOD GASES W/O2 SATURATION: CPT

## 2021-10-12 PROCEDURE — 93010 ELECTROCARDIOGRAM REPORT: CPT | Performed by: INTERNAL MEDICINE

## 2021-10-12 RX ORDER — POTASSIUM CHLORIDE 20 MEQ/1
40 TABLET, EXTENDED RELEASE ORAL PRN
Status: DISCONTINUED | OUTPATIENT
Start: 2021-10-12 | End: 2021-10-13 | Stop reason: HOSPADM

## 2021-10-12 RX ORDER — SODIUM CHLORIDE 0.9 % (FLUSH) 0.9 %
5-40 SYRINGE (ML) INJECTION EVERY 12 HOURS SCHEDULED
Status: DISCONTINUED | OUTPATIENT
Start: 2021-10-12 | End: 2021-10-13 | Stop reason: HOSPADM

## 2021-10-12 RX ORDER — LATANOPROST 50 UG/ML
1 SOLUTION/ DROPS OPHTHALMIC NIGHTLY
Status: DISCONTINUED | OUTPATIENT
Start: 2021-10-12 | End: 2021-10-13 | Stop reason: HOSPADM

## 2021-10-12 RX ORDER — B12/LEVOMEFOLATE CALCIUM/B-6 2-1.13-25
1 TABLET ORAL DAILY
Status: DISCONTINUED | OUTPATIENT
Start: 2021-10-12 | End: 2021-10-13 | Stop reason: HOSPADM

## 2021-10-12 RX ORDER — ACETAMINOPHEN 325 MG/1
325 TABLET ORAL EVERY 4 HOURS PRN
Status: DISCONTINUED | OUTPATIENT
Start: 2021-10-12 | End: 2021-10-13 | Stop reason: HOSPADM

## 2021-10-12 RX ORDER — SODIUM CHLORIDE 9 MG/ML
INJECTION, SOLUTION INTRAVENOUS CONTINUOUS
Status: DISCONTINUED | OUTPATIENT
Start: 2021-10-12 | End: 2021-10-13

## 2021-10-12 RX ORDER — BACLOFEN 10 MG/1
5 TABLET ORAL 2 TIMES DAILY
Status: DISCONTINUED | OUTPATIENT
Start: 2021-10-12 | End: 2021-10-13 | Stop reason: HOSPADM

## 2021-10-12 RX ORDER — POTASSIUM CHLORIDE 7.45 MG/ML
10 INJECTION INTRAVENOUS PRN
Status: DISCONTINUED | OUTPATIENT
Start: 2021-10-12 | End: 2021-10-13 | Stop reason: HOSPADM

## 2021-10-12 RX ORDER — ACETAMINOPHEN 650 MG/1
650 SUPPOSITORY RECTAL EVERY 6 HOURS PRN
Status: DISCONTINUED | OUTPATIENT
Start: 2021-10-12 | End: 2021-10-13 | Stop reason: HOSPADM

## 2021-10-12 RX ORDER — PAROXETINE HYDROCHLORIDE 20 MG/1
20 TABLET, FILM COATED ORAL DAILY
Status: DISCONTINUED | OUTPATIENT
Start: 2021-10-12 | End: 2021-10-13 | Stop reason: HOSPADM

## 2021-10-12 RX ORDER — ATORVASTATIN CALCIUM 40 MG/1
40 TABLET, FILM COATED ORAL NIGHTLY
Status: DISCONTINUED | OUTPATIENT
Start: 2021-10-12 | End: 2021-10-13 | Stop reason: HOSPADM

## 2021-10-12 RX ORDER — ACETAMINOPHEN 325 MG/1
650 TABLET ORAL EVERY 6 HOURS PRN
Status: DISCONTINUED | OUTPATIENT
Start: 2021-10-12 | End: 2021-10-13 | Stop reason: HOSPADM

## 2021-10-12 RX ORDER — ASPIRIN 81 MG/1
81 TABLET, CHEWABLE ORAL DAILY
Status: DISCONTINUED | OUTPATIENT
Start: 2021-10-12 | End: 2021-10-13 | Stop reason: HOSPADM

## 2021-10-12 RX ORDER — ONDANSETRON 4 MG/1
4 TABLET, ORALLY DISINTEGRATING ORAL EVERY 8 HOURS PRN
Status: DISCONTINUED | OUTPATIENT
Start: 2021-10-12 | End: 2021-10-13 | Stop reason: HOSPADM

## 2021-10-12 RX ORDER — SODIUM CHLORIDE 0.9 % (FLUSH) 0.9 %
5-40 SYRINGE (ML) INJECTION PRN
Status: DISCONTINUED | OUTPATIENT
Start: 2021-10-12 | End: 2021-10-13 | Stop reason: HOSPADM

## 2021-10-12 RX ORDER — HYDRALAZINE HYDROCHLORIDE 25 MG/1
50 TABLET, FILM COATED ORAL EVERY 8 HOURS SCHEDULED
Status: DISCONTINUED | OUTPATIENT
Start: 2021-10-12 | End: 2021-10-13 | Stop reason: HOSPADM

## 2021-10-12 RX ORDER — MAGNESIUM SULFATE IN WATER 40 MG/ML
2000 INJECTION, SOLUTION INTRAVENOUS PRN
Status: DISCONTINUED | OUTPATIENT
Start: 2021-10-12 | End: 2021-10-13 | Stop reason: HOSPADM

## 2021-10-12 RX ORDER — ALBUTEROL SULFATE 90 UG/1
2 AEROSOL, METERED RESPIRATORY (INHALATION) EVERY 6 HOURS PRN
Status: DISCONTINUED | OUTPATIENT
Start: 2021-10-12 | End: 2021-10-13 | Stop reason: HOSPADM

## 2021-10-12 RX ORDER — BISACODYL 10 MG
10 SUPPOSITORY, RECTAL RECTAL DAILY PRN
Status: DISCONTINUED | OUTPATIENT
Start: 2021-10-12 | End: 2021-10-13 | Stop reason: HOSPADM

## 2021-10-12 RX ORDER — SODIUM CHLORIDE 9 MG/ML
25 INJECTION, SOLUTION INTRAVENOUS PRN
Status: DISCONTINUED | OUTPATIENT
Start: 2021-10-12 | End: 2021-10-13 | Stop reason: HOSPADM

## 2021-10-12 RX ORDER — LANOLIN ALCOHOL/MO/W.PET/CERES
1000 CREAM (GRAM) TOPICAL DAILY
Status: DISCONTINUED | OUTPATIENT
Start: 2021-10-12 | End: 2021-10-13 | Stop reason: HOSPADM

## 2021-10-12 RX ORDER — DORZOLAMIDE HCL 20 MG/ML
1 SOLUTION/ DROPS OPHTHALMIC 3 TIMES DAILY
Status: DISCONTINUED | OUTPATIENT
Start: 2021-10-12 | End: 2021-10-13 | Stop reason: HOSPADM

## 2021-10-12 RX ORDER — MEMANTINE HYDROCHLORIDE 10 MG/1
10 TABLET ORAL 2 TIMES DAILY
Status: DISCONTINUED | OUTPATIENT
Start: 2021-10-12 | End: 2021-10-13 | Stop reason: HOSPADM

## 2021-10-12 RX ORDER — FAMOTIDINE 20 MG/1
20 TABLET, FILM COATED ORAL 2 TIMES DAILY
Status: DISCONTINUED | OUTPATIENT
Start: 2021-10-12 | End: 2021-10-12 | Stop reason: DRUGHIGH

## 2021-10-12 RX ORDER — DONEPEZIL HYDROCHLORIDE 5 MG/1
5 TABLET, FILM COATED ORAL NIGHTLY
Status: DISCONTINUED | OUTPATIENT
Start: 2021-10-12 | End: 2021-10-13 | Stop reason: HOSPADM

## 2021-10-12 RX ORDER — ONDANSETRON 2 MG/ML
4 INJECTION INTRAMUSCULAR; INTRAVENOUS EVERY 6 HOURS PRN
Status: DISCONTINUED | OUTPATIENT
Start: 2021-10-12 | End: 2021-10-13 | Stop reason: HOSPADM

## 2021-10-12 RX ORDER — FAMOTIDINE 20 MG/1
20 TABLET, FILM COATED ORAL DAILY
Status: DISCONTINUED | OUTPATIENT
Start: 2021-10-12 | End: 2021-10-13 | Stop reason: HOSPADM

## 2021-10-12 ASSESSMENT — PAIN DESCRIPTION - PAIN TYPE: TYPE: ACUTE PAIN

## 2021-10-12 ASSESSMENT — PAIN DESCRIPTION - LOCATION: LOCATION: HIP

## 2021-10-12 ASSESSMENT — PAIN SCALES - GENERAL: PAINLEVEL_OUTOF10: 3

## 2021-10-12 NOTE — ED NOTES
Family brought pt to the ER for \"tremors\" states they were on their way to lunch when the patient started shaking and wasn't acting herself  Pt is alert but not oriented to place and time  Pt is legally blind      Coco Friedman RN  10/12/21 8792

## 2021-10-12 NOTE — ED PROVIDER NOTES
EMERGENCY DEPARTMENT ENCOUNTER    Patient: Sonia Canchola  MRN: [id number]  : 1931  Date of Evaluation: 10/12/2021  ED Provider:  Yuval Yousif MD    CHIEF COMPLAINT  Chief Complaint   Patient presents with    Other       HPI  Sonia Canchola is a 80 y.o. female who presents for evaluation after a syncopal episode which occurred shortly before arrival to the emergency department. Patient was in the car with her family member who was driving and began to state that she was not feeling well and then suddenly went unresponsive in the vehicle. The filamentary who is present states that she did shake and convulse for a brief moment. She does not have history of seizures. Here in the emergency department, the patient states she feels very \"funny\" all over but is not able to specify any particular symptoms at present time. She does report that prior to going unresponsive she felt some moderate palpitations and fluttering in the center of her chest. She denies any overt chest pain or shortness of breath. He is unaware of any triggering or modifying factors. Denies any other associated symptoms or complaints or concerns.       REVIEW OF SYSTEMS    Constitutional: negative for fever, chills  Neurological: negative for HA, focal weakness, loss of sensation  Ophthalmic: negative for vision change  ENT: negative for congestion, rhinorrhea  Cardiovascular: negative for chest pain  Respiratory: negative for SOB, cough  GI: negative for abdominal pain, nausea, vomiting, diarrhea, constipation  : negative for dysuria, hematuria  Musculoskeletal: negative for myalgias, decreased ROM, joint swelling  Dermatological: negative for rash, wounds  Heme: Negative for bleeding, bruising      PAST MEDICAL HISTORY  Past Medical History:   Diagnosis Date    Arthritis     Blind left eye     Cancer ([facility]     colon    COPD (chronic obstructive pulmonary disease) ([facility]     Dementia (HCC)     Depression     GERD (gastroesophageal reflux disease)     Hypertension     Pneumonia     Restless legs syndrome     Tremors of nervous system     Unspecified cerebral artery occlusion with cerebral infarction        CURRENT MEDICATIONS  [unfilled]    ALLERGIES  Allergies   Allergen Reactions    Other      Pt states allergy to unknown antibiotic that made her arm red        SURGICAL HISTORY  Past Surgical History:   Procedure Laterality Date    ABDOMEN SURGERY      APPENDECTOMY      BACK SURGERY      COLONOSCOPY      ENDOSCOPY, COLON, DIAGNOSTIC      EYE SURGERY      FRACTURE SURGERY      HYSTERECTOMY  1972    SPINE SURGERY  1995    VASCULAR SURGERY         FAMILY HISTORY  Family History   Problem Relation Age of Onset    Arthritis Mother     Cancer Mother     Diabetes Mother     Heart Disease Mother     High Blood Pressure Mother    Noe Vernalis / Parker Pen Mother     Cancer Sister     Cancer Brother     Diabetes Daughter     High Blood Pressure Daughter        SOCIAL HISTORY  Social History     Socioeconomic History    Marital status:       Spouse name: None    Number of children: 7    Years of education: None    Highest education level: None   Occupational History    None   Tobacco Use    Smoking status: Former Smoker     Packs/day: 0.25     Years: 50.00     Pack years: 12.50     Types: Cigarettes    Smokeless tobacco: Never Used    Tobacco comment: states smokes 1-2 cigs a day   Vaping Use    Vaping Use: Never used   Substance and Sexual Activity    Alcohol use: No     Alcohol/week: 0.0 standard drinks    Drug use: No    Sexual activity: Never   Other Topics Concern    None   Social History Narrative    None     Social Determinants of Health     Financial Resource Strain:     Difficulty of Paying Living Expenses:    Food Insecurity:     Worried About Running Out of Food in the Last Year:     Ran Out of Food in the Last Year:    Transportation Needs:     Lack of Transportation (Medical):  Lack of Transportation (Non-Medical):    Physical Activity:     Days of Exercise per Week:     Minutes of Exercise per Session:    Stress:     Feeling of Stress :    Social Connections:     Frequency of Communication with Friends and Family:     Frequency of Social Gatherings with Friends and Family:     Attends Mu-ism Services:     Active Member of Clubs or Organizations:     Attends Club or Organization Meetings:     Marital Status:    Intimate Partner Violence:     Fear of Current or Ex-Partner:     Emotionally Abused:     Physically Abused:     Sexually Abused:          **Past medical, family and social histories, and nursing notes reviewed and verified by me**      PHYSICAL EXAM  VITAL SIGNS:   ED Triage Vitals   Enc Vitals Group      BP 10/12/21 1401 (!) 158/80      Pulse 10/12/21 1359 97      Resp 10/12/21 1359 16      Temp 10/12/21 1359 98.6 °F (37 °C)      Temp src --       SpO2 10/12/21 1359 97 %      Weight 10/12/21 1359 168 lb (76.2 kg)      Height --       Head Circumference --       Peak Flow --       Pain Score --       Pain Loc --       Pain Edu? --       Excl. in [address] during ED course were reviewed and are as charted. Constitutional: Minimal distress, Non-toxic appearance  Eyes: Conjunctiva normal, No discharge, PERRL, EOMI  HENT: Normocephalic, Atraumatic, bilateral external ears normal, posterior oropharynx is nonerythematous and without exudate, uvula is midline, no trismus, no \"hot potato voice\" or dysphonia, oropharynx moist  Neck: Supple, no stridor, no grossly visible or palpable masses  Cardiovascular: Regular rate and rhythm, No murmurs, No rubs, No gallops  Pulmonary/Chest: Normal breath sounds, No respiratory distress or accessory muscle use, No wheezing, crackles or rhonchi.   Abdomen: Soft, nondistended and nonrigid, No tenderness or peritoneal signs, No masses, normal bowel sounds  Back: No midline point tenderness, No paraspinous muscle tenderness.  No CVA tenderness  Extremities: No gross deformities, no edema, no tenderness  Neurologic: Cranial nerves II through XII grossly intact as tested, normal motor function, Normal sensory function, No focal deficits  Skin: Warm, Dry, No erythema, No rash, No cyanosis, No mottling  Lymphatic: No lymphadenopathy in the following location(s): cervical  Psychiatric: Alert and oriented x3, Affect normal          EKG Interpretation    Interpreted by emergency department physician    Rhythm: normal sinus   Rate: normal  Axis: left  Ectopy: premature atrial contraction  Conduction: normal  ST Segments: normal  T Waves: normal  Q Waves: none    Clinical Impression: Normal sinus rhythm with PACs and left ventricular hypertrophy        RADIOLOGY/PROCEDURES/LABS/MEDICATIONS ADMINISTERED:    I have reviewed and interpreted all of the currently available lab results from this visit (if applicable):  Results for orders placed or performed during the hospital encounter of 10/12/21   CBC Auto Differential   Result Value Ref Range    WBC 5.6 4.0 - 10.5 K/CU MM    RBC 4.23 4.2 - 5.4 M/CU MM    Hemoglobin 10.1 (L) 12.5 - 16.0 GM/DL    Hematocrit 35.0 (L) 37 - 47 %    MCV 82.7 78 - 100 FL    MCH 23.9 (L) 27 - 31 PG    MCHC 28.9 (L) 32.0 - 36.0 %    RDW 17.0 (H) 11.7 - 14.9 %    Platelets 530 249 - 404 K/CU MM    MPV 10.5 7.5 - 11.1 FL    Differential Type AUTOMATED DIFFERENTIAL     Segs Relative 46.4 36 - 66 %    Lymphocytes % 35.8 24 - 44 %    Monocytes % 11.4 (H) 0 - 4 %    Eosinophils % 5.5 (H) 0 - 3 %    Basophils % 0.5 0 - 1 %    Segs Absolute 2.6 K/CU MM    Lymphocytes Absolute 2.0 K/CU MM    Monocytes Absolute 0.6 K/CU MM    Eosinophils Absolute 0.3 K/CU MM    Basophils Absolute 0.0 K/CU MM    Immature Neutrophil % 0.4 0 - 0.43 %    Total Immature Neutrophil 0.02 K/CU MM   Comprehensive Metabolic Panel w/ Reflex to MG   Result Value Ref Range    Sodium 142 135 - 145 MMOL/L    Potassium 3.7 3.5 - 5.1 MMOL/L    Chloride 106 99 - 110 mMol/L    CO2 24 21 - 32 MMOL/L    BUN 12 6 - 23 MG/DL    CREATININE 0.8 0.6 - 1.1 MG/DL    Glucose 96 70 - 99 MG/DL    Calcium 9.5 8.3 - 10.6 MG/DL    Albumin 3.7 3.4 - 5.0 GM/DL    Total Protein 6.4 6.4 - 8.2 GM/DL    Total Bilirubin 0.3 0.0 - 1.0 MG/DL    ALT 12 10 - 40 U/L    AST 18 15 - 37 IU/L    Alkaline Phosphatase 97 40 - 129 IU/L    GFR Non-African American >60 >60 mL/min/1.73m2    GFR African American >60 >60 mL/min/1.73m2    Anion Gap 12 4 - 16   Troponin   Result Value Ref Range    Troponin T <0.010 <0.01 NG/ML   Lipase   Result Value Ref Range    Lipase 26 13 - 60 IU/L   POC CRITICAL CARE PROFILE   Result Value Ref Range    pH, Bld 7.42 7.34 - 7.45    pCO2, Arterial 37.2 32 - 45 MMHG    pO2, Arterial 40.2 (L) 75 - 100 MMHG    HCO3, Arterial 24.2 (H) 18 - 23 MMOL/L    Base Excess MINUS 0 - 2.4    Base Exc, Mixed 0.1 0 - 2.3    O2 Sat 76.5 (L) 96 - 97 %    CO2 25 21 - 32 MMOL/L    Sodium 145 138 - 146 MMOL/L    Potassium 3.5 3.5 - 4.5 MMOL/L    POC CALCIUM 1.18 1.12 - 1.32 MMOL/L    POC Glucose 93 70 - 99 MG/DL    Hematocrit 34.0 (L) 37 - 47 %    Hemoglobin 11.5 (L) 12.5 - 16.0 GM/DL    POC Chloride 109 98 - 109 MMOL/L    POC Creatinine 0.8 0.6 - 1.1 MG/DL    Source: Venous    POC Blood Glucose   Result Value Ref Range    Glucose 101 mg/dL   POCT Glucose   Result Value Ref Range    POC Glucose 101 (H) 70 - 99 MG/DL   EKG 12 Lead   Result Value Ref Range    Ventricular Rate 70 BPM    Atrial Rate 70 BPM    P-R Interval 172 ms    QRS Duration 82 ms    Q-T Interval 416 ms    QTc Calculation (Bazett) 449 ms    P Axis 52 degrees    R Axis -10 degrees    T Axis 54 degrees    Diagnosis       Sinus rhythm with premature supraventricular complexes  Minimal voltage criteria for LVH, may be normal variant  Cannot rule out Anterior infarct , age undetermined  Abnormal ECG  When compared with ECG of 24-JAN-2021 01:11,  premature supraventricular complexes are now present            ABNORMAL LABS:  Labs Reviewed   CBC WITH AUTO DIFFERENTIAL - Abnormal; Notable for the following components:       Result Value    Hemoglobin 10.1 (*)     Hematocrit 35.0 (*)     MCH 23.9 (*)     MCHC 28.9 (*)     RDW 17.0 (*)     Monocytes % 11.4 (*)     Eosinophils % 5.5 (*)     All other components within normal limits   POC CRITICAL CARE PROFILE - Abnormal; Notable for the following components:    pO2, Arterial 40.2 (*)     HCO3, Arterial 24.2 (*)     O2 Sat 76.5 (*)     Hematocrit 34.0 (*)     Hemoglobin 11.5 (*)     All other components within normal limits   POCT GLUCOSE - Abnormal; Notable for the following components:    POC Glucose 101 (*)     All other components within normal limits   POCT GLUCOSE - Normal   COMPREHENSIVE METABOLIC PANEL W/ REFLEX TO MG FOR LOW K   TROPONIN   LIPASE   URINALYSIS         IMAGING STUDIES ORDERED:  CT HEAD WO CONTRAST  IP CONSULT TO HOSPITALIST  IP CONSULT TO HOSPITALIST    I have personally viewed the imaging studies. The radiologist interpretation is:   CT HEAD WO CONTRAST   Final Result   1. No acute intracranial abnormality. MEDICATIONS ADMINISTERED:  Medications - No data to display      [facility]  Last vitals: BP (!) 158/80   Pulse 97   Temp 98.6 °F (37 °C)   Resp 16   Wt 168 lb (76.2 kg)   SpO2 97%   BMI 32.81 kg/m²     44-year-old female who presents for evaluation after syncopal episode after episode of palpitations while in the car today. Presents with reassuring and stable vital signs and physical exam unremarkable including neurological exam.  No evidence of any dysrhythmia or ectopy on EKG or on telemetry. No history significant electrolyte or metabolic abnormalities. Additional workup and treatment in the ED as documented above. Pt will be admitted for further evaluation and treatment. I discussed the case with Dr. Eulogio Lin, who agreed to admit the patient.  Plan discussed with pt and/or family who expressed understanding and agreement with plan. Admitted in stable condition. Clinical Impression:  1. Syncope and collapse    2. Palpitations        Disposition referral (if applicable):  No follow-up provider specified. Disposition medications (if applicable):  New Prescriptions    No medications on file       ED Provider Disposition Time  DISPOSITION            Electronically signed by: Hector Worley M.D., 10/12/2021 4:07 PM      This dictation was created with voice recognition software. While attempts have been made to review the dictation as it is transcribed, on occasion the spoken word can be misinterpreted by the technology leading to omissions or inappropriate words, phrases or sentences.       Jaime Forbes MD  10/13/21 7343

## 2021-10-12 NOTE — ED NOTES
[facility] here for transport to [facility]  Report to Holly Jimenez  Pt transferred to Obs rm [id number] HughesJose Antonio Armas, RN  10/12/21 4490

## 2021-10-12 NOTE — ED NOTES
Assisted patient to bed pan at this time. Patient provided sample and was repositioned. Linens changed and new depends placed on patient.      Madelyn Rivera RN  10/12/21 8129

## 2021-10-13 ENCOUNTER — APPOINTMENT (OUTPATIENT)
Dept: ULTRASOUND IMAGING | Age: 86
End: 2021-10-13
Payer: MEDICARE

## 2021-10-13 ENCOUNTER — APPOINTMENT (OUTPATIENT)
Dept: GENERAL RADIOLOGY | Age: 86
End: 2021-10-13
Payer: MEDICARE

## 2021-10-13 VITALS
OXYGEN SATURATION: 93 % | DIASTOLIC BLOOD PRESSURE: 53 MMHG | HEART RATE: 100 BPM | TEMPERATURE: 99.3 F | WEIGHT: 168 LBS | RESPIRATION RATE: 23 BRPM | SYSTOLIC BLOOD PRESSURE: 120 MMHG | BODY MASS INDEX: 32.98 KG/M2 | HEIGHT: 60 IN

## 2021-10-13 LAB
ALBUMIN SERPL-MCNC: 3.7 GM/DL (ref 3.4–5)
ALP BLD-CCNC: 87 IU/L (ref 40–129)
ALT SERPL-CCNC: 13 U/L (ref 10–40)
ANION GAP SERPL CALCULATED.3IONS-SCNC: 12 MMOL/L (ref 4–16)
APTT: 29.7 SECONDS (ref 25.1–37.1)
AST SERPL-CCNC: 19 IU/L (ref 15–37)
BASOPHILS ABSOLUTE: 0 K/CU MM
BASOPHILS RELATIVE PERCENT: 0.8 % (ref 0–1)
BILIRUB SERPL-MCNC: 0.3 MG/DL (ref 0–1)
BUN BLDV-MCNC: 10 MG/DL (ref 6–23)
CALCIUM SERPL-MCNC: 9 MG/DL (ref 8.3–10.6)
CHLORIDE BLD-SCNC: 107 MMOL/L (ref 99–110)
CO2: 22 MMOL/L (ref 21–32)
CREAT SERPL-MCNC: 0.6 MG/DL (ref 0.6–1.1)
DIFFERENTIAL TYPE: ABNORMAL
EOSINOPHILS ABSOLUTE: 0.3 K/CU MM
EOSINOPHILS RELATIVE PERCENT: 5.6 % (ref 0–3)
GFR AFRICAN AMERICAN: >60 ML/MIN/1.73M2
GFR NON-AFRICAN AMERICAN: >60 ML/MIN/1.73M2
GLUCOSE BLD-MCNC: 76 MG/DL (ref 70–99)
HCT VFR BLD CALC: 35.7 % (ref 37–47)
HCT VFR BLD CALC: 36.8 % (ref 37–47)
HEMOGLOBIN: 10.3 GM/DL (ref 12.5–16)
HEMOGLOBIN: 10.4 GM/DL (ref 12.5–16)
IMMATURE NEUTROPHIL %: 0.2 % (ref 0–0.43)
INR BLD: 0.94 INDEX
LV EF: 53 %
LVEF MODALITY: NORMAL
LYMPHOCYTES ABSOLUTE: 1.5 K/CU MM
LYMPHOCYTES RELATIVE PERCENT: 27.6 % (ref 24–44)
MAGNESIUM: 2.1 MG/DL (ref 1.8–2.4)
MCH RBC QN AUTO: 24 PG (ref 27–31)
MCHC RBC AUTO-ENTMCNC: 28.3 % (ref 32–36)
MCV RBC AUTO: 85 FL (ref 78–100)
MONOCYTES ABSOLUTE: 0.5 K/CU MM
MONOCYTES RELATIVE PERCENT: 9.4 % (ref 0–4)
NUCLEATED RBC %: 0 %
PDW BLD-RTO: 16.8 % (ref 11.7–14.9)
PLATELET # BLD: 261 K/CU MM (ref 140–440)
PMV BLD AUTO: 10.9 FL (ref 7.5–11.1)
POTASSIUM SERPL-SCNC: 4.2 MMOL/L (ref 3.5–5.1)
PROTHROMBIN TIME: 12.1 SECONDS (ref 11.7–14.5)
RBC # BLD: 4.33 M/CU MM (ref 4.2–5.4)
SEGMENTED NEUTROPHILS ABSOLUTE COUNT: 3 K/CU MM
SEGMENTED NEUTROPHILS RELATIVE PERCENT: 56.4 % (ref 36–66)
SODIUM BLD-SCNC: 141 MMOL/L (ref 135–145)
T4 FREE: 0.99 NG/DL (ref 0.9–1.8)
TOTAL IMMATURE NEUTOROPHIL: 0.01 K/CU MM
TOTAL NUCLEATED RBC: 0 K/CU MM
TOTAL PROTEIN: 6.1 GM/DL (ref 6.4–8.2)
TROPONIN T: <0.01 NG/ML
TSH HIGH SENSITIVITY: 0.69 UIU/ML (ref 0.27–4.2)
WBC # BLD: 5.3 K/CU MM (ref 4–10.5)

## 2021-10-13 PROCEDURE — 85730 THROMBOPLASTIN TIME PARTIAL: CPT

## 2021-10-13 PROCEDURE — 84484 ASSAY OF TROPONIN QUANT: CPT

## 2021-10-13 PROCEDURE — G0378 HOSPITAL OBSERVATION PER HR: HCPCS

## 2021-10-13 PROCEDURE — 85610 PROTHROMBIN TIME: CPT

## 2021-10-13 PROCEDURE — 85014 HEMATOCRIT: CPT

## 2021-10-13 PROCEDURE — 96365 THER/PROPH/DIAG IV INF INIT: CPT

## 2021-10-13 PROCEDURE — 87086 URINE CULTURE/COLONY COUNT: CPT

## 2021-10-13 PROCEDURE — 85018 HEMOGLOBIN: CPT

## 2021-10-13 PROCEDURE — 6360000002 HC RX W HCPCS: Performed by: HOSPITALIST

## 2021-10-13 PROCEDURE — 93306 TTE W/DOPPLER COMPLETE: CPT

## 2021-10-13 PROCEDURE — 97166 OT EVAL MOD COMPLEX 45 MIN: CPT

## 2021-10-13 PROCEDURE — 71045 X-RAY EXAM CHEST 1 VIEW: CPT

## 2021-10-13 PROCEDURE — 80053 COMPREHEN METABOLIC PANEL: CPT

## 2021-10-13 PROCEDURE — 93970 EXTREMITY STUDY: CPT

## 2021-10-13 PROCEDURE — 84443 ASSAY THYROID STIM HORMONE: CPT

## 2021-10-13 PROCEDURE — 6370000000 HC RX 637 (ALT 250 FOR IP): Performed by: HOSPITALIST

## 2021-10-13 PROCEDURE — 84439 ASSAY OF FREE THYROXINE: CPT

## 2021-10-13 PROCEDURE — 2580000003 HC RX 258: Performed by: HOSPITALIST

## 2021-10-13 PROCEDURE — 85025 COMPLETE CBC W/AUTO DIFF WBC: CPT

## 2021-10-13 PROCEDURE — 83735 ASSAY OF MAGNESIUM: CPT

## 2021-10-13 RX ORDER — AZITHROMYCIN 500 MG/1
500 TABLET, FILM COATED ORAL DAILY
Qty: 3 TABLET | Refills: 0 | Status: SHIPPED | OUTPATIENT
Start: 2021-10-13 | End: 2021-10-16

## 2021-10-13 RX ORDER — NITROFURANTOIN 25; 75 MG/1; MG/1
100 CAPSULE ORAL 2 TIMES DAILY
Qty: 12 CAPSULE | Refills: 0 | Status: SHIPPED | OUTPATIENT
Start: 2021-10-13 | End: 2021-10-19

## 2021-10-13 RX ORDER — IPRATROPIUM BROMIDE AND ALBUTEROL SULFATE 2.5; .5 MG/3ML; MG/3ML
1 SOLUTION RESPIRATORY (INHALATION) EVERY 6 HOURS PRN
Qty: 360 ML | Refills: 0 | Status: SHIPPED | OUTPATIENT
Start: 2021-10-13 | End: 2021-12-12

## 2021-10-13 RX ORDER — PREDNISONE 20 MG/1
40 TABLET ORAL DAILY
Qty: 10 TABLET | Refills: 0 | Status: SHIPPED | OUTPATIENT
Start: 2021-10-13 | End: 2021-10-18

## 2021-10-13 RX ADMIN — ROPINIROLE HYDROCHLORIDE 2.5 MG: 1 TABLET, FILM COATED ORAL at 08:45

## 2021-10-13 RX ADMIN — MEMANTINE 10 MG: 10 TABLET ORAL at 08:45

## 2021-10-13 RX ADMIN — CEFTRIAXONE SODIUM 1000 MG: 1 INJECTION, POWDER, FOR SOLUTION INTRAMUSCULAR; INTRAVENOUS at 00:57

## 2021-10-13 RX ADMIN — ROPINIROLE HYDROCHLORIDE 2.5 MG: 1 TABLET, FILM COATED ORAL at 13:50

## 2021-10-13 RX ADMIN — SODIUM CHLORIDE, PRESERVATIVE FREE 5 ML: 5 INJECTION INTRAVENOUS at 08:50

## 2021-10-13 RX ADMIN — PAROXETINE HYDROCHLORIDE 20 MG: 20 TABLET, FILM COATED ORAL at 08:45

## 2021-10-13 RX ADMIN — HYDRALAZINE HYDROCHLORIDE 50 MG: 25 TABLET, FILM COATED ORAL at 13:50

## 2021-10-13 RX ADMIN — Medication 1000 UNITS: at 08:45

## 2021-10-13 RX ADMIN — DORZOLAMIDE HYDROCHLORIDE 1 DROP: 20 SOLUTION/ DROPS OPHTHALMIC at 09:46

## 2021-10-13 RX ADMIN — BACLOFEN 5 MG: 10 TABLET ORAL at 08:45

## 2021-10-13 RX ADMIN — DORZOLAMIDE HYDROCHLORIDE 1 DROP: 20 SOLUTION/ DROPS OPHTHALMIC at 13:50

## 2021-10-13 RX ADMIN — ASPIRIN 81 MG: 81 TABLET, CHEWABLE ORAL at 08:45

## 2021-10-13 RX ADMIN — CYANOCOBALAMIN TAB 1000 MCG 1000 MCG: 1000 TAB at 08:45

## 2021-10-13 RX ADMIN — FAMOTIDINE 20 MG: 20 TABLET, FILM COATED ORAL at 08:45

## 2021-10-13 RX ADMIN — HYDRALAZINE HYDROCHLORIDE 50 MG: 25 TABLET, FILM COATED ORAL at 06:33

## 2021-10-13 RX ADMIN — Medication 1 TABLET: at 08:45

## 2021-10-13 RX ADMIN — SODIUM CHLORIDE: 9 INJECTION, SOLUTION INTRAVENOUS at 00:10

## 2021-10-13 ASSESSMENT — PAIN SCALES - GENERAL
PAINLEVEL_OUTOF10: 0

## 2021-10-13 NOTE — PROGRESS NOTES
[facility]. Pt is drowsy. Pt is fully dressed. Answers questions appropriately. Granddaughter Hakan updated via telephone.  Hospitalist notified of arrival.

## 2021-10-13 NOTE — PROGRESS NOTES
Pt is alert and oriented to place and self and situation. Pt has an iv infusing to the left AC without diff. Warm blanket given. Will continue to monitor pt. Pt stated to call her daughter in the morning for more information. Daughters name is Hakan \"sounds like Jeannette" [phone]. 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 Pt uses a walker at home, pt is not steady with ambulation. Pt has some confusion that comes and goes. Pt does live in Assisted living. Need to verify the amount of requip pt takes at assisted living. Pt back to resting with eyes closed. Call light in reach. Cardiac monitor is on. Note sent to pharmacy for missing medications.

## 2021-10-13 NOTE — DISCHARGE SUMMARY
Discharge Summary    Name:  Versa Babinski /Age/Sex: 1931  (80 y.o. female)   MRN & CSN:  [id number] & [id number] Admission Date/Time: 10/12/2021  1:59 PM   Attending:  No att. providers found Discharging Physician: Machelle Masterson PA-C     Hospital Course:   Versa Babinski is a 80 y.o.  female  who presents with <principal problem not specified>    Problems addressed during this hospitalization:     Syncope   - had syncopal episode transferring from wheelchair to car   -CT head with no acute process  -EKG with NSR w/ PVCs, no acute ischemia   - troponin negative x2  - echo with EF 50%, mild LVH, grade 1 diastolic dysfunction, mild aortic regurg and mild tricuspid regurg  -? Orthostasis, orthostatics negative in Obs unit  - no recurrence of symptoms  -Nissa Moore followed, recommended outpatient follow-up for Holter monitor    UTI  - UA showed large LE, many bacteria   -Reported dysuria, patient prone to UTIs  - afebrile without leukocytosis  - UCx pending on discharge, will follow-up if needed  -Recieved 1 dose of IV Rocephin, discharged on Macrobid to finish 7 day course    COPD exacerbation   Bronchitis   -caregiver reported increased wheezing, chest x-ray with bronchitis/pneumonitis  - no hypoxia  -Discharge with course of azithromycin, prednisone, duonebs with nebulizer, scripts sent to [place] [id number] was evaluated today and a DME order was entered for a nebulizer compressor in order to administer Duonebs due to the diagnosis of COPD. The need for this equipment and treatment was discussed with the patient and she understands and is in agreement.       Hemorrhoids  - daughter reports hemorrhoid bleeds occasionally, keeps close eye on this   - no signs of bleeding during admission, hemoglobin stable  - continue to monitor as outpatient    Dementia   - continue donepezil, Namenda    CAD  -Continue aspirin, statin    Restless leg syndrome  -Continue Requip    This patient was seen and examined autonomously  A hospitalist attending physician was available for questions/consultation as needed. The patient expressed appropriate understanding of and agreement with the discharge recommendations, medications, and plan.      Consults this admission:  IP CONSULT TO HOSPITALIST  IP CONSULT TO HOSPITALIST  IP CONSULT TO CARDIOLOGY  IP CONSULT TO [address] [place] [address] NEEDS    Discharge Instruction:   Follow up appointments: PCP, cardiology  Primary care physician:  within 2 weeks    Diet:  cardiac diet   Activity: activity as tolerated  Disposition: Discharged to:   [x]Home, []C, []SNF, []Acute Rehab, []Hospice   Condition on discharge: Stable    Discharge Medications:      Sandra Polo I   Home Medication Instructions AW    Printed on:10/13/21 7592   Medication Information                      acetaminophen 650 MG TABS  Take 650 mg by mouth every 4 hours as needed             albuterol sulfate  (90 Base) MCG/ACT inhaler  Inhale 2 puffs into the lungs every 6 hours as needed for Shortness of Breath              aspirin 81 MG chewable tablet  Take 1 tablet by mouth daily             atorvastatin (LIPITOR) 40 MG tablet  Take 1 tablet by mouth nightly             baclofen (LIORESAL) 5 MG tablet  Take 1 tablet by mouth 2 times daily             bisacodyl (DULCOLAX) 10 MG suppository  Place 10 mg rectally daily as needed for Constipation              donepezil (ARICEPT) 5 MG tablet  Take 1 tablet by mouth nightly             dorzolamide (TRUSOPT) 2 % ophthalmic solution  Place 1 drop into both eyes 3 times daily             folic acid-pyridoxine-cyancobalamin (FOLTX) 1.13-25-2 MG TABS  Take 1 tablet by mouth daily             hydrALAZINE (APRESOLINE) 50 MG tablet  Take 1 tablet by mouth every 8 hours             latanoprost (XALATAN) 0.005 % ophthalmic solution  Place 1 drop into both eyes nightly             magnesium hydroxide (MILK OF MAGNESIA) 400 MG/5ML suspension  Take 30 mLs by mouth daily as needed for Constipation             memantine (NAMENDA) 10 MG tablet  Take 1 tablet by mouth 2 times daily             PARoxetine (PAXIL) 20 MG tablet  Take 1 tablet by mouth daily             rOPINIRole (REQUIP) 0.5 MG tablet  Take 5 tablets by mouth 3 times daily             vitamin B-12 (CYANOCOBALAMIN) 1000 MCG tablet  Take 1,000 mcg by mouth daily             vitamin D 25 MCG (1000 UT) CAPS  Take 1 capsule by mouth daily                 Objective Findings at Discharge:   BP (!) 131/59   Pulse 90   Temp 98.6 °F (37 °C) (Oral)   Resp 21   Ht 5' (1.524 m)   Wt 168 lb (76.2 kg)   SpO2 96%   BMI 32.81 kg/m²            PHYSICAL EXAM   GEN Awake female, sitting upright in bed in no apparent distress. Appears given age. EYES Pupils are equally round. No scleral erythema, discharge, or conjunctivitis. Legally blind. HENT Mucous membranes are moist.   NECK Supple, no apparent thyromegaly or masses. RESP Clear to auscultation, no wheezes, rales or rhonchi. Symmetric chest movement while on room air. CARDIO/VASC S1/S2 auscultated. Regular rate without appreciable murmurs, rubs, or gallops. Peripheral pulses equal bilaterally and palpable. No peripheral edema. GI Abdomen is soft without significant tenderness, masses, or guarding. Bowel sounds are normoactive.  No costovertebral angle tenderness. Peterson catheter is not present. HEME/LYMPH No petechiae or ecchymoses. MSK No gross joint deformities. SKIN Normal coloration, warm, dry. NEURO Cranial nerves appear grossly intact, normal speech, no lateralizing weakness. PSYCH Awake, alert, oriented x 1. Affect appropriate.     BMP/CBC  Recent Labs     10/12/21  1405 10/12/21  1405 10/12/21  1421 10/13/21  0600 10/13/21  1355     --  145 141  --    K 3.7  --  3.5 4.2  --      --   --  107  --    CO2 24  --  25 22  --    BUN 12  --   --  10  --    CREATININE 0.8  --  0.8 0.6  --    WBC 5.6  --   --  5.3  --    HCT 35.0*   < > 34.0* 36.8* 35.7*     --   --  261  --     < > = values in this interval not displayed.        IMAGING:  CXR, echo, CT head    Discharge Time of 35 minutes    Electronically signed by Jamin Best PA-C on 10/13/2021 at 3:54 PM

## 2021-10-13 NOTE — PROGRESS NOTES
Pt oriented to self only. Reviewed discharge instructions with pt granddaughter/caregiver Justin Ford, pt home care nurse Shaniqua Lundberg, and pt. Discussed reasons to return, care for diagnosis at home, medications, and follow-up care. They state no further questions and verbalize understanding. Pt had LDA removed at discharge. Has belongings waiting in room as pt waits for granddaughter to come pick her up after she is finished with work. Pt granddaughter instructed on how to  pt. Pt discharge paperwork in room with pt to take home. Pt vitals stable at discharge.

## 2021-10-13 NOTE — PROGRESS NOTES
RENAL DOSE ADJUSTMENT MADE PER P/T PROTOCOL    PREVIOUS ORDER:  Pepcid 20 mg twice daily    CrCl cannot be calculated (Unknown ideal weight. ).   Recent Labs     10/12/21  1405 10/12/21  1421   BUN 12  --    CREATININE 0.8 0.8     --      NEW RENALLY ADJUSTED ORDER:  Pepcid 20 mg once daily    Lilia [facility]  10/12/2021 9:12 PM

## 2021-10-13 NOTE — CONSULTS
Dictation #[id number]  Discussed with Granddaughter;   Had first syncopal episode when getting out of car and transfer to wheelchair  Check orthostatics  Troponin negative    Patient know to me and practice  Will discuss with grand daughter  Patient lives with her  She came in with syncope  She is not very active   She has some dementia also  Labs stable  UTI noted  Echo pending  Carotid 2020 -negative  Will check holter as outpatient   She has hx of CVA also   Hx of colon CA per patient     If echo is normal and ortho negative can be d/c  holter as outpatient    Electronically signed by Hermilo Salinas MD on 10/13/21 at 10:23 AM EDT          Electronically signed by Hermilo Salinas MD on 10/13/21 at 7:40 AM EDT

## 2021-10-13 NOTE — PROGRESS NOTES
Report called to pt home health nurse Yoli Norwood with [facility]. She was updated on pt diagnosis, care for home, and medications. She states no further questions. AVS and face sheet will be faxed to RN Yoli Norwood. She reports she will be around tomorrow to see pt and pt and pt granddaughter will be updated.

## 2021-10-13 NOTE — PLAN OF CARE
Problem: Falls - Risk of:  Goal: Will remain free from falls  Description: Will remain free from falls  Outcome: Met This Shift  Goal: Absence of physical injury  Description: Absence of physical injury  Outcome: Met This Shift     Problem: Skin Integrity:  Goal: Will show no infection signs and symptoms  Description: Will show no infection signs and symptoms  Outcome: Met This Shift  Goal: Absence of new skin breakdown  Description: Absence of new skin breakdown  Outcome: Met This Shift     Problem: Pain:  Goal: Pain level will decrease  Description: Pain level will decrease  Outcome: Ongoing  Goal: Control of acute pain  Description: Control of acute pain  Outcome: Ongoing  Goal: Control of chronic pain  Description: Control of chronic pain  Outcome: Ongoing     Problem: Discharge Planning:  Goal: Discharged to appropriate level of care  Description: Discharged to appropriate level of care  Outcome: Ongoing     Problem: Cardiac:  Goal: Ability to maintain vital signs within normal range will improve  Description: Ability to maintain vital signs within normal range will improve  Outcome: Ongoing  Goal: Cardiovascular alteration will improve  Description: Cardiovascular alteration will improve  Outcome: Ongoing     Problem: Health Behavior:  Goal: Will modify at least one risk factor affecting health status  Description: Will modify at least one risk factor affecting health status  Outcome: Ongoing  Goal: Identification of resources available to assist in meeting health care needs will improve  Description: Identification of resources available to assist in meeting health care needs will improve  Outcome: Ongoing     Problem: Physical Regulation:  Goal: Complications related to the disease process, condition or treatment will be avoided or minimized  Description: Complications related to the disease process, condition or treatment will be avoided or minimized  Outcome: Ongoing

## 2021-10-13 NOTE — CARE COORDINATION
CM reviewed notes from previous visits,  CM into see pt and introduced self. Pt is legally blind. CM requested nurses to modify call light for pts safety. CM called pts [place] [facility]  to initiate a safe discharge plan. Cm introduced self and explained role of CM. Information per dtr who is primary caregiver. Pt lives at home with her dtr Cherri Howard [phone]. Pt has a grand dtr, Paco Venegas who is pts pd caregiver [facility]  home care. Pt has 40 hrs of aide service Wed-Sat while dtr works and then Dtr cares for pt the other days. Pt DME includes a rollator and w/c. Grab bars and gait belt. Pt is able to bed room 2nd floor with dtr using gait belt. Pt has a PCP. Pt has insurance and can obtain prescriptions. Meals per dtr. PCP list on discharge instruction for review per dtr request.   CM discussed the recommendations from OT . Dtr is declining SNF. Dtr would like the agencies that are in place to continue with SN,PT and OT. CM called  Scott Shah [phone]. CM called and left a VM for her to call. CM left message regarding the Rollator that is on order and a rail that dtr stated she was waiting for. CM called [place] [address] home care [phone] and spoke with Mario. Fax order, AVS,D/S to [phone]  Discharge plan is for pt to return home with dtr. [address] dtr pd caregiver. Pt is with [employer] and has 40 hrs of aide service per week. [place] [address] home care for SN, PT/OT   When pt is discharged please call  [address] [phone]   Fax order, AVS,D/S to [phone]    CM provided card and encouraged to call for any needs or concern. CM is available if any needs arise. [address] [facility] [place] called [facility] and spoke with [place] regarding the new order for a nebulizer.  [address]

## 2021-10-13 NOTE — CONSULTS
[address] [place] [address], [address] [facility]                                  CONSULTATION    PATIENT NAME: Clara Acosta                  :        1931  MED REC NO:   [id number]                          ROOM:       OBS05  ACCOUNT NO:   [de-identified]                           ADMIT DATE: 10/12/2021  PROVIDER:     Daniele Guerrero    CONSULT DATE:  10/13/2021    Daniele Guerrero CNP dictating for Noe Presley MD    INDICATION:  Syncopal episode. HISTORY OF PRESENT ILLNESS:  The patient is a 42-year-old female who  originally presented to the [facility] for a syncopal episode. Episode  originally came when she was trying to get out of the [facility] into her  wheelchair. Per granddaughter, she has not been feeling like herself  all day. She was going shopping with her granddaughter, when she got up  from the chair, she had a syncopal episode. After that, granddaughter  got her back in car and drove her to the [facility] Emergency Department,  where she had a syncopal episode when trying to get inside as well. She  has never had a history of this in the past and granddaughter said that  she is also having increasing tremor-like activity, has had a history of  tremors in the past, especially restless leg syndrome. In the emergency  department, a 12-lead showed normal sinus rhythm, left axis deviation,  but no arrhythmias. Troponins are negative. She has had a stress test one year ago, and it showed no ischemia or  infarct noted. Echo from a year ago showed left ventricular systolic  function as normal, EF was visually estimated at 50 to 55, grade 1  diastolic dysfunction, sclerotic, but non-stenotic aortic valve,  mild-to-moderate aortic regurgitation, mild mitral regurgitation, mitral  annular calcification present, no evidence of pericardial effusion.    Carotid ultrasound was also done at that time, bilateral ICA has been  less than 50%.    PAST MEDICAL HISTORY:  She has a previous medical history of  hypertension, restless legs, she is blind in both eyes, she has had a  history of colon cancer, depression, CVA, arthritis, pneumonia, GERD,  COPD, tremors, and dementia. PAST SURGICAL HISTORY:  She has a previous surgical history of  hysterectomy, spine surgery, abdomen surgery, appendectomy, vascular  surgery, back surgery, and eye surgery. FAMILY HISTORY:  She has a family history of daughter having  hypertension and mother having hypertension and heart disease. SOCIAL HISTORY:  She is a former smoker. She does not use any alcohol. She does not use any illicit drugs. HOME MEDICATIONS:  She is on Paxil, Foltx, Namenda, donepezil, baclofen,  aspirin, atorvastatin, ReQuip, hydralazine, vitamin B12, and albuterol. ALLERGIES:  She has a nonspecific allergy to an ANTIBIOTIC. PHYSICAL EXAMINATION:  GENERAL:  The patient is awake and alert, confused and answering  questions, not in any acute distress. VITAL SIGNS:  Temperature 98.1, respirations are 21, pulse 78, blood  pressure 148/77, oxygen saturation 94% sating on room air. CHEST:  Equal and expansive. LUNGS:  Clear to auscultation. CARDIOVASCULAR:  Rate and rhythm regular. ABDOMEN:  Soft, nontender. Bowel sounds are present in all four  quadrants. No hepatosplenomegaly or guarding noted. EXTREMITIES:  No clubbing, cyanosis, or edema noted. NEUROLOGIC:  Cranial nerves II through XII are grossly intact. LABORATORY DATA:  Troponins have been negative x2. Urine positive for  leukocytes, bacteria and white blood cells. Creatinine 0.8, potassium  3.5, and hemoglobin 10.4. DIAGNOSTIC TESTING:  CT of the head negative, no acute intracranial  abnormalities. IMPRESSION:  This is a 19-year-old female, who presented to the  emergency department for syncopal episode witnessed by her  granddaughter.   Today, currently she is resting comfortably in the bed,  no episodes of dizziness or near syncope. Normal sinus rhythm on  Telemetry. Stress test and Carotids done last year. We will repeat  echocardiogram at this time. Blood pressure and heart rate are stable  at this time. The nature of syncope upon standing could be orthostatic  in nature. We will obtain echocardiogram.  She will likely need a  Holter monitor as an outpatient. We will continue to follow and make  recommendations. This plan was discussed with Dr. Nicole Stack.     Agree with above     Nisa Quiles    D: 10/13/2021 8:20:44       T: 10/13/2021 11:47:31     [id number]  Job#: [id number]     Doc#: [id number]    CC:

## 2021-10-13 NOTE — PROGRESS NOTES
Occupational Therapy    [facility] CARE OCCUPATIONAL THERAPY EVALUATION  Haleigh Mejía, 1/30/1931, OBS [id number], 10/13/2021    History  [place]:  The primary encounter diagnosis was Syncope and collapse. A diagnosis of Palpitations was also pertinent to this visit. Patient  has a past medical history of Arthritis, Blind left eye, Cancer ([address] COPD (chronic obstructive pulmonary disease) ([address] Dementia ([address] Depression, GERD (gastroesophageal reflux disease), Hypertension, Pneumonia, Restless legs syndrome, Tremors of nervous system, and Unspecified cerebral artery occlusion with cerebral infarction. Patient  has a past surgical history that includes Hysterectomy (1972); Spine surgery (1995); Abdomen surgery; fracture surgery; Appendectomy; Endoscopy, colon, diagnostic; vascular surgery; back surgery; Colonoscopy; and eye surgery. Subjective:  Patient states:  \"I'm feeling good today\"    Pain:  No.    Communication with other providers:  Handoff to RN  Restrictions: General Precautions, Fall Risk    Home Setup/Prior level of function   Pt is a poor historian. Per H&P pt had a syncopal episode w/ family in car. Pt has dementia at baseline    Examination of body systems (includes body structures/functions, activity/participation limitations):  · Observation:  Supine in bed upon arrival, agreeable to therapy  · Vision:  Blind  · Hearing:  [facility]  · Cardiopulmonary:  No 02 needs      Body Systems and functions:  · ROM R/L:  WFL.     · Strength R/L:  4+/5,   · Sensation: WFL  · Tone: Normal  · Coordination: WFL  · Perception: WNL    Activities of Daily Living (ADLs):  · Feeding: Setup/SBA (pt feeding at end of session, setup due to blindness)  · Grooming: Setup/CGA (washing face/hands w/ warm washcloth)  · UB bathing: SBA  · LB bathing: maxA   · UB dressing: SBA  · LB dressing: maxA (donning socks sitting EOB)  · Toileting: maxA    Cognitive and Psychosocial Functioning:  · Overall cognitive status: Dementia at baseline, AxO to self and place only, contradictory baseline information, decreased short term memory   · Affect: Pleasantly confused       Mobility:  · Supine to sit:  Aaron  · Transfers: Aaron from EOB up to RW stand step to reclining chair  · Sitting balance:  SBA. · Standing balance:  CGA w/ RW.  · Toilet/Shower Transfers: DNT             AM-PAC Daily Activity Inpatient   How much help for putting on and taking off regular lower body clothing?: Total  How much help for Bathing?: A Lot  How much help for Toileting?: Total  How much help for putting on and taking off regular upper body clothing?: A Little  How much help for taking care of personal grooming?: A Little  How much help for eating meals?: A Little  AM-PAC Inpatient Daily Activity Raw Score: 13  AM-PAC Inpatient ADL T-Scale Score : 32.03  ADL Inpatient CMS 0-100% Score: 63.03  ADL Inpatient CMS G-Code Modifier : CL    Treatment:  Self Care Training:   Cues were given for safety, sequence, UE/LE placement, visual cues, and balance. Activities performed today included feeding, grooming, LB dressing    Safety: patient left in chair with chair alarm, call light within reach, RN notified, gait belt used. Assessment:  Pt is a 79 yo female admitted from home for syncope and collapse. Pt currently presents w/ deficits in ADL and high level IADL independence, functional activity tolerance, dynamic sitting and standing balance and tolerance and functional transfers, BUE strength, cognition and OOB activity. Pt would benefit from continued acute care OT services w/ discharge to SNF  Complexity: Moderate  Prognosis: Good, no significant barriers to participation at this time.    Plan  Times per week: 3x+  Times per day: Daily  Current Treatment Recommendations: Strengthening, Endurance Training, Patient/Caregiver Education & Training, Equipment Evaluation, Education, & procurement, Self-Care / ADL, Balance Training, Pain Management, Home

## 2021-10-13 NOTE — H&P
History and Physical      Name:  Abbi Jean /Age/Sex: 1931  (80 y.o. female)   MRN & CSN:  [id number] & [id number] Admission Date/Time: 10/12/2021  1:59 PM   Location:  OBS /MBS35-37 PCP: Lisa George [address] [facility] Day: 1    Assessment and Plan:   Abbi Jean is a 80 y.o.  female with past medical history of dementia, COPD who presents with syncope hence the admission. Syncope  -Patient syncopized in the presence of her family members during the car ride.  -It was short  episode.  -No previous history of any such occurrence. -CT head without any abnormality.  -Patient will be observed on telemetry overnight.  -Gentle IV hydration.  -Trend troponin. -2D echo.  -Consulting Dr. John Blackwell from cardiology for further input. UTI?  -UA shows large leukocyte esterase many bacteria. -Urine culture pending. -IV Rocephin for a short course. Dementia  -Continue donepezil, Namenda    Coronary artery disease  -Continue aspirin, atorvastatin.  -Patient does not endorse any chest pain. Restless leg syndrome  -Continue Requip        Diet ADULT DIET; Regular; Low Fat/Low Chol/High Fiber/2 gm Na   DVT Prophylaxis [x] Lovenox, []  Heparin, [] SCDs, [] Ambulation   GI Prophylaxis [] PPI,  [x] H2 Blocker,  [] Carafate,  [] Diet/Tube Feeds   Code Status Full Code   Disposition Patient requires continued admission due to multiple comorbidities   MDM [] Low, [] Moderate,[x]  High  Patient's risk as above due to multiple comorbidities     History of Present Illness:     Chief Complaint: Syncope  Abbi Jean is a 80 y.o.  female  who presents with a syncopal episode earlier today. Please be noted patient is 80years old with severe dementia and is a poor informant. There is no family member or caregiver at the bedside to corroborate any history. All history is obtained from the ER notes from [facility].   Apparently patient was in the car with her family member and she was found to have passed out. There is no witnessed seizure or seizure-like activity. Though there is some documentation that patient was shaking. No previous such episode. No previous history of seizures. No history of urinary or bowel incontinence. No history of tongue bite. No history of fever chills or rigors. No history of nausea vomiting or diarrhea. No history of abdominal pain. No history of cough hemoptysis hematemesis melena or hematochezia. Patient was transferred to [facility] for further investigations. Twelve-lead ECG normal sinus rhythm with left axis deviation otherwise no signs of acute ischemia or any arrhythmia. Her serum chemistries show sodium 145, potassium 3.5Chloride 106 bicarb 24 blood urea 12 creatinine 1.8 anion gap of 12, glucose 96 calcium 9.5 total protein 6.4. First set of troponin T less than 0.010. Liver function tests within normal limits. WBC 5.6 hemoglobin 10.1 hematocrit 35.0 platelet count 791. Patient will be admitted to the hospitalist service. Continue to observe the patient on a telemetry. Trend troponin. Gentle IV hydration PT OT as tolerated. Follow cardiology recommendations. Further therapy will depend hospital course the patient. Overall prognosis is very very guarded. Ten point ROS reviewed negative, unless as noted above    Objective:   No intake or output data in the 24 hours ending 10/12/21 2038   Vitals:   Vitals:    10/12/21 1947   BP: (!) 138/59   Pulse: 68   Resp: 12   Temp: 98 °F (36.7 °C)   SpO2: 98%     Physical Exam:   GEN Awake female, sitting upright in bed in no apparent distress. Appears given age. EYES Pupils are equally round. No scleral erythema, discharge, or conjunctivitis. HENT Mucous membranes are moist. Oral pharynx without exudates, no evidence of thrush. NECK Supple, no apparent thyromegaly or masses. RESP Clear to auscultation, no wheezes, rales or rhonchi.   Symmetric chest movement while on room air. CARDIO/VASC S1/S2 auscultated. Regular rate without appreciable murmurs, rubs, or gallops. No JVD or carotid bruits. Peripheral pulses equal bilaterally and palpable. No peripheral edema. GI Abdomen is soft without significant tenderness, masses, or guarding. Bowel sounds are normoactive. Rectal exam deferred.  No costovertebral angle tenderness. Normal appearing external genitalia. Peterson catheter is not present. HEME/LYMPH No palpable cervical lymphadenopathy and no hepatosplenomegaly. No petechiae or ecchymoses. MSK No gross joint deformities. SKIN Normal coloration, warm, dry. NEURO Cranial nerves appear grossly intact, normal speech, no lateralizing weakness. PSYCH Awake, alert, oriented x 4. Affect appropriate. Past Medical History:      Past Medical History:   Diagnosis Date    Arthritis     Blind left eye     Cancer [facility] 1971    colon    COPD (chronic obstructive pulmonary disease) [facility]     Dementia (HCC)     Depression     GERD (gastroesophageal reflux disease)     Hypertension     Pneumonia     Restless legs syndrome     Tremors of nervous system     Unspecified cerebral artery occlusion with cerebral infarction      PSHX:  has a past surgical history that includes Hysterectomy (1972); Spine surgery (1995); Abdomen surgery; fracture surgery; Appendectomy; Endoscopy, colon, diagnostic; vascular surgery; back surgery; Colonoscopy; and eye surgery. Allergies: Allergies   Allergen Reactions    Other      Pt states allergy to unknown antibiotic that made her arm red        FAM HX: family history includes Arthritis in her mother; Cancer in her brother, mother, and sister; Diabetes in her daughter and mother; Heart Disease in her mother; High Blood Pressure in her daughter and mother; [place] / [place] in her mother. Soc HX:   Social History     Socioeconomic History    Marital status:       Spouse name: None    Number of children: 7    Years of education: None    Highest education level: None   Occupational History    None   Tobacco Use    Smoking status: Former Smoker     Packs/day: 0.25     Years: 50.00     Pack years: 12.50     Types: Cigarettes    Smokeless tobacco: Never Used    Tobacco comment: states smokes 1-2 cigs a day   Vaping Use    Vaping Use: Never used   Substance and Sexual Activity    Alcohol use: No     Alcohol/week: 0.0 standard drinks    Drug use: No    Sexual activity: Never   Other Topics Concern    None   Social History Narrative    None     Social Determinants of Health     Financial Resource Strain:     Difficulty of Paying Living Expenses:    Food Insecurity:     Worried About Running Out of Food in the Last Year:     Ran Out of Food in the Last Year:    Transportation Needs:     Lack of Transportation (Medical):      Lack of Transportation (Non-Medical):    Physical Activity:     Days of Exercise per Week:     Minutes of Exercise per Session:    Stress:     Feeling of Stress :    Social Connections:     Frequency of Communication with Friends and Family:     Frequency of Social Gatherings with Friends and Family:     Attends Mandaeism Services:     Active Member of Clubs or Organizations:     Attends Club or Organization Meetings:     Marital Status:    Intimate Partner Violence:     Fear of Current or Ex-Partner:     Emotionally Abused:     Physically Abused:     Sexually Abused:        Data:   CBC with Differential:    Lab Results   Component Value Date    WBC 5.6 10/12/2021    RBC 4.23 10/12/2021    HGB 11.5 10/12/2021    HCT 34.0 10/12/2021     10/12/2021    MCV 82.7 10/12/2021    MCH 23.9 10/12/2021    MCHC 28.9 10/12/2021    RDW 17.0 10/12/2021    SEGSPCT 46.4 10/12/2021    BANDSPCT 7 10/06/2014    LYMPHOPCT 35.8 10/12/2021    MONOPCT 11.4 10/12/2021    MYELOPCT 1 10/04/2014    EOSPCT 3.4 08/07/2011    BASOPCT 0.5 10/12/2021    MONOSABS 0.6 10/12/2021    LYMPHSABS 2.0 10/12/2021    EOSABS 0.3 10/12/2021    BASOSABS 0.0 10/12/2021    DIFFTYPE AUTOMATED DIFFERENTIAL 10/12/2021       CMP:     Lab Results   Component Value Date     10/12/2021    K 3.5 10/12/2021     10/12/2021    CO2 25 10/12/2021    BUN 12 10/12/2021    CREATININE 0.8 10/12/2021    CREATININE 0.8 10/12/2021    GFRAA >60 10/12/2021    LABGLOM >60 10/12/2021    GLUCOSE 101 10/12/2021    PROT 6.4 10/12/2021    PROT 6.8 08/07/2011    LABALBU 3.7 10/12/2021    CALCIUM 9.5 10/12/2021    BILITOT 0.3 10/12/2021    ALKPHOS 97 10/12/2021    AST 18 10/12/2021    ALT 12 10/12/2021       Troponin:  Lab Results   Component Value Date    TROPONINT <0.010 10/12/2021       U/A:    Lab Results   Component Value Date    COLORU PALE YELLOW 10/12/2021    PROTEINU NEGATIVE 10/12/2021    PHUR 6.0 03/03/2020    PHUR 6.0 03/03/2020    WBCUA 24 10/12/2021    RBCUA 4 10/12/2021    MUCUS RARE 11/15/2020    TRICHOMONAS NONE SEEN 01/24/2021    BACTERIA MANY 10/12/2021    CLARITYU SLIGHTLY CLOUDY 10/12/2021    SPECGRAV 1.010 10/12/2021    LEUKOCYTESUR LARGE 10/12/2021    UROBILINOGEN 0.2 10/12/2021    BILIRUBINUR NEGATIVE 10/12/2021    BLOODU SMALL NUMBER OR AMOUNT OBSERVED 10/12/2021    GLUCOSEU Negative 03/03/2020       Urine Culture:  No components found for: CURINE    Radiology results:  CT HEAD WO CONTRAST   Final Result   1. No acute intracranial abnormality.                Medications:   Medications:    dorzolamide  1 drop Both Eyes TID    latanoprost  1 drop Both Eyes Nightly    vitamin B-12  1,000 mcg Oral Daily    hydrALAZINE  50 mg Oral 3 times per day    rOPINIRole  2.5 mg Oral TID    aspirin  81 mg Oral Daily    atorvastatin  40 mg Oral Nightly    PARoxetine  20 mg Oral Daily    folic acid-pyridoxine-cyancobalamin  1 tablet Oral Daily    memantine  10 mg Oral BID    donepezil  5 mg Oral Nightly    Baclofen  5 mg Oral BID    vitamin D  1,000 Units Oral Daily    sodium chloride flush  5-40 mL IntraVENous 2 times per day    enoxaparin  40 mg SubCUTAneous Daily    famotidine  20 mg Oral BID    cefTRIAXone (ROCEPHIN) IV  1,000 mg IntraVENous Q24H      Infusions:    sodium chloride      sodium chloride       PRN Meds: Acetaminophen, 325 mg, Q4H PRN  bisacodyl, 10 mg, Daily PRN  magnesium hydroxide, 30 mL, Daily PRN  albuterol sulfate HFA, 2 puff, Q6H PRN  sodium chloride flush, 5-40 mL, PRN  sodium chloride, 25 mL, PRN  ondansetron, 4 mg, Q8H PRN   Or  ondansetron, 4 mg, Q6H PRN  acetaminophen, 650 mg, Q6H PRN   Or  acetaminophen, 650 mg, Q6H PRN  potassium chloride, 40 mEq, PRN   Or  potassium alternative oral replacement, 40 mEq, PRN   Or  potassium chloride, 10 mEq, PRN  magnesium sulfate, 2,000 mg, PRN  potassium chloride, 10 mEq, PRN  bisacodyl, 5 mg, Daily PRN          Electronically signed by Scott Madsen MD on 10/12/2021 at 8:38 PM

## 2021-10-14 LAB
CULTURE: NORMAL
Lab: NORMAL
SPECIMEN: NORMAL

## 2021-10-15 LAB
CULTURE: ABNORMAL
CULTURE: ABNORMAL
Lab: ABNORMAL
SPECIMEN: ABNORMAL

## 2021-10-15 NOTE — PROGRESS NOTES
pts family stated she had white capris and a black top on when she left [facility] to come here.  When this nurse discharged her from the observation unit, pt had on a hospital gown and no other clothing in her room

## 2021-12-12 ENCOUNTER — HOSPITAL ENCOUNTER (EMERGENCY)
Age: 86
Discharge: HOME | End: 2021-12-12
Attending: EMERGENCY MEDICINE
Payer: MEDICARE

## 2021-12-12 ENCOUNTER — APPOINTMENT (OUTPATIENT)
Dept: GENERAL RADIOLOGY | Age: 86
End: 2021-12-12
Payer: MEDICARE

## 2021-12-12 VITALS
TEMPERATURE: 98.7 F | WEIGHT: 170 LBS | BODY MASS INDEX: 33.38 KG/M2 | DIASTOLIC BLOOD PRESSURE: 48 MMHG | OXYGEN SATURATION: 95 % | SYSTOLIC BLOOD PRESSURE: 125 MMHG | HEIGHT: 60 IN | RESPIRATION RATE: 14 BRPM | HEART RATE: 69 BPM

## 2021-12-12 DIAGNOSIS — J44.1 CHRONIC OBSTRUCTIVE PULMONARY DISEASE WITH ACUTE EXACERBATION (HCC): ICD-10-CM

## 2021-12-12 DIAGNOSIS — J44.1 COPD EXACERBATION (HCC): Primary | ICD-10-CM

## 2021-12-12 LAB
ALBUMIN SERPL-MCNC: 3.5 GM/DL (ref 3.4–5)
ALP BLD-CCNC: 78 IU/L (ref 40–129)
ALT SERPL-CCNC: 10 U/L (ref 10–40)
ANION GAP SERPL CALCULATED.3IONS-SCNC: 12 MMOL/L (ref 4–16)
AST SERPL-CCNC: 17 IU/L (ref 15–37)
BASOPHILS ABSOLUTE: 0 K/CU MM
BASOPHILS RELATIVE PERCENT: 0.4 % (ref 0–1)
BILIRUB SERPL-MCNC: 0.3 MG/DL (ref 0–1)
BUN BLDV-MCNC: 23 MG/DL (ref 6–23)
CALCIUM SERPL-MCNC: 8.9 MG/DL (ref 8.3–10.6)
CHLORIDE BLD-SCNC: 106 MMOL/L (ref 99–110)
CO2: 23 MMOL/L (ref 21–32)
CREAT SERPL-MCNC: 0.9 MG/DL (ref 0.6–1.1)
DIFFERENTIAL TYPE: ABNORMAL
EOSINOPHILS ABSOLUTE: 0.4 K/CU MM
EOSINOPHILS RELATIVE PERCENT: 5.4 % (ref 0–3)
GFR AFRICAN AMERICAN: >60 ML/MIN/1.73M2
GFR NON-AFRICAN AMERICAN: 59 ML/MIN/1.73M2
GLUCOSE BLD-MCNC: 81 MG/DL (ref 70–99)
HCT VFR BLD CALC: 34.9 % (ref 37–47)
HEMOGLOBIN: 10.4 GM/DL (ref 12.5–16)
IMMATURE NEUTROPHIL %: 0.4 % (ref 0–0.43)
LYMPHOCYTES ABSOLUTE: 1.6 K/CU MM
LYMPHOCYTES RELATIVE PERCENT: 22.2 % (ref 24–44)
MCH RBC QN AUTO: 23.2 PG (ref 27–31)
MCHC RBC AUTO-ENTMCNC: 29.8 % (ref 32–36)
MCV RBC AUTO: 77.9 FL (ref 78–100)
MONOCYTES ABSOLUTE: 0.7 K/CU MM
MONOCYTES RELATIVE PERCENT: 8.8 % (ref 0–4)
PDW BLD-RTO: 17.3 % (ref 11.7–14.9)
PLATELET # BLD: 325 K/CU MM (ref 140–440)
PMV BLD AUTO: 9.8 FL (ref 7.5–11.1)
POTASSIUM SERPL-SCNC: 4 MMOL/L (ref 3.5–5.1)
PRO-BNP: 228.4 PG/ML
RBC # BLD: 4.48 M/CU MM (ref 4.2–5.4)
SEGMENTED NEUTROPHILS ABSOLUTE COUNT: 4.7 K/CU MM
SEGMENTED NEUTROPHILS RELATIVE PERCENT: 62.8 % (ref 36–66)
SODIUM BLD-SCNC: 141 MMOL/L (ref 135–145)
TOTAL IMMATURE NEUTOROPHIL: 0.03 K/CU MM
TOTAL PROTEIN: 6.6 GM/DL (ref 6.4–8.2)
WBC # BLD: 7.4 K/CU MM (ref 4–10.5)

## 2021-12-12 PROCEDURE — 71045 X-RAY EXAM CHEST 1 VIEW: CPT

## 2021-12-12 PROCEDURE — 99283 EMERGENCY DEPT VISIT LOW MDM: CPT

## 2021-12-12 PROCEDURE — 85025 COMPLETE CBC W/AUTO DIFF WBC: CPT

## 2021-12-12 PROCEDURE — 93005 ELECTROCARDIOGRAM TRACING: CPT | Performed by: EMERGENCY MEDICINE

## 2021-12-12 PROCEDURE — 83880 ASSAY OF NATRIURETIC PEPTIDE: CPT

## 2021-12-12 PROCEDURE — 80053 COMPREHEN METABOLIC PANEL: CPT

## 2021-12-12 RX ORDER — PREDNISONE 20 MG/1
40 TABLET ORAL DAILY
Qty: 10 TABLET | Refills: 0 | Status: SHIPPED | OUTPATIENT
Start: 2021-12-12 | End: 2021-12-17

## 2021-12-12 RX ORDER — IPRATROPIUM BROMIDE AND ALBUTEROL SULFATE 2.5; .5 MG/3ML; MG/3ML
1 SOLUTION RESPIRATORY (INHALATION) EVERY 4 HOURS PRN
Qty: 360 ML | Refills: 0 | Status: SHIPPED | OUTPATIENT
Start: 2021-12-12

## 2021-12-12 NOTE — ED NOTES
Discharge instructions and prescription information was givne to the patients granddaughter who expressed understanding of information and follow up care.      Souleymane Prieto RN  12/12/21 1259

## 2021-12-12 NOTE — ED PROVIDER NOTES
eMERGENCY dEPARTMENT eNCOUnter      PCP: Levy Black, [facility] - [place] [address]    Chief Complaint   Patient presents with    Cough    Shortness of Breath       HPI    Anna Feliz is a 80 y.o. female who presents with cough and shortness of breath. States symptoms present for 3 or 4 days. Reports cough which is been nonproductive. States increasing shortness of breath. Reports history of COPD, seems similar to that. Denies chest pain, increase in leg pain or swelling. No nausea vomiting or diarrhea. No recorded fever or chills. Granddaughter states that she has COPD, they do have inhalers at home, she has a nebulizer machine but was never given the medication for it. She has had COVID vaccine. REVIEW OF SYSTEMS    Constitutional:  Denies fever, chills.    HENT:  Denies sore throat or ear pain   Cardiovascular:  Denies chest pain, palpitations   Respiratory:  + cough and shortness of breath    GI:  Denies abdominal pain, nausea, vomiting, or diarrhea  :  Denies any urinary symptoms, flank pain  Musculoskeletal:  Denies back pain, extremity pain  Skin:  Denies rash, color change  Neurologic:  Denies headache, focal weakness or sensory changes   Lymphatic:  Denies swollen glands, edema    All other review of systems are negative  See HPI and nursing notes for additional information     PAST MEDICAL AND SURGICAL HISTORY    Past Medical History:   Diagnosis Date    Arthritis     Blind left eye     Cancer ([facility] 1971    colon    COPD [facility]chronic obstructive pulmonary disease) ([facility]     Dementia ([facility]     Depression     GERD (gastroesophageal reflux disease)     Hypertension     Pneumonia     Restless legs syndrome     Tremors of nervous system     Unspecified cerebral artery occlusion with cerebral infarction      Past Surgical History:   Procedure Laterality Date    ABDOMEN SURGERY      APPENDECTOMY      BACK SURGERY      COLONOSCOPY      ENDOSCOPY, COLON, DIAGNOSTIC      EYE SURGERY      FRACTURE SURGERY      HYSTERECTOMY  1972    SPINE SURGERY  1995    VASCULAR SURGERY         CURRENT MEDICATIONS    Current Outpatient Rx   Medication Sig Dispense Refill    ipratropium-albuterol (DUONEB) 0.5-2.5 (3) MG/3ML SOLN nebulizer solution Take 3 mLs by nebulization every 6 hours as needed for Shortness of Breath (wheezing) 360 mL 0    PARoxetine (PAXIL) 20 MG tablet Take 1 tablet by mouth daily 30 tablet 3    folic acid-pyridoxine-cyancobalamin (FOLTX) 1.13-25-2 MG TABS Take 1 tablet by mouth daily 30 tablet 3    memantine (NAMENDA) 10 MG tablet Take 1 tablet by mouth 2 times daily 60 tablet 3    donepezil (ARICEPT) 5 MG tablet Take 1 tablet by mouth nightly 30 tablet 3    baclofen (LIORESAL) 5 MG tablet Take 1 tablet by mouth 2 times daily 60 tablet 3    vitamin D 25 MCG (1000 UT) CAPS Take 1 capsule by mouth daily 30 capsule 0    aspirin 81 MG chewable tablet Take 1 tablet by mouth daily 30 tablet 3    atorvastatin (LIPITOR) 40 MG tablet Take 1 tablet by mouth nightly 30 tablet 3    rOPINIRole (REQUIP) 0.5 MG tablet Take 5 tablets by mouth 3 times daily 30 tablet 1    hydrALAZINE (APRESOLINE) 50 MG tablet Take 1 tablet by mouth every 8 hours 90 tablet 3    vitamin B-12 (CYANOCOBALAMIN) 1000 MCG tablet Take 1,000 mcg by mouth daily      magnesium hydroxide (MILK OF MAGNESIA) 400 MG/5ML suspension Take 30 mLs by mouth daily as needed for Constipation      albuterol sulfate  (90 Base) MCG/ACT inhaler Inhale 2 puffs into the lungs every 6 hours as needed for Shortness of Breath       bisacodyl (DULCOLAX) 10 MG suppository Place 10 mg rectally daily as needed for Constipation       acetaminophen 650 MG TABS Take 650 mg by mouth every 4 hours as needed 120 tablet 3    dorzolamide (TRUSOPT) 2 % ophthalmic solution Place 1 drop into both eyes 3 times daily 10 mL 2    latanoprost (XALATAN) 0.005 % ophthalmic solution Place 1 drop into both eyes nightly 1 Bottle 3 ALLERGIES    Allergies   Allergen Reactions    Other      Pt states allergy to unknown antibiotic that made her arm red        SOCIAL AND FAMILY HISTORY    Social History     Socioeconomic History    Marital status:      Spouse name: None    Number of children: 7    Years of education: None    Highest education level: None   Occupational History    None   Tobacco Use    Smoking status: Former Smoker     Packs/day: 0.25     Years: 50.00     Pack years: 12.50     Types: Cigarettes    Smokeless tobacco: Never Used    Tobacco comment: states smokes 1-2 cigs a day   Vaping Use    Vaping Use: Never used   Substance and Sexual Activity    Alcohol use: No     Alcohol/week: 0.0 standard drinks    Drug use: No    Sexual activity: Never   Other Topics Concern    None   Social History Narrative    None     Social Determinants of Health     Financial Resource Strain:     Difficulty of Paying Living Expenses: Not on file   Food Insecurity:     Worried About Running Out of Food in the Last Year: Not on file    Randolph of Food in the Last Year: Not on file   Transportation Needs:     Lack of Transportation (Medical): Not on file    Lack of Transportation (Non-Medical):  Not on file   Physical Activity:     Days of Exercise per Week: Not on file    Minutes of Exercise per Session: Not on file   Stress:     Feeling of Stress : Not on file   Social Connections:     Frequency of Communication with Friends and Family: Not on file    Frequency of Social Gatherings with Friends and Family: Not on file    Attends Scientologist Services: Not on file    Active Member of Clubs or Organizations: Not on file    Attends Club or Organization Meetings: Not on file    Marital Status: Not on file   Intimate Partner Violence:     Fear of Current or Ex-Partner: Not on file    Emotionally Abused: Not on file    Physically Abused: Not on file    Sexually Abused: Not on file   Housing Stability:     Unable to Pay for Housing in the Last Year: Not on file    Number of Places Lived in the Last Year: Not on file    Unstable Housing in the Last Year: Not on file     Family History   Problem Relation Age of Onset    Arthritis Mother     Cancer Mother     Diabetes Mother     Heart Disease Mother     High Blood Pressure Mother    Darrin Mejia / Hugo Mother     Cancer Sister     Cancer Brother     Diabetes Daughter     High Blood Pressure Daughter          PHYSICAL EXAM    VITAL SIGNS: BP (!) 125/48   Pulse 69   Temp 98.7 °F (37.1 °C) (Oral)   Resp 14   Ht 5' (1.524 m)   Wt 170 lb (77.1 kg)   SpO2 95%   BMI 33.20 kg/m²    Constitutional:  Well developed, No acute distress. HENT:  Normocephalic, Atraumatic, PERRL. EOMI. Sclera clear. Conjunctiva normal.  Neck: supple without ridigity  Cardiovascular:  Regular rate and rhythm, No murmurs  Respiratory:  Nonlabored breathing. End expiratory wheezes noted. Abdomen: Soft, Non tender, bowel sounds present. Musculoskeletal: No edema, No tenderness  Integument:  Warm, Dry, no rash  Neurologic:  Alert & oriented , No focal deficits noted. Speech normal.  Psychiatric:  Affect normal, Mood normal.       Labs:  Labs Reviewed   CBC WITH AUTO DIFFERENTIAL - Abnormal; Notable for the following components:       Result Value    Hemoglobin 10.4 (*)     Hematocrit 34.9 (*)     MCV 77.9 (*)     MCH 23.2 (*)     MCHC 29.8 (*)     RDW 17.3 (*)     Lymphocytes % 22.2 (*)     Monocytes % 8.8 (*)     Eosinophils % 5.4 (*)     All other components within normal limits   COMPREHENSIVE METABOLIC PANEL - Abnormal; Notable for the following components:    GFR Non- 59 (*)     All other components within normal limits   BRAIN NATRIURETIC PEPTIDE         EKG    This EKG was interpreted by me. Rate is 74, rhythm is sinus. NM and QT intervals are within normal limits. There is no ST segment or T wave changes. This EKG was compared to previous EKG from date 10/12/21. Appears similar to previous EKG. RADIOLOGY    No results found. ED COURSE & MEDICAL DECISION MAKING       Vital signs and nursing notes reviewed during ED course. All pertinent Lab data and radiographic results reviewed with patient at bedside. The patient and/or the family were informed of the results of any tests/labs/imaging, the treatment plan, and time was allotted to answer questions. This is a 72-year-old female who presented with cough, shortness of breath. She has history of COPD. She is 95% on room air. Not requiring any oxygen therapy. She did have some wheezing on exam.  Laboratory work-up is virtually unremarkable, BNP is normal.  Hemoglobin slightly low at 10.4. White blood cell count of 7.4. Electrolytes within normal range. Chest x-ray was performed. Shows low lung volumes, scattered opacities which were present on previous chest x-ray. I suspect these are likely related to interstitial lung disease given history. EKG shows no sign of ischemia or significant change from previous. I suspect viral illness with COPD exacerbation. Will treat symptomatically with nebulizer treatments at home, inhaler treatments at home as well as prednisone. For follow-up outpatient with a primary care provider for recheck next few days and return to the emergency department with any new or worsening signs or symptoms.       Clinical  IMPRESSION    COPD exacerbation          (Please note the MDM and HPI sections of this note were completed with a voice recognition program.  Efforts were made to edit the dictations but occasionally words are mis-transcribed.)      Dayo Guerrero,   12/12/21 [id number]

## 2021-12-12 NOTE — ED TRIAGE NOTES
Pt arrives with her granddaughter who reports that she has been more labored with her breathing for the past couple of days, denies any fever or chills, the patient denies any pain, grand daughter states that she just finished taking a round of antibiotics for a UTI recently.

## 2021-12-13 LAB
EKG ATRIAL RATE: 74 BPM
EKG DIAGNOSIS: NORMAL
EKG P AXIS: 84 DEGREES
EKG P-R INTERVAL: 178 MS
EKG Q-T INTERVAL: 424 MS
EKG QRS DURATION: 74 MS
EKG QTC CALCULATION (BAZETT): 470 MS
EKG R AXIS: -17 DEGREES
EKG T AXIS: 3 DEGREES
EKG VENTRICULAR RATE: 74 BPM

## 2021-12-13 PROCEDURE — 93010 ELECTROCARDIOGRAM REPORT: CPT | Performed by: INTERNAL MEDICINE

## 2022-01-31 ENCOUNTER — TELEPHONE (OUTPATIENT)
Dept: NEUROLOGY | Age: 87
End: 2022-01-31

## 2022-01-31 NOTE — TELEPHONE ENCOUNTER
Pts granddaughter called in asking for a refill on Horizant 600 mg 1 tablet in the evening be sent into [facility]? She states she's aware pt needs an appt and states it is hard right now to get her out of the house and to appts. The medication was helping greatly she states. She was wondering if you could do a telehealth visit or another option to get this medication refilled for her? Please advise.

## 2022-02-17 RX ORDER — GABAPENTIN ENACARBIL 600 MG/1
600 TABLET, EXTENDED RELEASE ORAL DAILY
COMMUNITY
Start: 2022-02-17

## 2022-02-17 NOTE — TELEPHONE ENCOUNTER
Patient has not been seen in the office for over a year, I do not feel comfortable refilling this medication without first seeing patient. They will need to make an appointment, we do not do telehealth visits.

## 2022-02-22 NOTE — TELEPHONE ENCOUNTER
Attempted to contact Hakan to let her know message below from Dilia Velez but voicemail was full and no way to leave a message.

## 2022-04-13 ENCOUNTER — HOSPITAL ENCOUNTER (INPATIENT)
Age: 87
LOS: 2 days | Discharge: HOME HEALTH | DRG: 190 | End: 2022-04-16
Attending: EMERGENCY MEDICINE | Admitting: INTERNAL MEDICINE
Payer: MEDICARE

## 2022-04-13 ENCOUNTER — APPOINTMENT (OUTPATIENT)
Dept: GENERAL RADIOLOGY | Age: 87
DRG: 190 | End: 2022-04-13
Payer: MEDICARE

## 2022-04-13 DIAGNOSIS — R06.02 SHORTNESS OF BREATH: ICD-10-CM

## 2022-04-13 DIAGNOSIS — N30.00 ACUTE CYSTITIS WITHOUT HEMATURIA: ICD-10-CM

## 2022-04-13 DIAGNOSIS — J18.9 PNEUMONIA DUE TO INFECTIOUS ORGANISM, UNSPECIFIED LATERALITY, UNSPECIFIED PART OF LUNG: Primary | ICD-10-CM

## 2022-04-13 LAB
BASE EXCESS MIXED: 2.5 (ref 0–2.3)
BASOPHILS ABSOLUTE: 0 K/CU MM
BASOPHILS RELATIVE PERCENT: 0.5 % (ref 0–1)
DIFFERENTIAL TYPE: ABNORMAL
EOSINOPHILS ABSOLUTE: 0.3 K/CU MM
EOSINOPHILS RELATIVE PERCENT: 3.8 % (ref 0–3)
HCO3 VENOUS: 27.9 MMOL/L (ref 19–25)
HCT VFR BLD CALC: 37.5 % (ref 37–47)
HEMOGLOBIN: 11 GM/DL (ref 12.5–16)
IMMATURE NEUTROPHIL %: 0.3 % (ref 0–0.43)
LYMPHOCYTES ABSOLUTE: 1.6 K/CU MM
LYMPHOCYTES RELATIVE PERCENT: 21.3 % (ref 24–44)
MCH RBC QN AUTO: 24.1 PG (ref 27–31)
MCHC RBC AUTO-ENTMCNC: 29.3 % (ref 32–36)
MCV RBC AUTO: 82.2 FL (ref 78–100)
MONOCYTES ABSOLUTE: 0.6 K/CU MM
MONOCYTES RELATIVE PERCENT: 8.4 % (ref 0–4)
NUCLEATED RBC %: 0 %
O2 SAT, VEN: 96.5 % (ref 50–70)
PCO2, VEN: 45 MMHG (ref 38–52)
PDW BLD-RTO: 17.5 % (ref 11.7–14.9)
PH VENOUS: 7.4 (ref 7.32–7.42)
PLATELET # BLD: 277 K/CU MM (ref 140–440)
PMV BLD AUTO: 10.8 FL (ref 7.5–11.1)
PO2, VEN: 183 MMHG (ref 28–48)
RBC # BLD: 4.56 M/CU MM (ref 4.2–5.4)
SEGMENTED NEUTROPHILS ABSOLUTE COUNT: 4.8 K/CU MM
SEGMENTED NEUTROPHILS RELATIVE PERCENT: 65.7 % (ref 36–66)
TOTAL IMMATURE NEUTOROPHIL: 0.02 K/CU MM
TOTAL NUCLEATED RBC: 0 K/CU MM
WBC # BLD: 7.3 K/CU MM (ref 4–10.5)

## 2022-04-13 PROCEDURE — 6370000000 HC RX 637 (ALT 250 FOR IP): Performed by: EMERGENCY MEDICINE

## 2022-04-13 PROCEDURE — 85025 COMPLETE CBC W/AUTO DIFF WBC: CPT

## 2022-04-13 PROCEDURE — 82805 BLOOD GASES W/O2 SATURATION: CPT

## 2022-04-13 PROCEDURE — 83880 ASSAY OF NATRIURETIC PEPTIDE: CPT

## 2022-04-13 PROCEDURE — 87040 BLOOD CULTURE FOR BACTERIA: CPT

## 2022-04-13 PROCEDURE — 94640 AIRWAY INHALATION TREATMENT: CPT

## 2022-04-13 PROCEDURE — 99284 EMERGENCY DEPT VISIT MOD MDM: CPT

## 2022-04-13 PROCEDURE — 80053 COMPREHEN METABOLIC PANEL: CPT

## 2022-04-13 PROCEDURE — 83690 ASSAY OF LIPASE: CPT

## 2022-04-13 PROCEDURE — 83605 ASSAY OF LACTIC ACID: CPT

## 2022-04-13 PROCEDURE — 71045 X-RAY EXAM CHEST 1 VIEW: CPT

## 2022-04-13 PROCEDURE — 81001 URINALYSIS AUTO W/SCOPE: CPT

## 2022-04-13 PROCEDURE — 6360000002 HC RX W HCPCS: Performed by: EMERGENCY MEDICINE

## 2022-04-13 PROCEDURE — 96375 TX/PRO/DX INJ NEW DRUG ADDON: CPT

## 2022-04-13 PROCEDURE — 84484 ASSAY OF TROPONIN QUANT: CPT

## 2022-04-13 PROCEDURE — 87635 SARS-COV-2 COVID-19 AMP PRB: CPT

## 2022-04-13 RX ORDER — IPRATROPIUM BROMIDE AND ALBUTEROL SULFATE 2.5; .5 MG/3ML; MG/3ML
1 SOLUTION RESPIRATORY (INHALATION) ONCE
Status: COMPLETED | OUTPATIENT
Start: 2022-04-13 | End: 2022-04-13

## 2022-04-13 RX ORDER — METHYLPREDNISOLONE SODIUM SUCCINATE 125 MG/2ML
125 INJECTION, POWDER, LYOPHILIZED, FOR SOLUTION INTRAMUSCULAR; INTRAVENOUS ONCE
Status: COMPLETED | OUTPATIENT
Start: 2022-04-13 | End: 2022-04-13

## 2022-04-13 RX ADMIN — METHYLPREDNISOLONE SODIUM SUCCINATE 125 MG: 125 INJECTION, POWDER, FOR SOLUTION INTRAMUSCULAR; INTRAVENOUS at 23:17

## 2022-04-13 RX ADMIN — IPRATROPIUM BROMIDE AND ALBUTEROL SULFATE 1 AMPULE: .5; 3 SOLUTION RESPIRATORY (INHALATION) at 22:47

## 2022-04-13 ASSESSMENT — ENCOUNTER SYMPTOMS
VOMITING: 0
NAUSEA: 0
ABDOMINAL PAIN: 0
CONSTIPATION: 0
RHINORRHEA: 0
SORE THROAT: 0
SHORTNESS OF BREATH: 1
DIARRHEA: 0
SINUS PRESSURE: 0
WHEEZING: 1
COUGH: 1

## 2022-04-14 LAB
ALBUMIN SERPL-MCNC: 3.7 GM/DL (ref 3.4–5)
ALP BLD-CCNC: 82 IU/L (ref 40–129)
ALT SERPL-CCNC: 10 U/L (ref 10–40)
ANION GAP SERPL CALCULATED.3IONS-SCNC: 8 MMOL/L (ref 4–16)
AST SERPL-CCNC: 18 IU/L (ref 15–37)
BILIRUB SERPL-MCNC: 0.2 MG/DL (ref 0–1)
BILIRUBIN URINE: NEGATIVE MG/DL
BLOOD, URINE: ABNORMAL
BUN BLDV-MCNC: 20 MG/DL (ref 6–23)
CALCIUM SERPL-MCNC: 9.1 MG/DL (ref 8.3–10.6)
CHLORIDE BLD-SCNC: 106 MMOL/L (ref 99–110)
CLARITY: CLEAR
CO2: 25 MMOL/L (ref 21–32)
COLOR: YELLOW
CREAT SERPL-MCNC: 0.9 MG/DL (ref 0.6–1.1)
EKG ATRIAL RATE: 72 BPM
EKG DIAGNOSIS: NORMAL
EKG P AXIS: 49 DEGREES
EKG P-R INTERVAL: 184 MS
EKG Q-T INTERVAL: 428 MS
EKG QRS DURATION: 84 MS
EKG QTC CALCULATION (BAZETT): 468 MS
EKG R AXIS: -6 DEGREES
EKG T AXIS: 72 DEGREES
EKG VENTRICULAR RATE: 72 BPM
GFR AFRICAN AMERICAN: >60 ML/MIN/1.73M2
GFR NON-AFRICAN AMERICAN: 59 ML/MIN/1.73M2
GLUCOSE BLD-MCNC: 108 MG/DL (ref 70–99)
GLUCOSE, URINE: NEGATIVE MG/DL
KETONES, URINE: NEGATIVE MG/DL
LACTATE: 1.6 MMOL/L (ref 0.4–2)
LEUKOCYTE ESTERASE, URINE: ABNORMAL
LIPASE: 64 IU/L (ref 13–60)
NITRITE URINE, QUANTITATIVE: POSITIVE
PH, URINE: 6 (ref 5–8)
POTASSIUM SERPL-SCNC: 4.5 MMOL/L (ref 3.5–5.1)
PRO-BNP: 131.3 PG/ML
PROTEIN UA: NEGATIVE MG/DL
SARS-COV-2, NAAT: NOT DETECTED
SODIUM BLD-SCNC: 139 MMOL/L (ref 135–145)
SOURCE: NORMAL
SPECIFIC GRAVITY UA: 1.01 (ref 1–1.03)
TOTAL PROTEIN: 6.5 GM/DL (ref 6.4–8.2)
TROPONIN T: <0.01 NG/ML
UROBILINOGEN, URINE: 0.2 MG/DL (ref 0.2–1)

## 2022-04-14 PROCEDURE — 6360000002 HC RX W HCPCS: Performed by: INTERNAL MEDICINE

## 2022-04-14 PROCEDURE — 93010 ELECTROCARDIOGRAM REPORT: CPT | Performed by: INTERNAL MEDICINE

## 2022-04-14 PROCEDURE — 6360000002 HC RX W HCPCS: Performed by: EMERGENCY MEDICINE

## 2022-04-14 PROCEDURE — 2580000003 HC RX 258: Performed by: EMERGENCY MEDICINE

## 2022-04-14 PROCEDURE — 87186 SC STD MICRODIL/AGAR DIL: CPT

## 2022-04-14 PROCEDURE — 93005 ELECTROCARDIOGRAM TRACING: CPT | Performed by: EMERGENCY MEDICINE

## 2022-04-14 PROCEDURE — 6370000000 HC RX 637 (ALT 250 FOR IP): Performed by: INTERNAL MEDICINE

## 2022-04-14 PROCEDURE — 2700000000 HC OXYGEN THERAPY PER DAY

## 2022-04-14 PROCEDURE — 1200000000 HC SEMI PRIVATE

## 2022-04-14 PROCEDURE — 87086 URINE CULTURE/COLONY COUNT: CPT

## 2022-04-14 PROCEDURE — 96365 THER/PROPH/DIAG IV INF INIT: CPT

## 2022-04-14 PROCEDURE — 2580000003 HC RX 258: Performed by: INTERNAL MEDICINE

## 2022-04-14 PROCEDURE — 94761 N-INVAS EAR/PLS OXIMETRY MLT: CPT

## 2022-04-14 PROCEDURE — 87088 URINE BACTERIA CULTURE: CPT

## 2022-04-14 RX ORDER — ROPINIROLE 1 MG/1
5 TABLET, FILM COATED ORAL ONCE
Status: DISCONTINUED | OUTPATIENT
Start: 2022-04-14 | End: 2022-04-16 | Stop reason: HOSPADM

## 2022-04-14 RX ORDER — POLYETHYLENE GLYCOL 3350 17 G/17G
17 POWDER, FOR SOLUTION ORAL DAILY PRN
Status: DISCONTINUED | OUTPATIENT
Start: 2022-04-14 | End: 2022-04-16 | Stop reason: HOSPADM

## 2022-04-14 RX ORDER — SODIUM CHLORIDE 0.9 % (FLUSH) 0.9 %
5-40 SYRINGE (ML) INJECTION EVERY 12 HOURS SCHEDULED
Status: DISCONTINUED | OUTPATIENT
Start: 2022-04-14 | End: 2022-04-16 | Stop reason: HOSPADM

## 2022-04-14 RX ORDER — FUROSEMIDE 20 MG/1
10 TABLET ORAL 3 TIMES DAILY
COMMUNITY

## 2022-04-14 RX ORDER — FUROSEMIDE 10 MG/ML
40 INJECTION INTRAMUSCULAR; INTRAVENOUS ONCE
Status: COMPLETED | OUTPATIENT
Start: 2022-04-14 | End: 2022-04-14

## 2022-04-14 RX ORDER — ALBUTEROL SULFATE 90 UG/1
2 AEROSOL, METERED RESPIRATORY (INHALATION) EVERY 6 HOURS PRN
Status: DISCONTINUED | OUTPATIENT
Start: 2022-04-14 | End: 2022-04-16 | Stop reason: HOSPADM

## 2022-04-14 RX ORDER — ACETAMINOPHEN 325 MG/1
650 TABLET ORAL EVERY 6 HOURS PRN
Status: DISCONTINUED | OUTPATIENT
Start: 2022-04-14 | End: 2022-04-16 | Stop reason: HOSPADM

## 2022-04-14 RX ORDER — SODIUM CHLORIDE 9 MG/ML
INJECTION, SOLUTION INTRAVENOUS PRN
Status: DISCONTINUED | OUTPATIENT
Start: 2022-04-14 | End: 2022-04-16 | Stop reason: HOSPADM

## 2022-04-14 RX ORDER — ASPIRIN 81 MG/1
81 TABLET, CHEWABLE ORAL DAILY
Status: DISCONTINUED | OUTPATIENT
Start: 2022-04-14 | End: 2022-04-16 | Stop reason: HOSPADM

## 2022-04-14 RX ORDER — LANOLIN ALCOHOL/MO/W.PET/CERES
1000 CREAM (GRAM) TOPICAL DAILY
Status: DISCONTINUED | OUTPATIENT
Start: 2022-04-14 | End: 2022-04-16 | Stop reason: HOSPADM

## 2022-04-14 RX ORDER — VITAMIN B COMPLEX
1000 TABLET ORAL DAILY
Status: DISCONTINUED | OUTPATIENT
Start: 2022-04-14 | End: 2022-04-16 | Stop reason: HOSPADM

## 2022-04-14 RX ORDER — IPRATROPIUM BROMIDE AND ALBUTEROL SULFATE 2.5; .5 MG/3ML; MG/3ML
1 SOLUTION RESPIRATORY (INHALATION)
Status: DISCONTINUED | OUTPATIENT
Start: 2022-04-14 | End: 2022-04-14

## 2022-04-14 RX ORDER — ACETAMINOPHEN 650 MG/1
650 SUPPOSITORY RECTAL EVERY 6 HOURS PRN
Status: DISCONTINUED | OUTPATIENT
Start: 2022-04-14 | End: 2022-04-16 | Stop reason: HOSPADM

## 2022-04-14 RX ORDER — ROPINIROLE 1 MG/1
5 TABLET, FILM COATED ORAL 3 TIMES DAILY
Status: DISCONTINUED | OUTPATIENT
Start: 2022-04-14 | End: 2022-04-16 | Stop reason: HOSPADM

## 2022-04-14 RX ORDER — IPRATROPIUM BROMIDE AND ALBUTEROL SULFATE 2.5; .5 MG/3ML; MG/3ML
1 SOLUTION RESPIRATORY (INHALATION) EVERY 4 HOURS PRN
Status: DISCONTINUED | OUTPATIENT
Start: 2022-04-14 | End: 2022-04-16 | Stop reason: HOSPADM

## 2022-04-14 RX ORDER — ONDANSETRON 2 MG/ML
4 INJECTION INTRAMUSCULAR; INTRAVENOUS EVERY 6 HOURS PRN
Status: DISCONTINUED | OUTPATIENT
Start: 2022-04-14 | End: 2022-04-16 | Stop reason: HOSPADM

## 2022-04-14 RX ORDER — METHYLPREDNISOLONE SODIUM SUCCINATE 40 MG/ML
40 INJECTION, POWDER, LYOPHILIZED, FOR SOLUTION INTRAMUSCULAR; INTRAVENOUS EVERY 6 HOURS
Status: COMPLETED | OUTPATIENT
Start: 2022-04-14 | End: 2022-04-16

## 2022-04-14 RX ORDER — GABAPENTIN 300 MG/1
300 CAPSULE ORAL NIGHTLY
Status: DISCONTINUED | OUTPATIENT
Start: 2022-04-14 | End: 2022-04-16 | Stop reason: HOSPADM

## 2022-04-14 RX ORDER — ONDANSETRON 4 MG/1
4 TABLET, ORALLY DISINTEGRATING ORAL EVERY 8 HOURS PRN
Status: DISCONTINUED | OUTPATIENT
Start: 2022-04-14 | End: 2022-04-16 | Stop reason: HOSPADM

## 2022-04-14 RX ORDER — MEMANTINE HYDROCHLORIDE 5 MG/1
5 TABLET ORAL 2 TIMES DAILY
Status: DISCONTINUED | OUTPATIENT
Start: 2022-04-14 | End: 2022-04-16 | Stop reason: HOSPADM

## 2022-04-14 RX ORDER — BACLOFEN 10 MG/1
5 TABLET ORAL 2 TIMES DAILY
Status: DISCONTINUED | OUTPATIENT
Start: 2022-04-14 | End: 2022-04-16 | Stop reason: HOSPADM

## 2022-04-14 RX ORDER — SODIUM CHLORIDE 0.9 % (FLUSH) 0.9 %
5-40 SYRINGE (ML) INJECTION PRN
Status: DISCONTINUED | OUTPATIENT
Start: 2022-04-14 | End: 2022-04-16 | Stop reason: HOSPADM

## 2022-04-14 RX ORDER — PREDNISONE 20 MG/1
40 TABLET ORAL DAILY
Status: DISCONTINUED | OUTPATIENT
Start: 2022-04-16 | End: 2022-04-16 | Stop reason: HOSPADM

## 2022-04-14 RX ADMIN — ROPINIROLE HYDROCHLORIDE 5 MG: 1 TABLET, FILM COATED ORAL at 14:57

## 2022-04-14 RX ADMIN — Medication 1000 UNITS: at 08:22

## 2022-04-14 RX ADMIN — AZITHROMYCIN DIHYDRATE 500 MG: 500 INJECTION, POWDER, LYOPHILIZED, FOR SOLUTION INTRAVENOUS at 04:21

## 2022-04-14 RX ADMIN — METHYLPREDNISOLONE SODIUM SUCCINATE 40 MG: 40 INJECTION, POWDER, FOR SOLUTION INTRAMUSCULAR; INTRAVENOUS at 06:14

## 2022-04-14 RX ADMIN — METHYLPREDNISOLONE SODIUM SUCCINATE 40 MG: 40 INJECTION, POWDER, FOR SOLUTION INTRAMUSCULAR; INTRAVENOUS at 17:11

## 2022-04-14 RX ADMIN — SODIUM CHLORIDE, PRESERVATIVE FREE 10 ML: 5 INJECTION INTRAVENOUS at 20:47

## 2022-04-14 RX ADMIN — BACLOFEN 5 MG: 10 TABLET ORAL at 20:48

## 2022-04-14 RX ADMIN — APIXABAN 5 MG: 5 TABLET, FILM COATED ORAL at 08:23

## 2022-04-14 RX ADMIN — FUROSEMIDE 40 MG: 10 INJECTION, SOLUTION INTRAMUSCULAR; INTRAVENOUS at 06:13

## 2022-04-14 RX ADMIN — CYANOCOBALAMIN TAB 1000 MCG 1000 MCG: 1000 TAB at 08:22

## 2022-04-14 RX ADMIN — MEMANTINE HYDROCHLORIDE 5 MG: 5 TABLET ORAL at 20:48

## 2022-04-14 RX ADMIN — SODIUM CHLORIDE, PRESERVATIVE FREE 10 ML: 5 INJECTION INTRAVENOUS at 08:34

## 2022-04-14 RX ADMIN — METHYLPREDNISOLONE SODIUM SUCCINATE 40 MG: 40 INJECTION, POWDER, FOR SOLUTION INTRAMUSCULAR; INTRAVENOUS at 11:32

## 2022-04-14 RX ADMIN — BACLOFEN 5 MG: 10 TABLET ORAL at 08:23

## 2022-04-14 RX ADMIN — GABAPENTIN 300 MG: 300 CAPSULE ORAL at 20:48

## 2022-04-14 RX ADMIN — MEMANTINE HYDROCHLORIDE 5 MG: 5 TABLET ORAL at 08:23

## 2022-04-14 RX ADMIN — APIXABAN 5 MG: 5 TABLET, FILM COATED ORAL at 20:48

## 2022-04-14 RX ADMIN — ROPINIROLE HYDROCHLORIDE 5 MG: 1 TABLET, FILM COATED ORAL at 20:48

## 2022-04-14 RX ADMIN — ROPINIROLE HYDROCHLORIDE 5 MG: 1 TABLET, FILM COATED ORAL at 08:22

## 2022-04-14 RX ADMIN — SODIUM CHLORIDE, PRESERVATIVE FREE 10 ML: 5 INJECTION INTRAVENOUS at 06:14

## 2022-04-14 RX ADMIN — ASPIRIN 81 MG 81 MG: 81 TABLET ORAL at 08:22

## 2022-04-14 RX ADMIN — CEFTRIAXONE SODIUM 1000 MG: 1 INJECTION, POWDER, FOR SOLUTION INTRAMUSCULAR; INTRAVENOUS at 01:19

## 2022-04-14 NOTE — CARE COORDINATION
Met c pt and pt's dght in law Montes De Oca to initiate discharge planning. Confirmed pt lives at home with granddtr Liyah Warren [phone] and great grand dtr, Vince Pedraza is pt's pd caregiver through [facility]- pt receives 40 hrs of aide service Wed-Sat and Liyah Warren is available other hours ensuring pt has 24/7 supervision/ care. Pt has rollator and w/c, grab bars and gait belt. Goal upon discharge is for pt to return home with [facility], family will transport home.       When pt is discharged please call   Jermaine Esquivel [address] [place] [address] [phone]   Fax order, AVS,D/S to [phone]

## 2022-04-14 NOTE — ED NOTES
XR CHEST PORTABLE    Status: Final result       Order Providers    Authorizing Billing   DO Amanda Robb DO            Signed by    Signed Date/Time Phone Pager   Ngoc [address] 4/13/2022 10:42 [phone]      Reading Providers    Read Date Phone Pager   Earl Hernández Apr 13, 2022 10:42 [phone]        XR CHEST PORTABLE: Patient Communication     Released  Not seen     Radiation Dose Estimates    No radiation information found for this patient  Narrative   EXAMINATION:   ONE XRAY VIEW OF THE CHEST       4/13/2022 10:22 pm       COMPARISON:   Chest x-ray 12/12/2021.       HISTORY:   ORDERING SYSTEM PROVIDED HISTORY: SOB   TECHNOLOGIST PROVIDED HISTORY:   Reason for exam:->SOB   Reason for Exam: sob, fatigue   Additional signs and symptoms: sob, fatigue   Relevant Medical/Surgical History: n/a       FINDINGS:   Cardiomediastinal silhouette is enlarged.  Perihilar opacities.  No pleural   effusion on this projection.  No pneumothorax appreciated on this projection.           Impression   Perihilar opacities.  Pulmonary edema versus multifocal pneumonia.              João Stewart RN  04/14/22 [id number]

## 2022-04-14 NOTE — ED PROVIDER NOTES
syndrome     Tremors of nervous system     Unspecified cerebral artery occlusion with cerebral infarction      Past Surgical History:   Procedure Laterality Date    ABDOMEN SURGERY      APPENDECTOMY      BACK SURGERY      COLONOSCOPY      ENDOSCOPY, COLON, DIAGNOSTIC      EYE SURGERY      FRACTURE SURGERY      HYSTERECTOMY  1972    SPINE SURGERY  1995    VASCULAR SURGERY       Family History   Problem Relation Age of Onset    Arthritis Mother     Cancer Mother     Diabetes Mother     Heart Disease Mother     High Blood Pressure Mother    Margie Bernard / Hugo Mother     Cancer Sister     Cancer Brother     Diabetes Daughter     High Blood Pressure Daughter      Social History     Socioeconomic History    Marital status:      Spouse name: Not on file    Number of children: 9    Years of education: Not on file    Highest education level: Not on file   Occupational History    Not on file   Tobacco Use    Smoking status: Former Smoker     Packs/day: 0.25     Years: 50.00     Pack years: 12.50     Types: Cigarettes    Smokeless tobacco: Never Used    Tobacco comment: states smokes 1-2 cigs a day   Vaping Use    Vaping Use: Never used   Substance and Sexual Activity    Alcohol use: No     Alcohol/week: 0.0 standard drinks    Drug use: No    Sexual activity: Never   Other Topics Concern    Not on file   Social History Narrative    Not on file     Social Determinants of Health     Financial Resource Strain:     Difficulty of Paying Living Expenses: Not on file   Food Insecurity:     Worried About Running Out of Food in the Last Year: Not on file    Randolph of Food in the Last Year: Not on file   Transportation Needs:     Lack of Transportation (Medical): Not on file    Lack of Transportation (Non-Medical):  Not on file   Physical Activity:     Days of Exercise per Week: Not on file    Minutes of Exercise per Session: Not on file   Stress:     Feeling of Stress : Not on file   Social Connections:     Frequency of Communication with Friends and Family: Not on file    Frequency of Social Gatherings with Friends and Family: Not on file    Attends Judaism Services: Not on file    Active Member of Clubs or Organizations: Not on file    Attends Club or Organization Meetings: Not on file    Marital Status: Not on file   Intimate Partner Violence:     Fear of Current or Ex-Partner: Not on file    Emotionally Abused: Not on file    Physically Abused: Not on file    Sexually Abused: Not on file   Housing Stability:     Unable to Pay for Housing in the Last Year: Not on file    Number of [place] in the Last Year: Not on file    Unstable Housing in the Last Year: Not on file     No current facility-administered medications for this encounter. Current Outpatient Medications   Medication Sig Dispense Refill    HORIZANT 600 MG TBCR Take 600 mg by mouth daily.  In the evening      ipratropium-albuterol (DUONEB) 0.5-2.5 (3) MG/3ML SOLN nebulizer solution Inhale 3 mLs into the lungs every 4 hours as needed for Shortness of Breath 360 mL 0    PARoxetine (PAXIL) 20 MG tablet Take 1 tablet by mouth daily 30 tablet 3    folic acid-pyridoxine-cyancobalamin (FOLTX) 1.13-25-2 MG TABS Take 1 tablet by mouth daily 30 tablet 3    memantine (NAMENDA) 10 MG tablet Take 1 tablet by mouth 2 times daily 60 tablet 3    donepezil (ARICEPT) 5 MG tablet Take 1 tablet by mouth nightly 30 tablet 3    baclofen (LIORESAL) 5 MG tablet Take 1 tablet by mouth 2 times daily 60 tablet 3    vitamin D 25 MCG (1000 UT) CAPS Take 1 capsule by mouth daily 30 capsule 0    aspirin 81 MG chewable tablet Take 1 tablet by mouth daily 30 tablet 3    atorvastatin (LIPITOR) 40 MG tablet Take 1 tablet by mouth nightly 30 tablet 3    rOPINIRole (REQUIP) 0.5 MG tablet Take 5 tablets by mouth 3 times daily 30 tablet 1    hydrALAZINE (APRESOLINE) 50 MG tablet Take 1 tablet by mouth every 8 hours 90 tablet 3  vitamin B-12 (CYANOCOBALAMIN) 1000 MCG tablet Take 1,000 mcg by mouth daily      magnesium hydroxide (MILK OF MAGNESIA) 400 MG/5ML suspension Take 30 mLs by mouth daily as needed for Constipation      albuterol sulfate  (90 Base) MCG/ACT inhaler Inhale 2 puffs into the lungs every 6 hours as needed for Shortness of Breath       bisacodyl (DULCOLAX) 10 MG suppository Place 10 mg rectally daily as needed for Constipation       acetaminophen 650 MG TABS Take 650 mg by mouth every 4 hours as needed 120 tablet 3    dorzolamide (TRUSOPT) 2 % ophthalmic solution Place 1 drop into both eyes 3 times daily 10 mL 2    latanoprost (XALATAN) 0.005 % ophthalmic solution Place 1 drop into both eyes nightly 1 Bottle 3     Allergies   Allergen Reactions    Other      Pt states allergy to unknown antibiotic that made her arm red        Nursing Notes Reviewed    Physical Exam:  Triage VS:    ED Triage Vitals [04/13/22 2050]   Enc Vitals Group      BP (!) 133/52      Pulse 65      Resp 16      Temp 98 °F (36.7 °C)      Temp Source Oral      SpO2 96 %      Weight 175 lb (79.4 kg)      Height 5' (1.524 m)      Head Circumference       Peak Flow       Pain Score       Pain Loc       Pain Edu? Excl. in [address]? Physical Exam  Vitals and nursing note reviewed. Constitutional:       Appearance: She is well-developed. HENT:      Head: Normocephalic and atraumatic. Nose: Nose normal.      Mouth/Throat:      Mouth: Mucous membranes are moist.   Eyes:      Conjunctiva/sclera: Conjunctivae normal.   Cardiovascular:      Rate and Rhythm: Normal rate and regular rhythm. Pulses: Normal pulses. Pulmonary:      Effort: Pulmonary effort is normal. Prolonged expiration present. Breath sounds: Examination of the right-lower field reveals rhonchi. Examination of the left-lower field reveals rhonchi. Wheezing and rhonchi present. Abdominal:      General: Abdomen is flat.  Bowel sounds are normal. There is no distension. Palpations: Abdomen is soft. Tenderness: There is no abdominal tenderness. There is no guarding or rebound. Musculoskeletal:         General: Normal range of motion. Skin:     General: Skin is warm. Capillary Refill: Capillary refill takes less than 2 seconds. Neurological:      Mental Status: She is alert and oriented to person, place, and time. Mental status is at baseline.    Psychiatric:         Mood and Affect: Mood normal.              I have reviewed and interpreted all of the currently available lab results from this visit (if applicable):  Results for orders placed or performed during the hospital encounter of 04/13/22   COVID-19, Rapid    Specimen: Nasopharyngeal   Result Value Ref Range    Source THROAT     SARS-CoV-2, NAAT NOT DETECTED NOT DETECTED   CBC with Auto Differential   Result Value Ref Range    WBC 7.3 4.0 - 10.5 K/CU MM    RBC 4.56 4.2 - 5.4 M/CU MM    Hemoglobin 11.0 (L) 12.5 - 16.0 GM/DL    Hematocrit 37.5 37 - 47 %    MCV 82.2 78 - 100 FL    MCH 24.1 (L) 27 - 31 PG    MCHC 29.3 (L) 32.0 - 36.0 %    RDW 17.5 (H) 11.7 - 14.9 %    Platelets 916 049 - 160 K/CU MM    MPV 10.8 7.5 - 11.1 FL    Differential Type AUTOMATED DIFFERENTIAL     Segs Relative 65.7 36 - 66 %    Lymphocytes % 21.3 (L) 24 - 44 %    Monocytes % 8.4 (H) 0 - 4 %    Eosinophils % 3.8 (H) 0 - 3 %    Basophils % 0.5 0 - 1 %    Segs Absolute 4.8 K/CU MM    Lymphocytes Absolute 1.6 K/CU MM    Monocytes Absolute 0.6 K/CU MM    Eosinophils Absolute 0.3 K/CU MM    Basophils Absolute 0.0 K/CU MM    Nucleated RBC % 0.0 %    Total Nucleated RBC 0.0 K/CU MM    Total Immature Neutrophil 0.02 K/CU MM    Immature Neutrophil % 0.3 0 - 0.43 %   Comprehensive Metabolic Panel w/ Reflex to MG   Result Value Ref Range    Sodium 139 135 - 145 MMOL/L    Potassium 4.5 3.5 - 5.1 MMOL/L    Chloride 106 99 - 110 mMol/L    CO2 25 21 - 32 MMOL/L    BUN 20 6 - 23 MG/DL    CREATININE 0.9 0.6 - 1.1 MG/DL    Glucose 108 (H) 70 - 99 MG/DL    Calcium 9.1 8.3 - 10.6 MG/DL    Albumin 3.7 3.4 - 5.0 GM/DL    Total Protein 6.5 6.4 - 8.2 GM/DL    Total Bilirubin 0.2 0.0 - 1.0 MG/DL    ALT 10 10 - 40 U/L    AST 18 15 - 37 IU/L    Alkaline Phosphatase 82 40 - 129 IU/L    GFR Non- 59 (L) >60 mL/min/1.73m2    GFR African American >60 >60 mL/min/1.73m2    Anion Gap 8 4 - 16   Lipase   Result Value Ref Range    Lipase 64 (H) 13 - 60 IU/L   Troponin   Result Value Ref Range    Troponin T <0.010 <0.01 NG/ML   Brain Natriuretic Peptide   Result Value Ref Range    Pro-.3 <300 PG/ML   Blood Gas, Venous   Result Value Ref Range    pH, [place] 7.40 7.32 - 7.42    pCO2, [place] 45 38 - 52 mmHG    pO2, [place] 183 (H) 28 - 48 mmHG    Base Exc, Mixed 2.5 (H) 0 - 2.3    HCO3, Venous 27.9 (H) 19 - 25 MMOL/L    O2 Sat, Ven[place]ata 96.5 (H) 50 - 70 %   Lactic Acid   Result Value Ref Range    Lactate 1.6 0.4 - 2.0 mMOL/L   Urinalysis   Result Value Ref Range    Color, UA YELLOW YELLOW    Clarity, UA CLEAR CLEAR    Glucose, Urine NEGATIVE NEGATIVE MG/DL    Bilirubin Urine NEGATIVE NEGATIVE MG/DL    Ketones, Urine NEGATIVE NEGATIVE MG/DL    Specific Gravity, UA 1.015 1.001 - 1.035    Blood, Urine SMALL NUMBER OR AMOUNT OBSERVED (A) NEGATIVE    pH, Urine 6.0 5.0 - 8.0    Protein, UA NEGATIVE NEGATIVE MG/DL    Urobilinogen, Urine 0.2 0.2 - 1.0 MG/DL    Nitrite Urine, Quantitative POSITIVE (A) NEGATIVE    Leukocyte Esterase, Urine LARGE NUMBER OR AMOUNT OF  (A) NEGATIVE      Radiographs (if obtained):    [] Radiologist's Report Reviewed:  XR CHEST PORTABLE   Final Result   Perihilar opacities. Pulmonary edema versus multifocal pneumonia. EKG (if obtained): (All EKG's are interpreted by myself in the absence of a cardiologist)  Normal sinus rhythm, rate 72, , QRS 84, QTc 460, no acute ST elevation. Chart review shows recent radiographs:  No results found. MDM:  Patient seen examined. Labs and imagine obtained.   Labs without leukocytosis, platelets and hemoglobin stable for patient. Electrolytes and kidney function within acceptable limits. VBG with mild elevation in PO2, was done when patient was on 2 L nasal cannula. Troponin negative. Covid negative. Patient is 96% on room air, however when she goes from laying in bed to standing at bedside to use bedside commode, she did drop to 91% on room air. She is placed on 2 L nasal cannula. UA does show signs of infection. Chest x-ray with multifocal pneumonia. Given patient's multifocal pneumonia, oxygen of 91% with movement, and urinary tract infection, will admit for further evaluation and management. Patient is afebrile, not tachycardic and lactic acid is less than 2, low suspicion for sepsis. She is given community-acquired antibiotic coverage with azithromycin and Rocephin. Patient to be admitted for further evaluation and management. Case discussed with Dr. William Scott, who agrees to admission. Clinical Impression:  1. Pneumonia due to infectious organism, unspecified laterality, unspecified part of lung    2. Shortness of breath    3. Acute cystitis without hematuria      Disposition referral (if applicable):  No follow-up provider specified. Disposition medications (if applicable):  New Prescriptions    No medications on file       Comment: Please note this report has been produced using speech recognition software and may contain errors related to that system including errors in grammar, punctuation, and spelling, as well as words and phrases that may be inappropriate. Efforts were made to edit the dictations.        [id number] [facility], DO  04/14/22 4101 [address], DO  04/14/22 0141

## 2022-04-14 NOTE — ED NOTES
Pt ambulated to portable commode at this time. PT O2 sat 98% at on 2L via nasal canula. Pt O2 turned off five minutes prior to ambulation. Pt O2 @96% prior to ambulation. While ambulating pt O2 @91%. Dr. Amaya notified.       Aram Herrera RN  04/14/22 [id number]

## 2022-04-14 NOTE — RT PROTOCOL NOTE
RT Inhaler-Nebulizer Bronchodilator Protocol Note    There is a bronchodilator order in the chart from a provider indicating to follow the RT Bronchodilator Protocol and there is an Initiate RT Inhaler-Nebulizer Bronchodilator Protocol order as well (see protocol at bottom of note). CXR Findings:  XR CHEST PORTABLE    Result Date: 4/13/2022  Perihilar opacities. Pulmonary edema versus multifocal pneumonia. The findings from the last RT Protocol Assessment were as follows:   History Pulmonary Disease: Chronic pulmonary disease  Respiratory Pattern: Regular pattern and RR 12-20 bpm  Breath Sounds: Slightly diminished and/or crackles  Cough: Indication for Bronchodilator Therapy:    Bronchodilator Assessment Score:      Aerosolized bronchodilator medication orders have been revised according to the RT Inhaler-Nebulizer Bronchodilator Protocol below. Respiratory Therapist to perform RT Therapy Protocol Assessment initially then follow the protocol. Repeat RT Therapy Protocol Assessment PRN for score 0-3 or on second treatment, BID, and PRN for scores above 3. No Indications - adjust the frequency to every 6 hours PRN wheezing or bronchospasm, if no treatments needed after 48 hours then discontinue using Per Protocol order mode. If indication present, adjust the RT bronchodilator orders based on the Bronchodilator Assessment Score as indicated below. Use Inhaler orders unless patient has one or more of the following: on home nebulizer, not able to hold breath for 10 seconds, is not alert and oriented, cannot activate and use MDI correctly, or respiratory rate 25 breaths per minute or more, then use the equivalent nebulizer order(s) with same Frequency and PRN reasons based on the score. If a patient is on this medication at home then do not decrease Frequency below that used at home.     0-3 - enter or revise RT bronchodilator order(s) to equivalent RT Bronchodilator order with Frequency of every 4 hours PRN for wheezing or increased work of breathing using Per Protocol order mode. 4-6 - enter or revise RT Bronchodilator order(s) to two equivalent RT bronchodilator orders with one order with BID Frequency and one order with Frequency of every 4 hours PRN wheezing or increased work of breathing using Per Protocol order mode. 7-10 - enter or revise RT Bronchodilator order(s) to two equivalent RT bronchodilator orders with one order with TID Frequency and one order with Frequency of every 4 hours PRN wheezing or increased work of breathing using Per Protocol order mode. 11-13 - enter or revise RT Bronchodilator order(s) to one equivalent RT bronchodilator order with QID Frequency and an Albuterol order with Frequency of every 4 hours PRN wheezing or increased work of breathing using Per Protocol order mode. Greater than 13 - enter or revise RT Bronchodilator order(s) to one equivalent RT bronchodilator order with every 4 hours Frequency and an Albuterol order with Frequency of every 2 hours PRN wheezing or increased work of breathing using Per Protocol order mode. RT to enter RT Home Evaluation for COPD & MDI Assessment order using Per Protocol order mode.     Electronically signed by Radha Carrero RCP on 4/14/2022 at 11:26 AM

## 2022-04-14 NOTE — ED TRIAGE NOTES
Pt presents to the ED via EMS for shortness of breath. Pt was 91% on room air when medics arrived. Pt currently 98% on 2L/O2 at this time. Pt unable to tell me when coughing or shortness of breath started or worsened. But granddaughter at bedside states' \"it started to get worse yesterday and she was having trouble catching her breath today\". Pt has Hx of COPD. Pt very fatigued during triage but when awoken answered questions.

## 2022-04-14 NOTE — H&P
History and Physical      Name:  Haleigh Mejía /Age/Sex: 1931  (80 y.o. female)   MRN & CSN:  [id number] & [id number] Encounter Date/Time: 2022 2:25 AM EDT   Location:  ED15/ED-15 PCP: Jacquelyn Love [id number] teresa Day: 2    Assessment and Plan:     #. COPD exacerbation  -VBG-pH 7.40, PCO2 45, PO2 183, HCO3 27.9  -Chest x-ray-perihilar opacities. Pulmonary edema versus multifocal pneumonia. -Given chest x-ray findings of pulmonary edema versus multifocal pneumonia-ordered IV Lasix. Will reevaluate for further diuresis. -Continue IV methylprednisolone, bronchodilator therapy, antibiotics. #.  Acute respiratory failure with hypoxia  -Patient's oxygen saturation dropped to 91% with ambulation.  -Saturating 96% on 2 L of oxygen through nasal cannula    #. UTI  -UA-leukocyte esterase large, nitrite positive  -Urine culture ordered  -Continue ceftriaxone    #. Hypertension-patient not on any medications    #. History of PE-2021-on eliquis    #. dementia-patient on memantine 5 mg twice daily    #. Legally blind    #. Restless leg syndrome  -on requip- 5mg.  -Granddaughter reported that pt takes it three times, 5mg AM, 5mg at lunch,10mg QHS. #. H/o colon cancer    #. H/o GI bleed    #. H/o CVA- with no residual weakness    Disposition:   Current Living situation: home    Diet regular   DVT Prophylaxis [] Lovenox, []  Heparin, [] SCDs, [] Ambulation, [x] Eliquis, [] Xarelto   Code Status  FULL. Discussed with granddaughter at bedside   Surrogate Decision Maker/ POA      History from:   EMR, patient. History of Present Illness:     Chief Complaint: COPD exacerbation ([facility] [address]  Haleigh Mejía is a 80 y.o. female with COPD, not on home oxygen, dementia, depression, GERD, hypertension, restless leg syndrome, history of CVA, with no residual deficits, legally blind, remote history of colon cancer was brought to ED by EMS for shortness of breath.   Most of the information was provided by granddaughter at bedside. According to the granddaughter patient was noted to be short of breath, noticed increased work of breathing, described as rattling. She also reports patient having cough, with sputum production intermittently. She denied patient having any fever, chills. Patient herself denied any chest pain, nausea, abdominal pain, denied any urinary complaints, denied any constipation or diarrhea. At presentation patient was noted to have /52, HR 65, RR 16, temp 98, saturating 96% on 2 L of oxygen. Lab work significant for lactic acid 1.6, proBNP 131.3, troponin< 0.010. CBC within normal range except hemoglobin 11. Rapid COVID-negative. UA suggestive of infection. VBG-pH 7.40, PCO2 45, PO2 183, HCO3 27.9. Chest x-ray-perihilar opacities. Pulmonary edema versus multifocal pneumonia. Patient received IV methylprednisolone, DuoNeb, ceftriaxone, azithromycin. Review of Systems: Need 10 Elements   10 point review of systems conducted and pertinent positives and negatives as per HPI. Objective:   No intake or output data in the 24 hours ending 04/14/22 0225   Vitals:   Vitals:    04/13/22 2050 04/13/22 2247   BP: (!) 133/52    Pulse: 65    Resp: 16 16   Temp: 98 °F (36.7 °C)    TempSrc: Oral    SpO2: 96%    Weight: 175 lb (79.4 kg)    Height: 5' (1.524 m)        Medications Prior to Admission   Reviewed medications. Patient's granddaughter provided list of her medications. Patient is on Eliquis 5 mg twice daily, baclofen 5 mg twice daily, Lasix 10 mg 3 times daily, memantine 5 mg twice daily, vitamin B12 1000 MCG daily, vitamin D 1000 mg daily, Horizant  mg daily p.m., Spiriva, ropinirole 5 mg-1 tab at breakfast, 1 tablet at lunch, 2 tabs at bedtime. Physical Exam: Need 8 Elements   Physical Exam     GEN  -Awake, alert, NAD.   EYES   -PERRL. HENT  -MM are moist.   RESP  -LS CTA equal bilat, no wheezes, rales or rhonchi. Symmetric chest movement.  No respiratory distress noted. C/V  -S1/S2 auscultated. RRR without appreciable M/R/G. No JVD or carotid bruits. Peripheral pulses equal bilaterally and palpable. No peripheral edema. GI  -Abdomen is soft, non-distended, no significant tenderness. No masses or guarding. + BS in all quadrants. Rectal exam deferred.   -No CVA tenderness. Peterson catheter is not present. MS  -B/L extremities - No gross joint deformities. No swelling, intact sensation symmetrical.   SKIN  -Normal coloration, warm, dry. NEURO  - Awake, alert, oriented x 3, no focal deficits. PSYC  - Appropriate affect. Past Medical History:   Reviewed patient's past medical, surgical, social, family history and allergies. PMHx   Past Medical History:   Diagnosis Date    Arthritis     Blind left eye     Cancer ([facility] [address] 1971    colon    COPD (chronic obstructive pulmonary disease) ([facility] [address]     Dementia (HCC)     Depression     GERD (gastroesophageal reflux disease)     Hypertension     Pneumonia     Restless legs syndrome     Tremors of nervous system     Unspecified cerebral artery occlusion with cerebral infarction      PSHX:  has a past surgical history that includes Hysterectomy (1972); Spine surgery (1995); Abdomen surgery; fracture surgery; Appendectomy; Endoscopy, colon, diagnostic; vascular surgery; back surgery; Colonoscopy; and eye surgery. Allergies: Allergies   Allergen Reactions    Other      Pt states allergy to unknown antibiotic that made her arm red      Fam HX: family history includes Arthritis in her mother; Cancer in her brother, mother, and sister; Diabetes in her daughter and mother; Heart Disease in her mother; High Blood Pressure in her daughter and mother; [place] Abed / [place] in her mother. Soc HX:   Social History     Socioeconomic History    Marital status:       Spouse name: None    Number of children: 7    Years of education: None    Highest education level: None   Occupational History    None Tobacco Use    Smoking status: Former Smoker     Packs/day: 0.25     Years: 50.00     Pack years: 12.50     Types: Cigarettes    Smokeless tobacco: Never Used    Tobacco comment: states smokes 1-2 cigs a day   Vaping Use    Vaping Use: Never used   Substance and Sexual Activity    Alcohol use: No     Alcohol/week: 0.0 standard drinks    Drug use: No    Sexual activity: Never   Other Topics Concern    None   Social History Narrative    None     Social Determinants of Health     Financial Resource Strain:     Difficulty of Paying Living Expenses: Not on file   Food Insecurity:     Worried About Running Out of Food in the Last Year: Not on file    [place] of Food in the Last Year: Not on file   Transportation Needs:     Lack of Transportation (Medical): Not on file    Lack of Transportation (Non-Medical):  Not on file   Physical Activity:     Days of Exercise per Week: Not on file    Minutes of Exercise per Session: Not on file   Stress:     Feeling of Stress : Not on file   Social Connections:     Frequency of Communication with Friends and Family: Not on file    Frequency of Social Gatherings with Friends and Family: Not on file    Attends Denominational Services: Not on file    Active Member of [address] [place] [address] [employer] or Organizations: Not on file    Attends Club or Organization Meetings: Not on file    Marital Status: Not on file   Intimate Partner Violence:     Fear of Current or Ex-Partner: Not on file    Emotionally Abused: Not on file    Physically Abused: Not on file    Sexually Abused: Not on file   Housing Stability:     Unable to Pay for Housing in the Last Year: Not on file    Number of [place] in the Last Year: Not on file    Unstable Housing in the Last Year: Not on file       Medications:   Medications:    azithromycin  500 mg IntraVENous Once      Infusions:   PRN Meds:      Labs      CBC:   Recent Labs     04/13/22  2301   WBC 7.3   HGB 11.0*        BMP:    Recent Labs 04/13/22 2301      K 4.5      CO2 25   BUN 20   CREATININE 0.9   GLUCOSE 108*     Hepatic:   Recent Labs     04/13/22 2301   AST 18   ALT 10   BILITOT 0.2   ALKPHOS 82     Lipids:   Lab Results   Component Value Date    CHOL 182 11/12/2020    HDL 59 11/12/2020    TRIG 105 11/12/2020     Hemoglobin A1C:   Lab Results   Component Value Date    LABA1C 5.4 09/22/2020     TSH: No results found for: TSH  Troponin:   Lab Results   Component Value Date    TROPONINT <0.010 04/13/2022    TROPONINT <0.010 10/13/2021    TROPONINT <0.010 10/13/2021     Lactic Acid: No results for input(s): LACTA in the last 72 hours. BNP:   Recent Labs     04/13/22 2301   PROBNP 131.3     UA:  Lab Results   Component Value Date    NITRU POSITIVE 04/13/2022    NITRU Negative 03/03/2020    NITRU NEGATIVE 06/17/2013    COLORU YELLOW 04/13/2022    PHUR 6.0 03/03/2020    PHUR 6.0 03/03/2020    WBCUA 24 10/12/2021    RBCUA 4 10/12/2021    MUCUS RARE 11/15/2020    TRICHOMONAS NONE SEEN 01/24/2021    BACTERIA MANY 10/12/2021    CLARITYU CLEAR 04/13/2022    SPECGRAV 1.015 04/13/2022    LEUKOCYTESUR LARGE NUMBER OR AMOUNT OF  04/13/2022    UROBILINOGEN 0.2 04/13/2022    BILIRUBINUR NEGATIVE 04/13/2022    BLOODU SMALL NUMBER OR AMOUNT OBSERVED 04/13/2022    GLUCOSEU Negative 03/03/2020    KETUA NEGATIVE 04/13/2022     Urine Cultures: No results found for: LABURIN  Blood Cultures: No results found for: BC  No results found for: BLOODCULT2  Organism:   Lab Results   Component Value Date    ORG ENC 08/24/2018       Imaging/Diagnostics Last 24 Hours   XR CHEST PORTABLE    Result Date: 4/13/2022  EXAMINATION: ONE XRAY VIEW OF THE CHEST 4/13/2022 10:22 pm COMPARISON: Chest x-ray 12/12/2021.  HISTORY: ORDERING SYSTEM PROVIDED HISTORY: SOB TECHNOLOGIST PROVIDED HISTORY: Reason for exam:->SOB Reason for Exam: sob, fatigue Additional signs and symptoms: sob, fatigue Relevant Medical/Surgical History: n/a FINDINGS: Cardiomediastinal silhouette is enlarged. Perihilar opacities. No pleural effusion on this projection. No pneumothorax appreciated on this projection. Perihilar opacities. Pulmonary edema versus multifocal pneumonia. Personally reviewed Lab Studies, Imaging, and discussed case with ED physician/ED provider.     Electronically signed by Marky Plascencia MD on 4/14/2022 at 2:25 AM

## 2022-04-14 NOTE — PROGRESS NOTES
Pt arrived to room 4107 via cot from ER, family present at bedside. Pt is legally blind, per granddaughter at bedside. Pt up to [facility] with two assist and pt's granddaughter assisting. Pt is weak but did well transferring. Pt's skin free of any breakdown and is clean dry and intact. Pt denies any further needs at this time. Pt's family leaving to go home. Pt stating that she is tired. Educated pt on the call light, with modification done to ensure pt knew what button she was pressing.

## 2022-04-15 LAB
ANION GAP SERPL CALCULATED.3IONS-SCNC: 16 MMOL/L (ref 4–16)
BASOPHILS ABSOLUTE: 0 K/CU MM
BASOPHILS RELATIVE PERCENT: 0.1 % (ref 0–1)
BUN BLDV-MCNC: 30 MG/DL (ref 6–23)
CALCIUM SERPL-MCNC: 9.5 MG/DL (ref 8.3–10.6)
CHLORIDE BLD-SCNC: 106 MMOL/L (ref 99–110)
CO2: 20 MMOL/L (ref 21–32)
CREAT SERPL-MCNC: 1 MG/DL (ref 0.6–1.1)
CULTURE: ABNORMAL
CULTURE: ABNORMAL
DIFFERENTIAL TYPE: ABNORMAL
EOSINOPHILS ABSOLUTE: 0 K/CU MM
EOSINOPHILS RELATIVE PERCENT: 0 % (ref 0–3)
GFR AFRICAN AMERICAN: >60 ML/MIN/1.73M2
GFR NON-AFRICAN AMERICAN: 52 ML/MIN/1.73M2
GLUCOSE BLD-MCNC: 158 MG/DL (ref 70–99)
HCT VFR BLD CALC: 35 % (ref 37–47)
HEMOGLOBIN: 10.4 GM/DL (ref 12.5–16)
IMMATURE NEUTROPHIL %: 0.6 % (ref 0–0.43)
LYMPHOCYTES ABSOLUTE: 0.7 K/CU MM
LYMPHOCYTES RELATIVE PERCENT: 3.8 % (ref 24–44)
Lab: ABNORMAL
MCH RBC QN AUTO: 23.9 PG (ref 27–31)
MCHC RBC AUTO-ENTMCNC: 29.7 % (ref 32–36)
MCV RBC AUTO: 80.3 FL (ref 78–100)
MONOCYTES ABSOLUTE: 0.3 K/CU MM
MONOCYTES RELATIVE PERCENT: 1.7 % (ref 0–4)
NUCLEATED RBC %: 0 %
PDW BLD-RTO: 17.4 % (ref 11.7–14.9)
PLATELET # BLD: 313 K/CU MM (ref 140–440)
PMV BLD AUTO: 10.5 FL (ref 7.5–11.1)
POTASSIUM SERPL-SCNC: 3.8 MMOL/L (ref 3.5–5.1)
RBC # BLD: 4.36 M/CU MM (ref 4.2–5.4)
SEGMENTED NEUTROPHILS ABSOLUTE COUNT: 17.7 K/CU MM
SEGMENTED NEUTROPHILS RELATIVE PERCENT: 93.8 % (ref 36–66)
SODIUM BLD-SCNC: 142 MMOL/L (ref 135–145)
SPECIMEN: ABNORMAL
TOTAL IMMATURE NEUTOROPHIL: 0.12 K/CU MM
TOTAL NUCLEATED RBC: 0 K/CU MM
WBC # BLD: 18.9 K/CU MM (ref 4–10.5)

## 2022-04-15 PROCEDURE — 1200000000 HC SEMI PRIVATE

## 2022-04-15 PROCEDURE — 94761 N-INVAS EAR/PLS OXIMETRY MLT: CPT

## 2022-04-15 PROCEDURE — 85025 COMPLETE CBC W/AUTO DIFF WBC: CPT

## 2022-04-15 PROCEDURE — 80048 BASIC METABOLIC PNL TOTAL CA: CPT

## 2022-04-15 PROCEDURE — 94640 AIRWAY INHALATION TREATMENT: CPT

## 2022-04-15 PROCEDURE — 6360000002 HC RX W HCPCS: Performed by: INTERNAL MEDICINE

## 2022-04-15 PROCEDURE — 6370000000 HC RX 637 (ALT 250 FOR IP): Performed by: INTERNAL MEDICINE

## 2022-04-15 PROCEDURE — 2580000003 HC RX 258: Performed by: INTERNAL MEDICINE

## 2022-04-15 PROCEDURE — 36415 COLL VENOUS BLD VENIPUNCTURE: CPT

## 2022-04-15 RX ADMIN — APIXABAN 5 MG: 5 TABLET, FILM COATED ORAL at 20:12

## 2022-04-15 RX ADMIN — SODIUM CHLORIDE: 9 INJECTION, SOLUTION INTRAVENOUS at 04:10

## 2022-04-15 RX ADMIN — ROPINIROLE HYDROCHLORIDE 5 MG: 1 TABLET, FILM COATED ORAL at 09:36

## 2022-04-15 RX ADMIN — METHYLPREDNISOLONE SODIUM SUCCINATE 40 MG: 40 INJECTION, POWDER, FOR SOLUTION INTRAMUSCULAR; INTRAVENOUS at 00:49

## 2022-04-15 RX ADMIN — SODIUM CHLORIDE, PRESERVATIVE FREE 10 ML: 5 INJECTION INTRAVENOUS at 09:36

## 2022-04-15 RX ADMIN — BACLOFEN 5 MG: 10 TABLET ORAL at 09:36

## 2022-04-15 RX ADMIN — ALBUTEROL SULFATE 2 PUFF: 90 AEROSOL, METERED RESPIRATORY (INHALATION) at 04:23

## 2022-04-15 RX ADMIN — APIXABAN 5 MG: 5 TABLET, FILM COATED ORAL at 09:36

## 2022-04-15 RX ADMIN — ROPINIROLE HYDROCHLORIDE 5 MG: 1 TABLET, FILM COATED ORAL at 14:52

## 2022-04-15 RX ADMIN — SODIUM CHLORIDE, PRESERVATIVE FREE 10 ML: 5 INJECTION INTRAVENOUS at 20:22

## 2022-04-15 RX ADMIN — ROPINIROLE HYDROCHLORIDE 5 MG: 1 TABLET, FILM COATED ORAL at 20:11

## 2022-04-15 RX ADMIN — ASPIRIN 81 MG 81 MG: 81 TABLET ORAL at 09:36

## 2022-04-15 RX ADMIN — MEMANTINE HYDROCHLORIDE 5 MG: 5 TABLET ORAL at 09:36

## 2022-04-15 RX ADMIN — BACLOFEN 5 MG: 10 TABLET ORAL at 20:12

## 2022-04-15 RX ADMIN — METHYLPREDNISOLONE SODIUM SUCCINATE 40 MG: 40 INJECTION, POWDER, FOR SOLUTION INTRAMUSCULAR; INTRAVENOUS at 06:24

## 2022-04-15 RX ADMIN — CYANOCOBALAMIN TAB 1000 MCG 1000 MCG: 1000 TAB at 09:36

## 2022-04-15 RX ADMIN — ALBUTEROL SULFATE 2 PUFF: 90 AEROSOL, METERED RESPIRATORY (INHALATION) at 12:52

## 2022-04-15 RX ADMIN — METHYLPREDNISOLONE SODIUM SUCCINATE 40 MG: 40 INJECTION, POWDER, FOR SOLUTION INTRAMUSCULAR; INTRAVENOUS at 17:53

## 2022-04-15 RX ADMIN — AZITHROMYCIN DIHYDRATE 500 MG: 500 INJECTION, POWDER, LYOPHILIZED, FOR SOLUTION INTRAVENOUS at 04:11

## 2022-04-15 RX ADMIN — METHYLPREDNISOLONE SODIUM SUCCINATE 40 MG: 40 INJECTION, POWDER, FOR SOLUTION INTRAMUSCULAR; INTRAVENOUS at 12:47

## 2022-04-15 RX ADMIN — Medication 1000 UNITS: at 09:36

## 2022-04-15 RX ADMIN — SODIUM CHLORIDE: 9 INJECTION, SOLUTION INTRAVENOUS at 00:52

## 2022-04-15 RX ADMIN — MEMANTINE HYDROCHLORIDE 5 MG: 5 TABLET ORAL at 20:12

## 2022-04-15 RX ADMIN — CEFTRIAXONE SODIUM 1000 MG: 1 INJECTION, POWDER, FOR SOLUTION INTRAMUSCULAR; INTRAVENOUS at 00:56

## 2022-04-15 RX ADMIN — GABAPENTIN 300 MG: 300 CAPSULE ORAL at 20:12

## 2022-04-15 ASSESSMENT — PAIN SCALES - PAIN ASSESSMENT IN ADVANCED DEMENTIA (PAINAD)
BREATHING: 0
FACIALEXPRESSION: 1
BODYLANGUAGE: 1
TOTALSCORE: 4
CONSOLABILITY: 1
NEGVOCALIZATION: 1

## 2022-04-15 NOTE — PROGRESS NOTES
INTERNAL MED PROGRESS NOTE           Subjective: Reports some improvement in shortness of breath. Denies any pain, no nausea or vomiting. Assessment/Plan:       -- UTI: Continue IV Rocephin, follow-up with urine culture. --Acute COPD exacerbation: Continue IV steroid, continue as needed and scheduled duo nebs. --Acute hypoxic respiratory failure: Currently requiring 2 L of NC O2, typically not on home O2. Continue to    --HTN: Continue outpatient antihypertensive as ordered    --History of pulmonary embolism: Diagnosed on 4/25/2021. Continue Eliquis    --Dementia: currently w/o any acute behavioral disturbance. Cont Namenda     --RLS: Continue Requip    -- Legally blind: Continue supportive care      DVT prophylaxis: Heparin/Lovenox        Gonzalo Villar MD  4/15/2022 @ 11:21 AM           Objective:       No intake or output data in the 24 hours ending 04/15/22 1121     Vitals:   Vitals:    04/15/22 0808   BP: (!) 117/47   Pulse: 91   Resp: 16   Temp: 98.8 °F (37.1 °C)   SpO2: 93%       Physical Exam:        Constitutional: Well developed, Well nourished, NAD  Neurologic: Alert & oriented x 3, No focal deficits noted. CNs II-XII grossly intact and symmetrical  Psychiatric: Affect normal, Mood normal.  HENT: Normocephalic, Atraumatic,  Eyes: PERRLA, EOMI, No pallor/scleral icterus. Neck: Normal range of motion, No tenderness, Supple, no bruit  Lymphatic: No cervical lymphadenopathy noted. Cardiovascular: Regular rate and rhythm, normal S1-S2, No murmurs, gallops or rubs. Thorax & Lungs: CTA bilaterally, No respiratory distress, No wheezing   Abdomen: Soft, BS +ve, no tenderness, no rebound or guarding  Skin: Warm, Dry, No erythema, No rash. Back: No tenderness, No CVA tenderness. Extremities: No edema, No tenderness  Musculoskeletal:. No major deformities noted.                Labs:   Reviewed, as follows:  Recent Results (from the past 24 hour(s)) CBC with Auto Differential    Collection Time: 04/15/22  6:06 AM   Result Value Ref Range    WBC 18.9 (H) 4.0 - 10.5 K/CU MM    RBC 4.36 4.2 - 5.4 M/CU MM    Hemoglobin 10.4 (L) 12.5 - 16.0 GM/DL    Hematocrit 35.0 (L) 37 - 47 %    MCV 80.3 78 - 100 FL    MCH 23.9 (L) 27 - 31 PG    MCHC 29.7 (L) 32.0 - 36.0 %    RDW 17.4 (H) 11.7 - 14.9 %    Platelets 933 391 - 393 K/CU MM    MPV 10.5 7.5 - 11.1 FL    Differential Type AUTOMATED DIFFERENTIAL     Segs Relative 93.8 (H) 36 - 66 %    Lymphocytes % 3.8 (L) 24 - 44 %    Monocytes % 1.7 0 - 4 %    Eosinophils % 0.0 0 - 3 %    Basophils % 0.1 0 - 1 %    Segs Absolute 17.7 K/CU MM    Lymphocytes Absolute 0.7 K/CU MM    Monocytes Absolute 0.3 K/CU MM    Eosinophils Absolute 0.0 K/CU MM    Basophils Absolute 0.0 K/CU MM    Nucleated RBC % 0.0 %    Total Nucleated RBC 0.0 K/CU MM    Total Immature Neutrophil 0.12 K/CU MM    Immature Neutrophil % 0.6 (H) 0 - 0.43 %   Basic Metabolic Panel    Collection Time: 04/15/22  6:06 AM   Result Value Ref Range    Sodium 142 135 - 145 MMOL/L    Potassium 3.8 3.5 - 5.1 MMOL/L    Chloride 106 99 - 110 mMol/L    CO2 20 (L) 21 - 32 MMOL/L    Anion Gap 16 4 - 16    BUN 30 (H) 6 - 23 MG/DL    CREATININE 1.0 0.6 - 1.1 MG/DL    Glucose 158 (H) 70 - 99 MG/DL    Calcium 9.5 8.3 - 10.6 MG/DL    GFR Non-African American 52 (L) >60 mL/min/1.73m2    GFR African American >60 >60 mL/min/1.73m2          Body mass index is 31.38 kg/m².  Patient will follow up with PCP for further management as indicated unless otherwise specifically noted above        Kaite Graham MD  4/15/2022 @ 11:21 AM

## 2022-04-16 VITALS
SYSTOLIC BLOOD PRESSURE: 119 MMHG | WEIGHT: 160.7 LBS | TEMPERATURE: 98.1 F | RESPIRATION RATE: 16 BRPM | DIASTOLIC BLOOD PRESSURE: 58 MMHG | OXYGEN SATURATION: 94 % | BODY MASS INDEX: 31.55 KG/M2 | HEIGHT: 60 IN | HEART RATE: 69 BPM

## 2022-04-16 LAB
ANION GAP SERPL CALCULATED.3IONS-SCNC: 11 MMOL/L (ref 4–16)
BASOPHILS ABSOLUTE: 0 K/CU MM
BASOPHILS RELATIVE PERCENT: 0.1 % (ref 0–1)
BUN BLDV-MCNC: 37 MG/DL (ref 6–23)
CALCIUM SERPL-MCNC: 8.9 MG/DL (ref 8.3–10.6)
CHLORIDE BLD-SCNC: 110 MMOL/L (ref 99–110)
CO2: 23 MMOL/L (ref 21–32)
CREAT SERPL-MCNC: 0.9 MG/DL (ref 0.6–1.1)
DIFFERENTIAL TYPE: ABNORMAL
EOSINOPHILS ABSOLUTE: 0 K/CU MM
EOSINOPHILS RELATIVE PERCENT: 0 % (ref 0–3)
GFR AFRICAN AMERICAN: >60 ML/MIN/1.73M2
GFR NON-AFRICAN AMERICAN: 59 ML/MIN/1.73M2
GLUCOSE BLD-MCNC: 119 MG/DL (ref 70–99)
HCT VFR BLD CALC: 32.4 % (ref 37–47)
HEMOGLOBIN: 9.5 GM/DL (ref 12.5–16)
IMMATURE NEUTROPHIL %: 0.7 % (ref 0–0.43)
LYMPHOCYTES ABSOLUTE: 1 K/CU MM
LYMPHOCYTES RELATIVE PERCENT: 6.5 % (ref 24–44)
MCH RBC QN AUTO: 23.8 PG (ref 27–31)
MCHC RBC AUTO-ENTMCNC: 29.3 % (ref 32–36)
MCV RBC AUTO: 81.2 FL (ref 78–100)
MONOCYTES ABSOLUTE: 0.4 K/CU MM
MONOCYTES RELATIVE PERCENT: 2.5 % (ref 0–4)
NUCLEATED RBC %: 0 %
PDW BLD-RTO: 17.9 % (ref 11.7–14.9)
PLATELET # BLD: 277 K/CU MM (ref 140–440)
PMV BLD AUTO: 10.3 FL (ref 7.5–11.1)
POTASSIUM SERPL-SCNC: 4 MMOL/L (ref 3.5–5.1)
RBC # BLD: 3.99 M/CU MM (ref 4.2–5.4)
SEGMENTED NEUTROPHILS ABSOLUTE COUNT: 13.5 K/CU MM
SEGMENTED NEUTROPHILS RELATIVE PERCENT: 90.2 % (ref 36–66)
SODIUM BLD-SCNC: 144 MMOL/L (ref 135–145)
TOTAL IMMATURE NEUTOROPHIL: 0.11 K/CU MM
TOTAL NUCLEATED RBC: 0 K/CU MM
WBC # BLD: 15 K/CU MM (ref 4–10.5)

## 2022-04-16 PROCEDURE — 80048 BASIC METABOLIC PNL TOTAL CA: CPT

## 2022-04-16 PROCEDURE — 6360000002 HC RX W HCPCS: Performed by: INTERNAL MEDICINE

## 2022-04-16 PROCEDURE — 36415 COLL VENOUS BLD VENIPUNCTURE: CPT

## 2022-04-16 PROCEDURE — 2580000003 HC RX 258: Performed by: INTERNAL MEDICINE

## 2022-04-16 PROCEDURE — 6370000000 HC RX 637 (ALT 250 FOR IP): Performed by: INTERNAL MEDICINE

## 2022-04-16 PROCEDURE — 85025 COMPLETE CBC W/AUTO DIFF WBC: CPT

## 2022-04-16 PROCEDURE — 94761 N-INVAS EAR/PLS OXIMETRY MLT: CPT

## 2022-04-16 RX ORDER — CEFUROXIME AXETIL 250 MG/1
250 TABLET ORAL 2 TIMES DAILY
Qty: 6 TABLET | Refills: 0 | Status: SHIPPED | OUTPATIENT
Start: 2022-04-16 | End: 2022-04-16 | Stop reason: SDUPTHER

## 2022-04-16 RX ORDER — CEFUROXIME AXETIL 250 MG/1
250 TABLET ORAL 2 TIMES DAILY
Qty: 6 TABLET | Refills: 0 | Status: SHIPPED | OUTPATIENT
Start: 2022-04-16 | End: 2022-04-19

## 2022-04-16 RX ORDER — PAROXETINE HYDROCHLORIDE 20 MG/1
20 TABLET, FILM COATED ORAL DAILY
Qty: 30 TABLET | Refills: 3 | Status: SHIPPED | OUTPATIENT
Start: 2022-04-16

## 2022-04-16 RX ORDER — PREDNISONE 10 MG/1
20 TABLET ORAL DAILY
Qty: 10 TABLET | Refills: 0 | Status: SHIPPED | OUTPATIENT
Start: 2022-04-16 | End: 2022-04-16 | Stop reason: SDUPTHER

## 2022-04-16 RX ORDER — PREDNISONE 10 MG/1
20 TABLET ORAL DAILY
Qty: 10 TABLET | Refills: 0 | Status: SHIPPED | OUTPATIENT
Start: 2022-04-16 | End: 2022-04-21

## 2022-04-16 RX ORDER — HYDRALAZINE HYDROCHLORIDE 50 MG/1
50 TABLET, FILM COATED ORAL EVERY 8 HOURS SCHEDULED
Qty: 90 TABLET | Refills: 3 | Status: SHIPPED | OUTPATIENT
Start: 2022-04-16

## 2022-04-16 RX ADMIN — Medication 1000 UNITS: at 08:43

## 2022-04-16 RX ADMIN — METHYLPREDNISOLONE SODIUM SUCCINATE 40 MG: 40 INJECTION, POWDER, FOR SOLUTION INTRAMUSCULAR; INTRAVENOUS at 00:15

## 2022-04-16 RX ADMIN — CYANOCOBALAMIN TAB 1000 MCG 1000 MCG: 1000 TAB at 08:43

## 2022-04-16 RX ADMIN — ROPINIROLE HYDROCHLORIDE 5 MG: 1 TABLET, FILM COATED ORAL at 12:23

## 2022-04-16 RX ADMIN — MEMANTINE HYDROCHLORIDE 5 MG: 5 TABLET ORAL at 08:43

## 2022-04-16 RX ADMIN — SODIUM CHLORIDE, PRESERVATIVE FREE 10 ML: 5 INJECTION INTRAVENOUS at 08:44

## 2022-04-16 RX ADMIN — ROPINIROLE HYDROCHLORIDE 5 MG: 1 TABLET, FILM COATED ORAL at 08:43

## 2022-04-16 RX ADMIN — BACLOFEN 5 MG: 10 TABLET ORAL at 08:43

## 2022-04-16 RX ADMIN — APIXABAN 5 MG: 5 TABLET, FILM COATED ORAL at 08:43

## 2022-04-16 RX ADMIN — PREDNISONE 40 MG: 20 TABLET ORAL at 08:43

## 2022-04-16 RX ADMIN — ASPIRIN 81 MG 81 MG: 81 TABLET ORAL at 08:43

## 2022-04-16 RX ADMIN — AZITHROMYCIN DIHYDRATE 500 MG: 500 INJECTION, POWDER, LYOPHILIZED, FOR SOLUTION INTRAVENOUS at 04:20

## 2022-04-16 RX ADMIN — CEFTRIAXONE SODIUM 1000 MG: 1 INJECTION, POWDER, FOR SOLUTION INTRAMUSCULAR; INTRAVENOUS at 01:21

## 2022-04-16 ASSESSMENT — PAIN SCALES - PAIN ASSESSMENT IN ADVANCED DEMENTIA (PAINAD)
TOTALSCORE: 1
FACIALEXPRESSION: 0
NEGVOCALIZATION: 1
BODYLANGUAGE: 0
CONSOLABILITY: 0
BREATHING: 0

## 2022-04-16 ASSESSMENT — PAIN SCALES - GENERAL: PAINLEVEL_OUTOF10: 0

## 2022-04-16 NOTE — PROGRESS NOTES
INTERNAL MED PROGRESS NOTE           Subjective: Reports feeling fine and doing okay this morning. No acute overnight event reported. Assessment/Plan:       -- UTI: Continue IV Rocephin, urine culture grew pansensitive E. coli. We will switch to oral Ceftin today and discontinue IV Rocephin. --Acute COPD exacerbation: Continue IV steroid, continue as needed and scheduled duo nebs. --Acute hypoxic respiratory failure: Currently requiring 2 L of NC O2, typically not on home O2. Continue to    --HTN: Continue outpatient antihypertensive as ordered    --History of pulmonary embolism: Diagnosed on 4/25/2021. Continue Eliquis    --Dementia: currently w/o any acute behavioral disturbance. Cont Namenda     --RLS: Continue Requip    -- Legally blind: Continue supportive care      DVT prophylaxis: Heparin/Lovenox        Dat Abdul MD  4/16/2022 @ 11:16 AM           Objective:       No intake or output data in the 24 hours ending 04/16/22 1116     Vitals:   Vitals:    04/16/22 0830   BP: 104/63   Pulse: 65   Resp: 16   Temp: 98.7 °F (37.1 °C)   SpO2: 96%       Physical Exam:        Constitutional: Well developed, Well nourished, NAD  Neurologic: Alert & oriented x 3, No focal deficits noted. CNs II-XII grossly intact and symmetrical  Psychiatric: Affect normal, Mood normal.  HENT: Normocephalic, Atraumatic,  Eyes: PERRLA, EOMI, No pallor/scleral icterus. Neck: Normal range of motion, No tenderness, Supple, no bruit  Lymphatic: No cervical lymphadenopathy noted. Cardiovascular: Regular rate and rhythm, normal S1-S2, No murmurs, gallops or rubs. Thorax & Lungs: CTA bilaterally, No respiratory distress, No wheezing   Abdomen: Soft, BS +ve, no tenderness, no rebound or guarding  Skin: Warm, Dry, No erythema, No rash. Back: No tenderness, No CVA tenderness. Extremities: No edema, No tenderness  Musculoskeletal:. No major deformities noted. Labs:   Reviewed, as follows:  Recent Results (from the past 24 hour(s))   CBC with Auto Differential    Collection Time: 04/16/22  7:38 AM   Result Value Ref Range    WBC 15.0 (H) 4.0 - 10.5 K/CU MM    RBC 3.99 (L) 4.2 - 5.4 M/CU MM    Hemoglobin 9.5 (L) 12.5 - 16.0 GM/DL    Hematocrit 32.4 (L) 37 - 47 %    MCV 81.2 78 - 100 FL    MCH 23.8 (L) 27 - 31 PG    MCHC 29.3 (L) 32.0 - 36.0 %    RDW 17.9 (H) 11.7 - 14.9 %    Platelets 926 303 - 346 K/CU MM    MPV 10.3 7.5 - 11.1 FL    Differential Type AUTOMATED DIFFERENTIAL     Segs Relative 90.2 (H) 36 - 66 %    Lymphocytes % 6.5 (L) 24 - 44 %    Monocytes % 2.5 0 - 4 %    Eosinophils % 0.0 0 - 3 %    Basophils % 0.1 0 - 1 %    Segs Absolute 13.5 K/CU MM    Lymphocytes Absolute 1.0 K/CU MM    Monocytes Absolute 0.4 K/CU MM    Eosinophils Absolute 0.0 K/CU MM    Basophils Absolute 0.0 K/CU MM    Nucleated RBC % 0.0 %    Total Nucleated RBC 0.0 K/CU MM    Total Immature Neutrophil 0.11 K/CU MM    Immature Neutrophil % 0.7 (H) 0 - 0.43 %   Basic Metabolic Panel    Collection Time: 04/16/22  7:38 AM   Result Value Ref Range    Sodium 144 135 - 145 MMOL/L    Potassium 4.0 3.5 - 5.1 MMOL/L    Chloride 110 99 - 110 mMol/L    CO2 23 21 - 32 MMOL/L    Anion Gap 11 4 - 16    BUN 37 (H) 6 - 23 MG/DL    CREATININE 0.9 0.6 - 1.1 MG/DL    Glucose 119 (H) 70 - 99 MG/DL    Calcium 8.9 8.3 - 10.6 MG/DL    GFR Non- 59 (L) >60 mL/min/1.73m2    GFR African American >60 >60 mL/min/1.73m2          Body mass index is 31.38 kg/m².  Patient will follow up with PCP for further management as indicated unless otherwise specifically noted above        Jennie Allen MD  4/16/2022 @ 11:16 AM

## 2022-04-16 NOTE — PROGRESS NOTES
Discharge instructions reviewed with patient and daughter in law. All questions answered. Patient taken to private vehicle with all belongings. Daughter in law and granddaughter assisted patient into vehicle.

## 2022-04-16 NOTE — PROGRESS NOTES
Patricia Pace with case management came to meet with patient to determine needs for home health care. It was determined that patient only needs 40 hours of aide services a week as she lives with her granddaughter and great-granddaughter who are able to help patient with medications. New home health care orders and AVS/DS faxed to [facility] home health care. Adriane called [facility] home health care to notify them of patients confirmed discharge.

## 2022-04-16 NOTE — PROGRESS NOTES
Called [facility] home care to notify them of patients discharge. They stated patient is no longer active with them and they will only see her for \"non skilled aide\" services (?). Contacted on call  for clarification.

## 2022-04-16 NOTE — PLAN OF CARE
Problem: Skin Integrity:  Goal: Will show no infection signs and symptoms  Description: Will show no infection signs and symptoms  Outcome: Ongoing  Goal: Absence of new skin breakdown  Description: Absence of new skin breakdown  Outcome: Ongoing     Problem: Breathing Pattern - Ineffective:  Goal: Ability to achieve and maintain a regular respiratory rate will improve  Description: Ability to achieve and maintain a regular respiratory rate will improve  Outcome: Ongoing     Problem: Falls - Risk of:  Goal: Will remain free from falls  Description: Will remain free from falls  Outcome: Ongoing  Goal: Absence of physical injury  Description: Absence of physical injury  Outcome: Ongoing     Problem: Pain:  Goal: Pain level will decrease  Description: Pain level will decrease  Outcome: Ongoing  Goal: Control of acute pain  Description: Control of acute pain  Outcome: Ongoing  Goal: Control of chronic pain  Description: Control of chronic pain  Outcome: Ongoing     Problem: Discharge Planning:  Goal: Discharged to appropriate level of care  Description: Discharged to appropriate level of care  Outcome: Ongoing

## 2022-04-16 NOTE — DISCHARGE SUMMARY
V2.0  Discharge Summary    Name:  Ela Morejon /Age/Sex: 1931 (32 y.o. female)   Admit Date: 2022  Discharge Date: 22    MRN & CSN:  [id number] & [id number] Encounter Date and Time 22 11:52 AM EDT    Attending:  Lorena Sam MD Discharging Provider: Lorena Sam MD       Hospital Course:     Brief HPI: Ela Morejon is a 80 y.o. female who presented with symptoms of acute COPD exacerbation. Was started with IV steroids. Also noted to have UTI on admission, started on IV Rocephin. Brief Problem Based Course:   -- UTI: urine culture grew pansensitive E. coli. Will send home on 3 additional days of antibiotics (oral Ceftin) received 3 doses of IV Rocephin here.      --Acute COPD exacerbation: Resolved. Will go home on additional 5 days worth of steroid.        --Acute hypoxic respiratory failure: Resolved, currently  2 L of NC O2, which is baseline for this patient.      --HTN: Continue outpatient antihypertensive as ordered     --History of pulmonary embolism: Diagnosed on 2021. Continue Eliquis     --Dementia: currently w/o any acute behavioral disturbance. Cont Namenda      --RLS: Continue Requip     -- Legally blind: Continue supportive care           Discharged home in stable condition      The patient expressed appropriate understanding of, and agreement with the discharge recommendations, medications, and plan.      Consults this admission:  IP CONSULT TO HOSPITALIST  IP CONSULT TO HOME CARE NEEDS    Discharge Diagnosis:   COPD exacerbation [facility])        Discharge Instruction:   Follow up appointments:   Primary care physician: DUANE Valencia CNP within 2 weeks  Diet: cardiac diet   Activity: activity as tolerated  Disposition: Discharged to:   []Home, []C, []SNF, []Acute Rehab, []Hospice   Condition on discharge: Stable  Labs and Tests to be Followed up as an outpatient by PCP or Specialist:     Discharge Medications:        Medication List      START tablet  Commonly known as: CYANOCOBALAMIN     vitamin D 25 MCG (1000 UT) Caps  Take 1 capsule by mouth daily           Where to Get Your Medications      These medications were sent to [facility], [address] [place] [address]    Phone: [phone]   cefUROXime 250 MG tablet  hydrALAZINE 50 MG tablet  PARoxetine 20 MG tablet  predniSONE 10 MG tablet        Objective Findings at Discharge:   /63   Pulse 65   Temp 98.7 °F (37.1 °C)   Resp 16   Ht 5' (1.524 m)   Wt 160 lb 11.2 oz (72.9 kg)   SpO2 96%   BMI 31.38 kg/m²       Physical Exam:   General: NAD  Eyes: EOMI  ENT: neck supple  Cardiovascular: Regular rate. Respiratory: Clear to auscultation  Gastrointestinal: Soft, non tender  Genitourinary: no suprapubic tenderness  Musculoskeletal: No edema  Skin: warm, dry  Neuro: Alert. Psych: Mood appropriate. Labs and Imaging   XR CHEST PORTABLE    Result Date: 4/13/2022  EXAMINATION: ONE XRAY VIEW OF THE CHEST 4/13/2022 10:22 pm COMPARISON: Chest x-ray 12/12/2021. HISTORY: ORDERING SYSTEM PROVIDED HISTORY: SOB TECHNOLOGIST PROVIDED HISTORY: Reason for exam:->SOB Reason for Exam: sob, fatigue Additional signs and symptoms: sob, fatigue Relevant Medical/Surgical History: n/a FINDINGS: Cardiomediastinal silhouette is enlarged. Perihilar opacities. No pleural effusion on this projection. No pneumothorax appreciated on this projection. Perihilar opacities. Pulmonary edema versus multifocal pneumonia.        CBC:   Recent Labs     04/13/22  2301 04/15/22  0606 04/16/22  0738   WBC 7.3 18.9* 15.0*   HGB 11.0* 10.4* 9.5*    313 277     BMP:    Recent Labs     04/13/22  2301 04/15/22  0606 04/16/22  0738    142 144   K 4.5 3.8 4.0    106 110   CO2 25 20* 23   BUN 20 30* 37*   CREATININE 0.9 1.0 0.9   GLUCOSE 108* 158* 119*     Hepatic:   Recent Labs     04/13/22 2301 AST 18   ALT 10   BILITOT 0.2   ALKPHOS 82     Lipids:   Lab Results   Component Value Date    CHOL 182 11/12/2020    HDL 59 11/12/2020    TRIG 105 11/12/2020     Hemoglobin A1C:   Lab Results   Component Value Date    LABA1C 5.4 09/22/2020     TSH: No results found for: TSH  Troponin:   Lab Results   Component Value Date    TROPONINT <0.010 04/13/2022    TROPONINT <0.010 10/13/2021    TROPONINT <0.010 10/13/2021     Lactic Acid: No results for input(s): LACTA in the last 72 hours.   BNP:   Recent Labs     04/13/22  2301   PROBNP 131.3     UA:  Lab Results   Component Value Date    NITRU POSITIVE 04/13/2022    NITRU Negative 03/03/2020    NITRU NEGATIVE 06/17/2013    COLORU YELLOW 04/13/2022    PHUR 6.0 03/03/2020    PHUR 6.0 03/03/2020    WBCUA 24 10/12/2021    RBCUA 4 10/12/2021    MUCUS RARE 11/15/2020    TRICHOMONAS NONE SEEN 01/24/2021    BACTERIA MANY 10/12/2021    CLARITYU CLEAR 04/13/2022    SPECGRAV 1.015 04/13/2022    LEUKOCYTESUR LARGE NUMBER OR AMOUNT OF  04/13/2022    UROBILINOGEN 0.2 04/13/2022    BILIRUBINUR NEGATIVE 04/13/2022    BLOODU SMALL NUMBER OR AMOUNT OBSERVED 04/13/2022    GLUCOSEU Negative 03/03/2020    KETUA NEGATIVE 04/13/2022     Urine Cultures: No results found for: LABURIN  Blood Cultures: No results found for: BC  No results found for: BLOODCULT2  Organism:   Lab Results   Component Value Date    ORG ENC 08/24/2018       Time Spent Discharging patient 30 minutes    Electronically signed by Alejandra Reinoso MD on 4/16/2022 at 11:52 AM

## 2022-04-18 LAB
CULTURE: NORMAL
CULTURE: NORMAL
Lab: NORMAL
Lab: NORMAL
SPECIMEN: NORMAL
SPECIMEN: NORMAL

## 2022-11-22 ENCOUNTER — HOSPITAL ENCOUNTER (INPATIENT)
Age: 87
LOS: 2 days | Discharge: HOME | DRG: 092 | End: 2022-11-27
Attending: STUDENT IN AN ORGANIZED HEALTH CARE EDUCATION/TRAINING PROGRAM | Admitting: INTERNAL MEDICINE
Payer: MEDICARE

## 2022-11-22 PROBLEM — R41.82 ALTERED MENTAL STATUS, UNSPECIFIED: Status: ACTIVE | Noted: 2022-11-22

## 2022-11-22 LAB
ALBUMIN SERPL-MCNC: 4 GM/DL (ref 3.4–5)
ALP BLD-CCNC: 64 IU/L (ref 40–128)
ALT SERPL-CCNC: 10 U/L (ref 10–40)
AMMONIA: 25 UMOL/L (ref 11–51)
ANION GAP SERPL CALCULATED.3IONS-SCNC: 9 MMOL/L (ref 4–16)
AST SERPL-CCNC: 16 IU/L (ref 15–37)
BASE EXCESS MIXED: 3.3 (ref 0–2.3)
BASOPHILS ABSOLUTE: 0 K/CU MM
BASOPHILS RELATIVE PERCENT: 0.2 % (ref 0–1)
BILIRUB SERPL-MCNC: 1.1 MG/DL (ref 0–1)
BUN BLDV-MCNC: 14 MG/DL (ref 6–23)
CALCIUM SERPL-MCNC: 8.9 MG/DL (ref 8.3–10.6)
CHLORIDE BLD-SCNC: 110 MMOL/L (ref 99–110)
CO2: 23 MMOL/L (ref 21–32)
COMMENT: ABNORMAL
CREAT SERPL-MCNC: 0.8 MG/DL (ref 0.6–1.1)
DIFFERENTIAL TYPE: ABNORMAL
EOSINOPHILS ABSOLUTE: 0.1 K/CU MM
EOSINOPHILS RELATIVE PERCENT: 1.6 % (ref 0–3)
GFR SERPL CREATININE-BSD FRML MDRD: >60 ML/MIN/1.73M2
GLUCOSE BLD-MCNC: 98 MG/DL (ref 70–99)
HCO3 VENOUS: 27.8 MMOL/L (ref 19–25)
HCT VFR BLD CALC: 35.5 % (ref 37–47)
HEMOGLOBIN: 10.7 GM/DL (ref 12.5–16)
IMMATURE NEUTROPHIL %: 0.5 % (ref 0–0.43)
LACTATE: 1.3 MMOL/L (ref 0.4–2)
LYMPHOCYTES ABSOLUTE: 1.8 K/CU MM
LYMPHOCYTES RELATIVE PERCENT: 20.5 % (ref 24–44)
MCH RBC QN AUTO: 24.2 PG (ref 27–31)
MCHC RBC AUTO-ENTMCNC: 30.1 % (ref 32–36)
MCV RBC AUTO: 80.3 FL (ref 78–100)
MONOCYTES ABSOLUTE: 0.8 K/CU MM
MONOCYTES RELATIVE PERCENT: 9.6 % (ref 0–4)
NUCLEATED RBC %: 0 %
O2 SAT, VEN: 92.8 % (ref 50–70)
PCO2, VEN: 41 MMHG (ref 38–52)
PDW BLD-RTO: 17.2 % (ref 11.7–14.9)
PH VENOUS: 7.44 (ref 7.32–7.42)
PLATELET # BLD: 310 K/CU MM (ref 140–440)
PMV BLD AUTO: 10 FL (ref 7.5–11.1)
PO2, VEN: 73 MMHG (ref 28–48)
POTASSIUM SERPL-SCNC: 3.5 MMOL/L (ref 3.5–5.1)
RAPID INFLUENZA  B AGN: NEGATIVE
RAPID INFLUENZA A AGN: NEGATIVE
RBC # BLD: 4.42 M/CU MM (ref 4.2–5.4)
SARS-COV-2, NAAT: NOT DETECTED
SEGMENTED NEUTROPHILS ABSOLUTE COUNT: 5.9 K/CU MM
SEGMENTED NEUTROPHILS RELATIVE PERCENT: 67.6 % (ref 36–66)
SODIUM BLD-SCNC: 142 MMOL/L (ref 135–145)
SOURCE: NORMAL
TOTAL CK: 101 IU/L (ref 26–140)
TOTAL IMMATURE NEUTOROPHIL: 0.04 K/CU MM
TOTAL NUCLEATED RBC: 0 K/CU MM
TOTAL PROTEIN: 6.5 GM/DL (ref 6.4–8.2)
TROPONIN T: <0.01 NG/ML
TROPONIN T: <0.01 NG/ML
TSH HIGH SENSITIVITY: 1.69 UIU/ML (ref 0.27–4.2)
WBC # BLD: 8.7 K/CU MM (ref 4–10.5)

## 2022-11-22 PROCEDURE — 36415 COLL VENOUS BLD VENIPUNCTURE: CPT

## 2022-11-22 PROCEDURE — 84484 ASSAY OF TROPONIN QUANT: CPT

## 2022-11-22 PROCEDURE — 87635 SARS-COV-2 COVID-19 AMP PRB: CPT

## 2022-11-22 PROCEDURE — 96365 THER/PROPH/DIAG IV INF INIT: CPT

## 2022-11-22 PROCEDURE — 6370000000 HC RX 637 (ALT 250 FOR IP)

## 2022-11-22 PROCEDURE — 83605 ASSAY OF LACTIC ACID: CPT

## 2022-11-22 PROCEDURE — 82550 ASSAY OF CK (CPK): CPT

## 2022-11-22 PROCEDURE — G0379 DIRECT REFER HOSPITAL OBSERV: HCPCS

## 2022-11-22 PROCEDURE — 80053 COMPREHEN METABOLIC PANEL: CPT

## 2022-11-22 PROCEDURE — 93005 ELECTROCARDIOGRAM TRACING: CPT

## 2022-11-22 PROCEDURE — 51798 US URINE CAPACITY MEASURE: CPT

## 2022-11-22 PROCEDURE — 85025 COMPLETE CBC W/AUTO DIFF WBC: CPT

## 2022-11-22 PROCEDURE — 84443 ASSAY THYROID STIM HORMONE: CPT

## 2022-11-22 PROCEDURE — 87804 INFLUENZA ASSAY W/OPTIC: CPT

## 2022-11-22 PROCEDURE — 82140 ASSAY OF AMMONIA: CPT

## 2022-11-22 PROCEDURE — 6360000002 HC RX W HCPCS

## 2022-11-22 PROCEDURE — G0378 HOSPITAL OBSERVATION PER HR: HCPCS

## 2022-11-22 PROCEDURE — 82805 BLOOD GASES W/O2 SATURATION: CPT

## 2022-11-22 PROCEDURE — 87086 URINE CULTURE/COLONY COUNT: CPT

## 2022-11-22 PROCEDURE — 2580000003 HC RX 258

## 2022-11-22 RX ORDER — ACETAMINOPHEN 650 MG/1
650 SUPPOSITORY RECTAL EVERY 6 HOURS PRN
Status: DISCONTINUED | OUTPATIENT
Start: 2022-11-22 | End: 2022-11-27 | Stop reason: HOSPADM

## 2022-11-22 RX ORDER — SODIUM CHLORIDE 0.9 % (FLUSH) 0.9 %
5-40 SYRINGE (ML) INJECTION EVERY 12 HOURS SCHEDULED
Status: DISCONTINUED | OUTPATIENT
Start: 2022-11-22 | End: 2022-11-27 | Stop reason: HOSPADM

## 2022-11-22 RX ORDER — SODIUM CHLORIDE 0.9 % (FLUSH) 0.9 %
5-40 SYRINGE (ML) INJECTION PRN
Status: DISCONTINUED | OUTPATIENT
Start: 2022-11-22 | End: 2022-11-27 | Stop reason: HOSPADM

## 2022-11-22 RX ORDER — MEMANTINE HYDROCHLORIDE 5 MG/1
5 TABLET ORAL 2 TIMES DAILY
Status: DISCONTINUED | OUTPATIENT
Start: 2022-11-22 | End: 2022-11-27 | Stop reason: HOSPADM

## 2022-11-22 RX ORDER — PANTOPRAZOLE SODIUM 40 MG/1
40 TABLET, DELAYED RELEASE ORAL
Status: DISCONTINUED | OUTPATIENT
Start: 2022-11-23 | End: 2022-11-27 | Stop reason: HOSPADM

## 2022-11-22 RX ORDER — LATANOPROST 50 UG/ML
1 SOLUTION/ DROPS OPHTHALMIC NIGHTLY
Status: DISCONTINUED | OUTPATIENT
Start: 2022-11-22 | End: 2022-11-27 | Stop reason: HOSPADM

## 2022-11-22 RX ORDER — ROPINIROLE 1 MG/1
1 TABLET, FILM COATED ORAL 3 TIMES DAILY
Status: DISCONTINUED | OUTPATIENT
Start: 2022-11-22 | End: 2022-11-27 | Stop reason: HOSPADM

## 2022-11-22 RX ORDER — ACETAMINOPHEN 325 MG/1
650 TABLET ORAL EVERY 6 HOURS PRN
Status: DISCONTINUED | OUTPATIENT
Start: 2022-11-22 | End: 2022-11-27 | Stop reason: HOSPADM

## 2022-11-22 RX ORDER — ONDANSETRON 4 MG/1
4 TABLET, ORALLY DISINTEGRATING ORAL EVERY 8 HOURS PRN
Status: DISCONTINUED | OUTPATIENT
Start: 2022-11-22 | End: 2022-11-27 | Stop reason: HOSPADM

## 2022-11-22 RX ORDER — SODIUM CHLORIDE 9 MG/ML
INJECTION, SOLUTION INTRAVENOUS PRN
Status: DISCONTINUED | OUTPATIENT
Start: 2022-11-22 | End: 2022-11-27 | Stop reason: HOSPADM

## 2022-11-22 RX ORDER — FUROSEMIDE 20 MG/1
10 TABLET ORAL 3 TIMES DAILY
Status: DISCONTINUED | OUTPATIENT
Start: 2022-11-22 | End: 2022-11-27 | Stop reason: HOSPADM

## 2022-11-22 RX ORDER — ATORVASTATIN CALCIUM 40 MG/1
40 TABLET, FILM COATED ORAL NIGHTLY
Status: DISCONTINUED | OUTPATIENT
Start: 2022-11-22 | End: 2022-11-27 | Stop reason: HOSPADM

## 2022-11-22 RX ORDER — POLYETHYLENE GLYCOL 3350 17 G/17G
17 POWDER, FOR SOLUTION ORAL DAILY PRN
Status: DISCONTINUED | OUTPATIENT
Start: 2022-11-22 | End: 2022-11-27 | Stop reason: HOSPADM

## 2022-11-22 RX ORDER — ONDANSETRON 2 MG/ML
4 INJECTION INTRAMUSCULAR; INTRAVENOUS EVERY 6 HOURS PRN
Status: DISCONTINUED | OUTPATIENT
Start: 2022-11-22 | End: 2022-11-27 | Stop reason: HOSPADM

## 2022-11-22 RX ORDER — ENOXAPARIN SODIUM 100 MG/ML
40 INJECTION SUBCUTANEOUS DAILY
Status: DISCONTINUED | OUTPATIENT
Start: 2022-11-22 | End: 2022-11-22

## 2022-11-22 RX ORDER — SODIUM CHLORIDE 9 MG/ML
INJECTION, SOLUTION INTRAVENOUS CONTINUOUS
Status: ACTIVE | OUTPATIENT
Start: 2022-11-22 | End: 2022-11-23

## 2022-11-22 RX ADMIN — SODIUM CHLORIDE, PRESERVATIVE FREE 10 ML: 5 INJECTION INTRAVENOUS at 20:02

## 2022-11-22 RX ADMIN — ATORVASTATIN CALCIUM 40 MG: 40 TABLET, FILM COATED ORAL at 20:02

## 2022-11-22 RX ADMIN — SODIUM CHLORIDE: 9 INJECTION, SOLUTION INTRAVENOUS at 12:15

## 2022-11-22 RX ADMIN — FUROSEMIDE 10 MG: 20 TABLET ORAL at 20:02

## 2022-11-22 RX ADMIN — SODIUM CHLORIDE, PRESERVATIVE FREE 10 ML: 5 INJECTION INTRAVENOUS at 10:33

## 2022-11-22 RX ADMIN — MEMANTINE 5 MG: 5 TABLET ORAL at 20:02

## 2022-11-22 RX ADMIN — APIXABAN 5 MG: 5 TABLET, FILM COATED ORAL at 20:02

## 2022-11-22 RX ADMIN — ROPINIROLE HYDROCHLORIDE 1 MG: 1 TABLET, FILM COATED ORAL at 20:02

## 2022-11-22 RX ADMIN — CEFTRIAXONE SODIUM 1000 MG: 1 INJECTION, POWDER, FOR SOLUTION INTRAMUSCULAR; INTRAVENOUS at 12:15

## 2022-11-22 ASSESSMENT — PAIN SCALES - GENERAL
PAINLEVEL_OUTOF10: 0
PAINLEVEL_OUTOF10: 0

## 2022-11-22 NOTE — PROGRESS NOTES
Bedside swallow eval completed, patient able to take small sips of water without difficulty.      Destiney Phillip, BSN, RN

## 2022-11-22 NOTE — CARE COORDINATION
Attempted to meet with pt to initiate discharge planning- pt has no family at bedside, per sitter dghtr and son in law just left and returning later. Provided my card.   CM to follow up in am.

## 2022-11-22 NOTE — H&P
V2.0  History and Physical      Name:  Cony Owens /Age/Sex: 1931  (80 y.o. female)   MRN & CSN:  [id number] & [id number] Encounter Date/Time: 2022 9:40 AM EST   Location:  [address] [place] [address] PCP: Dorota Miles, [address] [facility] Day: 1    Assessment and Plan:   Cony Owens is a 80 y.o. female with a pmh of COPD, GERD, DVT, RLS, chronic ischemic heart disease, and dementia who presents with Altered mental status, unspecified    Hospital Problems             Last Modified POA    * (Principal) Altered mental status, unspecified 2022 Yes    AMS (altered mental status) 2022 Yes       AMS with Hallucinations -clear etiology unknown  Diff DX: Acute delirium; Toxic / Metabolic Encephalopathy 2/2 polypharmacy and possible UTI  superimposed on by underlying Dementia  -Per chart review, patient has a history of hallucinations and intermittent confusion and has seen neurology previously. --Per patient' granddaughter, patient started on ATB two days prior to admission for complaints of urinary frequency and burning, and a home urinalysis showed blood. --UA not just above acute infection; ordered urine culture  --CXR nonacute  --CT head nonacute; MRI 2020 shows cerebellar infarcts  --TSH,ammonia, CK,   --Neurology and Psychiatry consulted    Lactic Acidosis  --Repeat LA  --UA not suggestive of infection, however recently started on oral ATB at home.  Will empirically treat with Rocephin     Chronic Ischemic heart disease  --EKG ordered  - We will trend troponin  --Continue statin  -Recent echo 10/2021 shows EF 50-55%; most recent stress test 2020 shows no infarct or ischemia    COPD  --Managed with inhalers, will continue  - Per patient's granddaughter, patient is on hospice for end-stage COPD.  - Does not appear in acute exacerbation at this time, will repeat VBG  --Patient is on room air currently, oxygen saturation 95%    GERD  --Continue PPI    Chronic lacunar bilateral cerebellar infarcts  --On statin, and Eliquis    DVT history  -- Started on eliquis      RLS  Essential tremor  -- Managed on on Requip and Horizant      Disposition:   Current Living situation: Home with granddaughter  Expected Disposition: Home with [address] daughter  Estimated D/C: 2-3 days    Diet No diet orders on file   DVT Prophylaxis [] Lovenox, []  Heparin, [] SCDs, [] Ambulation,  [x] Eliquis, [] Xarelto   Code Status Limited   Surrogate Decision Maker/ POA Grand daughter     History from:     family member - Granddaughter    History of Present Illness:     Chief Complaint:  Amador Cueva is a 80 y.o. female with pmh of COPD, GERD, DVT, RLS, chronic ischemic heart disease, and dementia who presents to outside free standing ED with altered mental status with hallucinations. Patient does have history of dementia, and per chart review, has had hallucinations and intermittent increased levels of confusion previously. Patient has seen neurology in the past. Will consult on this visit. Patient is a poor historian, and is confused, is able to tell me her name and date of birth however unable to tell me location and reason for her stay. Patient further unable to tell me who she lives with, but does tell me that she walks around at home without a cane or walker. No patient at bedside, did call patient's granddaughter, in which confirms that she does not walk around at home with a cane or walker, as she has not been able to do so, having  loss of vision. Patient was exposed to Covid 2 weeks ago  Patient's granddaughter w Hospice changed her haldol after hallucinations. Patient's daughter states that she was started on ATB on Saturday, after complaining of urinary frequency and pain/burning. Did follow-up with granddaughter via phone, patient has 8 children, however lives with granddaughter whom is  listed in her chart as emergency contact, in which we discussed CODE STATUS.   Granddaughter requesting that patient receives all life-sustaining measures with the exception of being placed on a ventilator. Patient's granddaughter further states that she wants to get help with POA information, and will call to speak with nursing staff after talking with family to determine if they want to further change her CODE STATUS. Review of Systems: Need 10 Elements   Review of Systems   Unable to perform ROS: Dementia       Objective:   No intake or output data in the 24 hours ending 11/22/22 1152   Vitals:   Vitals:    11/22/22 0915   BP: (!) 140/72   Pulse: 94   Resp: 18   Temp: 97.8 °F (36.6 °C)   TempSrc: Oral   SpO2: 95%       Medications Prior to Admission     Prior to Admission medications    Medication Sig Start Date End Date Taking? Authorizing Provider   apixaban (ELIQUIS) 5 MG TABS tablet Take 5 mg by mouth 2 times daily   Yes Historical Provider, MD   furosemide (LASIX) 20 MG tablet Take 10 mg by mouth 3 times daily   Yes Historical Provider, MD   HORIZANT 600 MG TBCR Take 600 mg by mouth daily.  In the evening 2/17/22  Yes Historical Provider, MD   memantine (NAMENDA) 10 MG tablet Take 1 tablet by mouth 2 times daily  Patient taking differently: Take 5 mg by mouth 2 times daily 11/19/20  Yes Shaina Callahan MD   baclofen (LIORESAL) 5 MG tablet Take 1 tablet by mouth 2 times daily 11/19/20  Yes Shaina Callahan MD   vitamin D 25 MCG (1000 UT) CAPS Take 1 capsule by mouth daily 11/20/20  Yes Shaina Callahan MD   atorvastatin (LIPITOR) 40 MG tablet Take 1 tablet by mouth nightly 11/13/20  Yes Tatianna Upton MD   rOPINIRole (REQUIP) 0.5 MG tablet Take 5 tablets by mouth 3 times daily  Patient taking differently: Take 5 mg by mouth 3 times daily 6/4/20  Yes Tatianna Upton MD   dorzolamide (TRUSOPT) 2 % ophthalmic solution Place 1 drop into both eyes 3 times daily 6/27/16  Yes Nannette Bland MD   latanoprost (XALATAN) 0.005 % ophthalmic solution Place 1 drop into both eyes nightly 6/27/16  Yes Nannette Bland MD PARoxetine (PAXIL) 20 MG tablet Take 1 tablet by mouth daily 4/16/22   Ela Bridges MD   hydrALAZINE (APRESOLINE) 50 MG tablet Take 1 tablet by mouth every 8 hours 4/16/22   Ela Bridges MD   tiotropium (SPIRIVA) 18 MCG inhalation capsule Inhale 18 mcg into the lungs daily    Historical Provider, MD   ipratropium-albuterol (DUONEB) 0.5-2.5 (3) MG/3ML SOLN nebulizer solution Inhale 3 mLs into the lungs every 4 hours as needed for Shortness of Breath 12/12/21   Murray Banuelos DO   folic acid-pyridoxine-cyancobalamin (FOLTX) 1.13-25-2 MG TABS Take 1 tablet by mouth daily 11/20/20 12/20/20  Chela Hernandes MD   aspirin 81 MG chewable tablet Take 1 tablet by mouth daily 11/14/20   Ramana Orozco MD   vitamin B-12 (CYANOCOBALAMIN) 1000 MCG tablet Take 1,000 mcg by mouth daily    Historical Provider, MD   magnesium hydroxide (MILK OF MAGNESIA) 400 MG/5ML suspension Take 30 mLs by mouth daily as needed for Constipation    Historical Provider, MD   albuterol sulfate  (90 Base) MCG/ACT inhaler Inhale 2 puffs into the lungs every 6 hours as needed for Shortness of Breath     Historical Provider, MD   acetaminophen 650 MG TABS Take 650 mg by mouth every 4 hours as needed 6/27/16   Hodan Bustamante MD       Physical Exam: Need 8 Elements   Physical Exam  Constitutional:       General: She is not in acute distress. Appearance: She is not toxic-appearing. Eyes:      Comments: Patient is blind since the 1980's as reported per her granddaughter. Pulmonary:      Breath sounds: Normal breath sounds. Abdominal:      General: Bowel sounds are normal.   Musculoskeletal:      Cervical back: Normal range of motion. Skin:     General: Skin is warm and dry. Neurological:      Mental Status: She is alert. She is disoriented. Psychiatric:         Attention and Perception: She perceives auditory hallucinations. Mood and Affect: Mood is anxious. Cognition and Memory: Cognition is impaired. Past Medical History:   PMHx   Past Medical History:   Diagnosis Date    Arthritis     Blind left eye     Cancer ([facility] [address] 1971    colon    COPD (chronic obstructive pulmonary disease) (HCC)     Dementia (HCC)     Depression     GERD (gastroesophageal reflux disease)     Hypertension     Pneumonia     Restless legs syndrome     Tremors of nervous system     Unspecified cerebral artery occlusion with cerebral infarction      PSHX:  has a past surgical history that includes Hysterectomy (1972); Spine surgery (1995); Abdomen surgery; fracture surgery; Appendectomy; Endoscopy, colon, diagnostic; vascular surgery; back surgery; Colonoscopy; and eye surgery. Allergies: Allergies   Allergen Reactions    Other      Pt states allergy to unknown antibiotic that made her arm red      Fam HX: family history includes Arthritis in her mother; Cancer in her brother, mother, and sister; Diabetes in her daughter and mother; Heart Disease in her mother; High Blood Pressure in her daughter and mother; Areta Resendez / [place] in her mother. Soc HX:   Social History     Socioeconomic History    Marital status:     Number of children: 7   Tobacco Use    Smoking status: Former     Packs/day: 0.25     Years: 50.00     Pack years: 12.50     Types: Cigarettes    Smokeless tobacco: Never    Tobacco comments:     states smokes 1-2 cigs a day   Vaping Use    Vaping Use: Never used   Substance and Sexual Activity    Alcohol use: No     Alcohol/week: 0.0 standard drinks    Drug use: No    Sexual activity: Never       Medications:   Medications:    Infusions:   PRN Meds:     Labs      CBC: No results for input(s): WBC, HGB, PLT in the last 72 hours. BMP:  No results for input(s): NA, K, CL, CO2, BUN, CREATININE, GLUCOSE in the last 72 hours. Hepatic: No results for input(s): AST, ALT, ALB, BILITOT, ALKPHOS in the last 72 hours.   Lipids:   Lab Results   Component Value Date/Time    CHOL 182 11/12/2020 04:23 AM    HDL 59 11/12/2020 04:23 AM    TRIG 105 11/12/2020 04:23 AM     Hemoglobin A1C:   Lab Results   Component Value Date/Time    LABA1C 5.4 09/22/2020 12:35 PM     TSH: No results found for: TSH  Troponin:   Lab Results   Component Value Date/Time    TROPONINT <0.010 04/13/2022 11:01 PM    TROPONINT <0.010 10/13/2021 12:34 PM    TROPONINT <0.010 10/13/2021 06:00 AM     Lactic Acid: No results for input(s): LACTA in the last 72 hours. BNP: No results for input(s): PROBNP in the last 72 hours. UA:  Lab Results   Component Value Date/Time    NITRU POSITIVE 04/13/2022 11:40 PM    NITRU Negative 03/03/2020 08:11 PM    NITRU NEGATIVE 06/17/2013 08:31 PM    COLORU YELLOW 04/13/2022 11:40 PM    PHUR 6.0 03/03/2020 08:11 PM    PHUR 6.0 03/03/2020 08:11 PM    WBCUA 24 10/12/2021 03:48 PM    RBCUA 4 10/12/2021 03:48 PM    MUCUS RARE 11/15/2020 07:00 AM    TRICHOMONAS NONE SEEN 01/24/2021 03:13 AM    BACTERIA MANY 10/12/2021 03:48 PM    CLARITYU CLEAR 04/13/2022 11:40 PM    SPECGRAV 1.015 04/13/2022 11:40 PM    LEUKOCYTESUR LARGE NUMBER OR AMOUNT OF 04/13/2022 11:40 PM    UROBILINOGEN 0.2 04/13/2022 11:40 PM    BILIRUBINUR NEGATIVE 04/13/2022 11:40 PM    BLOODU SMALL NUMBER OR AMOUNT OBSERVED 04/13/2022 11:40 PM    GLUCOSEU Negative 03/03/2020 08:11 PM    Wilene Lacy NEGATIVE 04/13/2022 11:40 PM     Urine Cultures: No results found for: LABURIN  Blood Cultures: No results found for: BC  No results found for: BLOODCULT2  Organism:   Lab Results   Component Value Date/Time    ORG ENC 08/24/2018 06:15 PM       Imaging/Diagnostics Last 24 Hours   No results found.     Personally reviewed Lab Studies, Imaging, and discussed case with Dr. Beau Stiles    Electronically signed by DUANE Reeder CNP on 11/22/2022 at 11:52 AM

## 2022-11-22 NOTE — PROGRESS NOTES
Hospitalist notified of patients arrival to unit. Patient confused, agitated at times.     ARIANNA PhillipsN, RN

## 2022-11-22 NOTE — PROGRESS NOTES
Patient confused and alert to self only. Admission questions unable to be completed at this time d/t confusion.      Elvin Mejia, BSN, RN

## 2022-11-22 NOTE — PROGRESS NOTES
Patient has not urinated since arrival to unit. Abdomen distended. Patient bladder scanned, 740 ml showed on scan. Hospitalist notified, new orders for straight cath received. Patient straight cathed using sterile procedure. 350 ml, pale yellow urine returned immediately. Patient agitated during procedure. UA sent to lab.      Lauri Garcia, ARIANNAN, RN

## 2022-11-23 PROBLEM — G92.9 TOXIC ENCEPHALOPATHY: Status: ACTIVE | Noted: 2022-11-23

## 2022-11-23 LAB
ALBUMIN SERPL-MCNC: 3.6 GM/DL (ref 3.4–5)
ALP BLD-CCNC: 62 IU/L (ref 40–128)
ALT SERPL-CCNC: 12 U/L (ref 10–40)
ANION GAP SERPL CALCULATED.3IONS-SCNC: 12 MMOL/L (ref 4–16)
AST SERPL-CCNC: 22 IU/L (ref 15–37)
BILIRUB SERPL-MCNC: 1.1 MG/DL (ref 0–1)
BUN BLDV-MCNC: 12 MG/DL (ref 6–23)
CALCIUM SERPL-MCNC: 8.6 MG/DL (ref 8.3–10.6)
CHLORIDE BLD-SCNC: 108 MMOL/L (ref 99–110)
CO2: 21 MMOL/L (ref 21–32)
CREAT SERPL-MCNC: 0.8 MG/DL (ref 0.6–1.1)
CULTURE: NORMAL
FOLATE: >20 NG/ML (ref 3.1–17.5)
GFR SERPL CREATININE-BSD FRML MDRD: >60 ML/MIN/1.73M2
GLUCOSE BLD-MCNC: 78 MG/DL (ref 70–99)
Lab: NORMAL
MAGNESIUM: 1.9 MG/DL (ref 1.8–2.4)
POTASSIUM SERPL-SCNC: 3.3 MMOL/L (ref 3.5–5.1)
SODIUM BLD-SCNC: 141 MMOL/L (ref 135–145)
SPECIMEN: NORMAL
TOTAL PROTEIN: 5.8 GM/DL (ref 6.4–8.2)
VITAMIN B-12: 1071 PG/ML (ref 211–911)
VITAMIN D 25-HYDROXY: 42.72 NG/ML

## 2022-11-23 PROCEDURE — 82607 VITAMIN B-12: CPT

## 2022-11-23 PROCEDURE — G0378 HOSPITAL OBSERVATION PER HR: HCPCS

## 2022-11-23 PROCEDURE — 94640 AIRWAY INHALATION TREATMENT: CPT

## 2022-11-23 PROCEDURE — 83735 ASSAY OF MAGNESIUM: CPT

## 2022-11-23 PROCEDURE — 94761 N-INVAS EAR/PLS OXIMETRY MLT: CPT

## 2022-11-23 PROCEDURE — 6360000002 HC RX W HCPCS

## 2022-11-23 PROCEDURE — 96366 THER/PROPH/DIAG IV INF ADDON: CPT

## 2022-11-23 PROCEDURE — 82746 ASSAY OF FOLIC ACID SERUM: CPT

## 2022-11-23 PROCEDURE — 99213 OFFICE O/P EST LOW 20 MIN: CPT | Performed by: INTERNAL MEDICINE

## 2022-11-23 PROCEDURE — 6370000000 HC RX 637 (ALT 250 FOR IP): Performed by: INTERNAL MEDICINE

## 2022-11-23 PROCEDURE — 6360000002 HC RX W HCPCS: Performed by: INTERNAL MEDICINE

## 2022-11-23 PROCEDURE — 96367 TX/PROPH/DG ADDL SEQ IV INF: CPT

## 2022-11-23 PROCEDURE — 6370000000 HC RX 637 (ALT 250 FOR IP): Performed by: NURSE PRACTITIONER

## 2022-11-23 PROCEDURE — 99203 OFFICE O/P NEW LOW 30 MIN: CPT | Performed by: NURSE PRACTITIONER

## 2022-11-23 PROCEDURE — 99205 OFFICE O/P NEW HI 60 MIN: CPT | Performed by: STUDENT IN AN ORGANIZED HEALTH CARE EDUCATION/TRAINING PROGRAM

## 2022-11-23 PROCEDURE — 51798 US URINE CAPACITY MEASURE: CPT

## 2022-11-23 PROCEDURE — 2580000003 HC RX 258: Performed by: NURSE PRACTITIONER

## 2022-11-23 PROCEDURE — 80053 COMPREHEN METABOLIC PANEL: CPT

## 2022-11-23 PROCEDURE — 6370000000 HC RX 637 (ALT 250 FOR IP)

## 2022-11-23 PROCEDURE — 82306 VITAMIN D 25 HYDROXY: CPT

## 2022-11-23 PROCEDURE — 2580000003 HC RX 258

## 2022-11-23 PROCEDURE — 36415 COLL VENOUS BLD VENIPUNCTURE: CPT

## 2022-11-23 PROCEDURE — 96361 HYDRATE IV INFUSION ADD-ON: CPT

## 2022-11-23 RX ORDER — MAGNESIUM SULFATE 1 G/100ML
1000 INJECTION INTRAVENOUS ONCE
Status: COMPLETED | OUTPATIENT
Start: 2022-11-23 | End: 2022-11-23

## 2022-11-23 RX ORDER — DIVALPROEX SODIUM 125 MG/1
125 CAPSULE, COATED PELLETS ORAL DAILY PRN
Status: DISCONTINUED | OUTPATIENT
Start: 2022-11-23 | End: 2022-11-27 | Stop reason: HOSPADM

## 2022-11-23 RX ORDER — POTASSIUM CHLORIDE 7.45 MG/ML
10 INJECTION INTRAVENOUS
Status: COMPLETED | OUTPATIENT
Start: 2022-11-23 | End: 2022-11-23

## 2022-11-23 RX ORDER — DIVALPROEX SODIUM 125 MG/1
125 CAPSULE, COATED PELLETS ORAL DAILY
Status: DISCONTINUED | OUTPATIENT
Start: 2022-11-23 | End: 2022-11-27 | Stop reason: HOSPADM

## 2022-11-23 RX ORDER — DIVALPROEX SODIUM 125 MG/1
250 CAPSULE, COATED PELLETS ORAL NIGHTLY
Status: DISCONTINUED | OUTPATIENT
Start: 2022-11-23 | End: 2022-11-27 | Stop reason: HOSPADM

## 2022-11-23 RX ADMIN — MAGNESIUM SULFATE 1000 MG: 1 INJECTION INTRAVENOUS at 13:06

## 2022-11-23 RX ADMIN — ROPINIROLE HYDROCHLORIDE 1 MG: 1 TABLET, FILM COATED ORAL at 13:00

## 2022-11-23 RX ADMIN — ROPINIROLE HYDROCHLORIDE 1 MG: 1 TABLET, FILM COATED ORAL at 21:44

## 2022-11-23 RX ADMIN — POTASSIUM CHLORIDE 10 MEQ: 7.45 INJECTION INTRAVENOUS at 21:43

## 2022-11-23 RX ADMIN — FUROSEMIDE 10 MG: 20 TABLET ORAL at 11:49

## 2022-11-23 RX ADMIN — CEFTRIAXONE SODIUM 1000 MG: 1 INJECTION, POWDER, FOR SOLUTION INTRAMUSCULAR; INTRAVENOUS at 11:48

## 2022-11-23 RX ADMIN — SODIUM CHLORIDE: 9 INJECTION, SOLUTION INTRAVENOUS at 00:40

## 2022-11-23 RX ADMIN — TIOTROPIUM BROMIDE INHALATION SPRAY 2 PUFF: 3.12 SPRAY, METERED RESPIRATORY (INHALATION) at 07:43

## 2022-11-23 RX ADMIN — APIXABAN 5 MG: 5 TABLET, FILM COATED ORAL at 11:49

## 2022-11-23 RX ADMIN — MEMANTINE 5 MG: 5 TABLET ORAL at 11:50

## 2022-11-23 RX ADMIN — APIXABAN 5 MG: 5 TABLET, FILM COATED ORAL at 21:44

## 2022-11-23 RX ADMIN — DIVALPROEX SODIUM 125 MG: 125 CAPSULE, COATED PELLETS ORAL at 13:00

## 2022-11-23 RX ADMIN — ATORVASTATIN CALCIUM 40 MG: 40 TABLET, FILM COATED ORAL at 21:44

## 2022-11-23 RX ADMIN — SODIUM CHLORIDE, PRESERVATIVE FREE 10 ML: 5 INJECTION INTRAVENOUS at 21:44

## 2022-11-23 RX ADMIN — ROPINIROLE HYDROCHLORIDE 1 MG: 1 TABLET, FILM COATED ORAL at 11:49

## 2022-11-23 RX ADMIN — FUROSEMIDE 10 MG: 20 TABLET ORAL at 13:00

## 2022-11-23 RX ADMIN — SODIUM CHLORIDE, PRESERVATIVE FREE 10 ML: 5 INJECTION INTRAVENOUS at 15:13

## 2022-11-23 RX ADMIN — MEMANTINE 5 MG: 5 TABLET ORAL at 21:45

## 2022-11-23 RX ADMIN — POTASSIUM CHLORIDE 10 MEQ: 7.45 INJECTION INTRAVENOUS at 18:46

## 2022-11-23 RX ADMIN — DIVALPROEX SODIUM 250 MG: 125 CAPSULE, COATED PELLETS ORAL at 21:45

## 2022-11-23 RX ADMIN — LATANOPROST 1 DROP: 50 SOLUTION/ DROPS OPHTHALMIC at 21:40

## 2022-11-23 RX ADMIN — FUROSEMIDE 10 MG: 20 TABLET ORAL at 21:44

## 2022-11-23 NOTE — CONSULTS
Initial Psychiatric History and Physical    Kashmir Neighbours  [id number]  11/22/2022 11/23/22    ID: Patient is a 80 yrs y.o. female    CC: please let me sleep    Source of Information: [facility] Notes, Patient and nursing. HPI: Patient is a 80year old  female with PMHx of being legally blind, RLS, Dementia, HTN, COPD, GERD, DVT, Chronic ischemic heart disease who presents to [facility] with AMS with hallucinations. Patient arrived on unit combative and agitation. Per Mar no calming medications provided. Per care everywhere, patient has a hx of alzheimer's vs vascular dementia with hallucinations that are progressively getting worse. Met with patient at bedside. She is restless, just fell asleep but woke and was able to answer some questions before stating, \":Please let me go to sleep. \"  She was polite and cooperative. She believes she is at home. Does state, \"I can't see. \" When asked to open her eyes and look around her room for orientation. She does not answer further orientation questions, but tries to go back to sleep. Per sitter she has been agitated but redirectable. She did not sleep last night. When awake today she is \"talking to people who are not there. \"    Per Roxann Houston her nurse, patient has been limited in speech but was more willing to talk about growing up in [facility]. She was not oriented. Past Psychiatric History:   Hx of major neurocognitive disorder  Namenda 10 mg bid,    Paxil 20 mg po daily  Vitamin d 25 mcg,  Vit b 12 1000 mcg    Social History  Patient has 8 children. She lives with her granddaughter.   Family Psychiatric History:   Family History   Problem Relation Age of Onset    Arthritis Mother     Cancer Mother     Diabetes Mother     Heart Disease Mother     High Blood Pressure Mother     Miscarriages / [place] Mother     Cancer Sister     Cancer Brother     Diabetes Daughter     High Blood Pressure Daughter Allergies:   Allergies   Allergen Reactions    Other      Pt states allergy to unknown antibiotic that made her arm red         OBJECTIVE  Vital Signs:  Vitals:    11/23/22 0743   BP:    Pulse: 78   Resp: 18   Temp:    SpO2: 94%       Labs:  Recent Results (from the past 48 hour(s))   EKG 12 Lead    Collection Time: 11/22/22 11:05 AM   Result Value Ref Range    Ventricular Rate 98 BPM    Atrial Rate 98 BPM    P-R Interval 180 ms    QRS Duration 76 ms    Q-T Interval 458 ms    QTc Calculation (Bazett) 584 ms    P Axis 45 degrees    R Axis -21 degrees    T Axis -12 degrees    Diagnosis       Normal sinus rhythm  ST & T wave abnormality, consider inferior ischemia  ST & T wave abnormality, consider anterior ischemia  Abnormal ECG  When compared with ECG of 14-APR-2022 01:25,  T wave inversion now evident in Inferior leads  QT has lengthened     Rapid influenza A/B antigens    Collection Time: 11/22/22 11:12 AM    Specimen: Nasopharyngeal   Result Value Ref Range    Rapid Influenza A Ag NEGATIVE NEGATIVE    Rapid Influenza B Ag NEGATIVE NEGATIVE   COVID-19, Rapid    Collection Time: 11/22/22 11:12 AM    Specimen: Nasopharyngeal   Result Value Ref Range    Source NARES     SARS-CoV-2, NAAT NOT DETECTED NOT DETECTED   Blood Gas, Venous    Collection Time: 11/22/22 11:15 AM   Result Value Ref Range    pH, Venkata 7.44 (H) 7.32 - 7.42    pCO2, Venkata 41 38 - 52 mmHG    pO2, Venkata 73 (H) 28 - 48 mmHG    Base Exc, Mixed 3.3 (H) 0 - 2.3    HCO3, Venous 27.8 (H) 19 - 25 MMOL/L    O2 Sat, Venkata 92.8 (H) 50 - 70 %    Comment 2LNC    Lactic Acid    Collection Time: 11/22/22 12:49 PM   Result Value Ref Range    Lactate 1.3 0.4 - 2.0 mMOL/L   Comprehensive Metabolic Panel    Collection Time: 11/22/22 12:49 PM   Result Value Ref Range    Sodium 142 135 - 145 MMOL/L    Potassium 3.5 3.5 - 5.1 MMOL/L    Chloride 110 99 - 110 mMol/L    CO2 23 21 - 32 MMOL/L    BUN 14 6 - 23 MG/DL    Creatinine 0.8 0.6 - 1.1 MG/DL    Est, Glom Filt Rate >60 >60 mL/min/1.73m2    Glucose 98 70 - 99 MG/DL    Calcium 8.9 8.3 - 10.6 MG/DL    Albumin 4.0 3.4 - 5.0 GM/DL    Total Protein 6.5 6.4 - 8.2 GM/DL    Total Bilirubin 1.1 (H) 0.0 - 1.0 MG/DL    ALT 10 10 - 40 U/L    AST 16 15 - 37 IU/L    Alkaline Phosphatase 64 40 - 128 IU/L    Anion Gap 9 4 - 16   CBC with Auto Differential    Collection Time: 11/22/22 12:49 PM   Result Value Ref Range    WBC 8.7 4.0 - 10.5 K/CU MM    RBC 4.42 4.2 - 5.4 M/CU MM    Hemoglobin 10.7 (L) 12.5 - 16.0 GM/DL    Hematocrit 35.5 (L) 37 - 47 %    MCV 80.3 78 - 100 FL    MCH 24.2 (L) 27 - 31 PG    MCHC 30.1 (L) 32.0 - 36.0 %    RDW 17.2 (H) 11.7 - 14.9 %    Platelets 885 831 - 481 K/CU MM    MPV 10.0 7.5 - 11.1 FL    Differential Type AUTOMATED DIFFERENTIAL     Segs Relative 67.6 (H) 36 - 66 %    Lymphocytes % 20.5 (L) 24 - 44 %    Monocytes % 9.6 (H) 0 - 4 %    Eosinophils % 1.6 0 - 3 %    Basophils % 0.2 0 - 1 %    Segs Absolute 5.9 K/CU MM    Lymphocytes Absolute 1.8 K/CU MM    Monocytes Absolute 0.8 K/CU MM    Eosinophils Absolute 0.1 K/CU MM    Basophils Absolute 0.0 K/CU MM    Nucleated RBC % 0.0 %    Total Nucleated RBC 0.0 K/CU MM    Total Immature Neutrophil 0.04 K/CU MM    Immature Neutrophil % 0.5 (H) 0 - 0.43 %   TSH    Collection Time: 11/22/22 12:49 PM   Result Value Ref Range    TSH, High Sensitivity 1.690 0.270 - 4.20 uIu/ml   Ammonia    Collection Time: 11/22/22 12:49 PM   Result Value Ref Range    Ammonia 25 11 - 51 UMOL/L   CK    Collection Time: 11/22/22 12:49 PM   Result Value Ref Range    Total  26 - 140 IU/L   Troponin    Collection Time: 11/22/22 12:50 PM   Result Value Ref Range    Troponin T <0.010 <0.01 NG/ML   Troponin    Collection Time: 11/22/22  5:08 PM   Result Value Ref Range    Troponin T <0.010 <0.01 NG/ML   Comprehensive Metabolic Panel w/ Reflex to MG    Collection Time: 11/23/22  5:09 AM   Result Value Ref Range    Sodium 141 135 - 145 MMOL/L    Potassium 3.3 (L) 3.5 - 5.1 MMOL/L    Chloride 108 99 - 110 mMol/L    CO2 21 21 - 32 MMOL/L    BUN 12 6 - 23 MG/DL    Creatinine 0.8 0.6 - 1.1 MG/DL    Est, Glom Filt Rate >60 >60 mL/min/1.73m2    Glucose 78 70 - 99 MG/DL    Calcium 8.6 8.3 - 10.6 MG/DL    Albumin 3.6 3.4 - 5.0 GM/DL    Total Protein 5.8 (L) 6.4 - 8.2 GM/DL    Total Bilirubin 1.1 (H) 0.0 - 1.0 MG/DL    ALT 12 10 - 40 U/L    AST 22 15 - 37 IU/L    Alkaline Phosphatase 62 40 - 128 IU/L    Anion Gap 12 4 - 16   Magnesium    Collection Time: 11/23/22  5:09 AM   Result Value Ref Range    Magnesium 1.9 1.8 - 2.4 mg/dl       Review of Systems:  Reports of no current cardiovascular, respiratory, gastrointestinal, genitourinary, integumentary, neurological, muscuoskeletal, or immunological symptoms today. PSYCHIATRIC: See HPI above. PSYCHIATRIC EXAMINATION / MENTAL STATUS EXAM    CONSTITUTIONAL:    Vitals:   Vitals:    11/23/22 0743   BP:    Pulse: 78   Resp: 18   Temp:    SpO2: 94%      General appearance: [x] appears age, []  appears older than stated age,               [x]  adequately dressed and groomed, [] disheveled,               [x]  in no acute distress, [] appears mildly distressed, [] other           MUSCULOSKELETAL:   Gait:   [] normal, [] antalgic, [] unsteady, [x] gait not evaluated   Station:             [] erect, [] sitting, [x] recumbent, [] other        Strength/tone:  [x] muscle strength and tone appear consistent with age and                                        condition     [] atrophy      [] abnormal movements     Vitals: Blood pressure (!) 110/43, pulse 78, temperature 99.7 °F (37.6 °C), temperature source Oral, resp. rate 18, SpO2 94 %, not currently breastfeeding. CONSTITUTIONAL:    Appearance: appears stated age. alert and oriented to person, disoriented to  place, time & situation. no acute distress. Adequate grooming and hygeine. No eye contact- would not open her eyes. No prominent physical abnormalities. Attitude:  Manner is cooperative and pleasant  Motor: Noted mild psychomotor agitation, deniesretardation or abnormal movements noted  Speech: Clearly articulated; normal rate, volume, tone & amount. Language: ? intact understanding and production  Mood:\"I'm fine\"  Affect: euthymic, full range, non-labile, congruent with mood and content of speech  Thought Production: Spontaneous. Thought Form: Coherent, linear, logical & goal-directed. No tangentiality or circumstantiality. No flight of ideas or loosening of associations. Thought Content/Perceptions: No TAMMY, noted VH, denies AH   no delusion  Insight:impaired  Judgment-questionable  Memory: Immediate, recent, and remote appear impaired though not formally tested. Attention: maintained throughout interview  Fund of knowledge: Average  Gait/Balance: mirza    Impression:   Delirium due to general medical  Major neurocognitive disorder   psychosis      Problem List:   Altered mental status, unspecified    Plan:  As qtc is  584, do NOT recommend antipsychotics at this time ; recommend continuing to monitor qtc. When below 500 can schedule risperdal 0.5 mg po at bedtime to address hallucinations  Review of labs. Note  , platelets 078 on 90/36/45, urine pending  If patient is agitated, again avoid antipsychotics. Can use depakote sprinkles 125 mg  po prn daily.   Recommended depakote sprinkles 125 mg po daily and 250 mg po at bedtime for mood and agitation  If patient becomes agitated in the evening before nighttime dose of depakote, can add depakote 125 mg sprinkles at dinner time to assist.  Psychiatry will follow  Thank you for this consult  PS to Dr Sherri Barajas regarding recommendations    Electronically signed by DUANE Ty CNP on 11/23/2022 at 10:13 AM

## 2022-11-23 NOTE — CONSULTS
Neurology Service Consult Note  [address]   Patient Name: Blanka Fernandez  : 1931        Subjective:   Reason for consult: Altered mental status  80 y.o. female with history of colon cancer, COPD, dementia, depression, HTN, RLS, tremor, vision impairment, presenting to [place] [id number] as a transfer from [facility] ED with altered mental status and hallucinations. Chart review notes that patient has history of waxing and waning mental status. She was alert to self only at time of arrival.  Granddaughter reports the patient lives at home and is currently in hospice care. Plan is to return home with hospice. Patient was also recently started on antibiotic for suspected UTI. Chart was reviewed in detail    Patient was seen and assessed. She is lying in bed with eyes closed does respond to voice but is alert only to self. When asked where she is she tells me home. When asked to open her eyes she states \"I cannot see\" she is moving all 4 extremities purposefully but not to command. Patient does not follow any commands or participate in exam.  Sitter is at bedside.   No family present during exam      Past Medical History:   Diagnosis Date    Arthritis     Blind left eye     Cancer ([facility] [address]     colon    COPD (chronic obstructive pulmonary disease) ([facility] [address]     Dementia ([facility] [address]     Depression     GERD (gastroesophageal reflux disease)     Hypertension     Pneumonia     Restless legs syndrome     Tremors of nervous system     Unspecified cerebral artery occlusion with cerebral infarction     :   Past Surgical History:   Procedure Laterality Date    ABDOMINAL SURGERY      APPENDECTOMY      BACK SURGERY      COLONOSCOPY      ENDOSCOPY, COLON, DIAGNOSTIC      EYE SURGERY      FRACTURE SURGERY      HYSTERECTOMY      SPINE SURGERY      VASCULAR SURGERY       Medications:  Scheduled Meds:   potassium bicarb-citric acid  40 mEq Oral Once    magnesium sulfate  1,000 mg IntraVENous Once    sodium chloride flush  5-40 mL IntraVENous 2 times per day    cefTRIAXone (ROCEPHIN) IV  1,000 mg IntraVENous Q24H    furosemide  10 mg Oral TID    memantine  5 mg Oral BID    rOPINIRole  1 mg Oral TID    latanoprost  1 drop Both Eyes Nightly    [Held by provider] Gabapentin Enacarbil ER  600 mg Oral QPM    atorvastatin  40 mg Oral Nightly    apixaban  5 mg Oral BID    tiotropium  2 puff Inhalation Daily    pantoprazole  40 mg Oral QAM AC     Continuous Infusions:   sodium chloride 25 mL/hr at 11/22/22 1215    sodium chloride 100 mL/hr at 11/23/22 0040     PRN Meds:.sodium chloride flush, sodium chloride, ondansetron **OR** ondansetron, polyethylene glycol, acetaminophen **OR** acetaminophen    Allergies   Allergen Reactions    Other      Pt states allergy to unknown antibiotic that made her arm red      Social History     Socioeconomic History    Marital status:       Spouse name: Not on file    Number of children: 7    Years of education: Not on file    Highest education level: Not on file   Occupational History    Not on file   Tobacco Use    Smoking status: Former     Packs/day: 0.25     Years: 50.00     Pack years: 12.50     Types: Cigarettes    Smokeless tobacco: Never    Tobacco comments:     states smokes 1-2 cigs a day   Vaping Use    Vaping Use: Never used   Substance and Sexual Activity    Alcohol use: No     Alcohol/week: 0.0 standard drinks    Drug use: No    Sexual activity: Never   Other Topics Concern    Not on file   Social History Narrative    Not on file     Social Determinants of Health     Financial Resource Strain: Not on file   Food Insecurity: Not on file   Transportation Needs: Not on file   Physical Activity: Not on file   Stress: Not on file   Social Connections: Not on file   Intimate Partner Violence: Not on file   Housing Stability: Not on file      Family History   Problem Relation Age of Onset    Arthritis Mother     Cancer Mother     Diabetes Mother Heart Disease Mother     High Blood Pressure Mother     Miscarriages / [place] Mother     Cancer Sister     Cancer Brother     Diabetes Daughter     High Blood Pressure Daughter          ROS (10 systems)  patient unable to answer questions    Physical Exam:       Wt Readings from Last 3 Encounters:   04/14/22 160 lb 11.2 oz (72.9 kg)   12/12/21 170 lb (77.1 kg)   10/12/21 168 lb (76.2 kg)     Temp Readings from Last 3 Encounters:   11/23/22 99.7 °F (37.6 °C) (Oral)   04/16/22 98.1 °F (36.7 °C) (Oral)   12/12/21 98.7 °F (37.1 °C) (Oral)     BP Readings from Last 3 Encounters:   11/23/22 (!) 110/43   04/16/22 (!) 119/58   12/12/21 (!) 125/48     Pulse Readings from Last 3 Encounters:   11/23/22 78   04/16/22 69   12/12/21 69        Gen: A&O x self, NAD, does not cooperate with exam  HEENT: NC/AT, EOMI, PERRL, visually impaired at baseline, mmm, neck supple, no meningeal signs  Heart: NSR  Lungs: Respirations even and unlabored  Ext: no edema, no calf tenderness b/l  Psych: Noninteractive  Skin: no rashes or lesions    NEUROLOGIC EXAM:    Mental Status: A&O to self only,  NAD, speech clear, language fluent, unable to follow commands    Cranial Nerve Exam:   CN II-XII: PERRL, VFF, no nystagmus, no gaze paresis, sensation V1-V3 intact b/l, muscles of facial expression symmetric; hearing intact to conversational tone, palate elevates symmetrically, shoulder elevation symmetric and tongue protrudes midline with movement side to side.     Motor Exam:       Strength 5/5 UE's/LE's b/l  Tone and bulk normal   No pronator drift    Deep Tendon Reflexes: 1/4 biceps, triceps, brachioradialis, patellar, and achilles b/l; flexor plantar responses b/l    Sensation: Intact light touch/pinprick/vibration UE's/LE's b/l    Coordination/Cerebellum:       Tremors--none      Rapidly alternating movements: ANGI               Heel-to-Shin: ANGI       Finger-to-Nose: ANGI    Gait and stance:      Gait: deferred      LABS:     Recent Labs 11/22/22  1249 11/23/22  0509   WBC 8.7  --     141   K 3.5 3.3*    108   CO2 23 21   BUN 14 12   CREATININE 0.8 0.8   GLUCOSE 98 78       IMAGING:    CT head w/o contrast at OSH:  1.  No acute intracranial findings. There is no evidence for hemorrhage, mass effect, or acute large territory infarct. 2.  Atrophy and chronic small vessel ischemic changes. All imaging was personally reviewed    ASSESSMENT/PLAN:   44-year-old female with history of dementia presenting with altered mental status. Patient is laying in bed today alert to self only. Eyes are closed, vision is impaired at baseline. When eyes are opened during exam noted to have cloudiness bilaterally. Moves all 4 extremities purposefully with good strength but not to command. Noninteractive, does not participate in exam.  Speech is clear language is clipped but fluent. Altered mental status likely secondary to metabolic encephalopathy superimposed on UTI and progression of underlying dementia. Neuroimaging as above, nonacute  Patient with no focal or lateralizing deficits on exam, do not feel MRI is warranted at this time  ? UTI -management per IM  Urine culture pending  B12 1071, folate > 20  Psych on board for medication management  Do not feel that there is any acute primary neurologic process. Suspect patient is having progression of underlying dementia. No further recommendations at this time. Neurology will sign off. Please contact us with any new concerns    > 60 minutes of time spent included chart review, obtaining history, patient examination, developing plan of care, and documentation. Patient was discussed with attending neurologist Dr. Rosie Rodriguez. Thank you for allowing us to participate in the care of your patient. If there are any questions regarding evaluation please feel free to contact us.      DUANE Whitt - RO, 11/23/2022     ------------------------------------    Attending Note:  I have rounded on this patient with MIYA Aguero. I have reviewed the chart and we have discussed this case in detail. The patient was seen and examined by myself. Pertinent labs and imaging have been personally reviewed. Our findings and impressions were discussed with the patient. I concur with the Nurse Specialist's assessment and plan. Patient Seen and evaluated at bedside this afternoon. She is asleep when I enter the room. She does awaken to my touch but is largely noninteractive. Neck is supple. Low suspicion for meningitis as etiology for patient's altered mentation. Metabolic work-up is largely unremarkable. Do suspect that this is likely a toxic encephalopathy in the setting of an underlying UTI. It is my understanding that urine cultures are pending as patient was recently started on antibiotics. Unclear which antibiotic she was on but certainly a antibiotic toxicity could be considered if this was cefepime or Levaquin prior to admission. Ultimately appropriate treatment would be transitioning off of that antibiotic and it does not appear that she is on either of these at this hospital stay. Additional consideration would be for progression of underlying dementia.       Yovana Kamara DO 8/13/2022 2:08 PM

## 2022-11-23 NOTE — CONSULTS
CARDIOLOGY CONSULT NOTE   Reason for consultation:  ELEVATED TROP    Referring physician:  Kyree Puckett MD     Primary care physician: Keyanna Espinoza, APRN - CNP      Dear Kyree Puckett MD  Thanks for the consult. History of present illness:Karime is a 80 y. o.year old who  presents with mental status admit to medical floor with one-to-one observation patient is very confused and is delirious and cannot give any history and cannot contribute to physical exam all. After review of medical record discussion with staff, cardiac consult was called for elevated but her troponin is normal      Blood pressure, cholesterol, blood glucose and weight are well controlled. Past medical history:    has a past medical history of Arthritis, Blind left eye, Cancer ([address] COPD (chronic obstructive pulmonary disease) ([facility] [address] Dementia ([address] Depression, GERD (gastroesophageal reflux disease), Hypertension, Pneumonia, Restless legs syndrome, Tremors of nervous system, and Unspecified cerebral artery occlusion with cerebral infarction. Past surgical history:   has a past surgical history that includes Hysterectomy (1972); Spine surgery (1995); Abdomen surgery; fracture surgery; Appendectomy; Endoscopy, colon, diagnostic; vascular surgery; back surgery; Colonoscopy; and eye surgery. Social History:   reports that she has quit smoking. Her smoking use included cigarettes. She has a 12.50 pack-year smoking history. She has never used smokeless tobacco. She reports that she does not drink alcohol and does not use drugs.   Family history:   no family history of CAD, STROKE of DM    Allergies   Allergen Reactions    Other      Pt states allergy to unknown antibiotic that made her arm red        potassium bicarb-citric acid (EFFER-K) effervescent tablet 40 mEq, Once  magnesium sulfate 1000 mg in dextrose 5% 100 mL IVPB, Once  divalproex (DEPAKOTE SPRINKLE) capsule 125 mg, Daily  divalproex (DEPAKOTE SPRINKLE) capsule 250 mg, Nightly  divalproex (DEPAKOTE SPRINKLE) capsule 125 mg, Daily PRN  sodium chloride flush 0.9 % injection 5-40 mL, 2 times per day  sodium chloride flush 0.9 % injection 5-40 mL, PRN  0.9 % sodium chloride infusion, PRN  ondansetron (ZOFRAN-ODT) disintegrating tablet 4 mg, Q8H PRN   Or  ondansetron (ZOFRAN) injection 4 mg, Q6H PRN  polyethylene glycol (GLYCOLAX) packet 17 g, Daily PRN  acetaminophen (TYLENOL) tablet 650 mg, Q6H PRN   Or  acetaminophen (TYLENOL) suppository 650 mg, Q6H PRN  cefTRIAXone (ROCEPHIN) 1,000 mg in dextrose 5 % 50 mL IVPB mini-bag, Q24H  furosemide (LASIX) tablet 10 mg, TID  memantine (NAMENDA) tablet 5 mg, BID  rOPINIRole (REQUIP) tablet 1 mg, TID  latanoprost (XALATAN) 0.005 % ophthalmic solution 1 drop, Nightly  [Held by provider] Gabapentin Enacarbil ER TBCR 600 mg (Patient Supplied), QPM  atorvastatin (LIPITOR) tablet 40 mg, Nightly  apixaban (ELIQUIS) tablet 5 mg, BID  tiotropium (SPIRIVA RESPIMAT) 2.5 MCG/ACT inhaler 2 puff, Daily  pantoprazole (PROTONIX) tablet 40 mg, QAM AC      Current Facility-Administered Medications   Medication Dose Route Frequency Provider Last Rate Last Admin    potassium bicarb-citric acid (EFFER-K) effervescent tablet 40 mEq  40 mEq Oral Once Tom Ybarra MD        magnesium sulfate 1000 mg in dextrose 5% 100 mL IVPB  1,000 mg IntraVENous Once Tom Ybarra  mL/hr at 11/23/22 1306 1,000 mg at 11/23/22 1306    divalproex (DEPAKOTE SPRINKLE) capsule 125 mg  125 mg Oral Daily Real Whiteside, APRN - CNP   125 mg at 11/23/22 1300    divalproex (DEPAKOTE SPRINKLE) capsule 250 mg  250 mg Oral Nightly Real Whiteside, APRN - CNP        divalproex (DEPAKOTE SPRINKLE) capsule 125 mg  125 mg Oral Daily PRN Real Whiteside, APRN - CNP        sodium chloride flush 0.9 % injection 5-40 mL  5-40 mL IntraVENous 2 times per day Debra Dawson, APRN - CNP   10 mL at 11/22/22 2002    sodium chloride flush 0.9 % injection 5-40 mL  5-40 mL IntraVENous PRN Debra D Onyedumekwu, APRN - CNP        0.9 % sodium chloride infusion   IntraVENous PRN Debra D Onyedumekwu, APRN - CNP 25 mL/hr at 11/22/22 1215 New Bag at 11/22/22 1215    ondansetron (ZOFRAN-ODT) disintegrating tablet 4 mg  4 mg Oral Q8H PRN Debra D Onyedumekwu, APRN - CNP        Or    ondansetron (ZOFRAN) injection 4 mg  4 mg IntraVENous Q6H PRN Debra D Onyedumekwu, APRN - CNP        polyethylene glycol (GLYCOLAX) packet 17 g  17 g Oral Daily PRN Debra D Onyedumekwu, APRN - CNP        acetaminophen (TYLENOL) tablet 650 mg  650 mg Oral Q6H PRN Debra D Onyedumekwu, APRN - CNP        Or    acetaminophen (TYLENOL) suppository 650 mg  650 mg Rectal Q6H PRN Debra D Onyedumekwu, APRN - CNP        cefTRIAXone (ROCEPHIN) 1,000 mg in dextrose 5 % 50 mL IVPB mini-bag  1,000 mg IntraVENous Q24H Debra D Onyedumekwu, APRN -  mL/hr at 11/23/22 1148 1,000 mg at 11/23/22 1148    furosemide (LASIX) tablet 10 mg  10 mg Oral TID Debra D Onyedumekwu, APRN - CNP   10 mg at 11/23/22 1300    memantine (NAMENDA) tablet 5 mg  5 mg Oral BID Debra D Onyedumekwu, APRN - CNP   5 mg at 11/23/22 1150    rOPINIRole (REQUIP) tablet 1 mg  1 mg Oral TID Debra D Onyedumekwu, APRN - CNP   1 mg at 11/23/22 1300    latanoprost (XALATAN) 0.005 % ophthalmic solution 1 drop  1 drop Both Eyes Nightly Debra D Onyedumekwu, APRN - CNP        [Held by provider] Gabapentin Enacarbil ER TBCR 600 mg (Patient Supplied)  600 mg Oral QPM Debra D Onyedumekwu, APRN - CNP        atorvastatin (LIPITOR) tablet 40 mg  40 mg Oral Nightly Debra D Oncaro, APRN - CNP   40 mg at 11/22/22 2002    apixaban (ELIQUIS) tablet 5 mg  5 mg Oral BID Debra D Oncaro, APRN - CNP   5 mg at 11/23/22 1149    tiotropium (SPIRIVA RESPIMAT) 2.5 MCG/ACT inhaler 2 puff  2 puff Inhalation Daily Debra D ALON DawsonN - CNP   2 puff at 11/23/22 0743    pantoprazole (PROTONIX) tablet 40 mg  40 mg Oral QAM AC Debra D Oncaro, APRN - CNP         Review of Systems: aorta ausculation), femoral arteries pulse and amplitude are normal no bruit, pedal pulses are normal  GI:  Bowel sounds normal, Soft, No tenderness, No masses, No pulsatile masses, no hepatosplenomegally, no bruits  : External genitalia appear normal, No masses or lesions. No discharge. No CVA tenderness. Musculoskeletal:  Intact distal pulses, No edema, No tenderness, No cyanosis, No clubbing. Good range of motion in all major joints. No tenderness to palpation or major deformities noted. Back- No tenderness. Integument:  Warm, Dry, No erythema, No rash. Skin: no rash, no ulcers  Lymphatic:  No lymphadenopathy noted. Neurologic:  Alert & oriented x 3, Normal motor function, Normal sensory function, No focal deficits noted. Psychiatric:  Affect normal, Judgment normal, Mood normal.   Lab Review   Recent Labs     11/22/22  1249   WBC 8.7   HGB 10.7*   HCT 35.5*         Recent Labs     11/23/22  0509      K 3.3*      CO2 21   BUN 12   CREATININE 0.8     Recent Labs     11/23/22  0509   AST 22   ALT 12   BILITOT 1.1*   ALKPHOS 62     No results for input(s): TROPONINI in the last 72 hours. Lab Results   Component Value Date    BNP 22 06/05/2013    BNP 21 04/02/2013    BNP 31 08/07/2011     Lab Results   Component Value Date    INR 0.94 10/13/2021    PROTIME 12.1 10/13/2021         EKG: Normal sinus rhythm    Chest Xray:    ECHO: Echo was normal last year  Labs, echo, meds reviewed  Assessment: 80 y. o.year old with PMH of  has a past medical history of Arthritis, Blind left eye, Cancer ([address] COPD (chronic obstructive pulmonary disease) ([address] Dementia ([address] Depression, GERD (gastroesophageal reflux disease), Hypertension, Pneumonia, Restless legs syndrome, Tremors of nervous system, and Unspecified cerebral artery occlusion with cerebral infarction. Recommendations:     Altered mental status of unknown etiology, recommend acute intervention and neurology consult, no signs of ACS noted for his negative will not recommend further cardiac work-up any one-to-one observation  All labs, medications and tests reviewed, continue all other medications of all above medical condition listed as is.          George Thomason MD, 11/23/2022 1:58 PM

## 2022-11-23 NOTE — PROGRESS NOTES
V2.0  [facility] Hospitalist Progress Note      Name:  Cony Owens /Age/Sex: 1931  (80 y.o. female)   MRN & CSN:  [id number] & [id number] Encounter Date/Time: 2022 8:19 AM EST    Location:  [address] [place], [place] [address] PCP: Dorota Miles, [address] [facility] Day: 2    Assessment and Plan:   Cony Owens is a 80 y.o. female with PMH of COPD, GERD, DVT, RLS, chronic ischemic heart disease, and dementia who presents with Altered mental status, unspecified    #AMS with hallucinations, no clear etiology  -Ddx: acute delirium, polypharmacy, UTI, urinary retention   -h/o likely dementia  -hallucinations, intermittent confusion   -Has seen in neurology in the past  -per granddaughter--> Started antibiotics prior to admission due to urinary frequency and dysuria and home UA with blood  -UA: negative for infection (likely because patient has been on antibiotics)  -CXR: no acute findings  -CT head: no acute intracranial findings, atrophy and chronic small vessel ischemic changes  -VBG with normal pCO2 level  -Normal TSH, ammonia level  -low procal     Plan:  Neurology consulted, appreciate recs  Psych consulted, appreciate recs  Vitamin B12 level  Follow up on urine and blood culture   EKG in the morning to assess QTc    Per psych:  As qtc is  584, do NOT recommend antipsychotics at this time ; recommend continuing to monitor qtc. When below 500 can schedule risperdal 0.5 mg po at bedtime to address hallucinations  Review of labs. Note  , platelets 587 on , urine pending  If patient is agitated, again avoid antipsychotics. Can use depakote sprinkles 125 mg  po prn daily. Recommended depakote sprinkles 125 mg po daily and 250 mg po at bedtime for mood and agitation  If patient becomes agitated in the evening before nighttime dose of depakote, can add depakote 125 mg sprinkles at dinner time to assist.    #Urinary retention  -bladder with 800 mL     Plan:   Peterson catheter    #Lactic acidosis-resolved  -unknown cause  -LA: 3.1--> 1.1    #Hypokalemia    Plan:  Replete as necessary    #Elevated troponin in the setting of demand ischemia-resolved  -cardiology was consulted--> not ACS    Chronic medical problems:   #Legally blind    #COPD  -in hospice for end stage COPD  Continue home inhalers    #GERD  Continue PPI    #Chronic lacunar bilateral cerebellar infarcts  Continue eliquis and statin    #h/o DVT  Continue eliquis    #RLS  #Essential tremor  Continue requip and horizant    #CAD  -Echo with EF 50-55% in 2021  -stress test 11/2020 shows no infarct or ischemia    Of note, patient is on hospice for end-stage COPD--> no documents in regards to this  Granddaughter is apparently POA per outside ED records, she did not present paperwork at the time        Diet No diet orders on file   DVT Prophylaxis [] Lovenox, []  Heparin, [] SCDs, [] Ambulation,  [] Eliquis, [] Xarelto  [] Coumadin   Code Status Limited   Disposition From: home with hospice  Expected Disposition: home with hospice  Estimated Date of Discharge: 1-2 days  Patient requires continued admission due to AMS   Surrogate Decision Maker/ AMANDA Gracia     Subjective:     Chief Complaint: No chief complaint on file. Patient did not sleep at night and would not wake up when I saw her this morning         Review of Systems:    Unable to obtain due to medical problem. Objective:      Intake/Output Summary (Last 24 hours) at 11/23/2022 0819  Last data filed at 11/23/2022 0612  Gross per 24 hour   Intake --   Output 750 ml   Net -750 ml        Vitals:   Vitals:    11/23/22 0743   BP:    Pulse: 78   Resp: 18   Temp:    SpO2: 94%       Physical Exam:     General: sleeping  Eyes: EOMI  HENT: Atraumatic  CVS: Normal S1 and S2, no murmurs, RRR  Respiratory: Normal breath sounds, clear to auscultation bilaterally, no respiratory distress  GI: Normal bowel sounds, soft, nondistended, tenderness present  MSK: no lower extremity edema  Skin: Intact, dry, warm, no rashes    Medications:   Medications:    sodium chloride flush  5-40 mL IntraVENous 2 times per day    cefTRIAXone (ROCEPHIN) IV  1,000 mg IntraVENous Q24H    furosemide  10 mg Oral TID    memantine  5 mg Oral BID    rOPINIRole  1 mg Oral TID    latanoprost  1 drop Both Eyes Nightly    [Held by provider] Gabapentin Enacarbil ER  600 mg Oral QPM    atorvastatin  40 mg Oral Nightly    apixaban  5 mg Oral BID    tiotropium  2 puff Inhalation Daily    pantoprazole  40 mg Oral QAM AC      Infusions:    sodium chloride 25 mL/hr at 11/22/22 1215    sodium chloride 100 mL/hr at 11/23/22 0040     PRN Meds: sodium chloride flush, 5-40 mL, PRN  sodium chloride, , PRN  ondansetron, 4 mg, Q8H PRN   Or  ondansetron, 4 mg, Q6H PRN  polyethylene glycol, 17 g, Daily PRN  acetaminophen, 650 mg, Q6H PRN   Or  acetaminophen, 650 mg, Q6H PRN        Labs      Recent Results (from the past 24 hour(s))   EKG 12 Lead    Collection Time: 11/22/22 11:05 AM   Result Value Ref Range    Ventricular Rate 98 BPM    Atrial Rate 98 BPM    P-R Interval 180 ms    QRS Duration 76 ms    Q-T Interval 458 ms    QTc Calculation (Bazett) 584 ms    P Axis 45 degrees    R Axis -21 degrees    T Axis -12 degrees    Diagnosis       Normal sinus rhythm  ST & T wave abnormality, consider inferior ischemia  ST & T wave abnormality, consider anterior ischemia  Abnormal ECG  When compared with ECG of 14-APR-2022 01:25,  T wave inversion now evident in Inferior leads  QT has lengthened     Rapid influenza A/B antigens    Collection Time: 11/22/22 11:12 AM    Specimen: Nasopharyngeal   Result Value Ref Range    Rapid Influenza A Ag NEGATIVE NEGATIVE    Rapid Influenza B Ag NEGATIVE NEGATIVE   COVID-19, Rapid    Collection Time: 11/22/22 11:12 AM    Specimen: Nasopharyngeal   Result Value Ref Range    Source NARES     SARS-CoV-2, NAAT NOT DETECTED NOT DETECTED   Blood Gas, Venous    Collection Time: 11/22/22 11:15 AM   Result Value Ref Range    pH, Venkata 7.44 (H) 7.32 - 7.42    pCO2, Venkata 41 38 - 52 mmHG    pO2, Venkata 73 (H) 28 - 48 mmHG    Base Exc, Mixed 3.3 (H) 0 - 2.3    HCO3, Venous 27.8 (H) 19 - 25 MMOL/L    O2 Sat, Venkata 92.8 (H) 50 - 70 %    Comment 2LNC    Lactic Acid    Collection Time: 11/22/22 12:49 PM   Result Value Ref Range    Lactate 1.3 0.4 - 2.0 mMOL/L   Comprehensive Metabolic Panel    Collection Time: 11/22/22 12:49 PM   Result Value Ref Range    Sodium 142 135 - 145 MMOL/L    Potassium 3.5 3.5 - 5.1 MMOL/L    Chloride 110 99 - 110 mMol/L    CO2 23 21 - 32 MMOL/L    BUN 14 6 - 23 MG/DL    Creatinine 0.8 0.6 - 1.1 MG/DL    Est, Glom Filt Rate >60 >60 mL/min/1.73m2    Glucose 98 70 - 99 MG/DL    Calcium 8.9 8.3 - 10.6 MG/DL    Albumin 4.0 3.4 - 5.0 GM/DL    Total Protein 6.5 6.4 - 8.2 GM/DL    Total Bilirubin 1.1 (H) 0.0 - 1.0 MG/DL    ALT 10 10 - 40 U/L    AST 16 15 - 37 IU/L    Alkaline Phosphatase 64 40 - 128 IU/L    Anion Gap 9 4 - 16   CBC with Auto Differential    Collection Time: 11/22/22 12:49 PM   Result Value Ref Range    WBC 8.7 4.0 - 10.5 K/CU MM    RBC 4.42 4.2 - 5.4 M/CU MM    Hemoglobin 10.7 (L) 12.5 - 16.0 GM/DL    Hematocrit 35.5 (L) 37 - 47 %    MCV 80.3 78 - 100 FL    MCH 24.2 (L) 27 - 31 PG    MCHC 30.1 (L) 32.0 - 36.0 %    RDW 17.2 (H) 11.7 - 14.9 %    Platelets 961 216 - 396 K/CU MM    MPV 10.0 7.5 - 11.1 FL    Differential Type AUTOMATED DIFFERENTIAL     Segs Relative 67.6 (H) 36 - 66 %    Lymphocytes % 20.5 (L) 24 - 44 %    Monocytes % 9.6 (H) 0 - 4 %    Eosinophils % 1.6 0 - 3 %    Basophils % 0.2 0 - 1 %    Segs Absolute 5.9 K/CU MM    Lymphocytes Absolute 1.8 K/CU MM    Monocytes Absolute 0.8 K/CU MM    Eosinophils Absolute 0.1 K/CU MM    Basophils Absolute 0.0 K/CU MM    Nucleated RBC % 0.0 %    Total Nucleated RBC 0.0 K/CU MM    Total Immature Neutrophil 0.04 K/CU MM    Immature Neutrophil % 0.5 (H) 0 - 0.43 %   TSH    Collection Time: 11/22/22 12:49 PM   Result Value Ref Range    TSH, High Sensitivity 1.690 0.270 - 4.20 uIu/ml   Ammonia    Collection Time: 11/22/22 12:49 PM   Result Value Ref Range    Ammonia 25 11 - 51 UMOL/L   CK    Collection Time: 11/22/22 12:49 PM   Result Value Ref Range    Total  26 - 140 IU/L   Troponin    Collection Time: 11/22/22 12:50 PM   Result Value Ref Range    Troponin T <0.010 <0.01 NG/ML   Troponin    Collection Time: 11/22/22  5:08 PM   Result Value Ref Range    Troponin T <0.010 <0.01 NG/ML   Comprehensive Metabolic Panel w/ Reflex to MG    Collection Time: 11/23/22  5:09 AM   Result Value Ref Range    Sodium 141 135 - 145 MMOL/L    Potassium 3.3 (L) 3.5 - 5.1 MMOL/L    Chloride 108 99 - 110 mMol/L    CO2 21 21 - 32 MMOL/L    BUN 12 6 - 23 MG/DL    Creatinine 0.8 0.6 - 1.1 MG/DL    Est, Glom Filt Rate >60 >60 mL/min/1.73m2    Glucose 78 70 - 99 MG/DL    Calcium 8.6 8.3 - 10.6 MG/DL    Albumin 3.6 3.4 - 5.0 GM/DL    Total Protein 5.8 (L) 6.4 - 8.2 GM/DL    Total Bilirubin 1.1 (H) 0.0 - 1.0 MG/DL    ALT 12 10 - 40 U/L    AST 22 15 - 37 IU/L    Alkaline Phosphatase 62 40 - 128 IU/L    Anion Gap 12 4 - 16   Magnesium    Collection Time: 11/23/22  5:09 AM   Result Value Ref Range    Magnesium 1.9 1.8 - 2.4 mg/dl        Imaging/Diagnostics Last 24 Hours   No results found.     Electronically signed by Jeremi Archer MD on 11/23/2022 at 8:19 AM

## 2022-11-23 NOTE — PLAN OF CARE
Problem: Discharge Planning  Goal: Discharge to home or other facility with appropriate resources  Outcome: Progressing  Flowsheets (Taken 11/22/2022 0930 by Candace Whiteside RN)  Discharge to home or other facility with appropriate resources:   Identify barriers to discharge with patient and caregiver   Arrange for needed discharge resources and transportation as appropriate   Identify discharge learning needs (meds, wound care, etc)   Refer to discharge planning if patient needs post-hospital services based on physician order or complex needs related to functional status, cognitive ability or social support system     Problem: Pain  Goal: Verbalizes/displays adequate comfort level or baseline comfort level  Outcome: Progressing     Problem: Skin/Tissue Integrity  Goal: Absence of new skin breakdown  Description: 1. Monitor for areas of redness and/or skin breakdown  2. Assess vascular access sites hourly  3. Every 4-6 hours minimum:  Change oxygen saturation probe site  4. Every 4-6 hours:  If on nasal continuous positive airway pressure, respiratory therapy assess nares and determine need for appliance change or resting period.   Outcome: Progressing

## 2022-11-23 NOTE — PROGRESS NOTES
Verbal order received from Dr. Raunlfo English to place Peterson in pt d/t pts abd distended and pt not urinating throughout the day. Peterson placed 800ml output noted.

## 2022-11-23 NOTE — CARE COORDINATION
Reviewed chart: per pt's granddghtr pt lives at home and is active with [facility] and plans to resume upon discharge. TC to [facility], spoke with clinical director Mena and pt will be able to resume services upon discharge. They will need to be called at [phone] and fax info to [phone]. Discharging Nurse; Upon discharge please notify [facility]  at  [phone] and fax info to [phone].

## 2022-11-24 ENCOUNTER — APPOINTMENT (OUTPATIENT)
Dept: GENERAL RADIOLOGY | Age: 87
DRG: 092 | End: 2022-11-24
Attending: STUDENT IN AN ORGANIZED HEALTH CARE EDUCATION/TRAINING PROGRAM
Payer: MEDICARE

## 2022-11-24 LAB
ANION GAP SERPL CALCULATED.3IONS-SCNC: 11 MMOL/L (ref 4–16)
BASOPHILS ABSOLUTE: 0 K/CU MM
BASOPHILS RELATIVE PERCENT: 0.5 % (ref 0–1)
BUN BLDV-MCNC: 14 MG/DL (ref 6–23)
CALCIUM SERPL-MCNC: 8.5 MG/DL (ref 8.3–10.6)
CHLORIDE BLD-SCNC: 109 MMOL/L (ref 99–110)
CO2: 23 MMOL/L (ref 21–32)
CREAT SERPL-MCNC: 0.8 MG/DL (ref 0.6–1.1)
DIFFERENTIAL TYPE: ABNORMAL
EKG ATRIAL RATE: 71 BPM
EKG ATRIAL RATE: 98 BPM
EKG DIAGNOSIS: NORMAL
EKG DIAGNOSIS: NORMAL
EKG P AXIS: 22 DEGREES
EKG P AXIS: 45 DEGREES
EKG P-R INTERVAL: 160 MS
EKG P-R INTERVAL: 180 MS
EKG Q-T INTERVAL: 458 MS
EKG Q-T INTERVAL: 504 MS
EKG QRS DURATION: 76 MS
EKG QRS DURATION: 86 MS
EKG QTC CALCULATION (BAZETT): 547 MS
EKG QTC CALCULATION (BAZETT): 584 MS
EKG R AXIS: -21 DEGREES
EKG R AXIS: -22 DEGREES
EKG T AXIS: -12 DEGREES
EKG T AXIS: -2 DEGREES
EKG VENTRICULAR RATE: 71 BPM
EKG VENTRICULAR RATE: 98 BPM
EOSINOPHILS ABSOLUTE: 0.3 K/CU MM
EOSINOPHILS RELATIVE PERCENT: 5.3 % (ref 0–3)
GFR SERPL CREATININE-BSD FRML MDRD: >60 ML/MIN/1.73M2
GLUCOSE BLD-MCNC: 72 MG/DL (ref 70–99)
HCT VFR BLD CALC: 33.5 % (ref 37–47)
HEMOGLOBIN: 10.2 GM/DL (ref 12.5–16)
IMMATURE NEUTROPHIL %: 0.3 % (ref 0–0.43)
LYMPHOCYTES ABSOLUTE: 1.3 K/CU MM
LYMPHOCYTES RELATIVE PERCENT: 21.6 % (ref 24–44)
MAGNESIUM: 2.1 MG/DL (ref 1.8–2.4)
MCH RBC QN AUTO: 24.3 PG (ref 27–31)
MCHC RBC AUTO-ENTMCNC: 30.4 % (ref 32–36)
MCV RBC AUTO: 79.8 FL (ref 78–100)
MONOCYTES ABSOLUTE: 0.7 K/CU MM
MONOCYTES RELATIVE PERCENT: 12 % (ref 0–4)
NUCLEATED RBC %: 0 %
PDW BLD-RTO: 17.2 % (ref 11.7–14.9)
PLATELET # BLD: 286 K/CU MM (ref 140–440)
PMV BLD AUTO: 9.5 FL (ref 7.5–11.1)
POTASSIUM SERPL-SCNC: 3.3 MMOL/L (ref 3.5–5.1)
RBC # BLD: 4.2 M/CU MM (ref 4.2–5.4)
SEGMENTED NEUTROPHILS ABSOLUTE COUNT: 3.5 K/CU MM
SEGMENTED NEUTROPHILS RELATIVE PERCENT: 60.3 % (ref 36–66)
SODIUM BLD-SCNC: 143 MMOL/L (ref 135–145)
TOTAL IMMATURE NEUTOROPHIL: 0.02 K/CU MM
TOTAL NUCLEATED RBC: 0 K/CU MM
WBC # BLD: 5.8 K/CU MM (ref 4–10.5)

## 2022-11-24 PROCEDURE — 2580000003 HC RX 258

## 2022-11-24 PROCEDURE — G0378 HOSPITAL OBSERVATION PER HR: HCPCS

## 2022-11-24 PROCEDURE — 6370000000 HC RX 637 (ALT 250 FOR IP)

## 2022-11-24 PROCEDURE — 93010 ELECTROCARDIOGRAM REPORT: CPT | Performed by: INTERNAL MEDICINE

## 2022-11-24 PROCEDURE — 96376 TX/PRO/DX INJ SAME DRUG ADON: CPT

## 2022-11-24 PROCEDURE — 93005 ELECTROCARDIOGRAM TRACING: CPT | Performed by: INTERNAL MEDICINE

## 2022-11-24 PROCEDURE — 6360000002 HC RX W HCPCS: Performed by: INTERNAL MEDICINE

## 2022-11-24 PROCEDURE — 6360000002 HC RX W HCPCS

## 2022-11-24 PROCEDURE — 36415 COLL VENOUS BLD VENIPUNCTURE: CPT

## 2022-11-24 PROCEDURE — 80048 BASIC METABOLIC PNL TOTAL CA: CPT

## 2022-11-24 PROCEDURE — 71045 X-RAY EXAM CHEST 1 VIEW: CPT

## 2022-11-24 PROCEDURE — 83735 ASSAY OF MAGNESIUM: CPT

## 2022-11-24 PROCEDURE — 85025 COMPLETE CBC W/AUTO DIFF WBC: CPT

## 2022-11-24 PROCEDURE — 94761 N-INVAS EAR/PLS OXIMETRY MLT: CPT

## 2022-11-24 PROCEDURE — 6370000000 HC RX 637 (ALT 250 FOR IP): Performed by: NURSE PRACTITIONER

## 2022-11-24 RX ORDER — POTASSIUM CHLORIDE 7.45 MG/ML
10 INJECTION INTRAVENOUS
Status: COMPLETED | OUTPATIENT
Start: 2022-11-24 | End: 2022-11-24

## 2022-11-24 RX ADMIN — POTASSIUM CHLORIDE 10 MEQ: 7.45 INJECTION INTRAVENOUS at 09:49

## 2022-11-24 RX ADMIN — SODIUM CHLORIDE, PRESERVATIVE FREE 10 ML: 5 INJECTION INTRAVENOUS at 09:36

## 2022-11-24 RX ADMIN — POTASSIUM CHLORIDE 10 MEQ: 7.45 INJECTION INTRAVENOUS at 10:48

## 2022-11-24 RX ADMIN — FUROSEMIDE 10 MG: 20 TABLET ORAL at 20:43

## 2022-11-24 RX ADMIN — ROPINIROLE HYDROCHLORIDE 1 MG: 1 TABLET, FILM COATED ORAL at 14:17

## 2022-11-24 RX ADMIN — PANTOPRAZOLE SODIUM 40 MG: 40 TABLET, DELAYED RELEASE ORAL at 05:52

## 2022-11-24 RX ADMIN — ROPINIROLE HYDROCHLORIDE 1 MG: 1 TABLET, FILM COATED ORAL at 09:35

## 2022-11-24 RX ADMIN — DIVALPROEX SODIUM 250 MG: 125 CAPSULE, COATED PELLETS ORAL at 20:43

## 2022-11-24 RX ADMIN — ATORVASTATIN CALCIUM 40 MG: 40 TABLET, FILM COATED ORAL at 20:43

## 2022-11-24 RX ADMIN — MEMANTINE 5 MG: 5 TABLET ORAL at 09:35

## 2022-11-24 RX ADMIN — MEMANTINE 5 MG: 5 TABLET ORAL at 20:43

## 2022-11-24 RX ADMIN — ROPINIROLE HYDROCHLORIDE 1 MG: 1 TABLET, FILM COATED ORAL at 20:43

## 2022-11-24 RX ADMIN — APIXABAN 5 MG: 5 TABLET, FILM COATED ORAL at 09:35

## 2022-11-24 RX ADMIN — FUROSEMIDE 10 MG: 20 TABLET ORAL at 14:17

## 2022-11-24 RX ADMIN — DIVALPROEX SODIUM 125 MG: 125 CAPSULE, COATED PELLETS ORAL at 09:35

## 2022-11-24 RX ADMIN — CEFTRIAXONE SODIUM 1000 MG: 1 INJECTION, POWDER, FOR SOLUTION INTRAMUSCULAR; INTRAVENOUS at 14:26

## 2022-11-24 RX ADMIN — SODIUM CHLORIDE: 9 INJECTION, SOLUTION INTRAVENOUS at 09:45

## 2022-11-24 RX ADMIN — SODIUM CHLORIDE, PRESERVATIVE FREE 10 ML: 5 INJECTION INTRAVENOUS at 20:44

## 2022-11-24 RX ADMIN — APIXABAN 5 MG: 5 TABLET, FILM COATED ORAL at 20:43

## 2022-11-24 RX ADMIN — LATANOPROST 1 DROP: 50 SOLUTION/ DROPS OPHTHALMIC at 21:15

## 2022-11-24 RX ADMIN — FUROSEMIDE 10 MG: 20 TABLET ORAL at 09:35

## 2022-11-24 RX ADMIN — POTASSIUM CHLORIDE 10 MEQ: 7.45 INJECTION INTRAVENOUS at 12:12

## 2022-11-24 ASSESSMENT — PAIN SCALES - GENERAL: PAINLEVEL_OUTOF10: 0

## 2022-11-24 NOTE — PLAN OF CARE
Problem: Discharge Planning  Goal: Discharge to home or other facility with appropriate resources  Outcome: Progressing  Flowsheets (Taken 11/24/2022 4248)  Discharge to home or other facility with appropriate resources: Identify barriers to discharge with patient and caregiver     Problem: Pain  Goal: Verbalizes/displays adequate comfort level or baseline comfort level  Outcome: Progressing     Problem: Skin/Tissue Integrity  Goal: Absence of new skin breakdown  Description: 1. Monitor for areas of redness and/or skin breakdown  2. Assess vascular access sites hourly  3. Every 4-6 hours minimum:  Change oxygen saturation probe site  4. Every 4-6 hours:  If on nasal continuous positive airway pressure, respiratory therapy assess nares and determine need for appliance change or resting period. Outcome: Progressing     Problem: Safety - Medical Restraint  Goal: Remains free of injury from restraints (Restraint for Interference with Medical Device)  Description: INTERVENTIONS:  1. Determine that other, less restrictive measures have been tried or would not be effective before applying the restraint  2. Evaluate the patient's condition at the time of restraint application  3. Inform patient/family regarding the reason for restraint  4.  Q2H: Monitor safety, psychosocial status, comfort, nutrition and hydration  Outcome: Progressing  Flowsheets  Taken 11/24/2022 1100 by Micaela Stevens RN  Remains free of injury from restraints (restraint for interference with medical device): Every 2 hours: Monitor safety, psychosocial status, comfort, nutrition and hydration  Taken 11/24/2022 1000 by Micaela Stevens RN  Remains free of injury from restraints (restraint for interference with medical device): Every 2 hours: Monitor safety, psychosocial status, comfort, nutrition and hydration  Taken 11/24/2022 0920 by Micaela Stevens RN  Remains free of injury from restraints (restraint for interference with medical device): Every 2 hours: Monitor safety, psychosocial status, comfort, nutrition and hydration  Taken 11/24/2022 0900 by Marc Rock RN  Remains free of injury from restraints (restraint for interference with medical device): Every 2 hours: Monitor safety, psychosocial status, comfort, nutrition and hydration  Taken 11/24/2022 0800 by Maryjo Dubin, RN  Remains free of injury from restraints (restraint for interference with medical device): Every 2 hours: Monitor safety, psychosocial status, comfort, nutrition and hydration  Taken 11/24/2022 0700 by Maryjo Dubin, RN  Remains free of injury from restraints (restraint for interference with medical device):   Determine that other, less restrictive measures have been tried or would not be effective before applying the restraint   Evaluate the patient's condition at the time of restraint application   Inform patient/family regarding the reason for restraint   Every 2 hours: Monitor safety, psychosocial status, comfort, nutrition and hydration     Problem: Safety - Violent/Self-destructive Restraint  Goal: Remains free of injury from restraints (Restraint for Violent/Self-Destructive Behavior)  Description: INTERVENTIONS:  1. Determine that de-escalation and other, less restrictive measures have been tried or would not be effective before applying the restraint  2. Identify and document the criteria for restraint  3. Evaluate the patient's condition at the time of restraint application  4. Inform patient/family regarding the reason for restraint/seclusion  5. Q2H: Monitor comfort, nutrition and hydration needs  6. Q15M: Perform safety checks including skin, circulation, sensory, respiratory and psychological status  7. Ensure continuous observation  8.  Identify and implement measures to help patient regain control, assess readiness for release and initiate progressive release per policy  Outcome: Progressing  Flowsheets  Taken 11/24/2022 1800  Remains Free of Injury from Restraints (Restraint for Violent/Self-destructive Behavior): Ensure continuous observation  Taken 11/24/2022 1700  Remains Free of Injury from Restraints (Restraint for Violent/Self-destructive Behavior): Ensure continuous observation  Taken 11/24/2022 1600  Remains Free of Injury from Restraints (Restraint for Violent/Self-destructive Behavior): Ensure continuous observation  Taken 11/24/2022 1500  Remains Free of Injury from Restraints (Restraint for Violent/Self-destructive Behavior): Ensure continuous observation  Taken 11/24/2022 1300  Remains Free of Injury from Restraints (Restraint for Violent/Self-destructive Behavior): Ensure continuous observation  Taken 11/24/2022 1243  Remains Free of Injury from Restraints (Restraint for Violent/Self-destructive Behavior):   Ensure continuous observation   Every 2 hours: Monitor comfort, nutrition and hydration needs   Evaluate the patient's condition at the time of restraint application   Identify and document the criteria for restraint  Taken 11/24/2022 1200  Remains Free of Injury from Restraints (Restraint for Violent/Self-destructive Behavior): Ensure continuous observation  Taken 11/24/2022 1145  Remains Free of Injury from Restraints (Restraint for Violent/Self-destructive Behavior): Ensure continuous observation

## 2022-11-24 NOTE — PROGRESS NOTES
V2.0  [facility] [place] [facility]      Name:  Blanka Fernandez /Age/Sex: 1931  (80 y.o. female)   MRN & CSN:  [id number] & [id number] Encounter Date/Time: 2022 8:19 AM EST    Location:  [address] [place], [place] [address] PCP: Hernan Manzano, [address] [facility] Day: 3    Assessment and Plan:   Blanka Fernandez is a 80 y.o. female with PMH of COPD, GERD, DVT, RLS, chronic ischemic heart disease, and dementia who presents with Altered mental status, unspecified    #AMS with hallucinations, no clear etiology  -Ddx: acute delirium, polypharmacy, UTI, urinary retention   -h/o likely dementia  -hallucinations, intermittent confusion   -Has seen in neurology in the past  -per granddaughter--> Started antibiotics prior to admission due to urinary frequency and dysuria and home UA with blood  -UA: negative for infection (likely because patient has been on antibiotics)  -CXR: no acute findings  -CT head: no acute intracranial findings, atrophy and chronic small vessel ischemic changes  -VBG with normal pCO2 level  -Normal TSH, ammonia level  -low procal   -Neurology consulted--> signed off, likely not a primary neuro process    Plan:  Psych consulted, appreciate recs  Vitamin B12 level  Follow up on urine and blood culture   EKG in the morning to assess Qtc, replete lytes as needed  CXR     Per psych:  As qtc is  584, do NOT recommend antipsychotics at this time ; recommend continuing to monitor qtc. When below 500 can schedule risperdal 0.5 mg po at bedtime to address hallucinations  Review of labs. Note  , platelets 331 on , urine pending  If patient is agitated, again avoid antipsychotics. Can use depakote sprinkles 125 mg  po prn daily.   Recommended depakote sprinkles 125 mg po daily and 250 mg po at bedtime for mood and agitation  If patient becomes agitated in the evening before nighttime dose of depakote, can add depakote 125 mg sprinkles at dinner time to assist.    #Urinary retention  -bladder with 800 mL     Plan: Peterson catheter    #Lactic acidosis-resolved  -unknown cause  -LA: 3.1--> 1.1    #Hypokalemia    Plan:  Replete as necessary    #Elevated troponin in the setting of demand ischemia-resolved  -cardiology was consulted--> not ACS    Chronic medical problems:   #Legally blind    #COPD  -in hospice for end stage COPD  Continue home inhalers    #GERD  Continue PPI    #Chronic lacunar bilateral cerebellar infarcts  Continue eliquis and statin    #h/o DVT  Continue eliquis    #RLS  #Essential tremor  Continue requip and horizant    #CAD  -Echo with EF 50-55% in 2021  -stress test 11/2020 shows no infarct or ischemia    Of note, patient is on hospice for end-stage COPD--> will return when she is done with this hospitalization  Granddaughter is apparently POA per outside ED records, she did not present paperwork at the time        Diet ADULT DIET; Regular; Low Sodium (2 gm)   DVT Prophylaxis [] Lovenox, []  Heparin, [] SCDs, [] Ambulation,  [] Eliquis, [] Xarelto  [] Coumadin   Code Status Limited   Disposition From: home with hospice  Expected Disposition: home with hospice  Estimated Date of Discharge: 1-2 days  Patient requires continued admission due to AMS   Surrogate Decision Maker/ AMANDA Gracia     Subjective:     Chief Complaint: No chief complaint on file. Patient has been very violent with staff and attempts to take her mits off and bites staff members. When I saw her, she was sleeping comfortably. Review of Systems:    Unable to obtain due to medical problem. Objective:      Intake/Output Summary (Last 24 hours) at 11/24/2022 0858  Last data filed at 11/24/2022 0416  Gross per 24 hour   Intake 120 ml   Output 1300 ml   Net -1180 ml        Vitals:   Vitals:    11/24/22 0732   BP: (!) 115/57   Pulse: 76   Resp: 18   Temp: 98.4 °F (36.9 °C)   SpO2: 96%       Physical Exam:     General: sleeping  Eyes: EOMI  HENT: Atraumatic  Respiratory: no respiratory distress  GI: Normal bowel sounds, soft, nondistended, tenderness present  MSK: no lower extremity edema  Skin: Intact, dry, warm, no rashes    Medications:   Medications:    potassium chloride  10 mEq IntraVENous Q1H    potassium bicarb-citric acid  40 mEq Oral Once    divalproex  125 mg Oral Daily    divalproex  250 mg Oral Nightly    sodium chloride flush  5-40 mL IntraVENous 2 times per day    cefTRIAXone (ROCEPHIN) IV  1,000 mg IntraVENous Q24H    furosemide  10 mg Oral TID    memantine  5 mg Oral BID    rOPINIRole  1 mg Oral TID    latanoprost  1 drop Both Eyes Nightly    [Held by provider] Gabapentin Enacarbil ER  600 mg Oral QPM    atorvastatin  40 mg Oral Nightly    apixaban  5 mg Oral BID    tiotropium  2 puff Inhalation Daily    pantoprazole  40 mg Oral QAM AC      Infusions:    sodium chloride 25 mL/hr at 11/22/22 1215     PRN Meds: divalproex, 125 mg, Daily PRN  sodium chloride flush, 5-40 mL, PRN  sodium chloride, , PRN  ondansetron, 4 mg, Q8H PRN   Or  ondansetron, 4 mg, Q6H PRN  polyethylene glycol, 17 g, Daily PRN  acetaminophen, 650 mg, Q6H PRN   Or  acetaminophen, 650 mg, Q6H PRN      Labs      Recent Results (from the past 24 hour(s))   CBC with Auto Differential    Collection Time: 11/24/22  6:21 AM   Result Value Ref Range    WBC 5.8 4.0 - 10.5 K/CU MM    RBC 4.20 4.2 - 5.4 M/CU MM    Hemoglobin 10.2 (L) 12.5 - 16.0 GM/DL    Hematocrit 33.5 (L) 37 - 47 %    MCV 79.8 78 - 100 FL    MCH 24.3 (L) 27 - 31 PG    MCHC 30.4 (L) 32.0 - 36.0 %    RDW 17.2 (H) 11.7 - 14.9 %    Platelets 551 810 - 962 K/CU MM    MPV 9.5 7.5 - 11.1 FL    Differential Type AUTOMATED DIFFERENTIAL     Segs Relative 60.3 36 - 66 %    Lymphocytes % 21.6 (L) 24 - 44 %    Monocytes % 12.0 (H) 0 - 4 %    Eosinophils % 5.3 (H) 0 - 3 %    Basophils % 0.5 0 - 1 %    Segs Absolute 3.5 K/CU MM    Lymphocytes Absolute 1.3 K/CU MM    Monocytes Absolute 0.7 K/CU MM    Eosinophils Absolute 0.3 K/CU MM    Basophils Absolute 0.0 K/CU MM    Nucleated RBC % 0.0 %    Total Nucleated RBC 0.0 K/CU MM    Total Immature Neutrophil 0.02 K/CU MM    Immature Neutrophil % 0.3 0 - 0.43 %   Basic Metabolic Panel    Collection Time: 11/24/22  6:21 AM   Result Value Ref Range    Sodium 143 135 - 145 MMOL/L    Potassium 3.3 (L) 3.5 - 5.1 MMOL/L    Chloride 109 99 - 110 mMol/L    CO2 23 21 - 32 MMOL/L    Anion Gap 11 4 - 16    BUN 14 6 - 23 MG/DL    Creatinine 0.8 0.6 - 1.1 MG/DL    Est, Glom Filt Rate >60 >60 mL/min/1.73m2    Glucose 72 70 - 99 MG/DL    Calcium 8.5 8.3 - 10.6 MG/DL   Magnesium    Collection Time: 11/24/22  6:21 AM   Result Value Ref Range    Magnesium 2.1 1.8 - 2.4 mg/dl   EKG 12 Lead    Collection Time: 11/24/22  7:39 AM   Result Value Ref Range    Ventricular Rate 71 BPM    Atrial Rate 71 BPM    P-R Interval 160 ms    QRS Duration 86 ms    Q-T Interval 504 ms    QTc Calculation (Bazett) 547 ms    P Axis 22 degrees    R Axis -22 degrees    T Axis -2 degrees    Diagnosis       Normal sinus rhythm  Minimal voltage criteria for LVH, may be normal variant  Nonspecific T wave abnormality  Abnormal ECG  When compared with ECG of 22-NOV-2022 11:05,  T wave inversion no longer evident in Anterior leads          Imaging/Diagnostics Last 24 Hours   No results found.     Electronically signed by José Mckinnon MD on 11/24/2022 at 8:58 AM

## 2022-11-25 PROBLEM — R41.82 ALTERED MENTAL STATE: Status: ACTIVE | Noted: 2022-11-25

## 2022-11-25 LAB
ANION GAP SERPL CALCULATED.3IONS-SCNC: 8 MMOL/L (ref 4–16)
BASOPHILS ABSOLUTE: 0 K/CU MM
BASOPHILS RELATIVE PERCENT: 0.4 % (ref 0–1)
BUN BLDV-MCNC: 15 MG/DL (ref 6–23)
CALCIUM SERPL-MCNC: 8.9 MG/DL (ref 8.3–10.6)
CHLORIDE BLD-SCNC: 105 MMOL/L (ref 99–110)
CO2: 27 MMOL/L (ref 21–32)
CREAT SERPL-MCNC: 0.8 MG/DL (ref 0.6–1.1)
DIFFERENTIAL TYPE: ABNORMAL
EKG ATRIAL RATE: 72 BPM
EKG DIAGNOSIS: NORMAL
EKG P AXIS: 51 DEGREES
EKG P-R INTERVAL: 158 MS
EKG Q-T INTERVAL: 414 MS
EKG QRS DURATION: 86 MS
EKG QTC CALCULATION (BAZETT): 453 MS
EKG R AXIS: -24 DEGREES
EKG T AXIS: -1 DEGREES
EKG VENTRICULAR RATE: 72 BPM
EOSINOPHILS ABSOLUTE: 0.2 K/CU MM
EOSINOPHILS RELATIVE PERCENT: 4.7 % (ref 0–3)
GFR SERPL CREATININE-BSD FRML MDRD: >60 ML/MIN/1.73M2
GLUCOSE BLD-MCNC: 125 MG/DL (ref 70–99)
HCT VFR BLD CALC: 34.5 % (ref 37–47)
HEMOGLOBIN: 10.5 GM/DL (ref 12.5–16)
IMMATURE NEUTROPHIL %: 0.4 % (ref 0–0.43)
LYMPHOCYTES ABSOLUTE: 1.2 K/CU MM
LYMPHOCYTES RELATIVE PERCENT: 23.3 % (ref 24–44)
MAGNESIUM: 2.3 MG/DL (ref 1.8–2.4)
MCH RBC QN AUTO: 24.4 PG (ref 27–31)
MCHC RBC AUTO-ENTMCNC: 30.4 % (ref 32–36)
MCV RBC AUTO: 80 FL (ref 78–100)
MONOCYTES ABSOLUTE: 0.6 K/CU MM
MONOCYTES RELATIVE PERCENT: 11.5 % (ref 0–4)
NUCLEATED RBC %: 0 %
PDW BLD-RTO: 17.2 % (ref 11.7–14.9)
PLATELET # BLD: 282 K/CU MM (ref 140–440)
PMV BLD AUTO: 9.6 FL (ref 7.5–11.1)
POTASSIUM SERPL-SCNC: 3.5 MMOL/L (ref 3.5–5.1)
RBC # BLD: 4.31 M/CU MM (ref 4.2–5.4)
SEGMENTED NEUTROPHILS ABSOLUTE COUNT: 3.1 K/CU MM
SEGMENTED NEUTROPHILS RELATIVE PERCENT: 59.7 % (ref 36–66)
SODIUM BLD-SCNC: 140 MMOL/L (ref 135–145)
TOTAL IMMATURE NEUTOROPHIL: 0.02 K/CU MM
TOTAL NUCLEATED RBC: 0 K/CU MM
WBC # BLD: 5.2 K/CU MM (ref 4–10.5)

## 2022-11-25 PROCEDURE — 94640 AIRWAY INHALATION TREATMENT: CPT

## 2022-11-25 PROCEDURE — 96375 TX/PRO/DX INJ NEW DRUG ADDON: CPT

## 2022-11-25 PROCEDURE — 93010 ELECTROCARDIOGRAM REPORT: CPT | Performed by: INTERNAL MEDICINE

## 2022-11-25 PROCEDURE — 85025 COMPLETE CBC W/AUTO DIFF WBC: CPT

## 2022-11-25 PROCEDURE — 36415 COLL VENOUS BLD VENIPUNCTURE: CPT

## 2022-11-25 PROCEDURE — 80048 BASIC METABOLIC PNL TOTAL CA: CPT

## 2022-11-25 PROCEDURE — G0378 HOSPITAL OBSERVATION PER HR: HCPCS

## 2022-11-25 PROCEDURE — 2580000003 HC RX 258

## 2022-11-25 PROCEDURE — 6370000000 HC RX 637 (ALT 250 FOR IP)

## 2022-11-25 PROCEDURE — 94761 N-INVAS EAR/PLS OXIMETRY MLT: CPT

## 2022-11-25 PROCEDURE — 83735 ASSAY OF MAGNESIUM: CPT

## 2022-11-25 PROCEDURE — 93005 ELECTROCARDIOGRAM TRACING: CPT | Performed by: INTERNAL MEDICINE

## 2022-11-25 PROCEDURE — 6370000000 HC RX 637 (ALT 250 FOR IP): Performed by: NURSE PRACTITIONER

## 2022-11-25 PROCEDURE — 1200000000 HC SEMI PRIVATE

## 2022-11-25 PROCEDURE — 6360000002 HC RX W HCPCS: Performed by: INTERNAL MEDICINE

## 2022-11-25 RX ORDER — FUROSEMIDE 10 MG/ML
20 INJECTION INTRAMUSCULAR; INTRAVENOUS ONCE
Status: COMPLETED | OUTPATIENT
Start: 2022-11-25 | End: 2022-11-25

## 2022-11-25 RX ADMIN — MEMANTINE 5 MG: 5 TABLET ORAL at 10:36

## 2022-11-25 RX ADMIN — FUROSEMIDE 10 MG: 20 TABLET ORAL at 10:36

## 2022-11-25 RX ADMIN — TIOTROPIUM BROMIDE INHALATION SPRAY 2 PUFF: 3.12 SPRAY, METERED RESPIRATORY (INHALATION) at 08:25

## 2022-11-25 RX ADMIN — SODIUM CHLORIDE, PRESERVATIVE FREE 10 ML: 5 INJECTION INTRAVENOUS at 10:36

## 2022-11-25 RX ADMIN — ROPINIROLE HYDROCHLORIDE 1 MG: 1 TABLET, FILM COATED ORAL at 14:51

## 2022-11-25 RX ADMIN — FUROSEMIDE 20 MG: 10 INJECTION, SOLUTION INTRAVENOUS at 10:35

## 2022-11-25 RX ADMIN — ATORVASTATIN CALCIUM 40 MG: 40 TABLET, FILM COATED ORAL at 20:27

## 2022-11-25 RX ADMIN — ROPINIROLE HYDROCHLORIDE 1 MG: 1 TABLET, FILM COATED ORAL at 20:27

## 2022-11-25 RX ADMIN — MEMANTINE 5 MG: 5 TABLET ORAL at 20:28

## 2022-11-25 RX ADMIN — DIVALPROEX SODIUM 125 MG: 125 CAPSULE, COATED PELLETS ORAL at 10:36

## 2022-11-25 RX ADMIN — LATANOPROST 1 DROP: 50 SOLUTION/ DROPS OPHTHALMIC at 20:28

## 2022-11-25 RX ADMIN — DIVALPROEX SODIUM 250 MG: 125 CAPSULE, COATED PELLETS ORAL at 20:28

## 2022-11-25 RX ADMIN — PANTOPRAZOLE SODIUM 40 MG: 40 TABLET, DELAYED RELEASE ORAL at 05:35

## 2022-11-25 RX ADMIN — APIXABAN 5 MG: 5 TABLET, FILM COATED ORAL at 10:36

## 2022-11-25 RX ADMIN — ROPINIROLE HYDROCHLORIDE 1 MG: 1 TABLET, FILM COATED ORAL at 10:36

## 2022-11-25 RX ADMIN — FUROSEMIDE 10 MG: 20 TABLET ORAL at 14:51

## 2022-11-25 RX ADMIN — FUROSEMIDE 10 MG: 20 TABLET ORAL at 20:28

## 2022-11-25 RX ADMIN — APIXABAN 5 MG: 5 TABLET, FILM COATED ORAL at 20:28

## 2022-11-25 RX ADMIN — SODIUM CHLORIDE, PRESERVATIVE FREE 10 ML: 5 INJECTION INTRAVENOUS at 20:29

## 2022-11-25 NOTE — PLAN OF CARE
free of injury from restraints (restraint for interference with medical device): Every 2 hours: Monitor safety, psychosocial status, comfort, nutrition and hydration  Taken 11/24/2022 1900 by Lily Yen RN  Remains free of injury from restraints (restraint for interference with medical device): Every 2 hours: Monitor safety, psychosocial status, comfort, nutrition and hydration  11/24/2022 1814 by Lily Yen RN  Outcome: Progressing  Flowsheets  Taken 11/24/2022 1800 by Lily Yen RN  Remains free of injury from restraints (restraint for interference with medical device): Every 2 hours: Monitor safety, psychosocial status, comfort, nutrition and hydration  Taken 11/24/2022 1700 by Lily Yen RN  Remains free of injury from restraints (restraint for interference with medical device): Every 2 hours: Monitor safety, psychosocial status, comfort, nutrition and hydration  Taken 11/24/2022 1600 by Lily Yen RN  Remains free of injury from restraints (restraint for interference with medical device): Every 2 hours: Monitor safety, psychosocial status, comfort, nutrition and hydration  Taken 11/24/2022 1500 by Lily Yen RN  Remains free of injury from restraints (restraint for interference with medical device): Every 2 hours: Monitor safety, psychosocial status, comfort, nutrition and hydration  Taken 11/24/2022 1400 by Lily Yen RN  Remains free of injury from restraints (restraint for interference with medical device): Every 2 hours: Monitor safety, psychosocial status, comfort, nutrition and hydration  Taken 11/24/2022 1300 by Lily Yen RN  Remains free of injury from restraints (restraint for interference with medical device): Every 2 hours: Monitor safety, psychosocial status, comfort, nutrition and hydration  Taken 11/24/2022 1200 by Lily Yen RN  Remains free of injury from restraints (restraint for interference with medical device): Every 2 hours: Monitor safety, psychosocial status, comfort, nutrition and hydration  Taken 11/24/2022 1145 by Aleksandr Waters RN  Remains free of injury from restraints (restraint for interference with medical device): Every 2 hours: Monitor safety, psychosocial status, comfort, nutrition and hydration  Taken 11/24/2022 1140 by Aleksandr Waters RN  Remains free of injury from restraints (restraint for interference with medical device): Every 2 hours: Monitor safety, psychosocial status, comfort, nutrition and hydration  Taken 11/24/2022 1100 by Aleksandr Waters RN  Remains free of injury from restraints (restraint for interference with medical device): Every 2 hours: Monitor safety, psychosocial status, comfort, nutrition and hydration  Taken 11/24/2022 1000 by Aleksandr Waters RN  Remains free of injury from restraints (restraint for interference with medical device): Every 2 hours: Monitor safety, psychosocial status, comfort, nutrition and hydration  Taken 11/24/2022 0920 by Aleksandr Waters RN  Remains free of injury from restraints (restraint for interference with medical device): Every 2 hours: Monitor safety, psychosocial status, comfort, nutrition and hydration  Taken 11/24/2022 0900 by Gurinder Monsivais RN  Remains free of injury from restraints (restraint for interference with medical device): Every 2 hours: Monitor safety, psychosocial status, comfort, nutrition and hydration  Taken 11/24/2022 0800 by Aleksandr Waters RN  Remains free of injury from restraints (restraint for interference with medical device): Every 2 hours: Monitor safety, psychosocial status, comfort, nutrition and hydration  Taken 11/24/2022 0700 by Aleksandr Waters RN  Remains free of injury from restraints (restraint for interference with medical device):   Determine that other, less restrictive measures have been tried or would not be effective before applying the restraint   Evaluate the patient's condition at the time of restraint application   Inform patient/family regarding the reason for restraint   Every 2 hours: Monitor safety, psychosocial status, comfort, nutrition and hydration     Problem: Safety - Violent/Self-destructive Restraint  Goal: Remains free of injury from restraints (Restraint for Violent/Self-Destructive Behavior)  Description: INTERVENTIONS:  1. Determine that de-escalation and other, less restrictive measures have been tried or would not be effective before applying the restraint  2. Identify and document the criteria for restraint  3. Evaluate the patient's condition at the time of restraint application  4. Inform patient/family regarding the reason for restraint/seclusion  5. Q2H: Monitor comfort, nutrition and hydration needs  6. Q15M: Perform safety checks including skin, circulation, sensory, respiratory and psychological status  7. Ensure continuous observation  8.  Identify and implement measures to help patient regain control, assess readiness for release and initiate progressive release per policy  11/62/5488 [id number] by Alejandro Pham RN  Outcome: Progressing  11/24/2022 1814 by Bin Liu RN  Outcome: Progressing

## 2022-11-25 NOTE — PROGRESS NOTES
Patient seems to calm and willing to cooperate, took patient restraints off at  2200 to assess if patient able to stay calm. Patient has been calm, and cooperative, Liu Deya GonzalezD/WILIAM restraint order.

## 2022-11-25 NOTE — PROGRESS NOTES
V2.0  [facility] [place]      Name:  Binta Catalan /Age/Sex: 1931  (80 y.o. female)   MRN & CSN:  [id number] & [id number] Encounter Date/Time: 2022 8:19 AM EST    Location:  [address] [place] [address] PCP: Shu De León, [address] [facility] Day: 4    Assessment and Plan:   Binta Catalan is a 80 y.o. female with PMH of COPD, GERD, DVT, RLS, chronic ischemic heart disease, and dementia who presents with Altered mental status, unspecified    #AMS with hallucinations, no clear etiology  -Ddx: acute delirium, polypharmacy, UTI, urinary retention   -h/o likely dementia  -hallucinations, intermittent confusion   -Has seen in neurology in the past  -per granddaughter--> Started antibiotics prior to admission due to urinary frequency and dysuria and home UA with blood  -UA: negative for infection (likely because patient has been on antibiotics)  -CXR: no acute findings  -CT head: no acute intracranial findings, atrophy and chronic small vessel ischemic changes  -VBG with normal pCO2 level  -Normal TSH, ammonia level  -low procal   -Neurology consulted--> signed off, likely not a primary neuro process  -CXR showing hypoventilation and possible pulmonary edema    Plan:  Psych consulted, appreciate recs  Vitamin B12 level  Follow up on urine and blood culture   EKG in the morning to assess Qtc, replete lytes as needed  Lasix IV 20 mg once   Valproic acid level tomorrow    Per psych:  As qtc is  584, do NOT recommend antipsychotics at this time ; recommend continuing to monitor qtc. When below 500 can schedule risperdal 0.5 mg po at bedtime to address hallucinations  Review of labs. Note  , platelets 267 on , urine pending  If patient is agitated, again avoid antipsychotics. Can use depakote sprinkles 125 mg  po prn daily.   Recommended depakote sprinkles 125 mg po daily and 250 mg po at bedtime for mood and agitation  If patient becomes agitated in the evening before nighttime dose of depakote, can add depakote 125 mg sprinkles at dinner time to assist.    #Urinary retention  -bladder with 800 mL     Plan: Peterson catheter    #Lactic acidosis-resolved  -unknown cause  -LA: 3.1--> 1.1    #Hypokalemia    Plan:  Replete as necessary    #Elevated troponin in the setting of demand ischemia-resolved  -cardiology was consulted--> not ACS    Chronic medical problems:   #Legally blind    #COPD  -in hospice for end stage COPD  Continue home inhalers    #GERD  Continue PPI    #Chronic lacunar bilateral cerebellar infarcts  Continue eliquis and statin    #h/o DVT  Continue eliquis    #RLS  #Essential tremor  Continue requip and horizant    #CAD  -Echo with EF 50-55% in 2021  -stress test 11/2020 shows no infarct or ischemia    Of note, patient is on hospice for end-stage COPD--> will return when she is done with this hospitalization  Granddaughter is apparently POA per outside ED records, she did not present paperwork at the time        Diet ADULT DIET; Regular; Low Sodium (2 gm)   DVT Prophylaxis [] Lovenox, []  Heparin, [] SCDs, [] Ambulation,  [] Eliquis, [] Xarelto  [] Coumadin   Code Status Limited   Disposition From: home with hospice  Expected Disposition: home with hospice  Estimated Date of Discharge: 11/26/2022  Patient requires continued admission due to AMS   Surrogate Decision Maker/ POA Lennox Emory     Subjective:     Chief Complaint: No chief complaint on file. Patient was playing with her Peterson catheter this morning. Patient calmer today. Aox1. Awake and answering questions appropriately. Review of Systems:    General: No fever, no chills  Heart: No chest pain  Lungs: No shortness of breath  Abdomen: No abdominal pain, no nausea, no vomiting, no constipation, no diarrhea  : No frequency, no dysuria, no urgency  MSK: No lower extremity swelling  Neuro: No confusion, no weakness  Skin: No rashes    Objective:      Intake/Output Summary (Last 24 hours) at 11/25/2022 1410  Last data filed at 11/25/2022 0931  Gross per 24 hour   Intake 0 ml   Output 1125 ml   Net -1125 ml        Vitals:   Vitals:    11/25/22 0838   BP: (!) 116/56   Pulse:    Resp:    Temp:    SpO2:        Physical Exam:     General: Aox1, NAD  Eyes: EOMI  HENT: Atraumatic  Respiratory: no respiratory distress  GI: Normal bowel sounds, soft, nondistended, tenderness present  MSK: no lower extremity edema  Skin: Intact, dry, warm, no rashes    Medications:   Medications:    potassium bicarb-citric acid  40 mEq Oral Once    divalproex  125 mg Oral Daily    divalproex  250 mg Oral Nightly    sodium chloride flush  5-40 mL IntraVENous 2 times per day    furosemide  10 mg Oral TID    memantine  5 mg Oral BID    rOPINIRole  1 mg Oral TID    latanoprost  1 drop Both Eyes Nightly    [Held by provider] Gabapentin Enacarbil ER  600 mg Oral QPM    atorvastatin  40 mg Oral Nightly    apixaban  5 mg Oral BID    tiotropium  2 puff Inhalation Daily    pantoprazole  40 mg Oral QAM AC      Infusions:    sodium chloride 75 mL/hr at 11/24/22 0945     PRN Meds: divalproex, 125 mg, Daily PRN  sodium chloride flush, 5-40 mL, PRN  sodium chloride, , PRN  ondansetron, 4 mg, Q8H PRN   Or  ondansetron, 4 mg, Q6H PRN  polyethylene glycol, 17 g, Daily PRN  acetaminophen, 650 mg, Q6H PRN   Or  acetaminophen, 650 mg, Q6H PRN      Labs      Recent Results (from the past 24 hour(s))   CBC with Auto Differential    Collection Time: 11/25/22  1:38 AM   Result Value Ref Range    WBC 5.2 4.0 - 10.5 K/CU MM    RBC 4.31 4.2 - 5.4 M/CU MM    Hemoglobin 10.5 (L) 12.5 - 16.0 GM/DL    Hematocrit 34.5 (L) 37 - 47 %    MCV 80.0 78 - 100 FL    MCH 24.4 (L) 27 - 31 PG    MCHC 30.4 (L) 32.0 - 36.0 %    RDW 17.2 (H) 11.7 - 14.9 %    Platelets 075 630 - 770 K/CU MM    MPV 9.6 7.5 - 11.1 FL    Differential Type AUTOMATED DIFFERENTIAL     Segs Relative 59.7 36 - 66 %    Lymphocytes % 23.3 (L) 24 - 44 %    Monocytes % 11.5 (H) 0 - 4 %    Eosinophils % 4.7 (H) 0 - 3 % Basophils % 0.4 0 - 1 %    Segs Absolute 3.1 K/CU MM    Lymphocytes Absolute 1.2 K/CU MM    Monocytes Absolute 0.6 K/CU MM    Eosinophils Absolute 0.2 K/CU MM    Basophils Absolute 0.0 K/CU MM    Nucleated RBC % 0.0 %    Total Nucleated RBC 0.0 K/CU MM    Total Immature Neutrophil 0.02 K/CU MM    Immature Neutrophil % 0.4 0 - 0.43 %   Basic Metabolic Panel    Collection Time: 11/25/22  1:38 AM   Result Value Ref Range    Sodium 140 135 - 145 MMOL/L    Potassium 3.5 3.5 - 5.1 MMOL/L    Chloride 105 99 - 110 mMol/L    CO2 27 21 - 32 MMOL/L    Anion Gap 8 4 - 16    BUN 15 6 - 23 MG/DL    Creatinine 0.8 0.6 - 1.1 MG/DL    Est, Glom Filt Rate >60 >60 mL/min/1.73m2    Glucose 125 (H) 70 - 99 MG/DL    Calcium 8.9 8.3 - 10.6 MG/DL   Magnesium    Collection Time: 11/25/22  1:38 AM   Result Value Ref Range    Magnesium 2.3 1.8 - 2.4 mg/dl   EKG 12 Lead    Collection Time: 11/25/22  6:51 AM   Result Value Ref Range    Ventricular Rate 72 BPM    Atrial Rate 72 BPM    P-R Interval 158 ms    QRS Duration 86 ms    Q-T Interval 414 ms    QTc Calculation (Bazett) 453 ms    P Axis 51 degrees    R Axis -24 degrees    T Axis -1 degrees    Diagnosis       Sinus rhythm with premature atrial complexes  Voltage criteria for left ventricular hypertrophy  Abnormal ECG  When compared with ECG of 24-NOV-2022 07:39,  premature atrial complexes are now present  QT has shortened          Imaging/Diagnostics Last 24 Hours   No results found.     Electronically signed by Leroy Perdomo MD on 11/25/2022 at 2:10 PM

## 2022-11-26 LAB
ADENOVIRUS DETECTION BY PCR: NOT DETECTED
ANION GAP SERPL CALCULATED.3IONS-SCNC: 15 MMOL/L (ref 4–16)
BASOPHILS ABSOLUTE: 0 K/CU MM
BASOPHILS RELATIVE PERCENT: 0.3 % (ref 0–1)
BORDETELLA PARAPERTUSSIS BY PCR: NOT DETECTED
BORDETELLA PERTUSSIS PCR: NOT DETECTED
BUN BLDV-MCNC: 17 MG/DL (ref 6–23)
CALCIUM SERPL-MCNC: 9.7 MG/DL (ref 8.3–10.6)
CHLAMYDOPHILA PNEUMONIA PCR: NOT DETECTED
CHLORIDE BLD-SCNC: 103 MMOL/L (ref 99–110)
CO2: 22 MMOL/L (ref 21–32)
CORONAVIRUS 229E PCR: NOT DETECTED
CORONAVIRUS HKU1 PCR: NOT DETECTED
CORONAVIRUS NL63 PCR: NOT DETECTED
CORONAVIRUS OC43 PCR: NOT DETECTED
CREAT SERPL-MCNC: 0.9 MG/DL (ref 0.6–1.1)
DIFFERENTIAL TYPE: ABNORMAL
EOSINOPHILS ABSOLUTE: 0.2 K/CU MM
EOSINOPHILS RELATIVE PERCENT: 3.4 % (ref 0–3)
GFR SERPL CREATININE-BSD FRML MDRD: >60 ML/MIN/1.73M2
GLUCOSE BLD-MCNC: 100 MG/DL (ref 70–99)
HCT VFR BLD CALC: 40.5 % (ref 37–47)
HEMOGLOBIN: 12.1 GM/DL (ref 12.5–16)
HUMAN METAPNEUMOVIRUS PCR: NOT DETECTED
IMMATURE NEUTROPHIL %: 0.3 % (ref 0–0.43)
INFLUENZA A BY PCR: NOT DETECTED
INFLUENZA A H1 (2009) PCR: NOT DETECTED
INFLUENZA A H1 PANDEMIC PCR: NOT DETECTED
INFLUENZA A H3 PCR: NOT DETECTED
INFLUENZA B BY PCR: NOT DETECTED
LYMPHOCYTES ABSOLUTE: 1.6 K/CU MM
LYMPHOCYTES RELATIVE PERCENT: 25.9 % (ref 24–44)
MAGNESIUM: 2 MG/DL (ref 1.8–2.4)
MCH RBC QN AUTO: 24.1 PG (ref 27–31)
MCHC RBC AUTO-ENTMCNC: 29.9 % (ref 32–36)
MCV RBC AUTO: 80.5 FL (ref 78–100)
MONOCYTES ABSOLUTE: 0.7 K/CU MM
MONOCYTES RELATIVE PERCENT: 11 % (ref 0–4)
MYCOPLASMA PNEUMONIAE PCR: NOT DETECTED
NUCLEATED RBC %: 0 %
PARAINFLUENZA 1 PCR: NOT DETECTED
PARAINFLUENZA 2 PCR: NOT DETECTED
PARAINFLUENZA 3 PCR: NOT DETECTED
PARAINFLUENZA 4 PCR: NOT DETECTED
PDW BLD-RTO: 18.4 % (ref 11.7–14.9)
PLATELET # BLD: 351 K/CU MM (ref 140–440)
PMV BLD AUTO: 9.6 FL (ref 7.5–11.1)
POTASSIUM SERPL-SCNC: 3.8 MMOL/L (ref 3.5–5.1)
RAPID INFLUENZA  B AGN: NEGATIVE
RAPID INFLUENZA A AGN: NEGATIVE
RBC # BLD: 5.03 M/CU MM (ref 4.2–5.4)
RHINOVIRUS ENTEROVIRUS PCR: NOT DETECTED
RSV PCR: NOT DETECTED
SARS-COV-2: NOT DETECTED
SEGMENTED NEUTROPHILS ABSOLUTE COUNT: 3.6 K/CU MM
SEGMENTED NEUTROPHILS RELATIVE PERCENT: 59.1 % (ref 36–66)
SODIUM BLD-SCNC: 140 MMOL/L (ref 135–145)
TOTAL IMMATURE NEUTOROPHIL: 0.02 K/CU MM
TOTAL NUCLEATED RBC: 0 K/CU MM
WBC # BLD: 6.1 K/CU MM (ref 4–10.5)

## 2022-11-26 PROCEDURE — 1200000000 HC SEMI PRIVATE

## 2022-11-26 PROCEDURE — 80048 BASIC METABOLIC PNL TOTAL CA: CPT

## 2022-11-26 PROCEDURE — 6370000000 HC RX 637 (ALT 250 FOR IP)

## 2022-11-26 PROCEDURE — 36415 COLL VENOUS BLD VENIPUNCTURE: CPT

## 2022-11-26 PROCEDURE — 0202U NFCT DS 22 TRGT SARS-COV-2: CPT

## 2022-11-26 PROCEDURE — 94761 N-INVAS EAR/PLS OXIMETRY MLT: CPT

## 2022-11-26 PROCEDURE — 6370000000 HC RX 637 (ALT 250 FOR IP): Performed by: NURSE PRACTITIONER

## 2022-11-26 PROCEDURE — 2580000003 HC RX 258

## 2022-11-26 PROCEDURE — 87804 INFLUENZA ASSAY W/OPTIC: CPT

## 2022-11-26 PROCEDURE — 83735 ASSAY OF MAGNESIUM: CPT

## 2022-11-26 PROCEDURE — 85025 COMPLETE CBC W/AUTO DIFF WBC: CPT

## 2022-11-26 RX ADMIN — FUROSEMIDE 10 MG: 20 TABLET ORAL at 14:21

## 2022-11-26 RX ADMIN — APIXABAN 5 MG: 5 TABLET, FILM COATED ORAL at 20:21

## 2022-11-26 RX ADMIN — FUROSEMIDE 10 MG: 20 TABLET ORAL at 20:21

## 2022-11-26 RX ADMIN — SODIUM CHLORIDE, PRESERVATIVE FREE 10 ML: 5 INJECTION INTRAVENOUS at 08:49

## 2022-11-26 RX ADMIN — MEMANTINE 5 MG: 5 TABLET ORAL at 20:21

## 2022-11-26 RX ADMIN — LATANOPROST 1 DROP: 50 SOLUTION/ DROPS OPHTHALMIC at 20:29

## 2022-11-26 RX ADMIN — MEMANTINE 5 MG: 5 TABLET ORAL at 08:47

## 2022-11-26 RX ADMIN — PANTOPRAZOLE SODIUM 40 MG: 40 TABLET, DELAYED RELEASE ORAL at 06:11

## 2022-11-26 RX ADMIN — ATORVASTATIN CALCIUM 40 MG: 40 TABLET, FILM COATED ORAL at 20:21

## 2022-11-26 RX ADMIN — ROPINIROLE HYDROCHLORIDE 1 MG: 1 TABLET, FILM COATED ORAL at 08:47

## 2022-11-26 RX ADMIN — APIXABAN 5 MG: 5 TABLET, FILM COATED ORAL at 08:47

## 2022-11-26 RX ADMIN — FUROSEMIDE 10 MG: 20 TABLET ORAL at 08:47

## 2022-11-26 RX ADMIN — ROPINIROLE HYDROCHLORIDE 1 MG: 1 TABLET, FILM COATED ORAL at 14:21

## 2022-11-26 RX ADMIN — SODIUM CHLORIDE, PRESERVATIVE FREE 10 ML: 5 INJECTION INTRAVENOUS at 20:30

## 2022-11-26 RX ADMIN — DIVALPROEX SODIUM 250 MG: 125 CAPSULE, COATED PELLETS ORAL at 20:20

## 2022-11-26 RX ADMIN — ROPINIROLE HYDROCHLORIDE 1 MG: 1 TABLET, FILM COATED ORAL at 20:21

## 2022-11-26 RX ADMIN — DIVALPROEX SODIUM 125 MG: 125 CAPSULE, COATED PELLETS ORAL at 08:48

## 2022-11-26 NOTE — PROGRESS NOTES
V2.0  [facility] [place] Hospitalist Progress Note      Name:  Johanna Gonzales /Age/Sex: 1931  (80 y.o. female)   MRN & CSN:  [id number] & [id number] Encounter Date/Time: 2022 8:19 AM EST    Location:  [address] [place] [address] PCP: Marcey Phoenix, [address] [facility] Day: 5    Assessment and Plan:   Johanna Gonzales is a 80 y.o. female with PMH of COPD, GERD, DVT, RLS, chronic ischemic heart disease, and dementia who presents with Altered mental status, unspecified    #AMS with hallucinations, no clear etiology  -Ddx: acute delirium, polypharmacy, UTI, urinary retention   -h/o likely dementia  -hallucinations, intermittent confusion   -Has seen in neurology in the past  -per granddaughter--> Started antibiotics prior to admission due to urinary frequency and dysuria and home UA with blood  -UA: negative for infection (likely because patient has been on antibiotics)  -CXR: no acute findings  -CT head: no acute intracranial findings, atrophy and chronic small vessel ischemic changes  -VBG with normal pCO2 level  -Normal TSH, ammonia level  -low procal   -Neurology consulted--> signed off, likely not a primary neuro process  -CXR showing hypoventilation and possible pulmonary edema  -Negative rapid flu    Plan:  Psych consulted, appreciate recs  Vitamin B12 level  Follow up on urine and blood culture   EKG in the morning to assess Qtc, replete lytes as needed  Lasix IV 20 mg once   Valproic acid level tomorrow  Resp PCR    Per psych:  As qtc is  584, do NOT recommend antipsychotics at this time ; recommend continuing to monitor qtc. When below 500 can schedule risperdal 0.5 mg po at bedtime to address hallucinations  Review of labs. Note  , platelets 831 on , urine pending  If patient is agitated, again avoid antipsychotics. Can use depakote sprinkles 125 mg  po prn daily.   Recommended depakote sprinkles 125 mg po daily and 250 mg po at bedtime for mood and agitation  If patient becomes agitated in the evening before nighttime dose of depakote, can add depakote 125 mg sprinkles at dinner time to assist.    #Urinary retention  -bladder with 800 mL     Plan: Petreson catheter    #Lactic acidosis-resolved  -unknown cause  -LA: 3.1--> 1.1    #Hypokalemia    Plan:  Replete as necessary    #Elevated troponin in the setting of demand ischemia-resolved  -cardiology was consulted--> not ACS    Chronic medical problems:   #Legally blind    #COPD  -in hospice for end stage COPD  Continue home inhalers    #GERD  Continue PPI    #Chronic lacunar bilateral cerebellar infarcts  Continue eliquis and statin    #h/o DVT  Continue eliquis    #RLS  #Essential tremor  Continue requip and horizant    #CAD  -Echo with EF 50-55% in 2021  -stress test 11/2020 shows no infarct or ischemia    Of note, patient is on hospice for end-stage COPD--> will return when she is done with this hospitalization  Granddaughter is apparently POA per outside ED records, she did not present paperwork at the time        Diet ADULT DIET; Regular; Low Sodium (2 gm)   DVT Prophylaxis [] Lovenox, []  Heparin, [] SCDs, [] Ambulation,  [] Eliquis, [] Xarelto  [] Coumadin   Code Status Limited   Disposition From: home with hospice  Expected Disposition: home with hospice  Estimated Date of Discharge: 11/26/2022  Patient requires continued admission due to AMS   Surrogate Decision Maker/ AMANDA Rutherford     Subjective:     Chief Complaint: No chief complaint on file. Today, patient was hallucinating that she was on a ladder and she was asking for \" a couple of guys to help\" her keep the ladder steady. When I attempted to redirect her and tell her that she is in a hospital and that I am her doctor, she did not believe me. I tried to get her to feel the chair and that she was safe, however she did not agree. I spoke at length with patient's granddaughter today, who is her primary caregiver. Patient's granddaughter works more than 40 hours/week.   She also gets 40 hours of a home aide and has 1 friend who also comes when the aide is not available. Patient's granddaughter takes care of her nonstop when she is not at work. When asked about patient's general mental status, she states that she is oriented to herself and generally who she is speaking to. Patient recognizes people's voices. Most of the time, patient is oriented to time however sometimes she asks about her  who has long passed away as well as her mother. Patient's granddaughter does state that the patient starts getting altered mental status and hallucinations when she has a UTI. I did explain to her that now the patient has had multiple courses of antibiotics and likely no longer has an infection I also explained to her that her mental status was normal yesterday. Patient's granddaughter also did mention that the patient had a visitor that is now in the hospital with the flu and would like her tested. Review of Systems:    Unable to obtain due to medical problem. Objective:      Intake/Output Summary (Last 24 hours) at 11/26/2022 1433  Last data filed at 11/25/2022 1846  Gross per 24 hour   Intake --   Output 1250 ml   Net -1250 ml        Vitals:   Vitals:    11/26/22 1413   BP: 137/74   Pulse: 100   Resp: 17   Temp:    SpO2: 92%       Physical Exam:     General: Aox1, NAD, hallucinating  Eyes: EOMI  HENT: Atraumatic  Respiratory: no respiratory distress  GI: nondistended  MSK: no lower extremity edema  Skin: Intact, dry, warm, no rashes    Medications:   Medications:    potassium bicarb-citric acid  40 mEq Oral Once    divalproex  125 mg Oral Daily    divalproex  250 mg Oral Nightly    sodium chloride flush  5-40 mL IntraVENous 2 times per day    furosemide  10 mg Oral TID    memantine  5 mg Oral BID    rOPINIRole  1 mg Oral TID    latanoprost  1 drop Both Eyes Nightly    [Held by provider] Gabapentin Enacarbil ER  600 mg Oral QPM    atorvastatin  40 mg Oral Nightly    apixaban  5 mg Oral BID    tiotropium  2 puff Inhalation Daily    pantoprazole  40 mg Oral QAM AC      Infusions:    sodium chloride 75 mL/hr at 11/24/22 0945     PRN Meds: divalproex, 125 mg, Daily PRN  sodium chloride flush, 5-40 mL, PRN  sodium chloride, , PRN  ondansetron, 4 mg, Q8H PRN   Or  ondansetron, 4 mg, Q6H PRN  polyethylene glycol, 17 g, Daily PRN  acetaminophen, 650 mg, Q6H PRN   Or  acetaminophen, 650 mg, Q6H PRN      Labs      Recent Results (from the past 24 hour(s))   CBC with Auto Differential    Collection Time: 11/26/22  4:26 AM   Result Value Ref Range    WBC 6.1 4.0 - 10.5 K/CU MM    RBC 5.03 4.2 - 5.4 M/CU MM    Hemoglobin 12.1 (L) 12.5 - 16.0 GM/DL    Hematocrit 40.5 37 - 47 %    MCV 80.5 78 - 100 FL    MCH 24.1 (L) 27 - 31 PG    MCHC 29.9 (L) 32.0 - 36.0 %    RDW 18.4 (H) 11.7 - 14.9 %    Platelets 352 551 - 984 K/CU MM    MPV 9.6 7.5 - 11.1 FL    Differential Type AUTOMATED DIFFERENTIAL     Segs Relative 59.1 36 - 66 %    Lymphocytes % 25.9 24 - 44 %    Monocytes % 11.0 (H) 0 - 4 %    Eosinophils % 3.4 (H) 0 - 3 %    Basophils % 0.3 0 - 1 %    Segs Absolute 3.6 K/CU MM    Lymphocytes Absolute 1.6 K/CU MM    Monocytes Absolute 0.7 K/CU MM    Eosinophils Absolute 0.2 K/CU MM    Basophils Absolute 0.0 K/CU MM    Nucleated RBC % 0.0 %    Total Nucleated RBC 0.0 K/CU MM    Total Immature Neutrophil 0.02 K/CU MM    Immature Neutrophil % 0.3 0 - 0.43 %   Basic Metabolic Panel    Collection Time: 11/26/22  4:26 AM   Result Value Ref Range    Sodium 140 135 - 145 MMOL/L    Potassium 3.8 3.5 - 5.1 MMOL/L    Chloride 103 99 - 110 mMol/L    CO2 22 21 - 32 MMOL/L    Anion Gap 15 4 - 16    BUN 17 6 - 23 MG/DL    Creatinine 0.9 0.6 - 1.1 MG/DL    Est, Glom Filt Rate >60 >60 mL/min/1.73m2    Glucose 100 (H) 70 - 99 MG/DL    Calcium 9.7 8.3 - 10.6 MG/DL   Magnesium    Collection Time: 11/26/22  4:26 AM   Result Value Ref Range    Magnesium 2.0 1.8 - 2.4 mg/dl   Rapid influenza A/B antigens    Collection Time: 11/26/22 11:18 AM    Specimen: Nasopharyngeal   Result Value Ref Range    Rapid Influenza A Ag NEGATIVE NEGATIVE    Rapid Influenza B Ag NEGATIVE NEGATIVE        Imaging/Diagnostics Last 24 Hours   No results found.     Electronically signed by José Mckinnon MD on 11/26/2022 at 2:33 PM

## 2022-11-27 VITALS
SYSTOLIC BLOOD PRESSURE: 100 MMHG | BODY MASS INDEX: 30.43 KG/M2 | OXYGEN SATURATION: 95 % | RESPIRATION RATE: 14 BRPM | HEIGHT: 60 IN | HEART RATE: 71 BPM | WEIGHT: 154.98 LBS | DIASTOLIC BLOOD PRESSURE: 46 MMHG | TEMPERATURE: 97.4 F

## 2022-11-27 LAB
DOSE AMOUNT: NORMAL
DOSE TIME: NORMAL
VALPROIC ACID LEVEL: 62.8 UG/ML (ref 50–100)

## 2022-11-27 PROCEDURE — 6370000000 HC RX 637 (ALT 250 FOR IP): Performed by: NURSE PRACTITIONER

## 2022-11-27 PROCEDURE — 36415 COLL VENOUS BLD VENIPUNCTURE: CPT

## 2022-11-27 PROCEDURE — 6370000000 HC RX 637 (ALT 250 FOR IP)

## 2022-11-27 PROCEDURE — 2580000003 HC RX 258

## 2022-11-27 PROCEDURE — 94640 AIRWAY INHALATION TREATMENT: CPT

## 2022-11-27 PROCEDURE — 80164 ASSAY DIPROPYLACETIC ACD TOT: CPT

## 2022-11-27 PROCEDURE — 94761 N-INVAS EAR/PLS OXIMETRY MLT: CPT

## 2022-11-27 RX ORDER — DIVALPROEX SODIUM 125 MG/1
125 CAPSULE, COATED PELLETS ORAL DAILY PRN
Qty: 60 CAPSULE | Refills: 3 | Status: SHIPPED | OUTPATIENT
Start: 2022-11-27

## 2022-11-27 RX ORDER — DIVALPROEX SODIUM 125 MG/1
250 CAPSULE, COATED PELLETS ORAL NIGHTLY
Qty: 60 CAPSULE | Refills: 3 | Status: SHIPPED | OUTPATIENT
Start: 2022-11-27

## 2022-11-27 RX ORDER — ROPINIROLE 0.5 MG/1
5 TABLET, FILM COATED ORAL 3 TIMES DAILY
Qty: 300 TABLET | Refills: 0
Start: 2022-11-27

## 2022-11-27 RX ORDER — MEMANTINE HYDROCHLORIDE 10 MG/1
5 TABLET ORAL 2 TIMES DAILY
Qty: 30 TABLET | Refills: 0
Start: 2022-11-27

## 2022-11-27 RX ORDER — PANTOPRAZOLE SODIUM 40 MG/1
40 TABLET, DELAYED RELEASE ORAL
Qty: 30 TABLET | Refills: 3 | Status: SHIPPED | OUTPATIENT
Start: 2022-11-28

## 2022-11-27 RX ORDER — DIVALPROEX SODIUM 125 MG/1
125 CAPSULE, COATED PELLETS ORAL DAILY
Qty: 60 CAPSULE | Refills: 3 | Status: SHIPPED | OUTPATIENT
Start: 2022-11-28

## 2022-11-27 RX ADMIN — DIVALPROEX SODIUM 125 MG: 125 CAPSULE, COATED PELLETS ORAL at 08:37

## 2022-11-27 RX ADMIN — FUROSEMIDE 10 MG: 20 TABLET ORAL at 14:04

## 2022-11-27 RX ADMIN — MEMANTINE 5 MG: 5 TABLET ORAL at 08:38

## 2022-11-27 RX ADMIN — PANTOPRAZOLE SODIUM 40 MG: 40 TABLET, DELAYED RELEASE ORAL at 06:21

## 2022-11-27 RX ADMIN — FUROSEMIDE 10 MG: 20 TABLET ORAL at 08:37

## 2022-11-27 RX ADMIN — SODIUM CHLORIDE, PRESERVATIVE FREE 10 ML: 5 INJECTION INTRAVENOUS at 08:38

## 2022-11-27 RX ADMIN — ROPINIROLE HYDROCHLORIDE 1 MG: 1 TABLET, FILM COATED ORAL at 08:37

## 2022-11-27 RX ADMIN — APIXABAN 5 MG: 5 TABLET, FILM COATED ORAL at 08:37

## 2022-11-27 RX ADMIN — TIOTROPIUM BROMIDE INHALATION SPRAY 2 PUFF: 3.12 SPRAY, METERED RESPIRATORY (INHALATION) at 11:39

## 2022-11-27 RX ADMIN — ROPINIROLE HYDROCHLORIDE 1 MG: 1 TABLET, FILM COATED ORAL at 14:04

## 2022-11-27 NOTE — PLAN OF CARE
Problem: Discharge Planning  Goal: Discharge to home or other facility with appropriate resources  11/26/2022 2134 by Sharlene Vázquez RN  Outcome: Progressing  11/26/2022 2133 by Sharlene Vázquez RN  Outcome: Progressing     Problem: Skin/Tissue Integrity  Goal: Absence of new skin breakdown  Description: 1. Monitor for areas of redness and/or skin breakdown  2. Assess vascular access sites hourly  3. Every 4-6 hours minimum:  Change oxygen saturation probe site  4. Every 4-6 hours:  If on nasal continuous positive airway pressure, respiratory therapy assess nares and determine need for appliance change or resting period.   11/26/2022 2134 by Sharlene Vázquez RN  Outcome: Progressing  11/26/2022 2133 by Sharlene Vázquez RN  Outcome: Progressing     Problem: Safety - Adult  Goal: Free from fall injury  Outcome: Progressing

## 2022-11-27 NOTE — PLAN OF CARE
Device)  Description: INTERVENTIONS:  1. Determine that other, less restrictive measures have been tried or would not be effective before applying the restraint  2. Evaluate the patient's condition at the time of restraint application  3. Inform patient/family regarding the reason for restraint  4. Q2H: Monitor safety, psychosocial status, comfort, nutrition and hydration  11/27/2022 0933 by Dmitry Herbert RN  Outcome: Progressing     Problem: Safety - Violent/Self-destructive Restraint  Goal: Remains free of injury from restraints (Restraint for Violent/Self-Destructive Behavior)  Description: INTERVENTIONS:  1. Determine that de-escalation and other, less restrictive measures have been tried or would not be effective before applying the restraint  2. Identify and document the criteria for restraint  3. Evaluate the patient's condition at the time of restraint application  4. Inform patient/family regarding the reason for restraint/seclusion  5. Q2H: Monitor comfort, nutrition and hydration needs  6. Q15M: Perform safety checks including skin, circulation, sensory, respiratory and psychological status  7. Ensure continuous observation  8.  Identify and implement measures to help patient regain control, assess readiness for release and initiate progressive release per policy  [id number] by Dmitry Herbert RN  Outcome: Progressing     Problem: Safety - Adult  Goal: Free from fall injury  11/27/2022 0933 by Dmitry Herbert RN  Outcome: Progressing

## 2022-11-27 NOTE — PROGRESS NOTES
V2.0  Discharge Summary    Name:  Katheryn Iraheta /Age/Sex: 1931 (78 y.o. female)   Admit Date: 2022  Discharge Date: 22    MRN & CSN:  [id number] & [id number] Encounter Date and Time 22 2:20 PM EST    Attending:  No att. providers found Discharging Provider: Jonatan Flynn MD       Hospital Course:     Brief HPI: Katheryn Iraheta is a 80 y.o. female who presented with increasing confusion and hallucinations. CT head without acute process. CXR without any acute process. Found to have a UTI. Treated for 3 days of ceftriaxone. Cardiology consulted for elevated troponin, said ti was not in the setting of ACS. Neurology was consulted and they suspected that the AMS was not due to an underlying neurological process and likely due to progression of underlying dementia. Psychiatry was consulted and they changed her psych medications. Throughout her course, patient's mentation was waxing and waning in terms of agitation and hallucinations. Patient came from home with hospice for end stage COPD and was stable on discharge back home with hospice.      Brief Problem Based Course:     #AMS with hallucinations, no clear etiology  -Ddx: acute delirium, polypharmacy, UTI, urinary retention   -h/o likely dementia  -hallucinations, intermittent confusion   -Has seen in neurology in the past  -per granddaughter--> Started antibiotics prior to admission due to urinary frequency and dysuria and home UA with blood  -UA: negative for infection (likely because patient has been on antibiotics)  -CXR: no acute findings  -CT head: no acute intracranial findings, atrophy and chronic small vessel ischemic changes  -VBG with normal pCO2 level  -Normal TSH, ammonia level  -low procal   -Neurology consulted--> signed off, likely not a primary neuro process  -CXR showing hypoventilation and possible pulmonary edema  -Negative rapid flu and resp PCR  -Qtc 453    Plan  Per psych:  As qtc is  584, do NOT recommend antipsychotics at this time ; recommend continuing to monitor qtc. When below 500 can schedule risperdal 0.5 mg po at bedtime to address hallucinations  Review of labs. Note  , platelets 612 on 70/79/70, urine pending  If patient is agitated, again avoid antipsychotics. Can use depakote sprinkles 125 mg  po prn daily. Recommended depakote sprinkles 125 mg po daily and 250 mg po at bedtime for mood and agitation  If patient becomes agitated in the evening before nighttime dose of depakote, can add depakote 125 mg sprinkles at dinner time to assist.    #Urinary retention  -bladder with 800 mL     Plan: Peterson catheter    #Lactic acidosis-resolved  -unknown cause  -LA: 3.1--> 1.1    #Hypokalemia    Plan:  Replete as necessary    #Elevated troponin in the setting of demand ischemia-resolved  -cardiology was consulted--> not ACS    Chronic medical problems:   #Legally blind    #COPD  -in hospice for end stage COPD  Continue home inhalers    #GERD  Continue PPI    #Chronic lacunar bilateral cerebellar infarcts  Continue eliquis and statin    #h/o DVT  Continue eliquis    #RLS  #Essential tremor  Continue requip and horizant    #CAD  -Echo with EF 50-55% in 2021  -stress test 11/2020 shows no infarct or ischemia        The patient expressed appropriate understanding of, and agreement with the discharge recommendations, medications, and plan.      Consults this admission:  IP CONSULT TO NEUROLOGY  IP CONSULT TO PSYCHIATRY  IP CONSULT TO SPIRITUAL SERVICES  IP CONSULT TO CASE MANAGEMENT  IP CONSULT TO CARDIOLOGY    Discharge Diagnosis:   Altered mental status, unspecified    UTI  Dementia  End-stage COPD  Type II NSTEMI    Discharge Instruction:   Follow up appointments: NA  Primary care physician: DUANE Otoole CNP within 2 weeks  Diet: regular diet   Activity: activity as tolerated  Disposition: Discharged to:   [x]Home, []C, []SNF, []Acute Rehab, [x]Hospice   Condition on discharge: Stable  Labs and Tests to be Followed up as an outpatient by PCP or Specialist: na    Discharge Medications:        Medication List        START taking these medications      * divalproex 125 MG capsule  Commonly known as: DEPAKOTE SPRINKLE  Take 2 capsules by mouth at bedtime     * divalproex 125 MG capsule  Commonly known as: DEPAKOTE SPRINKLE  Take 1 capsule by mouth daily as needed (for acute agitation)     * divalproex 125 MG capsule  Commonly known as: DEPAKOTE SPRINKLE  Take 1 capsule by mouth daily     pantoprazole 40 MG tablet  Commonly known as: PROTONIX  Take 1 tablet by mouth every morning (before breakfast)           * This list has 3 medication(s) that are the same as other medications prescribed for you. Read the directions carefully, and ask your doctor or other care provider to review them with you.                 CONTINUE taking these medications      Acetaminophen 650 MG Tabs  Take 650 mg by mouth every 4 hours as needed     albuterol sulfate  (90 Base) MCG/ACT inhaler  Commonly known as: PROVENTIL;VENTOLIN;PROAIR     apixaban 5 MG Tabs tablet  Commonly known as: ELIQUIS     aspirin 81 MG chewable tablet  Take 1 tablet by mouth daily     atorvastatin 40 MG tablet  Commonly known as: LIPITOR  Take 1 tablet by mouth nightly     Baclofen 5 MG tablet  Commonly known as: LIORESAL  Take 1 tablet by mouth 2 times daily     dorzolamide 2 % ophthalmic solution  Commonly known as: TRUSOPT  Place 1 drop into both eyes 3 times daily     folic acid-pyridoxine-cyancobalamin 1.13-25-2 MG Tabs  Take 1 tablet by mouth daily     furosemide 20 MG tablet  Commonly known as: LASIX     Horizant 600 MG Tbcr  Generic drug: Gabapentin Enacarbil ER     ipratropium-albuterol 0.5-2.5 (3) MG/3ML Soln nebulizer solution  Commonly known as: DUONEB  Inhale 3 mLs into the lungs every 4 hours as needed for Shortness of Breath     latanoprost 0.005 % ophthalmic solution  Commonly known as: XALATAN  Place 1 drop into both eyes nightly magnesium hydroxide 400 MG/5ML suspension  Commonly known as: MILK OF MAGNESIA     memantine 10 MG tablet  Commonly known as: NAMENDA  Take 0.5 tablets by mouth 2 times daily     PARoxetine 20 MG tablet  Commonly known as: PAXIL  Take 1 tablet by mouth daily     rOPINIRole 0.5 MG tablet  Commonly known as: REQUIP  Take 10 tablets by mouth 3 times daily     tiotropium 18 MCG inhalation capsule  Commonly known as: SPIRIVA     vitamin B-12 1000 MCG tablet  Commonly known as: CYANOCOBALAMIN     vitamin D 25 MCG (1000 UT) Caps  Take 1 capsule by mouth daily            STOP taking these medications      hydrALAZINE 50 MG tablet  Commonly known as: APRESOLINE               Where to Get Your Medications        These medications were sent to [address] [phone]  [address] [place] [phone]      Phone: [phone]   divalproex 125 MG capsule  divalproex 125 MG capsule  divalproex 125 MG capsule  pantoprazole 40 MG tablet       Information about where to get these medications is not yet available    Ask your nurse or doctor about these medications  memantine 10 MG tablet  rOPINIRole 0.5 MG tablet        Objective Findings at Discharge:   BP (!) 100/46   Pulse 71   Temp 97.4 °F (36.3 °C) (Oral)   Resp 14   Ht 5' (1.524 m)   Wt 154 lb 15.7 oz (70.3 kg)   SpO2 95%   BMI 30.27 kg/m²       Physical Exam:   General: Aox2-3, NAD  Eyes: EOMI  HENT: Atraumatic  Respiratory: no respiratory distress  GI: nondistended  MSK: no lower extremity edema  Skin: Intact, dry, warm, no rashes  Labs and Imaging   XR CHEST PORTABLE    Result Date: 11/24/2022  EXAMINATION: ONE XRAY VIEW OF THE CHEST 11/24/2022 12:09 pm COMPARISON: Chest x-ray November 22, 2022 HISTORY: ORDERING SYSTEM PROVIDED HISTORY: AMS for unknown reason TECHNOLOGIST PROVIDED HISTORY: Reason for exam:->AMS for unknown reason Reason for Exam: AMS for unknown reason Additional signs and symptoms: AMS for unknown reason Relevant Medical/Surgical History: AMS for unknown reason FINDINGS: Cardiac silhouette is enlarged but stable. Atherosclerosis of the aorta. Central pulmonary vascular congestion and interstitial opacities favored to reflect edema. No well-defined consolidation. No pleural effusion. No pneumothorax. Lungs are hypoventilatory. Osseous structures appear stable. 1. Hypoventilatory chest with findings favored to reflect mild pulmonary edema. CBC: No results for input(s): WBC, HGB, PLT in the last 72 hours. BMP:  No results for input(s): NA, K, CL, CO2, BUN, CREATININE, GLUCOSE in the last 72 hours. Hepatic: No results for input(s): AST, ALT, ALB, BILITOT, ALKPHOS in the last 72 hours. Lipids:   Lab Results   Component Value Date/Time    CHOL 182 11/12/2020 04:23 AM    HDL 59 11/12/2020 04:23 AM    TRIG 105 11/12/2020 04:23 AM     Hemoglobin A1C:   Lab Results   Component Value Date/Time    LABA1C 5.4 09/22/2020 12:35 PM     TSH: No results found for: TSH  Troponin:   Lab Results   Component Value Date/Time    TROPONINT <0.010 11/22/2022 05:08 PM    TROPONINT <0.010 11/22/2022 12:50 PM    TROPONINT <0.010 04/13/2022 11:01 PM     Lactic Acid: No results for input(s): LACTA in the last 72 hours. BNP: No results for input(s): PROBNP in the last 72 hours.   UA:  Lab Results   Component Value Date/Time    NITRU POSITIVE 04/13/2022 11:40 PM    NITRU Negative 03/03/2020 08:11 PM    NITRU NEGATIVE 06/17/2013 08:31 PM    COLORU YELLOW 04/13/2022 11:40 PM    PHUR 6.0 03/03/2020 08:11 PM    PHUR 6.0 03/03/2020 08:11 PM    WBCUA 24 10/12/2021 03:48 PM    RBCUA 4 10/12/2021 03:48 PM    MUCUS RARE 11/15/2020 07:00 AM    TRICHOMONAS NONE SEEN 01/24/2021 03:13 AM    BACTERIA MANY 10/12/2021 03:48 PM    CLARITYU CLEAR 04/13/2022 11:40 PM    SPECGRAV 1.015 04/13/2022 11:40 PM    LEUKOCYTESUR LARGE NUMBER OR AMOUNT OF 04/13/2022 11:40 PM    UROBILINOGEN 0.2 04/13/2022 11:40 PM    BILIRUBINUR NEGATIVE 04/13/2022 11:40 PM    BLOODU SMALL NUMBER OR AMOUNT OBSERVED 04/13/2022 11:40 PM    GLUCOSEU Negative 03/03/2020 08:11 PM    Carlos Hardin NEGATIVE 04/13/2022 11:40 PM     Urine Cultures: No results found for: Christina Mix  Blood Cultures: No results found for: BC  No results found for: BLOODCULT2  Organism:   Lab Results   Component Value Date/Time    [facility] 08/24/2018 06:15 PM       Time Spent Discharging patient >31 minutes    Electronically signed by Jessica Spencer MD on 11/29/2022 at 2:20 PM

## 2022-11-27 NOTE — DISCHARGE INSTRUCTIONS
You are admitted for altered mental status with hallucinations probably due to UTI. At this point the urinary tract infection has resolved. We did swab the patient for flu and other respiratory viruses and it all came back negative. Today, you were doing much better and are okay for discharge home. If your symptoms come back or worsen, please return to the emergency room.

## 2022-11-27 NOTE — PLAN OF CARE
Problem: Discharge Planning  Goal: Discharge to home or other facility with appropriate resources  11/27/2022 1534 by Sidney Anderson RN  Outcome: Completed  11/27/2022 0933 by Keegan Lora RN  Outcome: Progressing  Flowsheets (Taken 11/27/2022 8704)  Discharge to home or other facility with appropriate resources:   Identify barriers to discharge with patient and caregiver   Arrange for needed discharge resources and transportation as appropriate   Identify discharge learning needs (meds, wound care, etc)   Refer to discharge planning if patient needs post-hospital services based on physician order or complex needs related to functional status, cognitive ability or social support system     Problem: Pain  Goal: Verbalizes/displays adequate comfort level or baseline comfort level  11/27/2022 1534 by Sidney Anderson RN  Outcome: Completed  11/27/2022 0933 by Keegan Lora RN  Outcome: Progressing  Flowsheets (Taken 11/27/2022 0830)  Verbalizes/displays adequate comfort level or baseline comfort level:   Encourage patient to monitor pain and request assistance   Assess pain using appropriate pain scale   Administer analgesics based on type and severity of pain and evaluate response   Implement non-pharmacological measures as appropriate and evaluate response   Consider cultural and social influences on pain and pain management   Notify Licensed Independent Practitioner if interventions unsuccessful or patient reports new pain     Problem: Skin/Tissue Integrity  Goal: Absence of new skin breakdown  Description: 1. Monitor for areas of redness and/or skin breakdown  2. Assess vascular access sites hourly  3. Every 4-6 hours minimum:  Change oxygen saturation probe site  4. Every 4-6 hours:  If on nasal continuous positive airway pressure, respiratory therapy assess nares and determine need for appliance change or resting period.   11/27/2022 1534 by Sidney Anderson RN  Outcome: Completed  11/27/2022 0933 by Zachariah Crawford RN  Outcome: Progressing     Problem: Safety - Medical Restraint  Goal: Remains free of injury from restraints (Restraint for Interference with Medical Device)  Description: INTERVENTIONS:  1. Determine that other, less restrictive measures have been tried or would not be effective before applying the restraint  2. Evaluate the patient's condition at the time of restraint application  3. Inform patient/family regarding the reason for restraint  4. Q2H: Monitor safety, psychosocial status, comfort, nutrition and hydration  11/27/2022 1534 by Aleida Lim RN  Outcome: Completed  11/27/2022 0933 by Zachariah Crawford RN  Outcome: Progressing     Problem: Safety - Violent/Self-destructive Restraint  Goal: Remains free of injury from restraints (Restraint for Violent/Self-Destructive Behavior)  Description: INTERVENTIONS:  1. Determine that de-escalation and other, less restrictive measures have been tried or would not be effective before applying the restraint  2. Identify and document the criteria for restraint  3. Evaluate the patient's condition at the time of restraint application  4. Inform patient/family regarding the reason for restraint/seclusion  5. Q2H: Monitor comfort, nutrition and hydration needs  6. Q15M: Perform safety checks including skin, circulation, sensory, respiratory and psychological status  7. Ensure continuous observation  8.  Identify and implement measures to help patient regain control, assess readiness for release and initiate progressive release per policy  [id number]/[id number] by Aleida Lim RN  Outcome: Completed  11/27/2022 0933 by Zachariah Crawford RN  Outcome: Progressing     Problem: Safety - Adult  Goal: Free from fall injury  11/27/2022 1534 by Aleida Lim RN  Outcome: Completed  11/27/2022 0933 by Zachariah Crawford RN  Outcome: Progressing     Problem: Chronic Conditions and Co-morbidities  Goal: Patient's chronic conditions and co-morbidity symptoms are monitored and maintained or improved  Outcome: Completed

## 2022-11-28 NOTE — ADT AUTH CERT
Utilization Reviews       Neurology [address] Day 4 (11/25/2022) by Lisandro Street RN       Review Status Review Entered   Completed 11/25/2022 1537       Created By   Lisandro Street RN      Criteria Review      Care Day: 4 Care Date: 11/25/2022 Level of Care: Telemetry    Guideline Day 3    Level Of Care    ( ) * Activity level acceptable    (X) Floor to discharge    11/25/2022 3:37 PM EST by Ruma Christel      teleemtry    Clinical Status    (X) * Mental status at baseline or stable and appropriate for next level of care    11/25/2022 3:37 PM EST by Ruma Christel      Aox1. Awake and answering questions appropriately.    hx of dementia    (X) * Neurologic deficits absent or stable and appropriate for next level of care    11/25/2022 3:37 PM EST by Ruma Christel      none ntoed    (X) * Motor deficits absent or acceptable for next level of care    11/25/2022 3:37 PM EST by Ruma Christel      no weakness    (X) * Seizures absent or managed    11/25/2022 3:37 PM EST by Ruma Christel      none noted    (X) * No infection, or status acceptable    11/25/2022 3:37 PM EST by Ruma Christel      UA neg for infection    (X) * Isolation not needed, or status acceptable    11/25/2022 3:37 PM EST by Ruma Christel      not needed    (X) * Respiratory status acceptable    11/25/2022 3:37 PM EST by Ruma Christel      RR 12-16    (X) * Pain and nausea absent or adequately managed    11/25/2022 3:37 PM EST by Ruma Christel      none ntoed    ( ) * General Discharge Criteria met    Interventions    (X) * Intake acceptable    11/25/2022 3:37 PM EST by Ruma Christel      regular diet    ( ) * No inpatient interventions needed    11/25/2022 3:37 PM EST by Ruma Christel      Lasix 20mg IV x 1  NS 75ml/hr  sitter at bedside    * Milestone   Additional Notes   DATE: 11/25/2022 Care day 4, IP day 1         RELEVANT BASELINES: (lab values, vitals, o2 amount/delivery, etc.)   Baseline dementia PERTINENT UPDATES:   Patient was playing with her Gaspar catheter this morning. Patient calmer today. Aox1. Awake and answering questions appropriately.     EKG in the morning to assess Qtc, replete lytes as needed   Lasix IV 20 mg once    Valproic acid level tomorrow         VITALS:   98.9, HR 84, RR 16, /56, 94% on RA      ABNL/PERTINENT LABS/RADIOLOGY/DIAGNOSTIC STUDIES:   Glucose 125   HGB 10.5   HCT 34.5      EKG: Sinus rhythm with PAC's         PHYSICAL EXAM:   General: Aox1, NAD   Eyes: EOMI   HENT: Atraumatic   Respiratory: no respiratory distress   GI: Normal bowel sounds, soft, nondistended, tenderness present      MD CONSULTS/ASSESSMENT AND PLAN:   Imnote:   #AMS with hallucinations, no clear etiology   -Ddx: acute delirium, polypharmacy, UTI, urinary retention    -h/o likely dementia   -hallucinations, intermittent confusion    -Has seen in neurology in the past   -per granddaughter--> Started antibiotics prior to admission due to urinary frequency and dysuria and home UA with blood   -UA: negative for infection (likely because patient has been on antibiotics)   -CXR: no acute findings   -CT head: no acute intracranial findings, atrophy and chronic small vessel ischemic changes   -VBG with normal pCO2 level   -Normal TSH, ammonia level   -low procal    -Neurology consulted--> signed off, likely not a primary neuro process   -CXR showing hypoventilation and possible pulmonary edema       Plan:   Psych consulted, appreciate recs   Vitamin B12 level   Follow up on urine and blood culture    EKG in the morning to assess Qtc, replete lytes as needed   Lasix IV 20 mg once    Valproic acid level tomorrow      MEDICATIONS:   Eliquis 5mg PO BID   Lipitor 40mg PO nightly   Spiriva daily      Orders: regular low sodium diet, gaspar, sitter, up with assist           PA letter of status determination by Michelle Smith RN       Review Status Review Entered   In Primary 11/25/2022 4183       Created By Loco Garcia RN      Criteria Review   We recommend that the following pt's current hospitalization under OBSERVATION status is upgraded to INPATIENT; if you agree, please place a new ADMIT order in [facility] as recommended. .       Name: Amador Cueva   : 1931   CSN: [id number]   INSURANCE: [facility] Medicare        Clinical summary Assessment and Plan:  Amador Cueva is a 80 y.o. female with PMH of COPD, GERD, DVT, RLS, chronic ischemic heart disease, and dementia who presents with Altered mental status, unspecified     #AMS with hallucinations, no clear etiology  -Ddx: acute delirium, polypharmacy, UTI, urinary retention   -h/o likely dementia  -hallucinations, intermittent confusion   -Has seen in neurology in the past  -per granddaughter--> Started antibiotics prior to admission due to urinary frequency and dysuria and home UA with blood  -UA: negative for infection (likely because patient has been on antibiotics)  -CXR: no acute findings  -CT head: no acute intracranial findings, atrophy and chronic small vessel ischemic changes  -VBG with normal pCO2 level  -Normal TSH, ammonia level  -low procal   -Neurology consulted--> signed off, likely not a primary neuro process     Plan:  Psych consulted, appreciate recs  Vitamin B12 level  Follow up on urine and blood culture   EKG in the morning to assess Qtc, replete lytes as needed  CXR      Per psych:  1. As qtc is  584, do NOT recommend antipsychotics at this time ; recommend continuing to monitor qtc.   2. When below 500 can schedule risperdal 0.5 mg po at bedtime to address hallucinations  3. Review of labs. Note  , platelets 808 on , urine pending  4. If patient is agitated, again avoid antipsychotics. Can use depakote sprinkles 125 mg  po prn daily. 5. Recommended depakote sprinkles 125 mg po daily and 250 mg po at bedtime for mood and agitation  6.  If patient becomes agitated in the evening before nighttime dose of depakote, can add depakote 125 mg sprinkles at dinner time to assist.     #Urinary retention  -bladder with 800 mL      Plan:   Peterson catheter     #Lactic acidosis-resolved  -unknown cause  -LA: 3.1--> 1.1     #Hypokalemia     Plan:  Replete as necessary     #Elevated troponin in the setting of demand ischemia-resolved  -cardiology was consulted--> not ACS     Chronic medical problems:   #Legally blind     #COPD  -in hospice for end stage COPD  Continue home inhalers     #GERD  Continue PPI     #Chronic lacunar bilateral cerebellar infarcts  Continue eliquis and statin     #h/o DVT  Continue eliquis     #RLS  #Essential tremor  Continue requip and horizant     #CAD  -Echo with EF 50-55% in 2021  -stress test 11/2020 shows no infarct or ischemia     Of note, patient is on hospice for end-stage COPD--> will return when she is done with this hospitalization  Granddaughter is apparently POA per outside ED records, she did not present paperwork at the time    [address] and Imaging reviewed  MCG criteria applies yes,   Comments Rec upgrade to inpt      This chart was reviewed at 8:05 AM 11/25/2022 2000 [facility]   CELL : [phone]

## 2022-11-29 NOTE — DISCHARGE SUMMARY
V2.0  Discharge Summary    Name:  Kranthi Fernandez /Age/Sex: 1931 (68 y.o. female)   Admit Date: 2022  Discharge Date: 22    MRN & CSN:  [id number] & [id number] Encounter Date and Time 22 2:20 PM EST    Attending:  No att. providers found Discharging Provider: Mabel Martinez MD       Hospital Course:     Brief HPI: Kranthi Fernandez is a 80 y.o. female who presented with increasing confusion and hallucinations. CT head without acute process. CXR without any acute process. Found to have a UTI. Treated for 3 days of ceftriaxone. Cardiology consulted for elevated troponin, said ti was not in the setting of ACS. Neurology was consulted and they suspected that the AMS was not due to an underlying neurological process and likely due to progression of underlying dementia. Psychiatry was consulted and they changed her psych medications. Throughout her course, patient's mentation was waxing and waning in terms of agitation and hallucinations. Patient came from home with hospice for end stage COPD and was stable on discharge back home with hospice.      Brief Problem Based Course:     #AMS with hallucinations, no clear etiology  -Ddx: acute delirium, polypharmacy, UTI, urinary retention   -h/o likely dementia  -hallucinations, intermittent confusion   -Has seen in neurology in the past  -per granddaughter--> Started antibiotics prior to admission due to urinary frequency and dysuria and home UA with blood  -UA: negative for infection (likely because patient has been on antibiotics)  -CXR: no acute findings  -CT head: no acute intracranial findings, atrophy and chronic small vessel ischemic changes  -VBG with normal pCO2 level  -Normal TSH, ammonia level  -low procal   -Neurology consulted--> signed off, likely not a primary neuro process  -CXR showing hypoventilation and possible pulmonary edema  -Negative rapid flu and resp PCR  -Qtc 453    Plan  Per psych:  As qtc is  584, do NOT recommend antipsychotics at this time ; recommend continuing to monitor qtc. When below 500 can schedule risperdal 0.5 mg po at bedtime to address hallucinations  Review of labs. Note  , platelets 186 on 84/91/32, urine pending  If patient is agitated, again avoid antipsychotics. Can use depakote sprinkles 125 mg  po prn daily. Recommended depakote sprinkles 125 mg po daily and 250 mg po at bedtime for mood and agitation  If patient becomes agitated in the evening before nighttime dose of depakote, can add depakote 125 mg sprinkles at dinner time to assist.    #Urinary retention  -bladder with 800 mL     Plan: Peterson catheter    #Lactic acidosis-resolved  -unknown cause  -LA: 3.1--> 1.1    #Hypokalemia    Plan:  Replete as necessary    #Elevated troponin in the setting of demand ischemia-resolved  -cardiology was consulted--> not ACS    Chronic medical problems:   #Legally blind    #COPD  -in hospice for end stage COPD  Continue home inhalers    #GERD  Continue PPI    #Chronic lacunar bilateral cerebellar infarcts  Continue eliquis and statin    #h/o DVT  Continue eliquis    #RLS  #Essential tremor  Continue requip and horizant    #CAD  -Echo with EF 50-55% in 2021  -stress test 11/2020 shows no infarct or ischemia        The patient expressed appropriate understanding of, and agreement with the discharge recommendations, medications, and plan.      Consults this admission:  IP CONSULT TO NEUROLOGY  IP CONSULT TO PSYCHIATRY  IP CONSULT TO SPIRITUAL SERVICES  IP CONSULT TO CASE MANAGEMENT  IP CONSULT TO CARDIOLOGY    Discharge Diagnosis:   Altered mental status, unspecified    UTI  Dementia  End-stage COPD  Type II NSTEMI    Discharge Instruction:   Follow up appointments: NA  Primary care physician: DUANE Garsia CNP within 2 weeks  Diet: regular diet   Activity: activity as tolerated  Disposition: Discharged to:   [x]Home, [][facility], []SNF, []Acute Rehab, [x]Hospice   Condition on discharge: Stable  Labs and Tests to be Followed up as an outpatient by PCP or Specialist: na    Discharge Medications:        Medication List        START taking these medications      * divalproex 125 MG capsule  Commonly known as: DEPAKOTE SPRINKLE  Take 2 capsules by mouth at bedtime     * divalproex 125 MG capsule  Commonly known as: DEPAKOTE SPRINKLE  Take 1 capsule by mouth daily as needed (for acute agitation)     * divalproex 125 MG capsule  Commonly known as: DEPAKOTE SPRINKLE  Take 1 capsule by mouth daily     pantoprazole 40 MG tablet  Commonly known as: PROTONIX  Take 1 tablet by mouth every morning (before breakfast)           * This list has 3 medication(s) that are the same as other medications prescribed for you. Read the directions carefully, and ask your doctor or other care provider to review them with you.                 CONTINUE taking these medications      Acetaminophen 650 MG Tabs  Take 650 mg by mouth every 4 hours as needed     albuterol sulfate  (90 Base) MCG/ACT inhaler  Commonly known as: PROVENTIL;VENTOLIN;PROAIR     apixaban 5 MG Tabs tablet  Commonly known as: ELIQUIS     aspirin 81 MG chewable tablet  Take 1 tablet by mouth daily     atorvastatin 40 MG tablet  Commonly known as: LIPITOR  Take 1 tablet by mouth nightly     Baclofen 5 MG tablet  Commonly known as: LIORESAL  Take 1 tablet by mouth 2 times daily     dorzolamide 2 % ophthalmic solution  Commonly known as: TRUSOPT  Place 1 drop into both eyes 3 times daily     folic acid-pyridoxine-cyancobalamin 1.13-25-2 MG Tabs  Take 1 tablet by mouth daily     furosemide 20 MG tablet  Commonly known as: LASIX     Horizant 600 MG Tbcr  Generic drug: Gabapentin Enacarbil ER     ipratropium-albuterol 0.5-2.5 (3) MG/3ML Soln nebulizer solution  Commonly known as: DUONEB  Inhale 3 mLs into the lungs every 4 hours as needed for Shortness of Breath     latanoprost 0.005 % ophthalmic solution  Commonly known as: XALATAN  Place 1 drop into both eyes nightly magnesium hydroxide 400 MG/5ML suspension  Commonly known as: MILK OF MAGNESIA     memantine 10 MG tablet  Commonly known as: NAMENDA  Take 0.5 tablets by mouth 2 times daily     PARoxetine 20 MG tablet  Commonly known as: PAXIL  Take 1 tablet by mouth daily     rOPINIRole 0.5 MG tablet  Commonly known as: REQUIP  Take 10 tablets by mouth 3 times daily     tiotropium 18 MCG inhalation capsule  Commonly known as: SPIRIVA     vitamin B-12 1000 MCG tablet  Commonly known as: CYANOCOBALAMIN     vitamin D 25 MCG (1000 UT) Caps  Take 1 capsule by mouth daily            STOP taking these medications      hydrALAZINE 50 MG tablet  Commonly known as: APRESOLINE               Where to Get Your Medications        These medications were sent to [address] [phone]  [address] [place] [id number]      Phone: [phone]   divalproex 125 MG capsule  divalproex 125 MG capsule  divalproex 125 MG capsule  pantoprazole 40 MG tablet       Information about where to get these medications is not yet available    Ask your nurse or doctor about these medications  memantine 10 MG tablet  rOPINIRole 0.5 MG tablet        Objective Findings at Discharge:   BP (!) 100/46   Pulse 71   Temp 97.4 °F (36.3 °C) (Oral)   Resp 14   Ht 5' (1.524 m)   Wt 154 lb 15.7 oz (70.3 kg)   SpO2 95%   BMI 30.27 kg/m²       Physical Exam:   General: Aox2-3, NAD  Eyes: EOMI  HENT: Atraumatic  Respiratory: no respiratory distress  GI: nondistended  MSK: no lower extremity edema  Skin: Intact, dry, warm, no rashes  Labs and Imaging   XR CHEST PORTABLE    Result Date: 11/24/2022  EXAMINATION: ONE XRAY VIEW OF THE CHEST 11/24/2022 12:09 pm COMPARISON: Chest x-ray November 22, 2022 HISTORY: ORDERING SYSTEM PROVIDED HISTORY: AMS for unknown reason TECHNOLOGIST PROVIDED HISTORY: Reason for exam:->AMS for unknown reason Reason for Exam: AMS for unknown reason Additional signs and symptoms: AMS for unknown reason Relevant Medical/Surgical History: AMS for unknown reason FINDINGS: Cardiac silhouette is enlarged but stable. Atherosclerosis of the aorta. Central pulmonary vascular congestion and interstitial opacities favored to reflect edema. No well-defined consolidation. No pleural effusion. No pneumothorax. Lungs are hypoventilatory. Osseous structures appear stable. 1. Hypoventilatory chest with findings favored to reflect mild pulmonary edema. CBC: No results for input(s): WBC, HGB, PLT in the last 72 hours. BMP:  No results for input(s): NA, K, CL, CO2, BUN, CREATININE, GLUCOSE in the last 72 hours. Hepatic: No results for input(s): AST, ALT, ALB, BILITOT, ALKPHOS in the last 72 hours. Lipids:   Lab Results   Component Value Date/Time    CHOL 182 11/12/2020 04:23 AM    HDL 59 11/12/2020 04:23 AM    TRIG 105 11/12/2020 04:23 AM     Hemoglobin A1C:   Lab Results   Component Value Date/Time    LABA1C 5.4 09/22/2020 12:35 PM     TSH: No results found for: TSH  Troponin:   Lab Results   Component Value Date/Time    TROPONINT <0.010 11/22/2022 05:08 PM    TROPONINT <0.010 11/22/2022 12:50 PM    TROPONINT <0.010 04/13/2022 11:01 PM     Lactic Acid: No results for input(s): LACTA in the last 72 hours. BNP: No results for input(s): PROBNP in the last 72 hours.   UA:  Lab Results   Component Value Date/Time    NITRU POSITIVE 04/13/2022 11:40 PM    NITRU Negative 03/03/2020 08:11 PM    NITRU NEGATIVE 06/17/2013 08:31 PM    COLORU YELLOW 04/13/2022 11:40 PM    PHUR 6.0 03/03/2020 08:11 PM    PHUR 6.0 03/03/2020 08:11 PM    WBCUA 24 10/12/2021 03:48 PM    RBCUA 4 10/12/2021 03:48 PM    MUCUS RARE 11/15/2020 07:00 AM    TRICHOMONAS NONE SEEN 01/24/2021 03:13 AM    BACTERIA MANY 10/12/2021 03:48 PM    CLARITYU CLEAR 04/13/2022 11:40 PM    SPECGRAV 1.015 04/13/2022 11:40 PM    LEUKOCYTESUR LARGE NUMBER OR AMOUNT OF 04/13/2022 11:40 PM    UROBILINOGEN 0.2 04/13/2022 11:40 PM    BILIRUBINUR NEGATIVE 04/13/2022 11:40 PM    BLOODU SMALL NUMBER OR AMOUNT OBSERVED 04/13/2022 11:40 PM    GLUCOSEU Negative 03/03/2020 08:11 PM    Ty Venu NEGATIVE 04/13/2022 11:40 PM     Urine Cultures: No results found for: Yusuf Narayanan  Blood Cultures: No results found for: BC  No results found for: BLOODCULT2  Organism:   Lab Results   Component Value Date/Time    [facility] 08/24/2018 06:15 PM       Time Spent Discharging patient >31 minutes    Electronically signed by Eric Caicedo MD on 11/29/2022 at 2:31 PM

## 2022-12-21 NOTE — PROGRESS NOTES
Physician Progress Note      Jessica Faye  CSN #:                  [id number]  :                       1931  ADMIT DATE:       2022 9:27 AM  Steffen Healy DATE:        2022 5:28 PM  RESPONDING  PROVIDER #:        Geovanna Guallpa MD          QUERY TEXT:    Pt admitted with AMS. Pt noted to have delirium, likely dementia, metabolic   and toxic encephalopathy. If possible, please document in progress notes and   discharge summary to further specify the cause of AMS. The medical record reflects the following:  Risk Factors: Urine retention, likely dementia, age, UTi  Clinical Indicators: DC summary \"AMS with hallucinations, no clear etiology\",   psych consult \"delirieum d/t general medical, psychosis\", neuro consult \"Do   not feel that there is any acute primary neurologic process. Suspect patient   is having progression of underlying dementia. ........... Levi Summers Do suspect that this   is likely a toxic encephalopathy in the setting of an underlying UTI. It is   my understanding that urine cultures are pending as patient was recently   started on antibiotics. Unclear which antibiotic she was on but certainly a   antibiotic toxicity \", Urine Cx on 22\"  Treatment: Avoid antipsychotics, depakote PRN, antibiotics, neuro and psych   consult. Thank you,  ARIANNA LauN, RN, [facility]  [phone]  Options provided:  -- AMS due to delirium with likely dementia and encephalopathy ruled out  -- AMS due to toxic encephalopathy  -- AMS due to metabolic encephalopathy  -- Other - I will add my own diagnosis  -- Disagree - Not applicable / Not valid  -- Disagree - Clinically unable to determine / Unknown  -- Refer to Clinical Documentation Reviewer    PROVIDER RESPONSE TEXT:    AMS likely in the setting of dementia, hospitalization and cefepime    Query created by: Lyn Hidalgo on 2022 1:41 PM      QUERY TEXT:    Patient admitted with AMS.   Noted documentation of type II MI in DC summary. In order to support the diagnosis of type II MI, please refer to 4th universal   definition of MI below and include additional clinical indicators in your   documentation. ? Or please document if the diagnosis of type II MI has been   ruled out after study. The medical record reflects the following:  ?? Risk Factors: Age  ?? Clinical Indicators: Cardiology consult on 11/23 \"After review of medical   record discussion with staff, cardiac consult was called for elevated but her   troponin is normal\" and \"no signs of ACS\", 11/23 note \"Elevated troponin in   the setting of demand ischemia\", DC summary \"Type II NSTEMI\", trop <0.010 x 2.  ?? Treatment: Cardiology consult, troponin x 2. Fourth Universal Definition of Myocardial Infarction:  Clearly separates MI   from myocardial injury. Patients with elevated blood troponin levels but   without clinical evidence of ischemia are said to have had a myocardial   injury. ? To have a myocardial infarction requires both an elevated troponin   blood test along with at least one of the following:  - Symptoms of acute myocardial ischemia (Types 1 - 5 MI)  - Clinical evidence of ischemia, as evidenced in an electrocardiogram (EKG)   showing new ischemic changes (Type 1, Type 2, Type 3, or Type 4a MI)  - Development of pathological Q waves (Types 1 - 5 MI)  - Imaging evidence of new loss of viable myocardium or new regional wall   motion abnormality in a pattern consistent with an ischemic etiology (Types 1   - 5 MI)      Thank you,  TRINI Dyson, RN, [facility]  [phone]  Options provided:  -- MI type II and demand ischemia ruled out  -- MI type II present as evidenced by  -- MI type II ruled out after study and demand ischemia due  -- Other - I will add my own diagnosis  -- Disagree - Not applicable / Not valid  -- Disagree - Clinically unable to determine / Unknown  -- Refer to Clinical Documentation Reviewer    PROVIDER RESPONSE TEXT:    Myocardial injury due to demand ischemia    Query created by: Catrina Resendiz on 12/20/2022 1:41 PM      Electronically signed by:  Leroy Perdomo MD 12/21/2022 3:54 PM